# Patient Record
Sex: MALE | Race: BLACK OR AFRICAN AMERICAN | NOT HISPANIC OR LATINO | Employment: FULL TIME | URBAN - METROPOLITAN AREA
[De-identification: names, ages, dates, MRNs, and addresses within clinical notes are randomized per-mention and may not be internally consistent; named-entity substitution may affect disease eponyms.]

---

## 2017-01-03 ENCOUNTER — LAB VISIT (OUTPATIENT)
Dept: LAB | Facility: HOSPITAL | Age: 52
End: 2017-01-03
Attending: FAMILY MEDICINE
Payer: COMMERCIAL

## 2017-01-03 ENCOUNTER — OFFICE VISIT (OUTPATIENT)
Dept: FAMILY MEDICINE | Facility: CLINIC | Age: 52
End: 2017-01-03
Payer: COMMERCIAL

## 2017-01-03 VITALS
HEART RATE: 63 BPM | WEIGHT: 315 LBS | HEIGHT: 78 IN | DIASTOLIC BLOOD PRESSURE: 70 MMHG | RESPIRATION RATE: 18 BRPM | SYSTOLIC BLOOD PRESSURE: 138 MMHG | BODY MASS INDEX: 36.45 KG/M2 | OXYGEN SATURATION: 98 % | TEMPERATURE: 97 F

## 2017-01-03 DIAGNOSIS — B35.1 TOENAIL FUNGUS: ICD-10-CM

## 2017-01-03 DIAGNOSIS — I10 ESSENTIAL HYPERTENSION: ICD-10-CM

## 2017-01-03 DIAGNOSIS — J40 SINOBRONCHITIS: Primary | ICD-10-CM

## 2017-01-03 DIAGNOSIS — H65.111 ACUTE ALLERGIC OTITIS MEDIA OF RIGHT EAR, RECURRENCE NOT SPECIFIED: ICD-10-CM

## 2017-01-03 DIAGNOSIS — J32.9 SINOBRONCHITIS: Primary | ICD-10-CM

## 2017-01-03 PROCEDURE — 99999 PR PBB SHADOW E&M-EST. PATIENT-LVL III: CPT | Mod: PBBFAC,,, | Performed by: FAMILY MEDICINE

## 2017-01-03 PROCEDURE — 3075F SYST BP GE 130 - 139MM HG: CPT | Mod: S$GLB,,, | Performed by: FAMILY MEDICINE

## 2017-01-03 PROCEDURE — 96372 THER/PROPH/DIAG INJ SC/IM: CPT | Mod: S$GLB,,, | Performed by: FAMILY MEDICINE

## 2017-01-03 PROCEDURE — 36415 COLL VENOUS BLD VENIPUNCTURE: CPT | Mod: PO

## 2017-01-03 PROCEDURE — 84450 TRANSFERASE (AST) (SGOT): CPT

## 2017-01-03 PROCEDURE — 99214 OFFICE O/P EST MOD 30 MIN: CPT | Mod: 25,S$GLB,, | Performed by: FAMILY MEDICINE

## 2017-01-03 PROCEDURE — 1159F MED LIST DOCD IN RCRD: CPT | Mod: S$GLB,,, | Performed by: FAMILY MEDICINE

## 2017-01-03 PROCEDURE — 3078F DIAST BP <80 MM HG: CPT | Mod: S$GLB,,, | Performed by: FAMILY MEDICINE

## 2017-01-03 PROCEDURE — 84460 ALANINE AMINO (ALT) (SGPT): CPT

## 2017-01-03 RX ORDER — AMOXICILLIN AND CLAVULANATE POTASSIUM 875; 125 MG/1; MG/1
1 TABLET, FILM COATED ORAL 2 TIMES DAILY
Qty: 20 TABLET | Refills: 0 | Status: SHIPPED | OUTPATIENT
Start: 2017-01-03 | End: 2017-01-13

## 2017-01-03 RX ORDER — BENZONATATE 100 MG/1
100 CAPSULE ORAL 3 TIMES DAILY PRN
Qty: 30 CAPSULE | Refills: 0 | Status: SHIPPED | OUTPATIENT
Start: 2017-01-03 | End: 2017-01-13

## 2017-01-03 RX ORDER — TERBINAFINE HYDROCHLORIDE 250 MG/1
250 TABLET ORAL DAILY
Qty: 30 TABLET | Refills: 2 | Status: SHIPPED | OUTPATIENT
Start: 2017-01-03 | End: 2017-02-02

## 2017-01-03 RX ORDER — BETAMETHASONE SODIUM PHOSPHATE AND BETAMETHASONE ACETATE 3; 3 MG/ML; MG/ML
12 INJECTION, SUSPENSION INTRA-ARTICULAR; INTRALESIONAL; INTRAMUSCULAR; SOFT TISSUE
Status: COMPLETED | OUTPATIENT
Start: 2017-01-03 | End: 2017-01-03

## 2017-01-03 RX ORDER — FLUTICASONE PROPIONATE 50 MCG
2 SPRAY, SUSPENSION (ML) NASAL DAILY PRN
Qty: 16 G | Refills: 1 | Status: SHIPPED | OUTPATIENT
Start: 2017-01-03 | End: 2017-03-08 | Stop reason: SDUPTHER

## 2017-01-03 RX ADMIN — BETAMETHASONE SODIUM PHOSPHATE AND BETAMETHASONE ACETATE 12 MG: 3; 3 INJECTION, SUSPENSION INTRA-ARTICULAR; INTRALESIONAL; INTRAMUSCULAR; SOFT TISSUE at 04:01

## 2017-01-03 NOTE — MR AVS SNAPSHOT
[facility]  [address]  [place] [address]  Phone: [phone]                  Reggie Sawant   1/3/2017 4:00 PM   Office Visit    Description:  Male : 1965   Provider:  Irene Sanchez MD   Department:  [facility]           Reason for Visit     URI           Diagnoses this Visit        Comments    Sinobronchitis    -  Primary     Essential hypertension         Toenail fungus                To Do List           Future Appointments        Provider Department Dept Phone    2017 9:00 AM Elizabeth Lejeune, NP [facility] [phone]    3/14/2017 8:00 AM Irene Sanchez MD [facility] [phone]      Goals (5 Years of Data)     None       These Medications        Disp Refills Start End    amoxicillin-clavulanate 875-125mg (AUGMENTIN) 875-125 mg per tablet 20 tablet 0 1/3/2017 2017    Take 1 tablet by mouth 2 (two) times daily. - Oral    Pharmacy: [place] [address] [address] Ph #: [phone]       benzonatate (TESSALON) 100 MG capsule 30 capsule 0 1/3/2017 2017    Take 1 capsule (100 mg total) by mouth 3 (three) times daily as needed. - Oral    Pharmacy: [facility] [place] [address] [address] Ph #: [phone]       fluticasone (FLONASE) 50 mcg/actuation nasal spray 16 g 1 1/3/2017     2 sprays by Each Nare route daily as needed. - Each Nare    Pharmacy: [address] Ph #: [phone]       terbinafine HCl (LAMISIL) 250 mg tablet 30 tablet 2 1/3/2017 2017    Take 1 tablet (250 mg total) by mouth once daily. - Oral    Pharmacy: [facility] [address] Ph #: [phone]         [facility]     [facility] -   Assistance  Registered nurses in the [facility] provide clinical advisement, health education, appointment booking, and other advisory services.  Call for this free service at [phone].             Medications           Message regarding Medications     Verify the changes and/or additions to your medication regime listed below are the same as discussed with your clinician today.  If any of these changes or additions are incorrect, please notify your healthcare provider.        START taking these NEW medications        Refills    amoxicillin-clavulanate 875-125mg (AUGMENTIN) 875-125 mg per tablet 0    Sig: Take 1 tablet by mouth 2 (two) times daily.    Class: Normal    Route: Oral    benzonatate (TESSALON) 100 MG capsule 0    Sig: Take 1 capsule (100 mg total) by mouth 3 (three) times daily as needed.    Class: Normal    Route: Oral    fluticasone (FLONASE) 50 mcg/actuation nasal spray 1    Si sprays by Each Nare route daily as needed.    Class: Normal    Route: Each Nare      These medications were administered today        Dose Freq    betamethasone acetate-betamethasone sodium phosphate injection 12 mg 12 mg [facility] 1 time    Sig: Inject 2 mLs (12 mg total) into the muscle one time.    Class: Normal    Route: Intramuscular      CHANGE how you are taking these medications     Start Taking Instead of    terbinafine HCl (LAMISIL) 250 mg tablet terbinafine (LAMISIL) 250 mg tablet    Dosage:  Take 1 tablet (250 mg total) by mouth once daily. Dosage:  Take 1 tablet (250 mg total) by mouth once daily.    Reason for Change:  Reorder            Verify that the below list of medications is an accurate representation of the medications you are currently taking.  If none reported, the list may be blank. If incorrect, please contact your healthcare provider. Carry this list with you in case of emergency.           Current Medications     amlodipine-benazepril (LOTREL) 10-40 mg per capsule TAKE ONE  CAPSULE BY MOUTH ONCE DAILY    aspirin (ASPIRIN LOW DOSE) 81 MG EC tablet Take 1 tablet by mouth Daily.    chlorthalidone (HYGROTEN) 50 MG Tab Take 1 tablet (50 mg total) by mouth once daily.    meloxicam (MOBIC) 15 MG tablet TAKE 1 TABLET BY MOUTH ONCE DAILY    metoprolol succinate (TOPROL-XL) 100 MG 24 hr tablet TAKE 1 TABLET BY MOUTH ONCE DAILY FOR BLOOD PRESSURE    multivitamin (ONE DAILY MULTIVITAMIN) per tablet Take 1 tablet by mouth once daily.    naproxen (NAPROSYN) 500 MG tablet Take 1 tablet (500 mg total) by mouth 2 (two) times daily as needed.    potassium chloride SA (K-DUR,KLOR-CON) 20 MEQ tablet Take 1 tablet (20 mEq total) by mouth once daily.    pravastatin (PRAVACHOL) 20 MG tablet TAKE 1 TABLET BY MOUTH ONCE DAILY    tadalafil (CIALIS) 20 MG Tab TAKE 1 TABLET BY MOUTH AS NEEDED    testosterone cypionate (DEPOTESTOTERONE CYPIONATE) 200 mg/mL injection INJECT 1 milliliter INTRAMUSCULARLY EVERY 14 days. PATIENT NEEDS TO BE SEEN FOR FURTHER REFILLS    trazodone (DESYREL) 50 MG tablet Take 1 tablet (50 mg total) by mouth every evening.    triamterene-hydrochlorothiazide 37.5-25 mg (DYAZIDE) 37.5-25 mg per capsule Take 1 capsule by mouth daily as needed (Leg swelling).    amoxicillin-clavulanate 875-125mg (AUGMENTIN) 875-125 mg per tablet Take 1 tablet by mouth 2 (two) times daily.    benzonatate (TESSALON) 100 MG capsule Take 1 capsule (100 mg total) by mouth 3 (three) times daily as needed.    epinephrine (EPIPEN JR) 0.15 mg/0.3 mL (1:2,000) pen injection Inject 0.3 mLs (0.15 mg total) into the muscle as needed for Anaphylaxis.    fluticasone (FLONASE) 50 mcg/actuation nasal spray 2 sprays by Each Nare route daily as needed.    terbinafine HCl (LAMISIL) 250 mg tablet Take 1 tablet (250 mg total) by mouth once daily.           Clinical Reference Information           Vital Signs - Last Recorded  Most recent update: 1/3/2017  4:23 PM by Angi Nunez LPN    BP Pulse Temp Resp Ht Wt    138/70 63  "97 °F (36.1 °C) (Tympanic) 18 6' 6" (1.981 m) (!) 164.3 kg (362 lb 3.5 oz)    SpO2 BMI             98% 41.86 kg/m2         Blood Pressure          Most Recent Value    BP  138/70      Allergies as of 1/3/2017     No Known Allergies      Immunizations Administered on Date of Encounter - 1/3/2017     None      Orders Placed During Today's Visit     Future Labs/Procedures Expected by Expires    ALT (SGPT)  1/3/2017 3/4/2018    AST (SGOT)  1/3/2017 3/4/2018      Administrations This Visit     betamethasone acetate-betamethasone sodium phosphate injection 12 mg     Admin Date Action Dose Route Administered By             01/03/2017 Given 12 mg Intramuscular Angi Nunez LPN                      Instructions    Please correspond with Dr. Sanchez via My Chart for results.    Thanks.       "

## 2017-01-03 NOTE — PROGRESS NOTES
Subjective:       Patient ID: Reggie Sawant is a 51 y.o. male.    Chief Complaint: URI    HPI Comments: Mr. Sawant, a nonsmoker, comes in today with complaint of having cold symptoms for 7 days.  He reports having fatigue, chest congestion, body aches, bloody nose, postnasal drip, runny nose, sore throat, red eyes with dry cough, loose stools but denies having fever, chills, appetite change, headache, sinus pressure, chest pain, palpitations, sneezing, ocular symptoms, abdominal pain, nausea, vomiting.  He states he has been drinking hot tea with lemon and honey with help but also states he took TheraFlu at the start of his symptoms without help.    He requests Lamisil for toenail fungus.  He states he has used Lamisil in the past (2014) with help but states fungus reoccurs.  He has no history of significant liver disease.    Current Outpatient Prescriptions:  amlodipine-benazepril (LOTREL) 10-40 mg per capsule, TAKE ONE CAPSULE BY MOUTH ONCE DAILY  aspirin (ASPIRIN LOW DOSE) 81 MG EC tablet, Take 1 tablet by mouth Daily.  chlorthalidone (HYGROTEN) 50 MG Tab, Take 1 tablet (50 mg total) by mouth once daily.  meloxicam (MOBIC) 15 MG tablet, TAKE 1 TABLET BY MOUTH ONCE DAILY  metoprolol succinate (TOPROL-XL) 100 MG 24 hr tablet, TAKE 1 TABLET BY MOUTH ONCE DAILY FOR BLOOD PRESSURE  multivitamin (ONE DAILY MULTIVITAMIN) per tablet, Take 1 tablet by mouth once daily.  naproxen (NAPROSYN) 500 MG tablet, Take 1 tablet (500 mg total) by mouth 2 (two) times daily as needed.  potassium chloride SA (K-DUR,KLOR-CON) 20 MEQ tablet, Take 1 tablet (20 mEq total) by mouth once daily.  pravastatin (PRAVACHOL) 20 MG tablet, TAKE 1 TABLET BY MOUTH ONCE DAILY  tadalafil (CIALIS) 20 MG Tab, TAKE 1 TABLET BY MOUTH AS NEEDED  testosterone cypionate (DEPOTESTOTERONE CYPIONATE) 200 mg/mL injection, INJECT 1 milliliter INTRAMUSCULARLY EVERY 14 days. PATIENT NEEDS TO BE SEEN FOR FURTHER REFILLS  trazodone (DESYREL) 50 MG tablet, Take 1  tablet (50 mg total) by mouth every evening.  triamterene-hydrochlorothiazide 37.5-25 mg (DYAZIDE) 37.5-25 mg per capsule, Take 1 capsule by mouth daily as needed (Leg swelling).  epinephrine (EPIPEN JR) 0.15 mg/0.3 mL (1:2,000) pen injection, Inject 0.3 mLs (0.15 mg total) into the muscle as needed for Anaphylaxis.        Review of Systems   Constitutional: Positive for fatigue. Negative for appetite change, chills and fever.   HENT: Positive for congestion, nosebleeds, postnasal drip, rhinorrhea and sore throat. Negative for sinus pressure and sneezing.    Eyes: Positive for redness. Negative for pain, discharge and itching.   Respiratory: Positive for cough. Negative for shortness of breath and wheezing.    Cardiovascular: Negative for chest pain and palpitations.   Gastrointestinal: Positive for diarrhea. Negative for abdominal pain, nausea and vomiting.   Musculoskeletal: Positive for myalgias.   Skin:        See history of present illness.   Neurological: Negative for headaches.       Objective:      Physical Exam   Constitutional: He is oriented to person, place, and time. He appears well-developed and well-nourished. No distress.   Pleasant.   HENT:   Head: Normocephalic and atraumatic.   Right Ear: External ear normal.   Left Ear: External ear normal.   Nose: Nose normal.   Mouth/Throat: Oropharynx is clear and moist. No oropharyngeal exudate.   Non tender sinuses. Right TM red, retracted, dull without drainage noted.  Left TM shiny and clear.  Nasal mucosa congested, pink without drainage noted.   Eyes: Conjunctivae and EOM are normal. Pupils are equal, round, and reactive to light. Right eye exhibits no discharge. Left eye exhibits no discharge.   He wears glasses.   Neck: Normal range of motion. Neck supple. No thyromegaly present.   Cardiovascular: Normal rate, regular rhythm and normal heart sounds.    No murmur heard.  Pulmonary/Chest: Effort normal and breath sounds normal. No respiratory distress.  He has no wheezes.   Abdominal: Soft. Bowel sounds are normal. He exhibits no distension and no mass. There is no tenderness. There is no rebound and no guarding.   Musculoskeletal: Normal range of motion. He exhibits no edema or tenderness.   He is ambulatory without problems.   Lymphadenopathy:     He has no cervical adenopathy.   Neurological: He is alert and oriented to person, place, and time.   Skin: He is not diaphoretic.   Toes not examined today.   Psychiatric: He has a normal mood and affect. His behavior is normal. Judgment and thought content normal.   Vitals reviewed.      Assessment:       1. Sinobronchitis    2. Acute allergic otitis media of right ear, recurrence not specified    3. Essential hypertension    4. Toenail fungus        Plan:       1.  Augmentin 875 mg twice daily for 10 days.  2.  Celestone 12 mg IM x 1 today.  3.  Tessalon Perles 100 mg every 8 hours prn cough, #30, 0 refill.  4.  Flonase 2 sprays each nostril daily prn, #1, 1 refill.    5.  Continue current medications, follow low sodium, low cholesterol, low carb diet, daily walks.  6.  Labs: ALT, AST.  Patient advised to correspond via My Chart for results.  7.  Okay for Lamisil 250 mg daily for 3 months if ALT/AST okay.  8.  Keep 3/14/2017 scheduled physical with me.

## 2017-01-04 LAB
ALT SERPL W/O P-5'-P-CCNC: 39 U/L
AST SERPL-CCNC: 42 U/L

## 2017-01-06 ENCOUNTER — PATIENT MESSAGE (OUTPATIENT)
Dept: FAMILY MEDICINE | Facility: CLINIC | Age: 52
End: 2017-01-06

## 2017-01-10 DIAGNOSIS — R60.0 BILATERAL LOWER EXTREMITY EDEMA: ICD-10-CM

## 2017-01-10 RX ORDER — TRIAMTERENE AND HYDROCHLOROTHIAZIDE 37.5; 25 MG/1; MG/1
CAPSULE ORAL
Qty: 30 CAPSULE | Refills: 5 | Status: SHIPPED | OUTPATIENT
Start: 2017-01-10 | End: 2017-05-11 | Stop reason: SDUPTHER

## 2017-01-26 RX ORDER — PRAVASTATIN SODIUM 20 MG/1
TABLET ORAL
Qty: 30 TABLET | Refills: 5 | Status: SHIPPED | OUTPATIENT
Start: 2017-01-26 | End: 2017-03-27 | Stop reason: SDUPTHER

## 2017-01-26 RX ORDER — AMLODIPINE AND BENAZEPRIL HYDROCHLORIDE 10; 40 MG/1; MG/1
CAPSULE ORAL
Qty: 30 CAPSULE | Refills: 5 | Status: SHIPPED | OUTPATIENT
Start: 2017-01-26 | End: 2017-02-03 | Stop reason: SDUPTHER

## 2017-01-31 ENCOUNTER — PATIENT MESSAGE (OUTPATIENT)
Dept: FAMILY MEDICINE | Facility: CLINIC | Age: 52
End: 2017-01-31

## 2017-02-01 RX ORDER — SILDENAFIL 50 MG/1
50 TABLET, FILM COATED ORAL DAILY PRN
Qty: 30 TABLET | Refills: 0 | Status: SHIPPED | OUTPATIENT
Start: 2017-02-01 | End: 2017-09-20

## 2017-02-03 ENCOUNTER — OFFICE VISIT (OUTPATIENT)
Dept: PULMONOLOGY | Facility: CLINIC | Age: 52
End: 2017-02-03
Payer: COMMERCIAL

## 2017-02-03 VITALS
BODY MASS INDEX: 36.45 KG/M2 | OXYGEN SATURATION: 99 % | RESPIRATION RATE: 18 BRPM | HEIGHT: 78 IN | DIASTOLIC BLOOD PRESSURE: 74 MMHG | HEART RATE: 45 BPM | SYSTOLIC BLOOD PRESSURE: 112 MMHG | WEIGHT: 315 LBS

## 2017-02-03 DIAGNOSIS — G47.33 OSA (OBSTRUCTIVE SLEEP APNEA): Primary | ICD-10-CM

## 2017-02-03 PROCEDURE — 99214 OFFICE O/P EST MOD 30 MIN: CPT | Mod: S$GLB,,, | Performed by: NURSE PRACTITIONER

## 2017-02-03 PROCEDURE — 3074F SYST BP LT 130 MM HG: CPT | Mod: S$GLB,,, | Performed by: NURSE PRACTITIONER

## 2017-02-03 PROCEDURE — 99999 PR PBB SHADOW E&M-EST. PATIENT-LVL IV: CPT | Mod: PBBFAC,,, | Performed by: NURSE PRACTITIONER

## 2017-02-03 PROCEDURE — 3078F DIAST BP <80 MM HG: CPT | Mod: S$GLB,,, | Performed by: NURSE PRACTITIONER

## 2017-02-03 RX ORDER — TADALAFIL 20 MG/1
TABLET, FILM COATED ORAL
COMMUNITY
Start: 2016-12-30 | End: 2017-05-29 | Stop reason: SDUPTHER

## 2017-02-03 NOTE — PROGRESS NOTES
"Subjective:      Patient ID: Reggie Sawant is a 51 y.o. male.    Chief Complaint: Sleep Apnea    HPI Comments: Presents to office for review of AutoPAP therapy. This is replacement. Patient states improved symptoms with use of AutoPAP. Sleeping more soundly. Waking up feeling more refreshed. Improved daytime sleepiness. Patient states he is benefiting from use of the AutoPAP.         Visit Vitals    /74    Pulse (!) 45    Resp 18    Ht 6' 6" (1.981 m)    Wt (!) 163.5 kg (360 lb 7.2 oz)    SpO2 99%    BMI 41.65 kg/m2     Body mass index is 41.65 kg/(m^2).    Review of Systems   Constitutional: Negative.    HENT: Negative.    Respiratory: Negative.    Cardiovascular: Negative.    Musculoskeletal: Negative.    Gastrointestinal: Negative.    Neurological: Negative.    Psychiatric/Behavioral: Negative.      Objective:      Physical Exam   Constitutional: He is oriented to person, place, and time. He appears well-developed and well-nourished.   Obese   HENT:   Head: Normocephalic and atraumatic.   Nose: Nose normal.   Mouth/Throat: Uvula is midline and oropharynx is clear and moist.   Neck: Trachea normal and normal range of motion. Neck supple. No thyroid mass and no thyromegaly present.   Cardiovascular: Normal rate, regular rhythm and normal heart sounds.    Pulmonary/Chest: Effort normal and breath sounds normal. He has no wheezes. He has no rhonchi. He has no rales. Chest wall is not dull to percussion.   Abdominal: Soft. He exhibits no mass. There is no hepatosplenomegaly or splenomegaly. There is no tenderness.   Musculoskeletal: Normal range of motion. He exhibits no edema.   Neurological: He is alert and oriented to person, place, and time.   Skin: Skin is warm and dry.   Psychiatric: He has a normal mood and affect.     Personal Diagnostic Review  Autopap download: Patient wears on average 5 hrs and 30 minutes. Greater than 4 hrs 86.7 % of the time. 90% percentile pressure 11.8 with AHI 2.8 " events/hr      Assessment:       1. DEEPTHI (obstructive sleep apnea)        Outpatient Encounter Prescriptions as of 2/3/2017   Medication Sig Dispense Refill    amlodipine-benazepril (LOTREL) 10-40 mg per capsule TAKE ONE CAPSULE BY MOUTH ONCE DAILY 30 capsule 5    aspirin (ASPIRIN LOW DOSE) 81 MG EC tablet Take 1 tablet by mouth Daily.      chlorthalidone (HYGROTEN) 50 MG Tab Take 1 tablet (50 mg total) by mouth once daily. 30 tablet 6    CIALIS 20 mg Tab       epinephrine (EPIPEN JR) 0.15 mg/0.3 mL (1:2,000) pen injection Inject 0.3 mLs (0.15 mg total) into the muscle as needed for Anaphylaxis. 2 each 6    fluticasone (FLONASE) 50 mcg/actuation nasal spray 2 sprays by Each Nare route daily as needed. 16 g 1    meloxicam (MOBIC) 15 MG tablet TAKE 1 TABLET BY MOUTH ONCE DAILY 30 tablet 0    metoprolol succinate (TOPROL-XL) 100 MG 24 hr tablet TAKE 1 TABLET BY MOUTH ONCE DAILY FOR BLOOD PRESSURE 34 tablet 5    multivitamin (ONE DAILY MULTIVITAMIN) per tablet Take 1 tablet by mouth once daily.      naproxen (NAPROSYN) 500 MG tablet Take 1 tablet (500 mg total) by mouth 2 (two) times daily as needed. 60 tablet 2    potassium chloride SA (K-DUR,KLOR-CON) 20 MEQ tablet Take 1 tablet (20 mEq total) by mouth once daily. 30 tablet 6    pravastatin (PRAVACHOL) 20 MG tablet TAKE 1 TABLET BY MOUTH ONCE DAILY 30 tablet 5    pravastatin (PRAVACHOL) 20 MG tablet TAKE 1 TABLET BY MOUTH ONCE DAILY 30 tablet 5    sildenafil (VIAGRA) 50 MG tablet Take 1 tablet (50 mg total) by mouth daily as needed for Erectile Dysfunction. 30 tablet 0    [] terbinafine HCl (LAMISIL) 250 mg tablet Take 1 tablet (250 mg total) by mouth once daily. 30 tablet 2    testosterone cypionate (DEPOTESTOTERONE CYPIONATE) 200 mg/mL injection INJECT 1 milliliter INTRAMUSCULARLY EVERY 14 days. PATIENT NEEDS TO BE SEEN FOR FURTHER REFILLS 12 mL 0    trazodone (DESYREL) 50 MG tablet Take 1 tablet (50 mg total) by mouth every evening. 30 tablet  11    triamterene-hydrochlorothiazide 37.5-25 mg (DYAZIDE) 37.5-25 mg per capsule TAKE 1 CAPSULE BY MOUTH DAILY AS NEEDED( LEG SWELLING) 30 capsule 5    [DISCONTINUED] amlodipine-benazepril (LOTREL) 10-40 mg per capsule TAKE ONE CAPSULE BY MOUTH ONCE DAILY 30 capsule 5     No facility-administered encounter medications on file as of 2/3/2017.      Orders Placed This Encounter   Procedures    CPAP/BIPAP SUPPLIES     Order Specific Question:   Type of mask:     Answer:   Nasal     Comments:   or FFM     Order Specific Question:   Headgear?     Answer:   Yes     Order Specific Question:   Tubing?     Answer:   Yes     Order Specific Question:   Humidifier chamber?     Answer:   Yes     Order Specific Question:   Chin strap?     Answer:   Yes     Order Specific Question:   Filters?     Answer:   Yes     Order Specific Question:   Length of need (1-99 months):     Answer:   99     Plan:      Doing well on PAP settings. Patient is compliant. Follow up in 12 months with PAP data download or call earlier if any problems.

## 2017-03-09 RX ORDER — FLUTICASONE PROPIONATE 50 MCG
SPRAY, SUSPENSION (ML) NASAL
Qty: 16 G | Refills: 5 | Status: SHIPPED | OUTPATIENT
Start: 2017-03-09 | End: 2018-05-07 | Stop reason: SDUPTHER

## 2017-03-11 ENCOUNTER — PATIENT MESSAGE (OUTPATIENT)
Dept: FAMILY MEDICINE | Facility: CLINIC | Age: 52
End: 2017-03-11

## 2017-03-27 ENCOUNTER — OFFICE VISIT (OUTPATIENT)
Dept: FAMILY MEDICINE | Facility: CLINIC | Age: 52
End: 2017-03-27
Payer: COMMERCIAL

## 2017-03-27 VITALS
OXYGEN SATURATION: 98 % | SYSTOLIC BLOOD PRESSURE: 132 MMHG | RESPIRATION RATE: 18 BRPM | BODY MASS INDEX: 36.45 KG/M2 | TEMPERATURE: 97 F | DIASTOLIC BLOOD PRESSURE: 68 MMHG | WEIGHT: 315 LBS | HEIGHT: 78 IN | HEART RATE: 65 BPM

## 2017-03-27 DIAGNOSIS — E78.5 HYPERLIPIDEMIA, UNSPECIFIED HYPERLIPIDEMIA TYPE: ICD-10-CM

## 2017-03-27 DIAGNOSIS — Z00.00 ANNUAL PHYSICAL EXAM: Primary | ICD-10-CM

## 2017-03-27 DIAGNOSIS — N52.9 ERECTILE DYSFUNCTION, UNSPECIFIED ERECTILE DYSFUNCTION TYPE: ICD-10-CM

## 2017-03-27 DIAGNOSIS — E29.1 HYPOGONADISM MALE: ICD-10-CM

## 2017-03-27 DIAGNOSIS — E66.01 MORBID OBESITY WITH BMI OF 40.0-44.9, ADULT: ICD-10-CM

## 2017-03-27 DIAGNOSIS — G47.30 SLEEP APNEA, UNSPECIFIED TYPE: ICD-10-CM

## 2017-03-27 DIAGNOSIS — I10 ESSENTIAL HYPERTENSION: ICD-10-CM

## 2017-03-27 PROCEDURE — 99999 PR PBB SHADOW E&M-EST. PATIENT-LVL III: CPT | Mod: PBBFAC,,, | Performed by: FAMILY MEDICINE

## 2017-03-27 PROCEDURE — 3075F SYST BP GE 130 - 139MM HG: CPT | Mod: S$GLB,,, | Performed by: FAMILY MEDICINE

## 2017-03-27 PROCEDURE — 99396 PREV VISIT EST AGE 40-64: CPT | Mod: S$GLB,,, | Performed by: FAMILY MEDICINE

## 2017-03-27 PROCEDURE — 3078F DIAST BP <80 MM HG: CPT | Mod: S$GLB,,, | Performed by: FAMILY MEDICINE

## 2017-03-27 RX ORDER — TESTOSTERONE CYPIONATE 200 MG/ML
INJECTION, SOLUTION INTRAMUSCULAR
Qty: 2 ML | Refills: 5 | Status: SHIPPED | OUTPATIENT
Start: 2017-03-27 | End: 2017-06-01 | Stop reason: SDUPTHER

## 2017-03-27 NOTE — MR AVS SNAPSHOT
[facility]  [address] [facility] [address]  Phone: [phone]                  Reggie Sawant   3/27/2017 9:00 AM   Office Visit    Description:  Male : 1965   Provider:  Irene Sanchez MD   Department:  [facility]           Reason for Visit     Annual Exam           Diagnoses this Visit        Comments    Annual physical exam    -  Primary     Essential hypertension         Hyperlipidemia, unspecified hyperlipidemia type         Hypogonadism male         Sleep apnea, unspecified type         Erectile dysfunction, unspecified erectile dysfunction type         Morbid obesity with BMI of 40.0-44.9, adult                To Do List           Future Appointments        Provider Department Dept Phone    3/30/2017 8:10 AM SPECIMEN, [address] [facility] [phone]    3/30/2017 8:20 AM LABORATORY, [address] [facility] [phone]    2017 9:30 AM Irene Sanchez MD [facility] [phone]      Goals (5 Years of Data)     None      Follow-Up and Disposition     Return in about 6 months (around 2017) for blood pressure follow up.       These Medications        Disp Refills Start End    testosterone cypionate (DEPOTESTOTERONE CYPIONATE) 200 mg/mL injection 2 mL 5 3/27/2017     INJECT 1 milliliter INTRAMUSCULARLY EVERY 14 days.    Pharmacy: [facility] [address] [place] [address] AT [address] Ph #: [phone]         [facility]     [facility] -  Assistance  Registered nurses in the [facility] provide clinical advisement, health education, appointment booking, and other advisory services.  Call for this free service at [phone].             Medications           Message regarding Medications     Verify the changes and/or additions to your medication regime listed below are the same  as discussed with your clinician today.  If any of these changes or additions are incorrect, please notify your healthcare provider.        CHANGE how you are taking these medications     Start Taking Instead of    testosterone cypionate (DEPOTESTOTERONE CYPIONATE) 200 mg/mL injection testosterone cypionate (DEPOTESTOTERONE CYPIONATE) 200 mg/mL injection    Dosage:  INJECT 1 milliliter INTRAMUSCULARLY EVERY 14 days. Dosage:  INJECT 1 milliliter INTRAMUSCULARLY EVERY 14 days. PATIENT NEEDS TO BE SEEN FOR FURTHER REFILLS    Reason for Change:  Reorder            Verify that the below list of medications is an accurate representation of the medications you are currently taking.  If none reported, the list may be blank. If incorrect, please contact your healthcare provider. Carry this list with you in case of emergency.           Current Medications     amlodipine-benazepril (LOTREL) 10-40 mg per capsule TAKE ONE CAPSULE BY MOUTH ONCE DAILY    aspirin (ASPIRIN LOW DOSE) 81 MG EC tablet Take 1 tablet by mouth Daily.    chlorthalidone (HYGROTEN) 50 MG Tab Take 1 tablet (50 mg total) by mouth once daily.    CIALIS 20 mg Tab     fluticasone (FLONASE) 50 mcg/actuation nasal spray SHAKE LIQUID AND USE 2 SPRAYS IN EACH NOSTRIL DAILY AS NEEDED    meloxicam (MOBIC) 15 MG tablet TAKE 1 TABLET BY MOUTH ONCE DAILY    metoprolol succinate (TOPROL-XL) 100 MG 24 hr tablet TAKE 1 TABLET BY MOUTH ONCE DAILY FOR BLOOD PRESSURE    multivitamin (ONE DAILY MULTIVITAMIN) per tablet Take 1 tablet by mouth once daily.    naproxen (NAPROSYN) 500 MG tablet Take 1 tablet (500 mg total) by mouth 2 (two) times daily as needed.    potassium chloride SA (K-DUR,KLOR-CON) 20 MEQ tablet Take 1 tablet (20 mEq total) by mouth once daily.    pravastatin (PRAVACHOL) 20 MG tablet TAKE 1 TABLET BY MOUTH ONCE DAILY    sildenafil (VIAGRA) 50 MG tablet Take 1 tablet (50 mg total) by mouth daily as needed for Erectile Dysfunction.    testosterone cypionate  "(DEPOTESTOTERONE CYPIONATE) 200 mg/mL injection INJECT 1 milliliter INTRAMUSCULARLY EVERY 14 days.    trazodone (DESYREL) 50 MG tablet Take 1 tablet (50 mg total) by mouth every evening.    triamterene-hydrochlorothiazide 37.5-25 mg (DYAZIDE) 37.5-25 mg per capsule TAKE 1 CAPSULE BY MOUTH DAILY AS NEEDED( LEG SWELLING)    epinephrine (EPIPEN JR) 0.15 mg/0.3 mL (1:2,000) pen injection Inject 0.3 mLs (0.15 mg total) into the muscle as needed for Anaphylaxis.           Clinical Reference Information           Your Vitals Were     BP Pulse Temp Resp    132/68 (BP Location: Right arm, Patient Position: Sitting, BP Method: Manual) 65 96.5 °F (35.8 °C) (Tympanic) 18    Height Weight SpO2 BMI    6' 6.5" (1.994 m) 160 kg (352 lb 11.8 oz) 98% 40.25 kg/m2      Blood Pressure          Most Recent Value    BP  132/68      Allergies as of 3/27/2017     Shrimp      Immunizations Administered on Date of Encounter - 3/27/2017     None      Orders Placed During Today's Visit     Future Labs/Procedures Expected by Expires    CBC auto differential  3/27/2017 5/26/2018    Comprehensive metabolic panel  3/27/2017 5/26/2018    Lipid panel  3/27/2017 5/26/2018    T3, free  3/27/2017 5/26/2018    T4, free  3/27/2017 5/26/2018    Testosterone, free  3/27/2017 5/26/2018    Testosterone  3/27/2017 5/26/2018    TSH  3/27/2017 5/26/2018    Urinalysis  3/27/2017 5/26/2018      Instructions    Please correspond with Dr. Sanchez via My Chart for results.     Thanks       Language Assistance Services     ATTENTION: Language assistance services are available, free of charge. Please call [phone].      ATENCIÓN: Si nataliala tony, tiene a grijalva disposición servicios gratuitos de asistencia lingüística. Llame al [phone].     CHÚ Ý: N?u b?n nói Ti?ng Vi?t, có các d?ch v? h? tr? ngôn ng? mi?n phí dành cho b?n. G?i s? [phone].         [facility] complies with applicable Federal civil rights laws and does not discriminate " on the basis of race, color, national origin, age, disability, or sex.

## 2017-03-27 NOTE — PROGRESS NOTES
HISTORY OF PRESENT ILLNESS: Mr. Sawant comes in today non fasting with taking medication and without acute problems for annual wellness examination.    END OF LIFE DECISION: He has a living will but desires life-support conditionally.     Current Outpatient Prescriptions   Medication Sig    amlodipine-benazepril (LOTREL) 10-40 mg per capsule TAKE ONE CAPSULE BY MOUTH ONCE DAILY    aspirin (ASPIRIN LOW DOSE) 81 MG EC tablet Take 1 tablet by mouth Daily.    chlorthalidone (HYGROTEN) 50 MG Tab Take 1 tablet (50 mg total) by mouth once daily.    CIALIS 20 mg Tab     fluticasone (FLONASE) 50 mcg/actuation nasal spray SHAKE LIQUID AND USE 2 SPRAYS IN EACH NOSTRIL DAILY AS NEEDED    meloxicam (MOBIC) 15 MG tablet TAKE 1 TABLET BY MOUTH ONCE DAILY    metoprolol succinate (TOPROL-XL) 100 MG 24 hr tablet TAKE 1 TABLET BY MOUTH ONCE DAILY FOR BLOOD PRESSURE    multivitamin (ONE DAILY MULTIVITAMIN) per tablet Take 1 tablet by mouth once daily.    naproxen (NAPROSYN) 500 MG tablet Take 1 tablet (500 mg total) by mouth 2 (two) times daily as needed.    potassium chloride SA (K-DUR,KLOR-CON) 20 MEQ tablet Take 1 tablet (20 mEq total) by mouth once daily.    pravastatin (PRAVACHOL) 20 MG tablet TAKE 1 TABLET BY MOUTH ONCE DAILY    sildenafil (VIAGRA) 50 MG tablet Take 1 tablet (50 mg total) by mouth daily as needed for Erectile Dysfunction.    testosterone cypionate (DEPOTESTOTERONE CYPIONATE) 200 mg/mL injection INJECT 1 milliliter INTRAMUSCULARLY EVERY 14 days. PATIENT NEEDS TO BE SEEN FOR FURTHER REFILLS    trazodone (DESYREL) 50 MG tablet Take 1 tablet (50 mg total) by mouth every evening.    triamterene-hydrochlorothiazide 37.5-25 mg (DYAZIDE) 37.5-25 mg per capsule TAKE 1 CAPSULE BY MOUTH DAILY AS NEEDED( LEG SWELLING)    epinephrine (EPIPEN JR) 0.15 mg/0.3 mL (1:2,000) pen injection Inject 0.3 mLs (0.15 mg total) into the muscle as needed for Anaphylaxis.     SCREENINGS:   Cholesterol: February 17,  2016.  FFS/Colonoscopy: Never.  Prostate/PSA: March 14, 2016/February 17, 2016 - 1.0; July 1, 2016 - 1.7.  Dexa Scan: Never.  Eye Exam: 2016 per patient.  PPD: Negative in the past.  Immunizations: Tdap - January 23, 2014.  Zostavax - N./A.  Pneumovax - never.  Seasonal Flu - September 1, 2016 at work.  Hepatitis B vaccine: completed per patient.    ROS:  GENERAL: No fever, chills, fatigue or unusual weight change. Appetite normal. Weight 164.3 kg (362 lb 3.5 oz) at January 3, 2017 visit. Does not exercise. Monitors diet.  SKIN: No rashes, itching, changes in mole, color or texture of skin or easy bruising. Reports on last month of Lamisil for onychomycosis with some improvement noted since starting it on January 3, 2017.     HEAD: No headaches or recent head trauma.  EYES: No change in vision, no pain, diplopia, redness or discharge.Wears glasses.  EARS: Denies ear pain, discharge, vertigo or decreased hearing.  NOSE: No epistaxis or rhinitis. Non tender external nose.  MOUTH & THROAT: No hoarseness or change in voice. No excessive gum bleeding or mouth sores. No sore throat.  NODES: Denies swollen glands.  CHEST: Denies GOMEZ, wheezing, cough, hemoptysis or sputum production. Saw NP Lejeune with pulmonary on February 3, 2017 for DEEPTHI on CPAP surveillance with 1-year follow up advised.   CARDIOVASCULAR: Denies chest pain, No palpitations.  ABDOMEN: Denies diarrhea, constipation, nausea, vomiting, abdominal pain, or blood in stool.  GENITOURINARY: No flank pain, dysuria or hematuria.No nocturia or frequency. No lesions, pain or swelling in genital area. Performs monthly self testicular exam. Continues testosterone 200 mg IM every 2 weeks for testicular hypofunction, initially started November 2010 with frequency increased last summer; he self administers injection and followed with AUGUSTA Gutierrez for surveillance for testicular hypofunction with last visit on July 11, 2016 with recommendation to continue follow up  "with me.   ENDOCRINE: Denies diabetes, thyroid problems.  HEME/LYMPH: Denies bleeding problems.  PERIPHERAL VASCULAR: No claudication or cyanosis  MUSCULOSKELETAL: No joint stiffness, pain or swelling. No edema.  NEUROLOGIC: No history of seizures, tremors, alteration of gait or coordination.  PSYCHIATRIC: Denies mood swings, depression, anxiety, homicidal or suicidal thoughts. Denies sleep problems.    PE:   VS:  /68 (BP Location: Right arm, Patient Position: Sitting, BP Method: Manual)  Pulse 65  Temp 96.5 °F (35.8 °C) (Tympanic)   Resp 18  Ht 6' 6.5" (1.994 m)  Wt (!) 160 kg (352 lb 11.8 oz)  SpO2 98%  BMI 40.25 kg/m2  APPEARANCE: Well nourished, well developed male, obese and pleasant, alert and oriented in no acute distress.   HEAD: Non tender . Full range of motion.  EYES: PERRL, conjunctiva pink, lids no edema. He wears glasses.  EARS: External canal patent, no swelling or redness. TM's shiny and clear.  NOSE: Mucosa and turbinates pink, not swollen. No discharge  THROAT: No pharyngeal erythema or exudate. No stridor.   NECK: Supple, no mass, thyroid not enlarged. No carotid bruit.  NODES: No cervical, axillary or inguinal lymph node enlargement.  CHEST: Normal respiratory effort. Lungs clear to auscultation.  CARDIOVASCULAR: Normal S1, S2. No rubs, murmurs or gallops.No edema.Pedal pulses palpable bilaterally.  ABDOMEN: Bowel sounds present. Not distended. Soft. No tenderness, masses or organomegaly.  BREAST: Non tender, no asymmetry, nipple discharge, abnormal masses, nodules, lumps.  GENITALIA: Scrotum no lesions, masses, tenderness or swelling. No penile lesions. No hernia.  RECTAL: Sphincter tone normal, no masses palpated, prostate not enlarged, no nodules, no external hemorrhoids or anal fissures noted.   MUSCULOSKELETAL: No joint deformities or stiffness. He is ambulatory without problems.  SKIN: No rashes or suspicious lesions, normal color and turgor with improvement of discolored " toenails noted.    NEUROLOGIC: Cranial Nerves: II-XII grossly intact. DTR's: Knees, Ankles 2+ and equal bilaterally Gait & Posture: Normal gait and fine motion.  PSYCHIATRIC: Patient alert, oriented x 3. Mood/Affect normal without acute anxiety or depression noted. Judgement and insight-good as he makes appropriate decisions on today's examination.    DIAGNOSIS:    ICD-10-CM ICD-9-CM    1. Annual physical exam Z00.00 V70.0 T3, free      T4, free      Lipid panel      Comprehensive metabolic panel      Urinalysis      TSH      CBC auto differential      Testosterone, free      Testosterone   2. Essential hypertension I10 401.9    3. Hyperlipidemia, unspecified hyperlipidemia type E78.5 272.4    4. Hypogonadism male E29.1 257.2    5. Sleep apnea, unspecified type G47.30 780.57    6. Erectile dysfunction, unspecified erectile dysfunction type N52.9 607.84    7. Morbid obesity with BMI of 40.0-44.9, adult E66.01 278.01     Z68.41 V85.41        PLAN:   1. Age-appropriate counseling-appropriate low-sodium, low-cholesterol, low carbohydrate diet and exercise daily, monthly self testicular exam, weight reduction advised, annual wellness examination.  2. Continue current medications.  3. See me in 6 months for hypertension follow up.  4. Prescription refill - Testosterone cypionate 1 mL IM every 2 weeks, #2 mL, 5 refills.

## 2017-03-30 ENCOUNTER — LAB VISIT (OUTPATIENT)
Dept: LAB | Facility: HOSPITAL | Age: 52
End: 2017-03-30
Attending: FAMILY MEDICINE
Payer: COMMERCIAL

## 2017-03-30 DIAGNOSIS — Z00.00 ANNUAL PHYSICAL EXAM: ICD-10-CM

## 2017-03-30 LAB
BILIRUB UR QL STRIP: NEGATIVE
CLARITY UR REFRACT.AUTO: CLEAR
COLOR UR AUTO: YELLOW
GLUCOSE UR QL STRIP: NEGATIVE
HGB UR QL STRIP: NEGATIVE
KETONES UR QL STRIP: NEGATIVE
LEUKOCYTE ESTERASE UR QL STRIP: NEGATIVE
NITRITE UR QL STRIP: NEGATIVE
PH UR STRIP: 6 [PH] (ref 5–8)
PROT UR QL STRIP: NEGATIVE
SP GR UR STRIP: 1.02 (ref 1–1.03)
URN SPEC COLLECT METH UR: NORMAL
UROBILINOGEN UR STRIP-ACNC: NEGATIVE EU/DL

## 2017-03-30 PROCEDURE — 81003 URINALYSIS AUTO W/O SCOPE: CPT

## 2017-04-02 DIAGNOSIS — Z00.00 ANNUAL PHYSICAL EXAM: Primary | ICD-10-CM

## 2017-05-11 ENCOUNTER — PATIENT MESSAGE (OUTPATIENT)
Dept: FAMILY MEDICINE | Facility: CLINIC | Age: 52
End: 2017-05-11

## 2017-05-11 DIAGNOSIS — I10 ESSENTIAL HYPERTENSION: Chronic | ICD-10-CM

## 2017-05-11 DIAGNOSIS — R60.0 BILATERAL LOWER EXTREMITY EDEMA: ICD-10-CM

## 2017-05-11 DIAGNOSIS — M16.0 PRIMARY OSTEOARTHRITIS OF BOTH HIPS: ICD-10-CM

## 2017-05-11 RX ORDER — TRAZODONE HYDROCHLORIDE 50 MG/1
50 TABLET ORAL NIGHTLY
Qty: 90 TABLET | Refills: 1 | Status: SHIPPED | OUTPATIENT
Start: 2017-05-11 | End: 2017-05-29 | Stop reason: SDUPTHER

## 2017-05-11 RX ORDER — PRAVASTATIN SODIUM 20 MG/1
20 TABLET ORAL DAILY
Qty: 90 TABLET | Refills: 1 | Status: SHIPPED | OUTPATIENT
Start: 2017-05-11 | End: 2017-05-29 | Stop reason: SDUPTHER

## 2017-05-11 RX ORDER — TRIAMTERENE AND HYDROCHLOROTHIAZIDE 37.5; 25 MG/1; MG/1
1 CAPSULE ORAL DAILY
Qty: 90 CAPSULE | Refills: 1 | Status: SHIPPED | OUTPATIENT
Start: 2017-05-11 | End: 2017-05-19 | Stop reason: SDUPTHER

## 2017-05-11 RX ORDER — METOPROLOL SUCCINATE 100 MG/1
100 TABLET, EXTENDED RELEASE ORAL DAILY
Qty: 90 TABLET | Refills: 1 | Status: SHIPPED | OUTPATIENT
Start: 2017-05-11 | End: 2017-05-29 | Stop reason: SDUPTHER

## 2017-05-11 RX ORDER — CHLORTHALIDONE 50 MG/1
50 TABLET ORAL DAILY
Qty: 90 TABLET | Refills: 1 | Status: SHIPPED | OUTPATIENT
Start: 2017-05-11 | End: 2017-05-29 | Stop reason: SDUPTHER

## 2017-05-11 RX ORDER — MELOXICAM 15 MG/1
15 TABLET ORAL DAILY
Qty: 90 TABLET | Refills: 1 | Status: SHIPPED | OUTPATIENT
Start: 2017-05-11 | End: 2017-05-29 | Stop reason: SDUPTHER

## 2017-05-11 RX ORDER — POTASSIUM CHLORIDE 20 MEQ/1
20 TABLET, EXTENDED RELEASE ORAL DAILY
Qty: 90 TABLET | Refills: 1 | Status: SHIPPED | OUTPATIENT
Start: 2017-05-11 | End: 2017-05-29 | Stop reason: SDUPTHER

## 2017-05-11 RX ORDER — AMLODIPINE AND BENAZEPRIL HYDROCHLORIDE 10; 40 MG/1; MG/1
1 CAPSULE ORAL DAILY
Qty: 90 CAPSULE | Refills: 1 | Status: SHIPPED | OUTPATIENT
Start: 2017-05-11 | End: 2017-05-29 | Stop reason: SDUPTHER

## 2017-05-19 ENCOUNTER — PATIENT MESSAGE (OUTPATIENT)
Dept: FAMILY MEDICINE | Facility: CLINIC | Age: 52
End: 2017-05-19

## 2017-05-19 DIAGNOSIS — R60.0 BILATERAL LOWER EXTREMITY EDEMA: ICD-10-CM

## 2017-05-19 RX ORDER — TRIAMTERENE AND HYDROCHLOROTHIAZIDE 37.5; 25 MG/1; MG/1
1 CAPSULE ORAL DAILY
Qty: 90 CAPSULE | Refills: 1 | Status: SHIPPED | OUTPATIENT
Start: 2017-05-19 | End: 2017-10-04 | Stop reason: ALTCHOICE

## 2017-05-29 ENCOUNTER — PATIENT MESSAGE (OUTPATIENT)
Dept: FAMILY MEDICINE | Facility: CLINIC | Age: 52
End: 2017-05-29

## 2017-05-29 DIAGNOSIS — M16.0 PRIMARY OSTEOARTHRITIS OF BOTH HIPS: ICD-10-CM

## 2017-05-29 DIAGNOSIS — I10 ESSENTIAL HYPERTENSION: Chronic | ICD-10-CM

## 2017-05-29 RX ORDER — METOPROLOL SUCCINATE 100 MG/1
100 TABLET, EXTENDED RELEASE ORAL DAILY
Qty: 90 TABLET | Refills: 1 | Status: SHIPPED | OUTPATIENT
Start: 2017-05-29 | End: 2017-10-04 | Stop reason: SDUPTHER

## 2017-05-29 RX ORDER — TRAZODONE HYDROCHLORIDE 50 MG/1
50 TABLET ORAL NIGHTLY
Qty: 90 TABLET | Refills: 1 | Status: SHIPPED | OUTPATIENT
Start: 2017-05-29 | End: 2017-10-04 | Stop reason: SDUPTHER

## 2017-05-29 RX ORDER — POTASSIUM CHLORIDE 20 MEQ/1
20 TABLET, EXTENDED RELEASE ORAL DAILY
Qty: 90 TABLET | Refills: 1 | Status: SHIPPED | OUTPATIENT
Start: 2017-05-29 | End: 2017-12-23 | Stop reason: SDUPTHER

## 2017-05-29 RX ORDER — PRAVASTATIN SODIUM 20 MG/1
20 TABLET ORAL DAILY
Qty: 90 TABLET | Refills: 1 | Status: SHIPPED | OUTPATIENT
Start: 2017-05-29 | End: 2017-10-04 | Stop reason: SDUPTHER

## 2017-05-29 RX ORDER — CHLORTHALIDONE 50 MG/1
50 TABLET ORAL DAILY
Qty: 90 TABLET | Refills: 1 | Status: SHIPPED | OUTPATIENT
Start: 2017-05-29 | End: 2017-12-03 | Stop reason: SDUPTHER

## 2017-05-29 RX ORDER — AMLODIPINE AND BENAZEPRIL HYDROCHLORIDE 10; 40 MG/1; MG/1
1 CAPSULE ORAL DAILY
Qty: 90 CAPSULE | Refills: 1 | Status: SHIPPED | OUTPATIENT
Start: 2017-05-29 | End: 2018-02-08 | Stop reason: SDUPTHER

## 2017-05-29 RX ORDER — MELOXICAM 15 MG/1
15 TABLET ORAL DAILY
Qty: 90 TABLET | Refills: 1 | Status: SHIPPED | OUTPATIENT
Start: 2017-05-29 | End: 2021-09-17 | Stop reason: SDUPTHER

## 2017-05-29 RX ORDER — TADALAFIL 20 MG/1
20 TABLET, FILM COATED ORAL DAILY
Qty: 30 TABLET | Refills: 0 | Status: SHIPPED | OUTPATIENT
Start: 2017-05-29 | End: 2017-06-25 | Stop reason: SDUPTHER

## 2017-06-02 RX ORDER — TESTOSTERONE CYPIONATE 200 MG/ML
INJECTION, SOLUTION INTRAMUSCULAR
Qty: 2 ML | Refills: 5 | Status: SHIPPED | OUTPATIENT
Start: 2017-06-02 | End: 2018-11-23

## 2017-06-26 RX ORDER — TADALAFIL 20 MG/1
TABLET, FILM COATED ORAL
Qty: 30 TABLET | Refills: 1 | Status: SHIPPED | OUTPATIENT
Start: 2017-06-26 | End: 2017-09-06 | Stop reason: SDUPTHER

## 2017-07-24 ENCOUNTER — PATIENT MESSAGE (OUTPATIENT)
Dept: FAMILY MEDICINE | Facility: CLINIC | Age: 52
End: 2017-07-24

## 2017-08-04 ENCOUNTER — OFFICE VISIT (OUTPATIENT)
Dept: FAMILY MEDICINE | Facility: CLINIC | Age: 52
End: 2017-08-04
Payer: COMMERCIAL

## 2017-08-04 VITALS
DIASTOLIC BLOOD PRESSURE: 86 MMHG | RESPIRATION RATE: 18 BRPM | TEMPERATURE: 98 F | HEIGHT: 78 IN | SYSTOLIC BLOOD PRESSURE: 134 MMHG | OXYGEN SATURATION: 98 % | BODY MASS INDEX: 36.45 KG/M2 | HEART RATE: 66 BPM | WEIGHT: 315 LBS

## 2017-08-04 DIAGNOSIS — S16.1XXA CERVICAL STRAIN, INITIAL ENCOUNTER: Primary | ICD-10-CM

## 2017-08-04 DIAGNOSIS — S29.012A UPPER BACK STRAIN, INITIAL ENCOUNTER: ICD-10-CM

## 2017-08-04 PROCEDURE — 99213 OFFICE O/P EST LOW 20 MIN: CPT | Mod: S$GLB,,, | Performed by: FAMILY MEDICINE

## 2017-08-04 PROCEDURE — 99999 PR PBB SHADOW E&M-EST. PATIENT-LVL III: CPT | Mod: PBBFAC,,, | Performed by: FAMILY MEDICINE

## 2017-08-04 PROCEDURE — 3008F BODY MASS INDEX DOCD: CPT | Mod: S$GLB,,, | Performed by: FAMILY MEDICINE

## 2017-08-04 RX ORDER — CYCLOBENZAPRINE HCL 10 MG
10 TABLET ORAL 3 TIMES DAILY PRN
Qty: 30 TABLET | Refills: 0 | Status: SHIPPED | OUTPATIENT
Start: 2017-08-04 | End: 2017-08-14
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
## 2017-09-01 ENCOUNTER — PATIENT MESSAGE (OUTPATIENT)
Dept: FAMILY MEDICINE | Facility: CLINIC | Age: 52
End: 2017-09-01

## 2017-09-01 ENCOUNTER — TELEPHONE (OUTPATIENT)
Dept: FAMILY MEDICINE | Facility: CLINIC | Age: 52
End: 2017-09-01

## 2017-09-01 DIAGNOSIS — N52.9 ERECTILE DYSFUNCTION, UNSPECIFIED ERECTILE DYSFUNCTION TYPE: ICD-10-CM

## 2017-09-01 DIAGNOSIS — E78.5 HYPERLIPIDEMIA, UNSPECIFIED HYPERLIPIDEMIA TYPE: ICD-10-CM

## 2017-09-01 DIAGNOSIS — H60.501 ACUTE OTITIS EXTERNA OF RIGHT EAR, UNSPECIFIED TYPE: ICD-10-CM

## 2017-09-01 DIAGNOSIS — G47.30 SLEEP APNEA, UNSPECIFIED TYPE: ICD-10-CM

## 2017-09-01 DIAGNOSIS — E66.9 OBESITY, UNSPECIFIED OBESITY SEVERITY, UNSPECIFIED OBESITY TYPE: ICD-10-CM

## 2017-09-01 DIAGNOSIS — I10 ESSENTIAL HYPERTENSION: Chronic | ICD-10-CM

## 2017-09-01 DIAGNOSIS — E29.1 HYPOGONADISM MALE: Chronic | ICD-10-CM

## 2017-09-01 RX ORDER — NEOMYCIN SULFATE, POLYMYXIN B SULFATE, HYDROCORTISONE 3.5; 10000; 1 MG/ML; [USP'U]/ML; MG/ML
3 SOLUTION/ DROPS AURICULAR (OTIC) 4 TIMES DAILY
Qty: 10 ML | Refills: 0 | Status: SHIPPED | OUTPATIENT
Start: 2017-09-01 | End: 2017-09-11

## 2017-09-01 NOTE — TELEPHONE ENCOUNTER
Patient stated that his ear have been itching and having wax in it. He also stated that he have been using over the counter drops and it do work but today he have had discharge coming from ear right ear, irritated and itching. Please advise.

## 2017-09-01 NOTE — TELEPHONE ENCOUNTER
----- Message from Zoey Jarrett sent at 9/1/2017  2:43 PM CDT -----  Please call pt back [phone] concerning pt right ear/

## 2017-09-01 NOTE — TELEPHONE ENCOUNTER
Notified patient of drops called in. Patient verbally understand and stated that he will  medication. And if Dr. Sanchez have an opening before Wednesday 9/6/2017 he would like to see her.

## 2017-09-01 NOTE — TELEPHONE ENCOUNTER
Pt refused at on Tuesday because of work  Pt states he will try the gtts called in and will go to urgent care this weekend if he is no better.  Will follow up with Dr. Sanchez next week if needed

## 2017-09-01 NOTE — TELEPHONE ENCOUNTER
Advise it I see he was prescribed Cortisporin drops of right ear infection by PA in 2/2016.  If asking for that ear drop for same symptoms - external ear infection, I sent Rx to pharmacy.

## 2017-09-06 RX ORDER — TADALAFIL 20 MG/1
20 TABLET, FILM COATED ORAL DAILY
Qty: 30 TABLET | Refills: 1 | Status: SHIPPED | OUTPATIENT
Start: 2017-09-06 | End: 2018-04-13 | Stop reason: SDUPTHER

## 2017-09-20 ENCOUNTER — OFFICE VISIT (OUTPATIENT)
Dept: FAMILY MEDICINE | Facility: CLINIC | Age: 52
End: 2017-09-20
Payer: COMMERCIAL

## 2017-09-20 VITALS
RESPIRATION RATE: 17 BRPM | SYSTOLIC BLOOD PRESSURE: 136 MMHG | WEIGHT: 315 LBS | OXYGEN SATURATION: 98 % | BODY MASS INDEX: 36.45 KG/M2 | TEMPERATURE: 97 F | HEIGHT: 78 IN | HEART RATE: 67 BPM | DIASTOLIC BLOOD PRESSURE: 84 MMHG

## 2017-09-20 DIAGNOSIS — S29.012A MUSCLE STRAIN OF RIGHT UPPER BACK, INITIAL ENCOUNTER: ICD-10-CM

## 2017-09-20 DIAGNOSIS — S16.1XXA CERVICAL STRAIN, INITIAL ENCOUNTER: Primary | ICD-10-CM

## 2017-09-20 PROCEDURE — 99999 PR PBB SHADOW E&M-EST. PATIENT-LVL IV: CPT | Mod: PBBFAC,,, | Performed by: REGISTERED NURSE

## 2017-09-20 PROCEDURE — 3075F SYST BP GE 130 - 139MM HG: CPT | Mod: S$GLB,,, | Performed by: REGISTERED NURSE

## 2017-09-20 PROCEDURE — 3079F DIAST BP 80-89 MM HG: CPT | Mod: S$GLB,,, | Performed by: REGISTERED NURSE

## 2017-09-20 PROCEDURE — 96372 THER/PROPH/DIAG INJ SC/IM: CPT | Mod: S$GLB,,, | Performed by: REGISTERED NURSE

## 2017-09-20 PROCEDURE — 3008F BODY MASS INDEX DOCD: CPT | Mod: S$GLB,,, | Performed by: REGISTERED NURSE

## 2017-09-20 PROCEDURE — 99213 OFFICE O/P EST LOW 20 MIN: CPT | Mod: 25,S$GLB,, | Performed by: REGISTERED NURSE

## 2017-09-20 RX ORDER — METHOCARBAMOL 750 MG/1
750 TABLET, FILM COATED ORAL 2 TIMES DAILY PRN
Qty: 60 TABLET | Refills: 2 | Status: SHIPPED | OUTPATIENT
Start: 2017-09-20 | End: 2019-02-18

## 2017-09-20 RX ORDER — TRIAMCINOLONE ACETONIDE 40 MG/ML
40 INJECTION, SUSPENSION INTRA-ARTICULAR; INTRAMUSCULAR
Status: COMPLETED | OUTPATIENT
Start: 2017-09-20 | End: 2017-09-20

## 2017-09-20 RX ORDER — OXAPROZIN 600 MG/1
600 TABLET, FILM COATED ORAL DAILY
Qty: 30 TABLET | Refills: 2 | Status: SHIPPED | OUTPATIENT
Start: 2017-09-20 | End: 2018-10-05

## 2017-09-20 RX ADMIN — TRIAMCINOLONE ACETONIDE 40 MG: 40 INJECTION, SUSPENSION INTRA-ARTICULAR; INTRAMUSCULAR at 10:09

## 2017-10-04 ENCOUNTER — OFFICE VISIT (OUTPATIENT)
Dept: FAMILY MEDICINE | Facility: CLINIC | Age: 52
End: 2017-10-04
Payer: COMMERCIAL

## 2017-10-04 ENCOUNTER — PATIENT MESSAGE (OUTPATIENT)
Dept: FAMILY MEDICINE | Facility: CLINIC | Age: 52
End: 2017-10-04

## 2017-10-04 VITALS
OXYGEN SATURATION: 99 % | TEMPERATURE: 96 F | RESPIRATION RATE: 18 BRPM | BODY MASS INDEX: 36.45 KG/M2 | HEIGHT: 78 IN | SYSTOLIC BLOOD PRESSURE: 128 MMHG | WEIGHT: 315 LBS | DIASTOLIC BLOOD PRESSURE: 86 MMHG | HEART RATE: 60 BPM

## 2017-10-04 DIAGNOSIS — G47.30 SLEEP APNEA, UNSPECIFIED TYPE: Chronic | ICD-10-CM

## 2017-10-04 DIAGNOSIS — I10 ESSENTIAL HYPERTENSION: Primary | Chronic | ICD-10-CM

## 2017-10-04 DIAGNOSIS — E29.1 HYPOGONADISM MALE: Chronic | ICD-10-CM

## 2017-10-04 DIAGNOSIS — E78.5 HYPERLIPIDEMIA, UNSPECIFIED HYPERLIPIDEMIA TYPE: Chronic | ICD-10-CM

## 2017-10-04 DIAGNOSIS — E66.01 MORBID OBESITY WITH BMI OF 40.0-44.9, ADULT: ICD-10-CM

## 2017-10-04 PROCEDURE — 99214 OFFICE O/P EST MOD 30 MIN: CPT | Mod: 25,S$GLB,, | Performed by: FAMILY MEDICINE

## 2017-10-04 PROCEDURE — 90471 IMMUNIZATION ADMIN: CPT | Mod: S$GLB,,, | Performed by: FAMILY MEDICINE

## 2017-10-04 PROCEDURE — 90686 IIV4 VACC NO PRSV 0.5 ML IM: CPT | Mod: S$GLB,,, | Performed by: FAMILY MEDICINE

## 2017-10-04 PROCEDURE — 99999 PR PBB SHADOW E&M-EST. PATIENT-LVL V: CPT | Mod: PBBFAC,,, | Performed by: FAMILY MEDICINE

## 2017-10-04 RX ORDER — PRAVASTATIN SODIUM 20 MG/1
20 TABLET ORAL DAILY
Qty: 90 TABLET | Refills: 1 | Status: SHIPPED | OUTPATIENT
Start: 2017-10-04 | End: 2018-08-31 | Stop reason: SDUPTHER

## 2017-10-04 RX ORDER — METOPROLOL SUCCINATE 100 MG/1
100 TABLET, EXTENDED RELEASE ORAL DAILY
Qty: 90 TABLET | Refills: 1 | Status: SHIPPED | OUTPATIENT
Start: 2017-10-04 | End: 2018-04-30 | Stop reason: SDUPTHER

## 2017-10-04 RX ORDER — TRAZODONE HYDROCHLORIDE 50 MG/1
50 TABLET ORAL NIGHTLY
Qty: 90 TABLET | Refills: 1 | Status: SHIPPED | OUTPATIENT
Start: 2017-10-04 | End: 2018-11-23

## 2017-10-04 RX ORDER — POTASSIUM CHLORIDE 1500 MG/1
TABLET, EXTENDED RELEASE ORAL
COMMUNITY
Start: 2017-09-22 | End: 2018-10-05
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
## 2017-10-23 RX ORDER — TESTOSTERONE CYPIONATE 200 MG/ML
INJECTION, SOLUTION INTRAMUSCULAR
Qty: 2 ML | Refills: 0 | Status: SHIPPED | OUTPATIENT
Start: 2017-10-23 | End: 2017-10-25 | Stop reason: SDUPTHER

## 2017-10-27 ENCOUNTER — LAB VISIT (OUTPATIENT)
Dept: LAB | Facility: HOSPITAL | Age: 52
End: 2017-10-27
Attending: FAMILY MEDICINE
Payer: COMMERCIAL

## 2017-10-27 DIAGNOSIS — I10 ESSENTIAL HYPERTENSION: Chronic | ICD-10-CM

## 2017-10-27 DIAGNOSIS — E29.1 HYPOGONADISM MALE: Chronic | ICD-10-CM

## 2017-10-27 LAB
ANION GAP SERPL CALC-SCNC: 10 MMOL/L
BUN SERPL-MCNC: 15 MG/DL
CALCIUM SERPL-MCNC: 9.3 MG/DL
CHLORIDE SERPL-SCNC: 97 MMOL/L
CO2 SERPL-SCNC: 29 MMOL/L
COMPLEXED PSA SERPL-MCNC: 1.4 NG/ML
CREAT SERPL-MCNC: 1.1 MG/DL
EST. GFR  (AFRICAN AMERICAN): >60 ML/MIN/1.73 M^2
EST. GFR  (NON AFRICAN AMERICAN): >60 ML/MIN/1.73 M^2
GLUCOSE SERPL-MCNC: 76 MG/DL
POTASSIUM SERPL-SCNC: 3.4 MMOL/L
SODIUM SERPL-SCNC: 136 MMOL/L
TESTOST SERPL-MCNC: 524 NG/DL

## 2017-10-27 PROCEDURE — 84402 ASSAY OF FREE TESTOSTERONE: CPT

## 2017-10-27 PROCEDURE — 84403 ASSAY OF TOTAL TESTOSTERONE: CPT

## 2017-10-27 PROCEDURE — 80048 BASIC METABOLIC PNL TOTAL CA: CPT

## 2017-10-27 PROCEDURE — 84153 ASSAY OF PSA TOTAL: CPT

## 2017-10-27 PROCEDURE — 36415 COLL VENOUS BLD VENIPUNCTURE: CPT | Mod: PO

## 2017-10-27 RX ORDER — TESTOSTERONE CYPIONATE 200 MG/ML
INJECTION, SOLUTION INTRAMUSCULAR
Qty: 2 ML | Refills: 5 | Status: SHIPPED | OUTPATIENT
Start: 2017-10-27 | End: 2018-05-13 | Stop reason: SDUPTHER

## 2017-10-30 ENCOUNTER — PATIENT MESSAGE (OUTPATIENT)
Dept: FAMILY MEDICINE | Facility: CLINIC | Age: 52
End: 2017-10-30

## 2017-10-30 LAB — TESTOST FREE SERPL-MCNC: 16.6 PG/ML

## 2017-11-13 ENCOUNTER — TELEPHONE (OUTPATIENT)
Dept: FAMILY MEDICINE | Facility: CLINIC | Age: 52
End: 2017-11-13

## 2017-11-13 DIAGNOSIS — Z12.11 COLON CANCER SCREENING: Primary | ICD-10-CM

## 2017-11-13 NOTE — TELEPHONE ENCOUNTER
Pt called to schedule colonoscopy. Please enter case order and our office will call him to schedule

## 2017-11-16 RX ORDER — SODIUM, POTASSIUM,MAG SULFATES 17.5-3.13G
SOLUTION, RECONSTITUTED, ORAL ORAL
Qty: 354 ML | Refills: 0 | Status: ON HOLD | OUTPATIENT
Start: 2017-11-16 | End: 2017-12-06 | Stop reason: HOSPADM

## 2017-12-03 DIAGNOSIS — I10 ESSENTIAL HYPERTENSION: Chronic | ICD-10-CM

## 2017-12-03 RX ORDER — CHLORTHALIDONE 50 MG/1
TABLET ORAL
Qty: 90 TABLET | Refills: 1 | Status: SHIPPED | OUTPATIENT
Start: 2017-12-03 | End: 2018-08-31 | Stop reason: SDUPTHER

## 2017-12-06 ENCOUNTER — ANESTHESIA (OUTPATIENT)
Dept: ENDOSCOPY | Facility: HOSPITAL | Age: 52
End: 2017-12-06
Payer: COMMERCIAL

## 2017-12-06 ENCOUNTER — HOSPITAL ENCOUNTER (OUTPATIENT)
Facility: HOSPITAL | Age: 52
Discharge: HOME | End: 2017-12-06
Attending: INTERNAL MEDICINE | Admitting: INTERNAL MEDICINE
Payer: COMMERCIAL

## 2017-12-06 ENCOUNTER — SURGERY (OUTPATIENT)
Age: 52
End: 2017-12-06

## 2017-12-06 ENCOUNTER — ANESTHESIA EVENT (OUTPATIENT)
Dept: ENDOSCOPY | Facility: HOSPITAL | Age: 52
End: 2017-12-06
Payer: COMMERCIAL

## 2017-12-06 DIAGNOSIS — Z12.11 SCREEN FOR COLON CANCER: ICD-10-CM

## 2017-12-06 PROCEDURE — 25000003 PHARM REV CODE 250: Performed by: INTERNAL MEDICINE

## 2017-12-06 PROCEDURE — 88305 TISSUE EXAM BY PATHOLOGIST: CPT | Performed by: PATHOLOGY

## 2017-12-06 PROCEDURE — 63600175 PHARM REV CODE 636 W HCPCS: Performed by: NURSE ANESTHETIST, CERTIFIED REGISTERED

## 2017-12-06 PROCEDURE — 27201012 HC FORCEPS, HOT/COLD, DISP: Performed by: INTERNAL MEDICINE

## 2017-12-06 PROCEDURE — 25000003 PHARM REV CODE 250: Performed by: NURSE ANESTHETIST, CERTIFIED REGISTERED

## 2017-12-06 PROCEDURE — 37000008 HC ANESTHESIA 1ST 15 MINUTES: Performed by: INTERNAL MEDICINE

## 2017-12-06 PROCEDURE — 37000009 HC ANESTHESIA EA ADD 15 MINS: Performed by: INTERNAL MEDICINE

## 2017-12-06 PROCEDURE — 45380 COLONOSCOPY AND BIOPSY: CPT | Performed by: INTERNAL MEDICINE

## 2017-12-06 PROCEDURE — 45380 COLONOSCOPY AND BIOPSY: CPT | Mod: 33,,, | Performed by: INTERNAL MEDICINE

## 2017-12-06 PROCEDURE — 88305 TISSUE EXAM BY PATHOLOGIST: CPT | Mod: 26,,, | Performed by: PATHOLOGY

## 2017-12-06 RX ORDER — SODIUM CHLORIDE, SODIUM LACTATE, POTASSIUM CHLORIDE, CALCIUM CHLORIDE 600; 310; 30; 20 MG/100ML; MG/100ML; MG/100ML; MG/100ML
INJECTION, SOLUTION INTRAVENOUS CONTINUOUS
Status: DISCONTINUED | OUTPATIENT
Start: 2017-12-06 | End: 2017-12-06 | Stop reason: HOSPADM

## 2017-12-06 RX ORDER — LIDOCAINE HCL/PF 100 MG/5ML
SYRINGE (ML) INTRAVENOUS
Status: DISCONTINUED | OUTPATIENT
Start: 2017-12-06 | End: 2017-12-06

## 2017-12-06 RX ORDER — PROPOFOL 10 MG/ML
INJECTION, EMULSION INTRAVENOUS
Status: DISCONTINUED | OUTPATIENT
Start: 2017-12-06 | End: 2017-12-06

## 2017-12-06 RX ORDER — SODIUM CHLORIDE, SODIUM LACTATE, POTASSIUM CHLORIDE, CALCIUM CHLORIDE 600; 310; 30; 20 MG/100ML; MG/100ML; MG/100ML; MG/100ML
INJECTION, SOLUTION INTRAVENOUS CONTINUOUS PRN
Status: DISCONTINUED | OUTPATIENT
Start: 2017-12-06 | End: 2017-12-06

## 2017-12-06 RX ADMIN — PROPOFOL 140 MG: 10 INJECTION, EMULSION INTRAVENOUS at 11:12

## 2017-12-06 RX ADMIN — PROPOFOL 60 MG: 10 INJECTION, EMULSION INTRAVENOUS at 11:12

## 2017-12-06 RX ADMIN — PROPOFOL 40 MG: 10 INJECTION, EMULSION INTRAVENOUS at 11:12

## 2017-12-06 RX ADMIN — LIDOCAINE HYDROCHLORIDE 100 MG: 20 INJECTION, SOLUTION INTRAVENOUS at 11:12

## 2017-12-06 RX ADMIN — PROPOFOL 30 MG: 10 INJECTION, EMULSION INTRAVENOUS at 11:12

## 2017-12-06 RX ADMIN — SODIUM CHLORIDE, SODIUM LACTATE, POTASSIUM CHLORIDE, AND CALCIUM CHLORIDE: 600; 310; 30; 20 INJECTION, SOLUTION INTRAVENOUS at 11:12

## 2017-12-06 RX ADMIN — SODIUM CHLORIDE, SODIUM LACTATE, POTASSIUM CHLORIDE, AND CALCIUM CHLORIDE: .6; .31; .03; .02 INJECTION, SOLUTION INTRAVENOUS at 10:12

## 2017-12-06 NOTE — PLAN OF CARE
WIFE AT BEDSIDE. DR KYLE SPOKE TO PT AND SPOUSE. DISCHARGE INSTRUCTIONS GIVEN TO PT ND FAMILY. VERBALIZE UNDERSTANDING.

## 2017-12-06 NOTE — DISCHARGE INSTRUCTIONS

## 2017-12-06 NOTE — ANESTHESIA RELEASE NOTE
"Anesthesia Release from PACU Note    Patient: Reggie Sawant    Procedure(s) Performed: Procedure(s) (LRB):  COLONOSCOPY (N/A)    Anesthesia type: MAC    Post pain: Adequate analgesia    Post assessment: no apparent anesthetic complications, tolerated procedure well and no evidence of recall    Last Vitals:   Visit Vitals  /61 (BP Location: Left arm, Patient Position: Lying)   Pulse 65   Temp 36.6 °C (97.9 °F) (Oral)   Resp 16   Ht 6' 6" (1.981 m)   Wt (!) 154.7 kg (341 lb)   SpO2 (!) 94%   BMI 39.41 kg/m²       Post vital signs: stable    Level of consciousness: awake, alert  and oriented    Nausea/Vomiting: no nausea/no vomiting    Complications: none    Airway Patency: patent    Respiratory: unassisted, spontaneous ventilation, room air    Cardiovascular: stable    Hydration: euvolemic  "

## 2017-12-06 NOTE — TRANSFER OF CARE
"Anesthesia Transfer of Care Note    Patient: Reggie Sawant    Procedure(s) Performed: Procedure(s) (LRB):  COLONOSCOPY (N/A)    Patient location: PACU    Anesthesia Type: MAC    Transport from OR: Transported from OR on room air with adequate spontaneous ventilation    Post pain: adequate analgesia    Post assessment: no apparent anesthetic complications    Post vital signs: stable    Level of consciousness: awake and alert    Nausea/Vomiting: no nausea/vomiting    Complications: none    Transfer of care protocol was followed      Last vitals:   Visit Vitals  /61 (BP Location: Left arm, Patient Position: Lying)   Pulse 65   Temp 36.6 °C (97.9 °F) (Oral)   Resp 16   Ht 6' 6" (1.981 m)   Wt (!) 154.7 kg (341 lb)   SpO2 (!) 94%   BMI 39.41 kg/m²     "

## 2017-12-06 NOTE — H&P
Short Stay Endoscopy History and Physical    PCP - Irene Sanchez MD    Procedure - Colonoscopy  ASA - II  Mallampati - per anesthesia  History of Anesthesia problems - no  Family history Anesthesia problems -  no     HPI:  This is a 52 y.o. male here for evaluation of :  Screening for colon cancer    Average Risk Screening:Yes  Family history of colon cancer: No  History of polyps: No  Anemia: No  Blood in stools: No  Diarrhea: No  Abdominal Pain: No    Review of Systems:  CONSTITUTIONAL: Denies weight change,  fatigue, fevers, chills, night sweats.  CARDIOVASCULAR: Denies chest pain, shortness of breath, orthopnea and edema.  RESPIRATORY: Denies cough, hemoptysis, dyspnea, and wheezing.  GI: See HPI.    Medical History:  Past Medical History:   Diagnosis Date    Cellulitis     left leg    Hip arthritis     right    Hyperlipidemia     Hypertension     Hypogonadism male     Hypokalemia     Morbid obesity     Sleep apnea     on CPAP       Surgical History:   Past Surgical History:   Procedure Laterality Date    right bunionectomy         Family History:   Family History   Problem Relation Age of Onset    Hypertension Brother     Diabetes Maternal Grandmother     Hypertension Father     Heart disease Father      CAD    Diabetes Sister     No Known Problems Daughter     No Known Problems Daughter     No Known Problems Daughter     No Known Problems Daughter     No Known Problems Mother     No Known Problems Sister     Cancer Neg Hx     Stroke Neg Hx        Social History:   Social History   Substance Use Topics    Smoking status: Never Smoker    Smokeless tobacco: Never Used    Alcohol use 0.0 oz/week      Comment: Occasionally       Allergies: Reviewed.    Medications:  No current facility-administered medications on file prior to encounter.      Current Outpatient Prescriptions on File Prior to Encounter   Medication Sig Dispense Refill    amlodipine-benazepril (LOTREL) 10-40 mg per  capsule Take 1 capsule by mouth once daily. 90 capsule 1    aspirin (ASPIRIN LOW DOSE) 81 MG EC tablet Take 1 tablet by mouth Daily.      CIALIS 20 mg Tab Take 1 tablet (20 mg total) by mouth once daily. 30 tablet 1    epinephrine (EPIPEN JR) 0.15 mg/0.3 mL (1:2,000) pen injection Inject 0.3 mLs (0.15 mg total) into the muscle as needed for Anaphylaxis. 2 each 6    fluticasone (FLONASE) 50 mcg/actuation nasal spray SHAKE LIQUID AND USE 2 SPRAYS IN EACH NOSTRIL DAILY AS NEEDED 16 g 5    KLOR-CON M20 20 mEq tablet       methocarbamol (ROBAXIN) 750 MG Tab Take 1 tablet (750 mg total) by mouth 2 (two) times daily as needed. 60 tablet 2    metoprolol succinate (TOPROL-XL) 100 MG 24 hr tablet Take 1 tablet (100 mg total) by mouth once daily. 90 tablet 1    multivitamin (ONE DAILY MULTIVITAMIN) per tablet Take 1 tablet by mouth once daily.      naproxen (NAPROSYN) 500 MG tablet Take 1 tablet (500 mg total) by mouth 2 (two) times daily as needed. 60 tablet 2    oxaprozin (DAYPRO) 600 mg tablet Take 1 tablet (600 mg total) by mouth once daily. (Patient taking differently: Take 600 mg by mouth daily as needed. ) 30 tablet 2    pravastatin (PRAVACHOL) 20 MG tablet Take 1 tablet (20 mg total) by mouth once daily. 90 tablet 1    testosterone cypionate (DEPOTESTOTERONE CYPIONATE) 200 mg/mL injection INJECT 1 milliliter INTRAMUSCULARLY EVERY 14 days. 2 mL 5    testosterone cypionate (DEPOTESTOTERONE CYPIONATE) 200 mg/mL injection INJECT 1 ML IN THE MUSCLE ONCE EVERY 14 DAYS 2 mL 5    trazodone (DESYREL) 50 MG tablet Take 1 tablet (50 mg total) by mouth every evening. 90 tablet 1       Physical Exam:  Vital Signs:   Vitals:    12/06/17 0950   BP: (!) 151/89   Pulse: 60   Resp: 20   Temp: 97.8 °F (36.6 °C)     General Appearance: Well appearing in no acute distress  ENT: OP clear  Chest: CTA B  CV: RRR, no m/r/g  Abd: s/nt/nd/nabs  Ext: no edema    Labs:  Lab Results   Component Value Date    WBC 5.23 03/30/2017    HGB  16.0 03/30/2017    HCT 48.7 03/30/2017    MCV 94 03/30/2017     03/30/2017     No results found for: INR, PROTIME  No results found for: IRON, TIBC, FERRITIN, SATURATEDIRO      IMPRESSION:  Patient Active Problem List   Diagnosis    HTN (hypertension)    Hyperlipidemia    Hypogonadism male    Sleep apnea    ED (erectile dysfunction)    Obesity    DJD (degenerative joint disease) of hip    Morbid obesity with BMI of 40.0-44.9, adult    Screen for colon cancer       Colon cancer screening    Plan:  I have explained the risks and benefits of colonoscopy to the patient including but not limited to bleeding, perforation, infection, and death. The patient wishes to proceed with colonoscopy.

## 2017-12-06 NOTE — ANESTHESIA POSTPROCEDURE EVALUATION
"Anesthesia Post Evaluation    Patient: Reggie Sawant    Procedure(s) Performed: Procedure(s) (LRB):  COLONOSCOPY (N/A)    Final Anesthesia Type: MAC  Patient location during evaluation: PACU  Patient participation: Yes- Able to Participate  Level of consciousness: awake and alert and oriented  Post-procedure vital signs: reviewed and stable  Pain management: adequate  Airway patency: patent  PONV status at discharge: No PONV  Anesthetic complications: no      Cardiovascular status: blood pressure returned to baseline  Respiratory status: unassisted, spontaneous ventilation and room air  Hydration status: euvolemic  Follow-up not needed.        Visit Vitals  /61 (BP Location: Left arm, Patient Position: Lying)   Pulse 65   Temp 36.6 °C (97.9 °F) (Oral)   Resp 16   Ht 6' 6" (1.981 m)   Wt (!) 154.7 kg (341 lb)   SpO2 (!) 94%   BMI 39.41 kg/m²       Pain/Des Score: Pain Assessment Performed: Yes (12/6/2017  9:49 AM)  Presence of Pain: denies (12/6/2017  9:49 AM)  Des Score: 9 (12/6/2017 11:47 AM)      "

## 2017-12-06 NOTE — ANESTHESIA PREPROCEDURE EVALUATION
12/06/2017  Reggie Sawant is a 52 y.o., male.    Anesthesia Evaluation    I have reviewed the Patient Summary Reports.    I have reviewed the Nursing Notes.   I have reviewed the Medications.     Review of Systems  Anesthesia Hx:  No problems with previous Anesthesia    Social:  Non-Smoker, Alcohol Use    Hematology/Oncology:  Hematology Normal   Oncology Normal     EENT/Dental:   chronic allergic rhinitis   Cardiovascular:   Hypertension, well controlled hyperlipidemia    Pulmonary:   Sleep Apnea, CPAP    Hepatic/GI:   Bowel Prep. 0645 last drink of fluid.   Musculoskeletal:   Arthritis     Neurological:  Neurology Normal    Endocrine:  Endocrine Normal    Dermatological:  Skin Normal    Psych:  Psychiatric Normal           Physical Exam  General:  Well nourished, Morbid Obesity    Airway/Jaw/Neck:  Airway Findings: Mallampati: IV                Anesthesia Plan  Type of Anesthesia, risks & benefits discussed:  Anesthesia Type:  MAC  Patient's Preference:   Intra-op Monitoring Plan:   Intra-op Monitoring Plan Comments:   Post Op Pain Control Plan:   Post Op Pain Control Plan Comments:   Induction:   IV  Beta Blocker:  Patient is on a Beta-Blocker and has received one dose within the past 24 hours (No further documentation required).       Informed Consent: Patient understands risks and agrees with Anesthesia plan.  Questions answered.   ASA Score: 2     Day of Surgery Review of History & Physical: I have interviewed and examined the patient. I have reviewed the patient's H&P dated: 12/06/17. There are no significant changes.  H&P update referred to the provider.         Ready For Surgery From Anesthesia Perspective.

## 2017-12-07 VITALS
BODY MASS INDEX: 36.45 KG/M2 | RESPIRATION RATE: 18 BRPM | HEIGHT: 78 IN | TEMPERATURE: 98 F | HEART RATE: 53 BPM | OXYGEN SATURATION: 99 % | WEIGHT: 315 LBS | SYSTOLIC BLOOD PRESSURE: 131 MMHG | DIASTOLIC BLOOD PRESSURE: 56 MMHG

## 2017-12-23 DIAGNOSIS — I10 ESSENTIAL HYPERTENSION: Chronic | ICD-10-CM

## 2017-12-23 RX ORDER — POTASSIUM CHLORIDE 1500 MG/1
TABLET, EXTENDED RELEASE ORAL
Qty: 90 TABLET | Refills: 1 | Status: SHIPPED | OUTPATIENT
Start: 2017-12-23 | End: 2018-08-31 | Stop reason: SDUPTHER

## 2018-02-08 RX ORDER — AMLODIPINE AND BENAZEPRIL HYDROCHLORIDE 10; 40 MG/1; MG/1
CAPSULE ORAL
Qty: 90 CAPSULE | Refills: 1 | Status: SHIPPED | OUTPATIENT
Start: 2018-02-08 | End: 2018-08-31 | Stop reason: SDUPTHER

## 2018-04-13 RX ORDER — TADALAFIL 20 MG/1
TABLET, FILM COATED ORAL
Qty: 30 TABLET | Refills: 1 | Status: SHIPPED | OUTPATIENT
Start: 2018-04-13 | End: 2018-09-27 | Stop reason: SDUPTHER

## 2018-04-30 RX ORDER — METOPROLOL SUCCINATE 100 MG/1
TABLET, EXTENDED RELEASE ORAL
Qty: 90 TABLET | Refills: 1 | Status: SHIPPED | OUTPATIENT
Start: 2018-04-30 | End: 2019-02-18 | Stop reason: SDUPTHER

## 2018-05-07 RX ORDER — FLUTICASONE PROPIONATE 50 MCG
SPRAY, SUSPENSION (ML) NASAL
Qty: 16 G | Refills: 5 | Status: SHIPPED | OUTPATIENT
Start: 2018-05-07 | End: 2019-05-17 | Stop reason: SDUPTHER

## 2018-05-13 RX ORDER — TESTOSTERONE CYPIONATE 200 MG/ML
INJECTION, SOLUTION INTRAMUSCULAR
Qty: 2 ML | Refills: 0 | Status: SHIPPED | OUTPATIENT
Start: 2018-05-13 | End: 2018-06-11 | Stop reason: SDUPTHER

## 2018-06-11 ENCOUNTER — PATIENT MESSAGE (OUTPATIENT)
Dept: FAMILY MEDICINE | Facility: CLINIC | Age: 53
End: 2018-06-11

## 2018-06-11 RX ORDER — TESTOSTERONE CYPIONATE 200 MG/ML
INJECTION, SOLUTION INTRAMUSCULAR
Qty: 2 ML | Refills: 0 | Status: SHIPPED | OUTPATIENT
Start: 2018-06-11 | End: 2018-07-02 | Stop reason: SDUPTHER

## 2018-06-15 ENCOUNTER — PATIENT OUTREACH (OUTPATIENT)
Dept: ADMINISTRATIVE | Facility: HOSPITAL | Age: 53
End: 2018-06-15

## 2018-07-02 RX ORDER — TESTOSTERONE CYPIONATE 200 MG/ML
INJECTION, SOLUTION INTRAMUSCULAR
Qty: 2 ML | Refills: 0 | Status: SHIPPED | OUTPATIENT
Start: 2018-07-02 | End: 2018-07-31 | Stop reason: SDUPTHER

## 2018-07-31 RX ORDER — TESTOSTERONE CYPIONATE 200 MG/ML
INJECTION, SOLUTION INTRAMUSCULAR
Qty: 2 ML | Refills: 0 | Status: SHIPPED | OUTPATIENT
Start: 2018-07-31 | End: 2018-08-25 | Stop reason: SDUPTHER

## 2018-08-26 RX ORDER — TESTOSTERONE CYPIONATE 200 MG/ML
INJECTION, SOLUTION INTRAMUSCULAR
Qty: 2 ML | Refills: 0 | Status: SHIPPED | OUTPATIENT
Start: 2018-08-26 | End: 2018-09-27 | Stop reason: SDUPTHER

## 2018-08-31 DIAGNOSIS — I10 ESSENTIAL HYPERTENSION: Chronic | ICD-10-CM

## 2018-09-01 RX ORDER — PRAVASTATIN SODIUM 20 MG/1
TABLET ORAL
Qty: 90 TABLET | Refills: 0 | Status: SHIPPED | OUTPATIENT
Start: 2018-09-01 | End: 2019-01-16 | Stop reason: SDUPTHER

## 2018-09-01 RX ORDER — AMLODIPINE AND BENAZEPRIL HYDROCHLORIDE 10; 40 MG/1; MG/1
CAPSULE ORAL
Qty: 90 CAPSULE | Refills: 0 | Status: SHIPPED | OUTPATIENT
Start: 2018-09-01 | End: 2019-02-25 | Stop reason: SDUPTHER

## 2018-09-01 RX ORDER — POTASSIUM CHLORIDE 1500 MG/1
TABLET, EXTENDED RELEASE ORAL
Qty: 90 TABLET | Refills: 0 | Status: SHIPPED | OUTPATIENT
Start: 2018-09-01 | End: 2019-01-16 | Stop reason: SDUPTHER

## 2018-09-01 RX ORDER — CHLORTHALIDONE 50 MG/1
TABLET ORAL
Qty: 90 TABLET | Refills: 0 | Status: SHIPPED | OUTPATIENT
Start: 2018-09-01 | End: 2019-01-16 | Stop reason: SDUPTHER

## 2018-09-27 RX ORDER — TADALAFIL 20 MG/1
TABLET, FILM COATED ORAL
Qty: 30 TABLET | Refills: 1 | Status: SHIPPED | OUTPATIENT
Start: 2018-09-27 | End: 2019-07-15 | Stop reason: SDUPTHER

## 2018-09-27 RX ORDER — TESTOSTERONE CYPIONATE 200 MG/ML
INJECTION, SOLUTION INTRAMUSCULAR
Qty: 2 ML | Refills: 0 | Status: SHIPPED | OUTPATIENT
Start: 2018-09-27 | End: 2018-10-05

## 2018-10-01 ENCOUNTER — TELEPHONE (OUTPATIENT)
Dept: FAMILY MEDICINE | Facility: CLINIC | Age: 53
End: 2018-10-01

## 2018-10-01 NOTE — TELEPHONE ENCOUNTER
----- Message from Mary Kc sent at 10/1/2018 11:38 AM CDT -----  Contact: pt   States his daughter is having a baby (2 months early), he is on his way to the hospital now. States he needs to reschedule his appt, nothing available until November and he would like a sooner appt. Please call pt at [phone]. Thank you

## 2018-10-05 ENCOUNTER — OFFICE VISIT (OUTPATIENT)
Dept: FAMILY MEDICINE | Facility: CLINIC | Age: 53
End: 2018-10-05
Payer: COMMERCIAL

## 2018-10-05 ENCOUNTER — LAB VISIT (OUTPATIENT)
Dept: LAB | Facility: HOSPITAL | Age: 53
End: 2018-10-05
Attending: FAMILY MEDICINE
Payer: COMMERCIAL

## 2018-10-05 VITALS
SYSTOLIC BLOOD PRESSURE: 122 MMHG | BODY MASS INDEX: 36.45 KG/M2 | HEIGHT: 78 IN | DIASTOLIC BLOOD PRESSURE: 80 MMHG | TEMPERATURE: 99 F | OXYGEN SATURATION: 96 % | HEART RATE: 66 BPM | RESPIRATION RATE: 17 BRPM | WEIGHT: 315 LBS

## 2018-10-05 DIAGNOSIS — Z00.00 ANNUAL PHYSICAL EXAM: ICD-10-CM

## 2018-10-05 DIAGNOSIS — G47.30 SLEEP APNEA, UNSPECIFIED TYPE: Chronic | ICD-10-CM

## 2018-10-05 DIAGNOSIS — E78.5 HYPERLIPIDEMIA, UNSPECIFIED HYPERLIPIDEMIA TYPE: Chronic | ICD-10-CM

## 2018-10-05 DIAGNOSIS — E66.01 MORBID OBESITY WITH BMI OF 40.0-44.9, ADULT: ICD-10-CM

## 2018-10-05 DIAGNOSIS — Z23 NEED FOR INFLUENZA VACCINATION: ICD-10-CM

## 2018-10-05 DIAGNOSIS — I10 ESSENTIAL HYPERTENSION: Chronic | ICD-10-CM

## 2018-10-05 DIAGNOSIS — E29.1 HYPOGONADISM MALE: Chronic | ICD-10-CM

## 2018-10-05 LAB
ALBUMIN SERPL BCP-MCNC: 4.1 G/DL
ALP SERPL-CCNC: 48 U/L
ALT SERPL W/O P-5'-P-CCNC: 42 U/L
ANION GAP SERPL CALC-SCNC: 10 MMOL/L
AST SERPL-CCNC: 38 U/L
BASOPHILS # BLD AUTO: 0.03 K/UL
BASOPHILS NFR BLD: 0.7 %
BILIRUB SERPL-MCNC: 1.3 MG/DL
BILIRUB UR QL STRIP: NEGATIVE
BUN SERPL-MCNC: 21 MG/DL
CALCIUM SERPL-MCNC: 10.1 MG/DL
CHLORIDE SERPL-SCNC: 100 MMOL/L
CHOLEST SERPL-MCNC: 202 MG/DL
CHOLEST/HDLC SERPL: 4.2 {RATIO}
CLARITY UR REFRACT.AUTO: CLEAR
CO2 SERPL-SCNC: 30 MMOL/L
COLOR UR AUTO: YELLOW
COMPLEXED PSA SERPL-MCNC: 1.3 NG/ML
CREAT SERPL-MCNC: 1.1 MG/DL
DIFFERENTIAL METHOD: ABNORMAL
EOSINOPHIL # BLD AUTO: 0.1 K/UL
EOSINOPHIL NFR BLD: 3.1 %
ERYTHROCYTE [DISTWIDTH] IN BLOOD BY AUTOMATED COUNT: 13 %
EST. GFR  (AFRICAN AMERICAN): >60 ML/MIN/1.73 M^2
EST. GFR  (NON AFRICAN AMERICAN): >60 ML/MIN/1.73 M^2
GLUCOSE SERPL-MCNC: 89 MG/DL
GLUCOSE UR QL STRIP: NEGATIVE
HCT VFR BLD AUTO: 51.9 %
HDLC SERPL-MCNC: 48 MG/DL
HDLC SERPL: 23.8 %
HGB BLD-MCNC: 17.3 G/DL
HGB UR QL STRIP: NEGATIVE
IMM GRANULOCYTES # BLD AUTO: 0.02 K/UL
IMM GRANULOCYTES NFR BLD AUTO: 0.4 %
KETONES UR QL STRIP: NEGATIVE
LDLC SERPL CALC-MCNC: 136.8 MG/DL
LEUKOCYTE ESTERASE UR QL STRIP: NEGATIVE
LYMPHOCYTES # BLD AUTO: 1.5 K/UL
LYMPHOCYTES NFR BLD: 33.3 %
MCH RBC QN AUTO: 31.3 PG
MCHC RBC AUTO-ENTMCNC: 33.3 G/DL
MCV RBC AUTO: 94 FL
MONOCYTES # BLD AUTO: 0.5 K/UL
MONOCYTES NFR BLD: 11.9 %
NEUTROPHILS # BLD AUTO: 2.3 K/UL
NEUTROPHILS NFR BLD: 50.6 %
NITRITE UR QL STRIP: NEGATIVE
NONHDLC SERPL-MCNC: 154 MG/DL
NRBC BLD-RTO: 0 /100 WBC
PH UR STRIP: 6 [PH] (ref 5–8)
PLATELET # BLD AUTO: 190 K/UL
PMV BLD AUTO: 11.2 FL
POTASSIUM SERPL-SCNC: 3.2 MMOL/L
PROT SERPL-MCNC: 7.7 G/DL
PROT UR QL STRIP: NEGATIVE
RBC # BLD AUTO: 5.53 M/UL
SODIUM SERPL-SCNC: 140 MMOL/L
SP GR UR STRIP: 1.02 (ref 1–1.03)
T3FREE SERPL-MCNC: 2.6 PG/ML
T4 FREE SERPL-MCNC: 0.76 NG/DL
TESTOST SERPL-MCNC: 326 NG/DL
TRIGL SERPL-MCNC: 86 MG/DL
TSH SERPL DL<=0.005 MIU/L-ACNC: 1.11 UIU/ML
URN SPEC COLLECT METH UR: NORMAL
UROBILINOGEN UR STRIP-ACNC: 2 EU/DL
WBC # BLD AUTO: 4.45 K/UL

## 2018-10-05 PROCEDURE — 84443 ASSAY THYROID STIM HORMONE: CPT

## 2018-10-05 PROCEDURE — 80061 LIPID PANEL: CPT

## 2018-10-05 PROCEDURE — 3074F SYST BP LT 130 MM HG: CPT | Mod: CPTII,S$GLB,, | Performed by: FAMILY MEDICINE

## 2018-10-05 PROCEDURE — 85025 COMPLETE CBC W/AUTO DIFF WBC: CPT

## 2018-10-05 PROCEDURE — 80053 COMPREHEN METABOLIC PANEL: CPT

## 2018-10-05 PROCEDURE — 84481 FREE ASSAY (FT-3): CPT

## 2018-10-05 PROCEDURE — 3079F DIAST BP 80-89 MM HG: CPT | Mod: CPTII,S$GLB,, | Performed by: FAMILY MEDICINE

## 2018-10-05 PROCEDURE — 90471 IMMUNIZATION ADMIN: CPT | Mod: S$GLB,,, | Performed by: FAMILY MEDICINE

## 2018-10-05 PROCEDURE — 99999 PR PBB SHADOW E&M-EST. PATIENT-LVL IV: CPT | Mod: PBBFAC,,, | Performed by: FAMILY MEDICINE

## 2018-10-05 PROCEDURE — 84403 ASSAY OF TOTAL TESTOSTERONE: CPT

## 2018-10-05 PROCEDURE — 84402 ASSAY OF FREE TESTOSTERONE: CPT

## 2018-10-05 PROCEDURE — 81003 URINALYSIS AUTO W/O SCOPE: CPT

## 2018-10-05 PROCEDURE — 90686 IIV4 VACC NO PRSV 0.5 ML IM: CPT | Mod: S$GLB,,, | Performed by: FAMILY MEDICINE

## 2018-10-05 PROCEDURE — 84153 ASSAY OF PSA TOTAL: CPT

## 2018-10-05 PROCEDURE — 84439 ASSAY OF FREE THYROXINE: CPT

## 2018-10-05 PROCEDURE — 36415 COLL VENOUS BLD VENIPUNCTURE: CPT | Mod: PO

## 2018-10-05 PROCEDURE — 99396 PREV VISIT EST AGE 40-64: CPT | Mod: 25,S$GLB,, | Performed by: FAMILY MEDICINE

## 2018-10-05 NOTE — PROGRESS NOTES
HISTORY OF PRESENT ILLNESS: Mr. Sawant comes in today non fasting with taking medication (with OJ) and without acute problems for annual wellness examination.    END OF LIFE DECISION: He has a living will but desires life-support conditionally.    Current Outpatient Medications   Medication Sig    amlodipine-benazepril (LOTREL) 10-40 mg per capsule TAKE 1 CAPSULE ONCE DAILY    aspirin (ASPIRIN LOW DOSE) 81 MG EC tablet Take 1 tablet by mouth Daily.    chlorthalidone (HYGROTEN) 50 MG Tab TAKE 1 TABLET ONCE DAILY    CIALIS 20 mg Tab TAKE 1 TABLET DAILY    fluticasone (FLONASE) 50 mcg/actuation nasal spray SHAKE LIQUID AND USE 2 SPRAYS IN EACH NOSTRIL DAILY AS NEEDED    KLOR-CON M20 20 mEq tablet TAKE 1 TABLET ONCE DAILY    methocarbamol (ROBAXIN) 750 MG Tab Take 1 tablet (750 mg total) by mouth 2 (two) times daily as needed.    metoprolol succinate (TOPROL-XL) 100 MG 24 hr tablet TAKE 1 TABLET ONCE DAILY    multivitamin (ONE DAILY MULTIVITAMIN) per tablet Take 1 tablet by mouth once daily.    naproxen (NAPROSYN) 500 MG tablet Take 1 tablet (500 mg total) by mouth 2 (two) times daily as needed.    pravastatin (PRAVACHOL) 20 MG tablet TAKE 1 TABLET ONCE DAILY    testosterone cypionate (DEPOTESTOTERONE CYPIONATE) 200 mg/mL injection INJECT 1 milliliter INTRAMUSCULARLY EVERY 14 days.    trazodone (DESYREL) 50 MG tablet Take 1 tablet (50 mg total) by mouth every evening. (Patient taking differently: Take 50 mg by mouth nightly as needed. )    epinephrine (EPIPEN JR) 0.15 mg/0.3 mL (1:2,000) pen injection Inject 0.3 mLs (0.15 mg total) into the muscle as needed for Anaphylaxis.      SCREENINGS:   Cholesterol: March 30, 2017.  FFS/Colonoscopy: December 6, 2017 - benign colon polyp, internal hemorrhoid; repeat in 5 years.  Prostate/PSA: October 27, 2017 - 1.4.  Dexa Scan: Never.  Eye Exam: June 2018 with [facility] per patient. He wears glasses.  PPD: Negative in the past.  Immunizations: Tdap - January 23,  2014.  Zostavax - N./A.  Pneumovax - never.  Seasonal Flu - October 4, 2017. He desires.  Hepatitis B vaccine: completed per patient.    ROS:  GENERAL: No fever, chills, fatigue or unusual weight change. Appetite normal. Weight 160.8 kg (354 lb 8 oz) at October 4, 2017 visit. Does not exercise. Monitors diet very little.  SKIN: No rashes, itching, changes in mole, color or texture of skin or easy bruising.      HEAD: No headaches or recent head trauma.  EYES: No change in vision, no pain, diplopia, redness or discharge.Wears glasses.  EARS: Denies ear pain, discharge, vertigo or decreased hearing.  NOSE: No epistaxis or rhinitis. Non tender external nose.  MOUTH & THROAT: No hoarseness or change in voice. No excessive gum bleeding or mouth sores. No sore throat.  NODES: Denies swollen glands.  CHEST: Denies GOMEZ, wheezing, cough, hemoptysis or sputum production. Saw NP Lejeune with pulmonary on February 3, 2017 for DEEPTHI on CPAP surveillance with 1-year follow up advised.   CARDIOVASCULAR: Denies chest pain, No palpitations.  ABDOMEN: Denies diarrhea, constipation, nausea, vomiting, abdominal pain, or blood in stool.  GENITOURINARY: No flank pain, dysuria or hematuria.No nocturia or frequency. No lesions, pain or swelling in genital area. Performs monthly self testicular exam. Continues testosterone 200 mg IM every 2 weeks for testicular hypofunction, initially started November 2010 with frequency increased summer 2016; he self administers injection and followed with AUGUSTA Gutierrez for surveillance for testicular hypofunction with last visit on July 11, 2016 with recommendation to continue follow up with me. Reports last injection was given 2 weeks ago.  ENDOCRINE: Denies diabetes, thyroid problems.  HEME/LYMPH: Denies bleeding problems.  PERIPHERAL VASCULAR: No claudication or cyanosis  MUSCULOSKELETAL: No joint stiffness, pain or swelling. No edema.  NEUROLOGIC: No history of seizures, tremors, alteration of gait  "or coordination.  PSYCHIATRIC: Denies mood swings, depression, anxiety, homicidal or suicidal thoughts. Denies sleep problems.    PE:   VS:  /80 Comment: Rechecked by Dr. Sanchez.  Pulse 66   Temp 98.9 °F (37.2 °C) (Tympanic)   Resp 17   Ht 6' 6" (1.981 m)   Wt (!) 162.9 kg (359 lb 3.8 oz)   SpO2 96%   BMI 41.51 kg/m²   APPEARANCE: Well nourished, well developed male, obese and pleasant, alert and oriented in no acute distress.   HEAD: Non tender . Full range of motion.  EYES: PERRL, conjunctiva pink, lids no edema. He wears glasses.  EARS: External canal patent, no swelling or redness. TM's shiny and clear.  NOSE: Mucosa and turbinates pink, not swollen. No discharge  THROAT: No pharyngeal erythema or exudate. No stridor.   NECK: Supple, no mass, thyroid not enlarged. No carotid bruit.  NODES: No cervical, axillary or inguinal lymph node enlargement.  CHEST: Normal respiratory effort. Lungs clear to auscultation.  CARDIOVASCULAR: Normal S1, S2. No rubs, murmurs or gallops.No edema.Pedal pulses palpable bilaterally.  ABDOMEN: Bowel sounds present. Not distended. Soft. No tenderness, masses or organomegaly.  BREAST: Non tender, no asymmetry, nipple discharge, abnormal masses, nodules, lumps.  GENITALIA: Scrotum no lesions, masses, tenderness or swelling. No penile lesions. No hernia.  RECTAL: Sphincter tone normal, no masses palpated, prostate not enlarged, no nodules, no external hemorrhoids or anal fissures noted.   MUSCULOSKELETAL: No joint deformities or stiffness. He is ambulatory without problems.  SKIN: No rashes or suspicious lesions, normal color and turgor.    NEUROLOGIC: Cranial Nerves: II-XII grossly intact. DTR's: Knees, Ankles 2+ and equal bilaterally Gait & Posture: Normal gait and fine motion.  PSYCHIATRIC: Patient alert, oriented x 3. Mood/Affect normal without acute anxiety or depression noted. Judgement and insight-good as he makes appropriate decisions on today's " examination.    DIAGNOSIS:    ICD-10-CM ICD-9-CM    1. Annual physical exam Z00.00 V70.0 CBC auto differential      TSH      Urinalysis      Comprehensive metabolic panel      Lipid panel      T3, free      T4, free      Testosterone, free      Testosterone      PSA, Screening   2. Essential hypertension I10 401.9    3. Hyperlipidemia, unspecified hyperlipidemia type E78.5 272.4    4. Hypogonadism male E29.1 257.2    5. Sleep apnea, unspecified type G47.30 780.57    6. Morbid obesity with BMI of 40.0-44.9, adult E66.01 278.01     Z68.41 V85.41    7. Need for influenza vaccination Z23 V04.81 Influenza - Quadrivalent (3 years & older) (PF)     PLAN:   1.Age-appropriate counseling-appropriate low-sodium, low-cholesterol, low carbohydrate diet and exercise daily, monthly self testicular examination, annual wellness examination.  2. Patient advised to call for results.  3. Continue current medications.  4. Keep follow up with specialists.  5. Follow-up in about 6 months (around 4/5/2019) for hypertension follow up.     Answers for HPI/ROS submitted by the patient on 10/3/2018   activity change: No  unexpected weight change: No  neck pain: No  hearing loss: No  rhinorrhea: No  trouble swallowing: No  eye discharge: No  visual disturbance: No  chest tightness: No  wheezing: No  chest pain: No  palpitations: No  blood in stool: No  constipation: No  vomiting: No  diarrhea: No  polydipsia: No  polyuria: No  difficulty urinating: No  urgency: No  hematuria: No  joint swelling: No  arthralgias: No  headaches: No  weakness: No  confusion: No  dysphoric mood: No

## 2018-10-09 LAB — TESTOST FREE SERPL-MCNC: 10.3 PG/ML

## 2018-10-11 ENCOUNTER — PATIENT MESSAGE (OUTPATIENT)
Dept: FAMILY MEDICINE | Facility: CLINIC | Age: 53
End: 2018-10-11

## 2018-10-15 ENCOUNTER — TELEPHONE (OUTPATIENT)
Dept: FAMILY MEDICINE | Facility: CLINIC | Age: 53
End: 2018-10-15

## 2018-10-15 NOTE — TELEPHONE ENCOUNTER
----- Message from Lola Crawley sent at 10/15/2018  9:52 AM CDT -----  [phone] ref number([id number])  Caller wants nurse to see if its ok to prescribe a generic prescription of CIALIS 20 mg Tab to pharmacy listed below.

## 2018-10-29 RX ORDER — TESTOSTERONE CYPIONATE 200 MG/ML
INJECTION, SOLUTION INTRAMUSCULAR
Qty: 2 ML | Refills: 5 | Status: SHIPPED | OUTPATIENT
Start: 2018-10-29 | End: 2019-04-13 | Stop reason: SDUPTHER

## 2018-11-05 ENCOUNTER — PATIENT MESSAGE (OUTPATIENT)
Dept: FAMILY MEDICINE | Facility: CLINIC | Age: 53
End: 2018-11-05

## 2018-11-23 ENCOUNTER — OFFICE VISIT (OUTPATIENT)
Dept: FAMILY MEDICINE | Facility: CLINIC | Age: 53
End: 2018-11-23
Payer: COMMERCIAL

## 2018-11-23 VITALS
SYSTOLIC BLOOD PRESSURE: 138 MMHG | HEART RATE: 66 BPM | TEMPERATURE: 98 F | HEIGHT: 78 IN | OXYGEN SATURATION: 98 % | WEIGHT: 315 LBS | DIASTOLIC BLOOD PRESSURE: 80 MMHG | RESPIRATION RATE: 17 BRPM | BODY MASS INDEX: 36.45 KG/M2

## 2018-11-23 DIAGNOSIS — B35.1 ONYCHOMYCOSIS OF TOENAIL: ICD-10-CM

## 2018-11-23 PROCEDURE — 99999 PR PBB SHADOW E&M-EST. PATIENT-LVL IV: CPT | Mod: PBBFAC,,, | Performed by: FAMILY MEDICINE

## 2018-11-23 PROCEDURE — 99213 OFFICE O/P EST LOW 20 MIN: CPT | Mod: S$GLB,,, | Performed by: FAMILY MEDICINE

## 2018-11-23 PROCEDURE — 3008F BODY MASS INDEX DOCD: CPT | Mod: CPTII,S$GLB,, | Performed by: FAMILY MEDICINE

## 2018-11-23 PROCEDURE — 3075F SYST BP GE 130 - 139MM HG: CPT | Mod: CPTII,S$GLB,, | Performed by: FAMILY MEDICINE

## 2018-11-23 PROCEDURE — 3079F DIAST BP 80-89 MM HG: CPT | Mod: CPTII,S$GLB,, | Performed by: FAMILY MEDICINE

## 2018-11-23 RX ORDER — TERBINAFINE HYDROCHLORIDE 250 MG/1
250 TABLET ORAL DAILY
Qty: 90 TABLET | Refills: 0 | Status: SHIPPED | OUTPATIENT
Start: 2018-11-23 | End: 2018-12-23

## 2018-11-23 NOTE — PROGRESS NOTES
Reggie Sawant    Chief Complaint   Patient presents with    Nail Problem       History of Present Illness:   Mr. Kelly comes in today requesting Lamisil again pain for toenail onychomycosis.  He states since this summer he has noticed nail discoloration.  Used Lamisil in February 2017 for treatment of toenail onychomycosis without problems and with help.    He denies having fever, chills, fatigue, appetite changes; shortness of breath, cough, wheezing; chest pain, palpitations, leg swelling; abdominal pain, nausea, vomiting, diarrhea, constipation; unusual urinary symptoms; back pain; headache; anxiety, depression, homicidal or suicidal thoughts.    He occasional drinks alcohol. He denies history of liver disease.    Labs:                WBC                      4.45                10/05/2018                 HGB                      17.3                10/05/2018                 HCT                      51.9                10/05/2018                 PLT                      190                 10/05/2018                 CHOL                     202 (H)             10/05/2018                 TRIG                     86                  10/05/2018                 HDL                      48                  10/05/2018                 ALT                      42                  10/05/2018                 AST                      38                  10/05/2018                 NA                       140                 10/05/2018                 K                        3.2 (L)             10/05/2018                 CL                       100                 10/05/2018                 CREATININE               1.1                 10/05/2018                 BUN                      21 (H)              10/05/2018                 CO2                      30 (H)              10/05/2018                 TSH                      1.110               10/05/2018                 PSA                      1.3                  10/05/2018                 HGBA1C                   5.8                 02/17/2016              LDLCALC                  136.8               10/05/2018                  Current Outpatient Medications   Medication Sig    amlodipine-benazepril (LOTREL) 10-40 mg per capsule TAKE 1 CAPSULE ONCE DAILY    aspirin (ASPIRIN LOW DOSE) 81 MG EC tablet Take 1 tablet by mouth Daily.    chlorthalidone (HYGROTEN) 50 MG Tab TAKE 1 TABLET ONCE DAILY    CIALIS 20 mg Tab TAKE 1 TABLET DAILY    fluticasone (FLONASE) 50 mcg/actuation nasal spray SHAKE LIQUID AND USE 2 SPRAYS IN EACH NOSTRIL DAILY AS NEEDED    KLOR-CON M20 20 mEq tablet TAKE 1 TABLET ONCE DAILY    methocarbamol (ROBAXIN) 750 MG Tab Take 1 tablet (750 mg total) by mouth 2 (two) times daily as needed.    metoprolol succinate (TOPROL-XL) 100 MG 24 hr tablet TAKE 1 TABLET ONCE DAILY    multivitamin (ONE DAILY MULTIVITAMIN) per tablet Take 1 tablet by mouth once daily.    naproxen (NAPROSYN) 500 MG tablet Take 1 tablet (500 mg total) by mouth 2 (two) times daily as needed.    pravastatin (PRAVACHOL) 20 MG tablet TAKE 1 TABLET ONCE DAILY    testosterone cypionate (DEPOTESTOTERONE CYPIONATE) 200 mg/mL injection ADMINISTER 1 ML IN THE MUSCLE 1 TIME EVERY 14 DAYS    epinephrine (EPIPEN JR) 0.15 mg/0.3 mL (1:2,000) pen injection Inject 0.3 mLs (0.15 mg total) into the muscle as needed for Anaphylaxis.       Review of Systems   Constitutional: Negative for appetite change, chills, fatigue and fever.   Respiratory: Negative for cough, shortness of breath and wheezing.    Cardiovascular: Negative for chest pain, palpitations and leg swelling.   Gastrointestinal: Negative for abdominal pain, blood in stool, constipation, diarrhea and vomiting.   Genitourinary: Negative for difficulty urinating.   Musculoskeletal: Negative for back pain.   Skin:        See history of present illness.   Neurological: Negative for headaches.   Psychiatric/Behavioral: Negative for dysphoric  mood and suicidal ideas. The patient is not nervous/anxious.         Negative for homicidal ideas.       Objective:  Physical Exam   Constitutional: He is oriented to person, place, and time. He appears well-developed and well-nourished. No distress.   Pleasant.   Eyes:   He wears glasses.   Cardiovascular: Normal rate, regular rhythm and normal heart sounds.   No murmur heard.  Pulmonary/Chest: Effort normal and breath sounds normal. No respiratory distress. He has no wheezes.   Abdominal: Soft. Bowel sounds are normal. He exhibits no distension and no mass. There is no tenderness. There is no rebound and no guarding.   Musculoskeletal: Normal range of motion. He exhibits no edema or tenderness.   He is ambulatory without problems.   Neurological: He is alert and oriented to person, place, and time.   Skin: He is not diaphoretic.   Darkening nails at right > left foot (1st - 3rd toes) - consistent with onychomycosis.   Psychiatric: He has a normal mood and affect. His behavior is normal. Judgment and thought content normal.   Vitals reviewed.      ASSESSMENT:  1. Onychomycosis of toenail        PLAN:  Reggie was seen today for nail problem.    Diagnoses and all orders for this visit:    Onychomycosis of toenail  -     terbinafine HCl (LAMISIL) 250 mg tablet; Take 1 tablet (250 mg total) by mouth once daily.    Continue current medications, follow low sodium, low cholesterol, low carb diet, daily walks.  Medication precautions discussed with patient.  Keep 4/5/2019 scheduled appointment with me for hypertension follow up with repeat AST, ALT at that time.

## 2018-12-06 ENCOUNTER — TELEPHONE (OUTPATIENT)
Dept: FAMILY MEDICINE | Facility: CLINIC | Age: 53
End: 2018-12-06

## 2018-12-06 NOTE — TELEPHONE ENCOUNTER
----- Message from Lin Scott sent at 12/6/2018 10:59 AM CST -----  Contact: Adilene from [facility]   States she's calling having questions about Cialis 20mg and wants to know if generic is ok to dispense and can be reached at [phone] Ref # [id number]//thanks/dbw

## 2018-12-06 NOTE — TELEPHONE ENCOUNTER
Pharmacy notified of ok to dispense generic if patient is ok with generic. Pharmacy voiced understanding

## 2019-01-16 DIAGNOSIS — I10 ESSENTIAL HYPERTENSION: Chronic | ICD-10-CM

## 2019-01-17 RX ORDER — CHLORTHALIDONE 50 MG/1
TABLET ORAL
Qty: 90 TABLET | Refills: 0 | Status: SHIPPED | OUTPATIENT
Start: 2019-01-17 | End: 2019-05-04 | Stop reason: SDUPTHER

## 2019-01-17 RX ORDER — PRAVASTATIN SODIUM 20 MG/1
TABLET ORAL
Qty: 90 TABLET | Refills: 0 | Status: SHIPPED | OUTPATIENT
Start: 2019-01-17 | End: 2019-05-20 | Stop reason: SDUPTHER

## 2019-01-17 RX ORDER — POTASSIUM CHLORIDE 1500 MG/1
TABLET, EXTENDED RELEASE ORAL
Qty: 90 TABLET | Refills: 0 | Status: SHIPPED | OUTPATIENT
Start: 2019-01-17 | End: 2019-05-04 | Stop reason: SDUPTHER

## 2019-02-03 ENCOUNTER — HOSPITAL ENCOUNTER (OUTPATIENT)
Dept: RADIOLOGY | Facility: HOSPITAL | Age: 54
Discharge: HOME | End: 2019-02-03
Attending: NURSE PRACTITIONER
Payer: COMMERCIAL

## 2019-02-03 ENCOUNTER — OFFICE VISIT (OUTPATIENT)
Dept: URGENT CARE | Facility: CLINIC | Age: 54
End: 2019-02-03
Payer: COMMERCIAL

## 2019-02-03 VITALS
SYSTOLIC BLOOD PRESSURE: 160 MMHG | WEIGHT: 315 LBS | HEART RATE: 75 BPM | TEMPERATURE: 100 F | HEIGHT: 78 IN | BODY MASS INDEX: 36.45 KG/M2 | DIASTOLIC BLOOD PRESSURE: 80 MMHG

## 2019-02-03 DIAGNOSIS — R50.9 LOW GRADE FEVER: ICD-10-CM

## 2019-02-03 DIAGNOSIS — J22 BACTERIAL LOWER RESPIRATORY INFECTION: Primary | ICD-10-CM

## 2019-02-03 DIAGNOSIS — R05.9 COUGH: ICD-10-CM

## 2019-02-03 DIAGNOSIS — B96.89 BACTERIAL LOWER RESPIRATORY INFECTION: Primary | ICD-10-CM

## 2019-02-03 PROCEDURE — 71046 X-RAY EXAM CHEST 2 VIEWS: CPT | Mod: TC,FY,PO

## 2019-02-03 PROCEDURE — 99214 OFFICE O/P EST MOD 30 MIN: CPT | Mod: S$GLB,,, | Performed by: NURSE PRACTITIONER

## 2019-02-03 PROCEDURE — 3079F PR MOST RECENT DIASTOLIC BLOOD PRESSURE 80-89 MM HG: ICD-10-PCS | Mod: CPTII,S$GLB,, | Performed by: NURSE PRACTITIONER

## 2019-02-03 PROCEDURE — 3077F PR MOST RECENT SYSTOLIC BLOOD PRESSURE >= 140 MM HG: ICD-10-PCS | Mod: CPTII,S$GLB,, | Performed by: NURSE PRACTITIONER

## 2019-02-03 PROCEDURE — 3008F PR BODY MASS INDEX (BMI) DOCUMENTED: ICD-10-PCS | Mod: CPTII,S$GLB,, | Performed by: NURSE PRACTITIONER

## 2019-02-03 PROCEDURE — 71046 X-RAY EXAM CHEST 2 VIEWS: CPT | Mod: 26,,, | Performed by: RADIOLOGY

## 2019-02-03 PROCEDURE — 3077F SYST BP >= 140 MM HG: CPT | Mod: CPTII,S$GLB,, | Performed by: NURSE PRACTITIONER

## 2019-02-03 PROCEDURE — 3008F BODY MASS INDEX DOCD: CPT | Mod: CPTII,S$GLB,, | Performed by: NURSE PRACTITIONER

## 2019-02-03 PROCEDURE — 99999 PR PBB SHADOW E&M-EST. PATIENT-LVL III: CPT | Mod: PBBFAC,,, | Performed by: NURSE PRACTITIONER

## 2019-02-03 PROCEDURE — 99214 PR OFFICE/OUTPT VISIT, EST, LEVL IV, 30-39 MIN: ICD-10-PCS | Mod: S$GLB,,, | Performed by: NURSE PRACTITIONER

## 2019-02-03 PROCEDURE — 71046 XR CHEST PA AND LATERAL: ICD-10-PCS | Mod: 26,,, | Performed by: RADIOLOGY

## 2019-02-03 PROCEDURE — 99999 PR PBB SHADOW E&M-EST. PATIENT-LVL III: ICD-10-PCS | Mod: PBBFAC,,, | Performed by: NURSE PRACTITIONER

## 2019-02-03 PROCEDURE — 3079F DIAST BP 80-89 MM HG: CPT | Mod: CPTII,S$GLB,, | Performed by: NURSE PRACTITIONER

## 2019-02-03 RX ORDER — AMOXICILLIN AND CLAVULANATE POTASSIUM 875; 125 MG/1; MG/1
1 TABLET, FILM COATED ORAL EVERY 12 HOURS
Qty: 20 TABLET | Refills: 0 | Status: SHIPPED | OUTPATIENT
Start: 2019-02-03 | End: 2019-02-13

## 2019-02-03 RX ORDER — METHYLPREDNISOLONE 4 MG/1
TABLET ORAL
Qty: 21 TABLET | Refills: 0 | Status: SHIPPED | OUTPATIENT
Start: 2019-02-03 | End: 2019-02-18

## 2019-02-03 RX ORDER — GUAIFENESIN 600 MG/1
1200 TABLET, EXTENDED RELEASE ORAL 2 TIMES DAILY PRN
Qty: 40 TABLET | Refills: 0 | Status: SHIPPED | OUTPATIENT
Start: 2019-02-03 | End: 2019-02-10

## 2019-02-03 NOTE — LETTER
February 3, 2019      [facility]  [address] [place] [address]  Phone: [phone]  Fax: [phone]       Patient: Reggie Sawant   YOB: 1965  Date of Visit: 02/03/2019    To Whom It May Concern:    Ben Sawant  was at [facility] on 02/03/2019. He may return to work/school on 02/05/2019 with no restrictions. If you have any questions or concerns, or if I can be of further assistance, please do not hesitate to contact me.    Sincerely,    Alka Boss, NP

## 2019-02-03 NOTE — PROGRESS NOTES
Subjective:       Patient ID: Reggie Sawant is a 53 y.o. male.    Chief Complaint: URI    URI    This is a new problem. Episode onset: 3 days. The problem has been unchanged. There has been no fever. Associated symptoms include congestion, coughing, rhinorrhea and a sore throat. Pertinent negatives include no dysuria, ear pain, headaches, neck pain, plugged ear sensation, sinus pain, sneezing, swollen glands, vomiting or wheezing. He has tried nothing for the symptoms.     Review of Systems   Constitutional: Negative for fatigue and fever.   HENT: Positive for congestion, postnasal drip, rhinorrhea and sore throat. Negative for ear pain, sinus pain and sneezing.    Respiratory: Positive for cough. Negative for shortness of breath, wheezing and stridor.    Gastrointestinal: Negative for vomiting.   Genitourinary: Negative for dysuria.   Musculoskeletal: Negative for neck pain.   Skin: Negative for color change.   Allergic/Immunologic: Negative for environmental allergies.   Neurological: Negative for dizziness and headaches.   Psychiatric/Behavioral: Negative for agitation.       Objective:      Physical Exam   Constitutional: He appears well-developed and well-nourished.   HENT:   Head: Normocephalic.   Right Ear: Tympanic membrane normal.   Left Ear: Tympanic membrane normal.   Nose: Mucosal edema and rhinorrhea present.   Mouth/Throat: Uvula is midline, oropharynx is clear and moist and mucous membranes are normal.   Cardiovascular: Normal rate and normal heart sounds.   Pulmonary/Chest: Effort normal. He has decreased breath sounds in the right lower field and the left lower field.   productive cough   Nursing note and vitals reviewed.      Assessment:       1. Bacterial lower respiratory infection    2. Low grade fever    3. Cough        Plan:         Reggie was seen today for uri.    Diagnoses and all orders for this visit:    Bacterial lower respiratory infection  -     amoxicillin-clavulanate 875-125mg  (AUGMENTIN) 875-125 mg per tablet; Take 1 tablet by mouth every 12 (twelve) hours. for 10 days  -     guaiFENesin (MUCINEX) 600 mg 12 hr tablet; Take 2 tablets (1,200 mg total) by mouth 2 (two) times daily as needed for Congestion.  -     methylPREDNISolone (MEDROL DOSEPACK) 4 mg tablet; use as directed    Low grade fever  -     POCT Influenza A/B Molecular    Cough  -     X-Ray Chest PA And Lateral; Future    Follow prescribed treatment plan as directed.  Stay hydrated and rest.  Report to ER if symptoms worsen.  Follow up with PCP in 2-3 days or sooner if symptoms do not improve.

## 2019-02-12 ENCOUNTER — PATIENT MESSAGE (OUTPATIENT)
Dept: FAMILY MEDICINE | Facility: CLINIC | Age: 54
End: 2019-02-12

## 2019-02-18 ENCOUNTER — PATIENT MESSAGE (OUTPATIENT)
Dept: ADMINISTRATIVE | Facility: OTHER | Age: 54
End: 2019-02-18

## 2019-02-18 ENCOUNTER — OFFICE VISIT (OUTPATIENT)
Dept: FAMILY MEDICINE | Facility: CLINIC | Age: 54
End: 2019-02-18
Payer: COMMERCIAL

## 2019-02-18 VITALS
SYSTOLIC BLOOD PRESSURE: 142 MMHG | OXYGEN SATURATION: 97 % | RESPIRATION RATE: 18 BRPM | HEIGHT: 78 IN | TEMPERATURE: 99 F | HEART RATE: 64 BPM | WEIGHT: 315 LBS | BODY MASS INDEX: 36.45 KG/M2 | DIASTOLIC BLOOD PRESSURE: 84 MMHG

## 2019-02-18 DIAGNOSIS — E66.9 OBESITY (BMI 30-39.9): ICD-10-CM

## 2019-02-18 DIAGNOSIS — I10 ESSENTIAL HYPERTENSION: Primary | Chronic | ICD-10-CM

## 2019-02-18 PROCEDURE — 3077F PR MOST RECENT SYSTOLIC BLOOD PRESSURE >= 140 MM HG: ICD-10-PCS | Mod: CPTII,S$GLB,, | Performed by: FAMILY MEDICINE

## 2019-02-18 PROCEDURE — 3008F BODY MASS INDEX DOCD: CPT | Mod: CPTII,S$GLB,, | Performed by: FAMILY MEDICINE

## 2019-02-18 PROCEDURE — 99999 PR PBB SHADOW E&M-EST. PATIENT-LVL III: ICD-10-PCS | Mod: PBBFAC,,, | Performed by: FAMILY MEDICINE

## 2019-02-18 PROCEDURE — 99214 OFFICE O/P EST MOD 30 MIN: CPT | Mod: S$GLB,,, | Performed by: FAMILY MEDICINE

## 2019-02-18 PROCEDURE — 3079F PR MOST RECENT DIASTOLIC BLOOD PRESSURE 80-89 MM HG: ICD-10-PCS | Mod: CPTII,S$GLB,, | Performed by: FAMILY MEDICINE

## 2019-02-18 PROCEDURE — 3079F DIAST BP 80-89 MM HG: CPT | Mod: CPTII,S$GLB,, | Performed by: FAMILY MEDICINE

## 2019-02-18 PROCEDURE — 3008F PR BODY MASS INDEX (BMI) DOCUMENTED: ICD-10-PCS | Mod: CPTII,S$GLB,, | Performed by: FAMILY MEDICINE

## 2019-02-18 PROCEDURE — 3077F SYST BP >= 140 MM HG: CPT | Mod: CPTII,S$GLB,, | Performed by: FAMILY MEDICINE

## 2019-02-18 PROCEDURE — 99214 PR OFFICE/OUTPT VISIT, EST, LEVL IV, 30-39 MIN: ICD-10-PCS | Mod: S$GLB,,, | Performed by: FAMILY MEDICINE

## 2019-02-18 PROCEDURE — 99999 PR PBB SHADOW E&M-EST. PATIENT-LVL III: CPT | Mod: PBBFAC,,, | Performed by: FAMILY MEDICINE

## 2019-02-18 RX ORDER — MELOXICAM 15 MG/1
15 TABLET ORAL DAILY PRN
COMMUNITY
End: 2019-09-04 | Stop reason: SDUPTHER

## 2019-02-18 RX ORDER — METOPROLOL SUCCINATE 200 MG/1
200 TABLET, EXTENDED RELEASE ORAL DAILY
Qty: 90 TABLET | Refills: 1 | Status: SHIPPED | OUTPATIENT
Start: 2019-02-18 | End: 2019-04-08 | Stop reason: DRUGHIGH

## 2019-02-18 NOTE — PROGRESS NOTES
Reggie Sawant    Chief Complaint   Patient presents with    Hypertension       History of Present Illness:   Mr. Sawant the comes in today for hypertension follow-up.  He has taken medication today.  He states he walks at work and does no other leisure exercise.  He states he monitors his diet.  He states his blood pressure levels have been elevated at work (160/90).  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    He states he feels okay today.  He denies having fever, chills, fatigue, appetite changes; shortness of breath, cough, wheezing; chest pain, palpitations, leg swelling; abdominal pain, nausea, vomiting, diarrhea, constipation; unusual urinary symptoms; back pain; headaches, numbness, visual disturbance; anxiety, depression, homicidal or suicidal thoughts.        Labs:                   WBC                      4.45                10/05/2018                 HGB                      17.3                10/05/2018                 HCT                      51.9                10/05/2018                 PLT                      190                 10/05/2018                 CHOL                     202 (H)             10/05/2018                 TRIG                     86                  10/05/2018                 HDL                      48                  10/05/2018                 ALT                      42                  10/05/2018                 AST                      38                  10/05/2018                 NA                       140                 10/05/2018                 K                        3.2 (L)             10/05/2018                 CL                       100                 10/05/2018                 CREATININE               1.1                 10/05/2018                 BUN                      21 (H)              10/05/2018                 CO2                       30 (H)              10/05/2018                 TSH                      1.110               10/05/2018                 PSA                      1.3                 10/05/2018                 HGBA1C                   5.8                 02/17/2016               LDLCALC                  136.8               10/05/2018                Hypertension   This is a chronic problem. The current episode started more than 1 year ago. The problem has been gradually worsening since onset. The problem is resistant. Pertinent negatives include no anxiety, blurred vision, chest pain, headaches, malaise/fatigue, orthopnea, palpitations, peripheral edema, PND, shortness of breath or sweats. There are no associated agents to hypertension. Risk factors for coronary artery disease include dyslipidemia, family history, obesity, sedentary lifestyle and stress. Past treatments include calcium channel blockers and diuretics. The current treatment provides mild improvement. Compliance problems include diet and exercise.          Current Outpatient Medications   Medication Sig    amlodipine-benazepril (LOTREL) 10-40 mg per capsule TAKE 1 CAPSULE ONCE DAILY    aspirin (ASPIRIN LOW DOSE) 81 MG EC tablet Take 1 tablet by mouth Daily.    chlorthalidone (HYGROTEN) 50 MG Tab TAKE 1 TABLET ONCE DAILY    CIALIS 20 mg Tab TAKE 1 TABLET DAILY    fluticasone (FLONASE) 50 mcg/actuation nasal spray SHAKE LIQUID AND USE 2 SPRAYS IN EACH NOSTRIL DAILY AS NEEDED    KLOR-CON M20 20 mEq tablet TAKE 1 TABLET ONCE DAILY    meloxicam (MOBIC) 15 MG tablet Take 15 mg by mouth daily as needed for Pain.    metoprolol succinate (TOPROL-XL) 100 MG 24 hr tablet TAKE 1 TABLET ONCE DAILY    multivitamin (ONE DAILY MULTIVITAMIN) per tablet Take 1 tablet by mouth once daily.    pravastatin (PRAVACHOL) 20 MG tablet TAKE 1 TABLET ONCE DAILY    testosterone cypionate (DEPOTESTOTERONE CYPIONATE) 200 mg/mL injection ADMINISTER 1 ML IN THE MUSCLE 1 TIME  EVERY 14 DAYS    epinephrine (EPIPEN JR) 0.15 mg/0.3 mL (1:2,000) pen injection Inject 0.3 mLs (0.15 mg total) into the muscle as needed for Anaphylaxis.     Review of Systems   Constitutional: Negative for activity change, appetite change, chills, fatigue, fever and malaise/fatigue.   Eyes: Negative for blurred vision and visual disturbance.   Respiratory: Negative for cough, shortness of breath and wheezing.    Cardiovascular: Negative for chest pain, palpitations, orthopnea, leg swelling and PND.   Gastrointestinal: Negative for abdominal pain, constipation, diarrhea and vomiting.   Genitourinary: Negative for difficulty urinating.   Musculoskeletal: Negative for back pain.   Neurological: Negative for headaches.   Psychiatric/Behavioral: Negative for dysphoric mood and suicidal ideas. The patient is not nervous/anxious.         Negative for homicidal ideas.  See history of present illness.       Objective:  Physical Exam   Constitutional: He is oriented to person, place, and time. He appears well-developed and well-nourished. No distress.   Pleasant.   Eyes:   He wears glasses.   Neck: Normal range of motion. Neck supple. No thyromegaly present.   Cardiovascular: Normal rate, regular rhythm and normal heart sounds.   No murmur heard.  Pulmonary/Chest: Effort normal and breath sounds normal. No respiratory distress. He has no wheezes.   Abdominal: Soft. Bowel sounds are normal. He exhibits no distension and no mass. There is no tenderness. There is no rebound and no guarding.   Musculoskeletal: Normal range of motion. He exhibits no edema or tenderness.   He is ambulatory without problems.   Lymphadenopathy:     He has no cervical adenopathy.   Neurological: He is alert and oriented to person, place, and time.   Skin: He is not diaphoretic.   Psychiatric: He has a normal mood and affect. His behavior is normal. Judgment and thought content normal.   Vitals reviewed.      ASSESSMENT:  1. Essential hypertension     2. Obesity (BMI 30-39.9)        PLAN:  Reggie was seen today for hypertension.    Diagnoses and all orders for this visit:    Essential hypertension  -     metoprolol succinate (TOPROL-XL) 200 MG 24 hr tablet; Take 1 tablet (200 mg total) by mouth once daily.  -     Hypertension Digital Medicine (HDMP) Enrollment Order  -     Hypertension Digital Medicine (HDMP): Assign Onboarding Questionnaires    Obesity (BMI 30-39.9)       Increase Toprol- mg to 200 mg daily as directed, #90, 1 refill.  Continue current medications, follow low sodium, low cholesterol, low carb diet, daily walks.  Keep scheduled 4/5/2019 appointment with me for hypertension follow up.

## 2019-02-20 ENCOUNTER — PATIENT OUTREACH (OUTPATIENT)
Dept: OTHER | Facility: OTHER | Age: 54
End: 2019-02-20

## 2019-02-20 NOTE — PROGRESS NOTES
1st attempt for enrollment call. Left voicemail.         Last 5 Patient Entered Readings                                      Current 30 Day Average: 168/104     Recent Readings 2/19/2019    SBP (mmHg) 168    DBP (mmHg) 104    Pulse 57

## 2019-02-26 RX ORDER — AMLODIPINE AND BENAZEPRIL HYDROCHLORIDE 10; 40 MG/1; MG/1
CAPSULE ORAL
Qty: 90 CAPSULE | Refills: 1 | Status: SHIPPED | OUTPATIENT
Start: 2019-02-26 | End: 2019-04-22 | Stop reason: DRUGHIGH

## 2019-03-27 ENCOUNTER — PATIENT OUTREACH (OUTPATIENT)
Dept: OTHER | Facility: OTHER | Age: 54
End: 2019-03-27

## 2019-03-27 PROCEDURE — 99091 PR DIGITAL MEDICINE SERVICES, HYPERTENSION, ESTABLISHED: ICD-10-PCS | Mod: ,,, | Performed by: PHARMACIST

## 2019-03-27 PROCEDURE — 99091 COLLJ & INTERPJ DATA EA 30 D: CPT | Mod: ,,, | Performed by: PHARMACIST

## 2019-03-27 NOTE — LETTER
March 27, 2019     Reggie Sawant  [address]  [place] [address]       Dear Mr. Paredes,    Welcome to [facility]! Our goal is to make care effective, proactive and convenient by using data you send us from home to better treat your chronic conditions.        My name is Cinda Gill, and I am your dedicated Digital Medicine clinician. As an expert in medication management, I will help ensure that the medications you are taking continue to provide the intended benefits and help you reach your goals. You can reach me directly at [phone] or by sending me a message directly through your [facility] account.      I am Cesilia Carbone and I will be your health . My job is to help you identify lifestyle changes to improve your disease control. We will talk about nutrition, exercise, and other ways you may be able to adjust your current habits to better your health. Additionally, we will help ensure you are completing the tests and screenings that are necessary to help manage your conditions. You can reach me directly at [phone] or by sending me a message directly through your [facility] account.    Most importantly, YOU are at the center of this team. Together, we will work to improve your overall health and encourage you to meet your goals for a healthier lifestyle.     What we expect from YOU:  · Please take frequent home blood pressure measurements. We ask that you take at least 1 blood pressure reading per week, but more information will better help us get you know you. Be sure you rest for a few minutes before taking the reading in a quiet, comfortable place.     Be available to receive phone calls or [facility] messages, when appropriate, from your care team. Please let us know if there are any specific days or times that work best for us to reach you via phone.     Complete routine tests and screenings. Dont worry, we will help keep you on track!           What you  should expect from your Digital Medicine Care Team:   We will work with you to create a personalized plan of care and provide you with encouragement and education, including regarding lifestyle changes, that could help you manage your disease states.     We will adjust your current medications, if needed, and continue to monitor your long-term progress.     We will provide you and your physician with monthly progress reports after you have been in the program for more than 30 days.     We will send you reminders through [facility] and text messages to help ensure you do not miss any testing deadlines to help manage your disease states.    You will be able to reach us by phone or through your [facility] account by clicking our names under Care Team on the right side of the home screen. As a reminder, we cannot receive text messages.    We look forward to working with you to achieve your blood pressure goals!    Sincerely,    Your Digital Medicine Team    Please visit our websites to learn more:   · Hypertension: [email or web address]      Remember, we are not available for emergencies. If you have an emergency, please contact your doctors office directly or call [facility] ([phone] or [phone]) or 911.

## 2019-03-27 NOTE — PROGRESS NOTES
Last 5 Patient Entered Readings                                      Current 30 Day Average: 128/73     Recent Readings 3/6/2019 3/6/2019 3/6/2019 3/4/2019 3/4/2019    SBP (mmHg) 140 139 143 126 136    DBP (mmHg) 81 83 88 73 76    Pulse 57 58 58 59 60        Digital Medicine Enrollment Call    Introduced Mr. Reggie Sawnat to Digital Medicine.     Discussed program expectations and requirements.    Introduced digital medicine care team.     Reviewed the importance of self-monitoring for digital medicine participation.     Reviewed that the Digital Medicine team is not available for emergencies and instructed the patient to call 911 or [facility] ([phone] or [phone]) if one arises.

## 2019-03-27 NOTE — PROGRESS NOTES
Last 5 Patient Entered Readings                                      Current 30 Day Average: 128/73     Recent Readings 3/26/2019 3/26/2019 3/26/2019 3/17/2019 3/16/2019    SBP (mmHg) 146 151 160 129 131    DBP (mmHg) 86 90 98 79 76    Pulse 57 58 60 51 57        Digital Medicine: Health  Introduction    Introduced Mr. Reggie Sawant to Digital Medicine. Discussed health  role and recommended lifestyle modifications.    Completed enrollment call.    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  Afternoon calls between 2-4pm are best.    Lifestyle Assessment:  Current Dietary Habits(i.e. low sodium, food labels, dining out): will discuss at next encounter.  Exercise:  Alcohol/Tobacco:  Medication Adherence: has been compliant with the medicaiton regimen  Other goals:    Reviewed AHA/AACE recommendations:  Limit sodium intake to <2000mg/day  Recommended CHO intake, 45-65% of daily caloric intake  Perform 150 minutes of physical activity per week    Reviewed the importance of self-monitoring, medication adherence, and that the health  can be used as a resource for lifestyle modifications to help reduce or maintain a healthy lifestyle.  Reviewed that the Digital Medicine team is not available for emergencies and instructed the patient to call 911 or [facility] ([phone] or [phone]) if one arises.

## 2019-03-29 ENCOUNTER — PATIENT OUTREACH (OUTPATIENT)
Dept: OTHER | Facility: OTHER | Age: 54
End: 2019-03-29

## 2019-03-29 DIAGNOSIS — I10 ESSENTIAL HYPERTENSION: Primary | Chronic | ICD-10-CM

## 2019-03-29 NOTE — PROGRESS NOTES
Last 5 Patient Entered Readings                                      Current 30 Day Average: 129/75     Recent Readings 3/26/2019 3/26/2019 3/26/2019 3/17/2019 3/16/2019    SBP (mmHg) 146 151 160 129 131    DBP (mmHg) 86 90 98 79 76    Pulse 57 58 60 51 57        [facility] to welcome him to the program.

## 2019-04-05 ENCOUNTER — OFFICE VISIT (OUTPATIENT)
Dept: FAMILY MEDICINE | Facility: CLINIC | Age: 54
End: 2019-04-05
Payer: COMMERCIAL

## 2019-04-05 ENCOUNTER — LAB VISIT (OUTPATIENT)
Dept: LAB | Facility: HOSPITAL | Age: 54
End: 2019-04-05
Attending: FAMILY MEDICINE
Payer: COMMERCIAL

## 2019-04-05 ENCOUNTER — PATIENT MESSAGE (OUTPATIENT)
Dept: OTHER | Facility: OTHER | Age: 54
End: 2019-04-05

## 2019-04-05 VITALS
DIASTOLIC BLOOD PRESSURE: 88 MMHG | HEIGHT: 78 IN | TEMPERATURE: 98 F | OXYGEN SATURATION: 96 % | RESPIRATION RATE: 17 BRPM | SYSTOLIC BLOOD PRESSURE: 162 MMHG | BODY MASS INDEX: 36.45 KG/M2 | HEART RATE: 67 BPM | WEIGHT: 315 LBS

## 2019-04-05 DIAGNOSIS — I10 ESSENTIAL HYPERTENSION: Chronic | ICD-10-CM

## 2019-04-05 DIAGNOSIS — G47.00 INSOMNIA, UNSPECIFIED TYPE: ICD-10-CM

## 2019-04-05 DIAGNOSIS — E66.01 MORBID OBESITY WITH BMI OF 40.0-44.9, ADULT: ICD-10-CM

## 2019-04-05 DIAGNOSIS — G47.30 SLEEP APNEA, UNSPECIFIED TYPE: Chronic | ICD-10-CM

## 2019-04-05 DIAGNOSIS — I10 ESSENTIAL HYPERTENSION: Primary | Chronic | ICD-10-CM

## 2019-04-05 DIAGNOSIS — M54.9 MUSCULOSKELETAL BACK PAIN: ICD-10-CM

## 2019-04-05 DIAGNOSIS — E78.5 HYPERLIPIDEMIA, UNSPECIFIED HYPERLIPIDEMIA TYPE: Chronic | ICD-10-CM

## 2019-04-05 LAB
ALBUMIN SERPL BCP-MCNC: 3.9 G/DL (ref 3.5–5.2)
ALP SERPL-CCNC: 38 U/L (ref 55–135)
ALT SERPL W/O P-5'-P-CCNC: 38 U/L (ref 10–44)
ANION GAP SERPL CALC-SCNC: 7 MMOL/L (ref 8–16)
AST SERPL-CCNC: 42 U/L (ref 10–40)
BILIRUB SERPL-MCNC: 1.1 MG/DL (ref 0.1–1)
BUN SERPL-MCNC: 17 MG/DL (ref 6–20)
CALCIUM SERPL-MCNC: 9.8 MG/DL (ref 8.7–10.5)
CHLORIDE SERPL-SCNC: 98 MMOL/L (ref 95–110)
CO2 SERPL-SCNC: 33 MMOL/L (ref 23–29)
CREAT SERPL-MCNC: 1.1 MG/DL (ref 0.5–1.4)
EST. GFR  (AFRICAN AMERICAN): >60 ML/MIN/1.73 M^2
EST. GFR  (NON AFRICAN AMERICAN): >60 ML/MIN/1.73 M^2
GLUCOSE SERPL-MCNC: 88 MG/DL (ref 70–110)
POTASSIUM SERPL-SCNC: 3.6 MMOL/L (ref 3.5–5.1)
PROT SERPL-MCNC: 7.5 G/DL (ref 6–8.4)
SODIUM SERPL-SCNC: 138 MMOL/L (ref 136–145)

## 2019-04-05 PROCEDURE — 3079F DIAST BP 80-89 MM HG: CPT | Mod: CPTII,S$GLB,, | Performed by: FAMILY MEDICINE

## 2019-04-05 PROCEDURE — 36415 COLL VENOUS BLD VENIPUNCTURE: CPT | Mod: PO

## 2019-04-05 PROCEDURE — 3008F PR BODY MASS INDEX (BMI) DOCUMENTED: ICD-10-PCS | Mod: CPTII,S$GLB,, | Performed by: FAMILY MEDICINE

## 2019-04-05 PROCEDURE — 3008F BODY MASS INDEX DOCD: CPT | Mod: CPTII,S$GLB,, | Performed by: FAMILY MEDICINE

## 2019-04-05 PROCEDURE — 3077F PR MOST RECENT SYSTOLIC BLOOD PRESSURE >= 140 MM HG: ICD-10-PCS | Mod: CPTII,S$GLB,, | Performed by: FAMILY MEDICINE

## 2019-04-05 PROCEDURE — 3077F SYST BP >= 140 MM HG: CPT | Mod: CPTII,S$GLB,, | Performed by: FAMILY MEDICINE

## 2019-04-05 PROCEDURE — 99214 PR OFFICE/OUTPT VISIT, EST, LEVL IV, 30-39 MIN: ICD-10-PCS | Mod: S$GLB,,, | Performed by: FAMILY MEDICINE

## 2019-04-05 PROCEDURE — 99999 PR PBB SHADOW E&M-EST. PATIENT-LVL IV: CPT | Mod: PBBFAC,,, | Performed by: FAMILY MEDICINE

## 2019-04-05 PROCEDURE — 99999 PR PBB SHADOW E&M-EST. PATIENT-LVL IV: ICD-10-PCS | Mod: PBBFAC,,, | Performed by: FAMILY MEDICINE

## 2019-04-05 PROCEDURE — 80053 COMPREHEN METABOLIC PANEL: CPT

## 2019-04-05 PROCEDURE — 99214 OFFICE O/P EST MOD 30 MIN: CPT | Mod: S$GLB,,, | Performed by: FAMILY MEDICINE

## 2019-04-05 PROCEDURE — 3079F PR MOST RECENT DIASTOLIC BLOOD PRESSURE 80-89 MM HG: ICD-10-PCS | Mod: CPTII,S$GLB,, | Performed by: FAMILY MEDICINE

## 2019-04-05 RX ORDER — TERBINAFINE HYDROCHLORIDE 250 MG/1
TABLET ORAL
Refills: 0 | COMMUNITY
Start: 2019-01-22 | End: 2019-04-15

## 2019-04-05 RX ORDER — TRAZODONE HYDROCHLORIDE 50 MG/1
50 TABLET ORAL NIGHTLY PRN
Qty: 30 TABLET | Refills: 5 | Status: SHIPPED | OUTPATIENT
Start: 2019-04-05 | End: 2020-12-30 | Stop reason: SDUPTHER

## 2019-04-05 NOTE — PATIENT INSTRUCTIONS
Okay to take Meloxicam 15 mg daily along with Methocarbamol 750 mg twice daily if needed for 2 weeks.    Please call Dr. Sanchez for your results.    Please reach out to Hypertension Digital Medicine Program representative to see if he/she can help you with your blood pressure management.    Thanks.

## 2019-04-05 NOTE — PROGRESS NOTES
Reggie Sawant    Chief Complaint   Patient presents with    Hypertension    Follow-up       History of Present Illness:   Mr. Sawant comes in today for 6-month hypertension follow-up.  He has taken medication today but states he just got off of work.  He states he monitors his diet and walks at work but does not perform other exercise.    He follows with [facility] but states he has not received the back on his blood pressure levels yet.  He reports home BP of 160/83 this morning following taken medication.    He states he has abnormal sleep pattern as his job schedule is inconsistent; he states the abnormal sleep pattern causes him to be tired.  He states he has not been taking trazodone 50 mg nightly prn for insomnia in some time.  He states he continues to use CPAP for treatment of DEEPTHI.  He denies having anxiety, depression, homicidal or suicidal thoughts.    He complains of having upper back pain over his left shoulder blade for 1 week.  He denies having recent trauma or associated shortness of breath, cough, wheezing, chest pain, palpitations, leg swelling, abdominal pain, nausea, vomiting, diarrhea, constipation, fever, chills, appetite change, fatigue, unusual urinary symptoms, headaches, anxiety, depression, homicidal suicidal thoughts, rashes.  He states he has not taken medication specifically for upper back pain but states he has taken Robaxin 750 mg twice daily prn and oxaprozin 600 mg daily in the past with help for back pain.    He states he will end the Lamisil therapy for treatment of onychomycosis soon; he has been taken Lamisil since November 2018.      Labs:                     WBC                      4.45                10/05/2018                 HGB                      17.3                10/05/2018                 HCT                      51.9                10/05/2018                 PLT                      190                 10/05/2018                 CHOL                     202  (H)             10/05/2018                 TRIG                     86                  10/05/2018                 HDL                      48                  10/05/2018                 ALT                      42                  10/05/2018                 AST                      38                  10/05/2018                 NA                       140                 10/05/2018                 K                        3.2 (L)             10/05/2018                 CL                       100                 10/05/2018                 CREATININE               1.1                 10/05/2018                 BUN                      21 (H)              10/05/2018                 CO2                      30 (H)              10/05/2018                 TSH                      1.110               10/05/2018                 PSA                      1.3                 10/05/2018                 HGBA1C                   5.8                 02/17/2016                LDLCALC                  136.8               10/05/2018                Current Outpatient Medications   Medication Sig    amlodipine-benazepril (LOTREL) 10-40 mg per capsule TAKE 1 CAPSULE ONCE DAILY    aspirin (ASPIRIN LOW DOSE) 81 MG EC tablet Take 1 tablet by mouth Daily.    chlorthalidone (HYGROTEN) 50 MG Tab TAKE 1 TABLET ONCE DAILY    CIALIS 20 mg Tab TAKE 1 TABLET DAILY    fluticasone (FLONASE) 50 mcg/actuation nasal spray SHAKE LIQUID AND USE 2 SPRAYS IN EACH NOSTRIL DAILY AS NEEDED    KLOR-CON M20 20 mEq tablet TAKE 1 TABLET ONCE DAILY    metoprolol succinate (TOPROL-XL) 200 MG 24 hr tablet Take 1 tablet (200 mg total) by mouth once daily.    multivitamin (ONE DAILY MULTIVITAMIN) per tablet Take 1 tablet by mouth once daily.    pravastatin (PRAVACHOL) 20 MG tablet TAKE 1 TABLET ONCE DAILY    terbinafine HCl (LAMISIL) 250 mg tablet     testosterone cypionate (DEPOTESTOTERONE CYPIONATE) 200 mg/mL injection ADMINISTER 1 ML IN THE MUSCLE 1  TIME EVERY 14 DAYS    epinephrine (EPIPEN JR) 0.15 mg/0.3 mL (1:2,000) pen injection Inject 0.3 mLs (0.15 mg total) into the muscle as needed for Anaphylaxis.    meloxicam (MOBIC) 15 MG tablet Take 15 mg by mouth daily as needed for Pain.       Review of Systems   Constitutional: Positive for fatigue. Negative for activity change, appetite change, chills and fever.        Weight 160.4 kg (353 lb 9.9 oz) at February 18, 2019 visit.   Respiratory: Negative for cough, shortness of breath and wheezing.    Cardiovascular: Negative for chest pain and palpitations.   Gastrointestinal: Negative for abdominal pain, blood in stool, constipation, diarrhea and vomiting.   Endocrine: Negative for polydipsia, polyphagia and polyuria.   Genitourinary: Negative for difficulty urinating and hematuria.   Musculoskeletal: Positive for back pain.   Neurological: Negative for headaches.   Psychiatric/Behavioral: Positive for sleep disturbance. Negative for dysphoric mood and suicidal ideas. The patient is not nervous/anxious.         Negative for homicidal ideas.       Objective:  Physical Exam   Constitutional: He is oriented to person, place, and time. He appears well-developed and well-nourished. No distress.   Pleasant.   Eyes:   He wears glasses.   Neck: Normal range of motion. Neck supple. No thyromegaly present.   Cardiovascular: Normal rate, regular rhythm and normal heart sounds.   No murmur heard.  Pulmonary/Chest: Effort normal and breath sounds normal. No respiratory distress. He has no wheezes.   Abdominal: Soft. Bowel sounds are normal. He exhibits no distension and no mass. There is no tenderness. There is no rebound and no guarding.   Musculoskeletal: Normal range of motion. He exhibits tenderness. He exhibits no edema.   He is ambulatory without problems. Slightly tender at left shoulder blade with full range of motion noted.   Lymphadenopathy:     He has no cervical adenopathy.   Neurological: He is alert and  oriented to person, place, and time.   Skin: He is not diaphoretic.   Improvement of darkening nails at right > left foot (1st - 3rd toes).    Psychiatric: He has a normal mood and affect. His behavior is normal. Judgment and thought content normal.   Vitals reviewed.      ASSESSMENT:  1. Essential hypertension    2. Hyperlipidemia, unspecified hyperlipidemia type    3. Sleep apnea, unspecified type    4. Insomnia, unspecified type    5. Musculoskeletal back pain    6. Morbid obesity with BMI of 40.0-44.9, adult        PLAN:  Reggie was seen today for hypertension and follow-up.    Diagnoses and all orders for this visit:    Essential hypertension  -     Comprehensive metabolic panel; Future    Hyperlipidemia, unspecified hyperlipidemia type    Sleep apnea, unspecified type    Insomnia, unspecified type  -     traZODone (DESYREL) 50 MG tablet; Take 1 tablet (50 mg total) by mouth nightly as needed for Insomnia.    Musculoskeletal back pain    Morbid obesity with BMI of 40.0-44.9, adult    Patient advised to call for results.  Okay to take Meloxicam 15 mg daily (not oxaprozin) along with Methocarbamol 750 mg twice daily if needed for 2 weeks.  Please reach out to Hypertension Digital Medicine Program representative to see if he/she can help you with your blood pressure management.  Continue current medications, follow low sodium, low cholesterol, low carb diet, daily walks.  Get back on Trazodone nightly to see if sleep improves and helps blood pressure control.  Keep follow up with specialists.  Follow up in about 6 months (around 10/7/2019) for physical.

## 2019-04-08 RX ORDER — CARVEDILOL 25 MG/1
25 TABLET ORAL 2 TIMES DAILY WITH MEALS
Qty: 60 TABLET | Refills: 3 | Status: SHIPPED | OUTPATIENT
Start: 2019-04-08 | End: 2019-08-31 | Stop reason: SDUPTHER

## 2019-04-08 RX ORDER — OXAPROZIN 600 MG/1
600 TABLET, FILM COATED ORAL DAILY
COMMUNITY
End: 2020-01-29

## 2019-04-08 NOTE — PROGRESS NOTES
Last 5 Patient Entered Readings                                      Current 30 Day Average: 146/87     Recent Readings 4/5/2019 4/5/2019 4/3/2019 4/3/2019 3/26/2019    SBP (mmHg) 164 158 161 160 146    DBP (mmHg) 97 93 93 90 86    Pulse 50 51 60 62 57        HPI:  54 yo male with a PMH listed below.  Past Medical History:   Diagnosis Date    Cellulitis     left leg    Hip arthritis     right    Hyperlipidemia     Hypertension     Hypogonadism male     Hypokalemia     Morbid obesity     Sleep apnea     on CPAP     Patient endorses adherence to medication regimen.     Patient started utilizing Mobic daily for the past two weeks most likely attributing to his upward trend in BP. He used it twice a month before this started two weeks ago.    Patient denies hypotensive s/sx (lightheadedness, dizziness, nausea, fatigue); patient denies hypertensive s/sx (SOB, CP, severe headaches, changes in vision). Instructed patient to seek medical care if BP > 180/110 and is accompanied by hypertensive s/sx associated, patient confirms understanding.     IF DEPRESSED    Responses to the depression screening suggest patient is having depressive symptoms. Patient is not followed for this and is not interested in referral.     Irene Sanchez indicated she would like to have patient  referred to a specialist for further evaluation.     IF DEEPTHI screen positive    DEEPTHI screening results for this patient suggest a high likelihood of sleep apnea, which can contribute to hypertension. Patient has been previously diagnosed with sleep apnea and is not interested in referral at this time. Patient reports consistent CPAP use at least 8 hours per night. DEEPTHI is managed effectively with CPAP use.     Irene Sanchez indicated she would like to have patient  referred to a specialist for further evaluation.     Assessment:  Reviewed recent readings. Per 2017 ACC/ AHA HTN guidelines (goal of BP < 130/80), current 30-day average needs to be addressed  more thoroughly today.     Plan:  Change metoprolol 200mg daily to carvedilol 25mg twice daily for better BP and HR control.   I will continue to monitor regularly and will follow-up in 2 to 3 weeks, sooner if blood pressure begins to trend upward or downward.     Current medication regimen:  Hypertension Medications             amlodipine-benazepril (LOTREL) 10-40 mg per capsule TAKE 1 CAPSULE ONCE DAILY    chlorthalidone (HYGROTEN) 50 MG Tab TAKE 1 TABLET ONCE DAILY    metoprolol succinate (TOPROL-XL) 200 MG 24 hr tablet Take 1 tablet (200 mg total) by mouth once daily.        Patient denies having questions or concerns. Patient has my contact information and knows to call with any concerns or clinical changes.

## 2019-04-09 ENCOUNTER — PATIENT MESSAGE (OUTPATIENT)
Dept: OTHER | Facility: OTHER | Age: 54
End: 2019-04-09

## 2019-04-10 PROCEDURE — 99091 COLLJ & INTERPJ DATA EA 30 D: CPT | Mod: ,,,

## 2019-04-10 PROCEDURE — 99091 PR DIGITAL MEDICINE SERVICES, HYPERTENSION, ESTABLISHED: ICD-10-PCS | Mod: ,,,

## 2019-04-15 ENCOUNTER — OFFICE VISIT (OUTPATIENT)
Dept: FAMILY MEDICINE | Facility: CLINIC | Age: 54
End: 2019-04-15
Payer: COMMERCIAL

## 2019-04-15 ENCOUNTER — PATIENT MESSAGE (OUTPATIENT)
Dept: FAMILY MEDICINE | Facility: CLINIC | Age: 54
End: 2019-04-15

## 2019-04-15 VITALS
RESPIRATION RATE: 18 BRPM | HEART RATE: 60 BPM | TEMPERATURE: 98 F | OXYGEN SATURATION: 97 % | WEIGHT: 315 LBS | BODY MASS INDEX: 36.45 KG/M2 | DIASTOLIC BLOOD PRESSURE: 82 MMHG | HEIGHT: 78 IN | SYSTOLIC BLOOD PRESSURE: 136 MMHG

## 2019-04-15 DIAGNOSIS — M62.830 SPASM OF THORACIC BACK MUSCLE: Primary | ICD-10-CM

## 2019-04-15 PROCEDURE — 99213 OFFICE O/P EST LOW 20 MIN: CPT | Mod: 25,S$GLB,, | Performed by: FAMILY MEDICINE

## 2019-04-15 PROCEDURE — 3008F BODY MASS INDEX DOCD: CPT | Mod: CPTII,S$GLB,, | Performed by: FAMILY MEDICINE

## 2019-04-15 PROCEDURE — 3075F PR MOST RECENT SYSTOLIC BLOOD PRESS GE 130-139MM HG: ICD-10-PCS | Mod: CPTII,S$GLB,, | Performed by: FAMILY MEDICINE

## 2019-04-15 PROCEDURE — 96372 PR INJECTION,THERAP/PROPH/DIAG2ST, IM OR SUBCUT: ICD-10-PCS | Mod: S$GLB,,, | Performed by: FAMILY MEDICINE

## 2019-04-15 PROCEDURE — 99999 PR PBB SHADOW E&M-EST. PATIENT-LVL IV: ICD-10-PCS | Mod: PBBFAC,,, | Performed by: FAMILY MEDICINE

## 2019-04-15 PROCEDURE — 99999 PR PBB SHADOW E&M-EST. PATIENT-LVL IV: CPT | Mod: PBBFAC,,, | Performed by: FAMILY MEDICINE

## 2019-04-15 PROCEDURE — 96372 THER/PROPH/DIAG INJ SC/IM: CPT | Mod: S$GLB,,, | Performed by: FAMILY MEDICINE

## 2019-04-15 PROCEDURE — 3075F SYST BP GE 130 - 139MM HG: CPT | Mod: CPTII,S$GLB,, | Performed by: FAMILY MEDICINE

## 2019-04-15 PROCEDURE — 3079F PR MOST RECENT DIASTOLIC BLOOD PRESSURE 80-89 MM HG: ICD-10-PCS | Mod: CPTII,S$GLB,, | Performed by: FAMILY MEDICINE

## 2019-04-15 PROCEDURE — 3079F DIAST BP 80-89 MM HG: CPT | Mod: CPTII,S$GLB,, | Performed by: FAMILY MEDICINE

## 2019-04-15 PROCEDURE — 3008F PR BODY MASS INDEX (BMI) DOCUMENTED: ICD-10-PCS | Mod: CPTII,S$GLB,, | Performed by: FAMILY MEDICINE

## 2019-04-15 PROCEDURE — 99213 PR OFFICE/OUTPT VISIT, EST, LEVL III, 20-29 MIN: ICD-10-PCS | Mod: 25,S$GLB,, | Performed by: FAMILY MEDICINE

## 2019-04-15 RX ORDER — TESTOSTERONE CYPIONATE 200 MG/ML
INJECTION, SOLUTION INTRAMUSCULAR
Qty: 2 ML | Refills: 5 | Status: SHIPPED | OUTPATIENT
Start: 2019-04-15 | End: 2019-09-30 | Stop reason: SDUPTHER

## 2019-04-15 RX ORDER — METHYLPREDNISOLONE ACETATE 80 MG/ML
80 INJECTION, SUSPENSION INTRA-ARTICULAR; INTRALESIONAL; INTRAMUSCULAR; SOFT TISSUE ONCE
Status: COMPLETED | OUTPATIENT
Start: 2019-04-15 | End: 2019-04-15

## 2019-04-15 RX ADMIN — METHYLPREDNISOLONE ACETATE 80 MG: 80 INJECTION, SUSPENSION INTRA-ARTICULAR; INTRALESIONAL; INTRAMUSCULAR; SOFT TISSUE at 03:04

## 2019-04-15 NOTE — PROGRESS NOTES
Reggie Sawant    Chief Complaint   Patient presents with    Back Pain       History of Present Illness:   Mr. Sawant comes in today with complaint of having several weeks pain at his left scapular area.  He states he has had similar symptoms in the past.  He denies having neck pain; numbness, headaches; fever, chills, fatigue, appetite changes; shortness of breath, cough, wheezing; chest pain, palpitations, leg swelling; abdominal pain, nausea, vomiting, diarrhea, constipation; unusual urinary symptoms; anxiety, depression, homicidal or suicidal.  He states he has been applying heat, capsaicin and taking Robaxin along with Mobic or Oxaprozin without help.        Current Outpatient Medications   Medication Sig    amlodipine-benazepril (LOTREL) 10-40 mg per capsule TAKE 1 CAPSULE ONCE DAILY    aspirin (ASPIRIN LOW DOSE) 81 MG EC tablet Take 1 tablet by mouth Daily.    carvedilol (COREG) 25 MG tablet Take 1 tablet (25 mg total) by mouth 2 (two) times daily with meals.    chlorthalidone (HYGROTEN) 50 MG Tab TAKE 1 TABLET ONCE DAILY    CIALIS 20 mg Tab TAKE 1 TABLET DAILY    fluticasone (FLONASE) 50 mcg/actuation nasal spray SHAKE LIQUID AND USE 2 SPRAYS IN EACH NOSTRIL DAILY AS NEEDED    KLOR-CON M20 20 mEq tablet TAKE 1 TABLET ONCE DAILY    meloxicam (MOBIC) 15 MG tablet Take 15 mg by mouth daily as needed for Pain.    multivitamin (ONE DAILY MULTIVITAMIN) per tablet Take 1 tablet by mouth once daily.    oxaprozin (DAYPRO) 600 mg tablet Take 600 mg by mouth once daily.    pravastatin (PRAVACHOL) 20 MG tablet TAKE 1 TABLET ONCE DAILY    testosterone cypionate (DEPOTESTOTERONE CYPIONATE) 200 mg/mL injection ADMINISTER 1 ML IN THE MUSCLE 1 TIME EVERY 14 DAYS    traZODone (DESYREL) 50 MG tablet Take 1 tablet (50 mg total) by mouth nightly as needed for Insomnia.    epinephrine (EPIPEN JR) 0.15 mg/0.3 mL (1:2,000) pen injection Inject 0.3 mLs (0.15 mg total) into the muscle as needed for Anaphylaxis.        Review of Systems   Constitutional: Negative for appetite change, chills, fatigue and fever.   Respiratory: Negative for cough, shortness of breath and wheezing.    Cardiovascular: Negative for chest pain, palpitations and leg swelling.   Gastrointestinal: Negative for abdominal pain, constipation, diarrhea, nausea and vomiting.   Genitourinary: Negative for difficulty urinating.   Musculoskeletal: Positive for back pain and myalgias.   Neurological: Negative for headaches.   Psychiatric/Behavioral: Negative for dysphoric mood and suicidal ideas. The patient is not nervous/anxious.         Negative for homicidal ideas.       Objective:  Physical Exam   Constitutional: He is oriented to person, place, and time. He appears well-developed and well-nourished. No distress.   Pleasant.   Eyes:   He wears glasses.   Neck: Normal range of motion. Neck supple. No thyromegaly present.   Cardiovascular: Normal rate, regular rhythm and normal heart sounds.   No murmur heard.  Pulmonary/Chest: Effort normal and breath sounds normal. No respiratory distress. He has no wheezes.   Abdominal: Soft. Bowel sounds are normal. He exhibits no distension and no mass. There is no tenderness. There is no rebound and no guarding.   Musculoskeletal: Normal range of motion. He exhibits tenderness. He exhibits no edema.   He is ambulatory without problems Tender over left shoulder blade muscle with spasm with full range of motion noted.   Lymphadenopathy:     He has no cervical adenopathy.   Neurological: He is alert and oriented to person, place, and time.   Skin: No rash noted. He is not diaphoretic.   Improvement of darkening nails at right > left foot (1st - 3rd toes).    Psychiatric: He has a normal mood and affect. His behavior is normal. Judgment and thought content normal.   Vitals reviewed.      ASSESSMENT:  1. Spasm of thoracic back muscle        PLAN:  Reggie was seen today for back pain.    Diagnoses and all orders for this  visit:    Spasm of thoracic back muscle  -     Ambulatory Referral to Physical/Occupational Therapy  -     methylPREDNISolone acetate injection 80 mg    Continue conservative therapy.  Continue current medications, follow low sodium, low cholesterol, low carb diet, daily walks.  Follow up if symptoms worsen or fail to improve.

## 2019-04-22 ENCOUNTER — PATIENT OUTREACH (OUTPATIENT)
Dept: OTHER | Facility: OTHER | Age: 54
End: 2019-04-22

## 2019-04-22 DIAGNOSIS — I10 ESSENTIAL HYPERTENSION: Primary | Chronic | ICD-10-CM

## 2019-04-22 RX ORDER — VALSARTAN 320 MG/1
320 TABLET ORAL DAILY
Qty: 30 TABLET | Refills: 3 | Status: SHIPPED | OUTPATIENT
Start: 2019-04-22 | End: 2019-08-31 | Stop reason: SDUPTHER

## 2019-04-22 RX ORDER — AMLODIPINE BESYLATE 10 MG/1
10 TABLET ORAL DAILY
Qty: 30 TABLET | Refills: 3 | Status: SHIPPED | OUTPATIENT
Start: 2019-04-22 | End: 2019-08-31 | Stop reason: SDUPTHER

## 2019-04-22 NOTE — PROGRESS NOTES
Last 5 Patient Entered Readings                                      Current 30 Day Average: 151/90     Recent Readings 4/15/2019 4/13/2019 4/13/2019 4/13/2019 4/5/2019    SBP (mmHg) 132 151 150 159 164    DBP (mmHg) 86 83 85 88 97    Pulse 59 59 58 59 50        1:50PM: LMFCB to see how he is tolerating carvedilol 25mg BID.    3:24PM:   HPI:  Called patient to follow up since changing metoprolol to carvedilol. Patient denies any changes since starting.   Patient endorses adherence to medication regimen.   Patient took readings today however those readings aren't coming over.  Patient denies hypotensive s/sx (lightheadedness, dizziness, nausea, fatigue); patient denies hypertensive s/sx (SOB, CP, severe headaches, changes in vision). Instructed patient to seek medical care if BP > 180/110 and is accompanied by hypertensive s/sx associated, patient confirms understanding.     Assessment:  Reviewed recent readings. Per 2017 ACC/ AHA HTN guidelines (goal of BP < 130/80), current 30-day average needs to be addressed more thoroughly today.     Plan:  Stop Lotrel 10-40 and change to amlodipine 10mg daily and valsartan 320mg daily.  Encouraged patient to log into his [employer].  I will continue to monitor regularly and will follow-up in 2 to 3 weeks, sooner if blood pressure begins to trend upward or downward.     Current medication regimen:  Hypertension Medications             amlodipine-benazepril (LOTREL) 10-40 mg per capsule TAKE 1 CAPSULE ONCE DAILY    carvedilol (COREG) 25 MG tablet Take 1 tablet (25 mg total) by mouth 2 (two) times daily with meals.    chlorthalidone (HYGROTEN) 50 MG Tab TAKE 1 TABLET ONCE DAILY          Patient denies having questions or concerns. Patient has my contact information and knows to call with any concerns or clinical changes.

## 2019-04-24 ENCOUNTER — PATIENT OUTREACH (OUTPATIENT)
Dept: OTHER | Facility: OTHER | Age: 54
End: 2019-04-24

## 2019-04-24 NOTE — PROGRESS NOTES
Last 5 Patient Entered Readings                                      Current 30 Day Average: 150/89     Recent Readings 4/22/2019 4/22/2019 4/15/2019 4/15/2019 4/13/2019    SBP (mmHg) 148 146 132 132 151    DBP (mmHg) 85 82 86 86 83    Pulse 63 65 60 59 59        Digital Medicine: Health  Follow Up    Feeling well.    Wasn't able to  one of his medications because it wasn't in stock at the pharmacy. Will start new med regimen ASAP.     at a [employer].    Lifestyle Modifications:    1.Dietary Modifications: Meals don't have any kind of consistency. Breakfast can be just coffee or leftover from lunch/dinner. Had chinese food for lunch but that's not always typical. Eats out on average 2x/wk. We reviewed sodium intake guidelines. He requested more information be sent via email to improve dietary habits.    2.Physical Activity: Gets in a lot of steps at work. / Plans to start seeing a PT for a shoulder injury.    3.Medication Therapy: Patient has been compliant with the medication regimen.    4.Patient has the following medication side effects/concerns:   (Frequency/Alleviating factors/Precipitating factors, etc.)     Follow up with Mr. Reggie Sawant completed. No further questions or concerns. Will continue to follow up to achieve health goals.

## 2019-05-04 DIAGNOSIS — I10 ESSENTIAL HYPERTENSION: Chronic | ICD-10-CM

## 2019-05-04 RX ORDER — POTASSIUM CHLORIDE 1500 MG/1
TABLET, EXTENDED RELEASE ORAL
Qty: 90 TABLET | Refills: 1 | Status: SHIPPED | OUTPATIENT
Start: 2019-05-04 | End: 2019-12-16 | Stop reason: SDUPTHER

## 2019-05-04 RX ORDER — CHLORTHALIDONE 50 MG/1
TABLET ORAL
Qty: 90 TABLET | Refills: 1 | Status: SHIPPED | OUTPATIENT
Start: 2019-05-04 | End: 2019-12-16 | Stop reason: SDUPTHER

## 2019-05-05 PROCEDURE — 99091 COLLJ & INTERPJ DATA EA 30 D: CPT | Mod: ,,,

## 2019-05-05 PROCEDURE — 99091 PR DIGITAL MEDICINE SERVICES, HYPERTENSION, ESTABLISHED: ICD-10-PCS | Mod: ,,,

## 2019-05-08 ENCOUNTER — PATIENT OUTREACH (OUTPATIENT)
Dept: OTHER | Facility: OTHER | Age: 54
End: 2019-05-08

## 2019-05-08 DIAGNOSIS — I10 ESSENTIAL HYPERTENSION: Primary | Chronic | ICD-10-CM

## 2019-05-08 NOTE — PROGRESS NOTES
Last 5 Patient Entered Readings                                      Current 30 Day Average: 141/84     Recent Readings 5/3/2019 5/3/2019 5/2/2019 4/22/2019 4/22/2019    SBP (mmHg) 138 138 138 148 146    DBP (mmHg) 78 83 79 85 82    Pulse 56 56 59 63 65      LMFCB to assess how he is tolerating the change from Lotrel to valsartan and amlodipine.

## 2019-05-17 RX ORDER — FLUTICASONE PROPIONATE 50 MCG
SPRAY, SUSPENSION (ML) NASAL
Qty: 16 ML | Refills: 5 | Status: SHIPPED | OUTPATIENT
Start: 2019-05-17 | End: 2020-04-07

## 2019-05-20 RX ORDER — PRAVASTATIN SODIUM 20 MG/1
TABLET ORAL
Qty: 90 TABLET | Refills: 1 | Status: SHIPPED | OUTPATIENT
Start: 2019-05-20 | End: 2019-12-16 | Stop reason: SDUPTHER

## 2019-05-21 RX ORDER — SPIRONOLACTONE 25 MG/1
25 TABLET ORAL DAILY
Qty: 30 TABLET | Refills: 3 | Status: SHIPPED | OUTPATIENT
Start: 2019-05-21 | End: 2019-10-10 | Stop reason: SDUPTHER

## 2019-05-21 NOTE — PROGRESS NOTES
Last 5 Patient Entered Readings                                      Current 30 Day Average: 141/80     Recent Readings 5/21/2019 5/21/2019 5/10/2019 5/3/2019 5/3/2019    SBP (mmHg) 139 142 140 138 138    DBP (mmHg) 72 81 79 78 83    Pulse 54 56 56 56 56        HPI:  Called patient to follow up since changing Lotrel to valsartan and amlodipine.   Patient endorses adherence to medication regimen.   Patient denies hypotensive s/sx (lightheadedness, dizziness, nausea, fatigue); patient denies hypertensive s/sx (SOB, CP, severe headaches, changes in vision). Instructed patient to seek medical care if BP > 180/110 and is accompanied by hypertensive s/sx associated, patient confirms understanding.     Assessment:  Reviewed recent readings. Per 2017 ACC/ AHA HTN guidelines (goal of BP < 130/80), current 30-day average needs to be addressed more thoroughly today. His BP dropped ten points in both the systolic and diastolic readings since making the change to valsartan.     Plan:  Add spironolactone 25mg daily with a BMP in 2-3 weeks before our next contact 6/7.   I will continue to monitor regularly and will follow-up in 2 to 3 weeks, sooner if blood pressure begins to trend upward or downward.     Current medication regimen:  Hypertension Medications             amLODIPine (NORVASC) 10 MG tablet Take 1 tablet (10 mg total) by mouth once daily.    carvedilol (COREG) 25 MG tablet Take 1 tablet (25 mg total) by mouth 2 (two) times daily with meals.    chlorthalidone (HYGROTEN) 50 MG Tab TAKE 1 TABLET ONCE DAILY    valsartan (DIOVAN) 320 MG tablet Take 1 tablet (320 mg total) by mouth once daily.          Patient denies having questions or concerns. Patient has my contact information and knows to call with any concerns or clinical changes.

## 2019-05-22 ENCOUNTER — PATIENT OUTREACH (OUTPATIENT)
Dept: OTHER | Facility: OTHER | Age: 54
End: 2019-05-22

## 2019-05-22 NOTE — PROGRESS NOTES
Last 5 Patient Entered Readings                                      Current 30 Day Average: 141/80     Recent Readings 5/21/2019 5/21/2019 5/10/2019 5/3/2019 5/3/2019    SBP (mmHg) 139 142 140 138 138    DBP (mmHg) 72 81 79 78 83    Pulse 54 56 56 56 56          Digital Medicine: Health  Follow Up    Left voicemail to follow up with Mr. Reggie Sawant.  Current BP average 141/80 mmHg is not at goal.

## 2019-06-02 PROCEDURE — 99091 PR DIGITAL MEDICINE SERVICES, HYPERTENSION, ESTABLISHED: ICD-10-PCS | Mod: ,,,

## 2019-06-02 PROCEDURE — 99091 COLLJ & INTERPJ DATA EA 30 D: CPT | Mod: ,,,

## 2019-06-06 NOTE — PROGRESS NOTES
Last 5 Patient Entered Readings                                      Current 30 Day Average: 134/75     Recent Readings 5/31/2019 5/30/2019 5/30/2019 5/21/2019 5/21/2019    SBP (mmHg) 142 115 124 139 142    DBP (mmHg) 76 73 70 72 81    Pulse 66 54 55 54 56        Digital Medicine: Health  Follow Up    Left voicemail to follow up with Mr. Reggie Sawant.  Current BP average 134/75 mmHg is not at goal, but improving.  Sending [employer] message.

## 2019-06-07 ENCOUNTER — PATIENT OUTREACH (OUTPATIENT)
Dept: OTHER | Facility: OTHER | Age: 54
End: 2019-06-07

## 2019-06-07 NOTE — PROGRESS NOTES
Last 5 Patient Entered Readings                                      Current 30 Day Average: 134/75     Recent Readings 5/31/2019 5/30/2019 5/30/2019 5/21/2019 5/21/2019    SBP (mmHg) 142 115 124 139 142    DBP (mmHg) 76 73 70 72 81    Pulse 66 54 55 54 56          06/21/2019 LVM to f/u with patient:  - Needs to get BMP  - added Spironolactone on 5/21

## 2019-07-03 NOTE — PROGRESS NOTES
"Last 5 Patient Entered Readings                                      Current 30 Day Average: 131/73     Recent Readings 7/3/2019 7/1/2019 6/15/2019 6/15/2019 6/15/2019    SBP (mmHg) 124 134 137 138 125    DBP (mmHg) 71 80 70 73 66    Pulse 51 56 68 66 62        Digital Medicine: Health  Follow Up    Left voicemail to follow up with Mr. Reggie Sawant.  Current BP average 131/73 mmHg is nearly at goal.    "All is well. I'm doing fine with the meds. Just need to get lab work done that the pharmacist requested."          "

## 2019-07-05 ENCOUNTER — PATIENT MESSAGE (OUTPATIENT)
Dept: OTHER | Facility: OTHER | Age: 54
End: 2019-07-05

## 2019-07-14 PROCEDURE — 99091 COLLJ & INTERPJ DATA EA 30 D: CPT | Mod: ,,,

## 2019-07-14 PROCEDURE — 99091 PR DIGITAL MEDICINE SERVICES, HYPERTENSION, ESTABLISHED: ICD-10-PCS | Mod: ,,,

## 2019-07-15 RX ORDER — TADALAFIL 20 MG/1
TABLET ORAL
Qty: 30 TABLET | Refills: 1 | Status: SHIPPED | OUTPATIENT
Start: 2019-07-15 | End: 2019-12-16 | Stop reason: SDUPTHER

## 2019-07-29 NOTE — PROGRESS NOTES
Last 5 Patient Entered Readings                                      Current 30 Day Average: 126/71     Recent Readings 7/20/2019 7/3/2019 7/1/2019 6/15/2019 6/15/2019    SBP (mmHg) 121 124 134 137 138    DBP (mmHg) 61 71 80 70 73    Pulse 63 51 56 68 66        Digital Medicine: Health  Follow Up    Left voicemail to follow up with Mr. Reggie Sawant.  Current BP average 126/71 mmHg is at goal.    Check mailing address.

## 2019-08-04 PROCEDURE — 99091 PR DIGITAL MEDICINE SERVICES, HYPERTENSION, ESTABLISHED: ICD-10-PCS | Mod: ,,,

## 2019-08-04 PROCEDURE — 99091 COLLJ & INTERPJ DATA EA 30 D: CPT | Mod: ,,,

## 2019-08-19 ENCOUNTER — PATIENT MESSAGE (OUTPATIENT)
Dept: ADMINISTRATIVE | Facility: OTHER | Age: 54
End: 2019-08-19

## 2019-08-31 DIAGNOSIS — I10 ESSENTIAL HYPERTENSION: Chronic | ICD-10-CM

## 2019-09-01 PROCEDURE — 99091 PR DIGITAL MEDICINE SERVICES, HYPERTENSION, ESTABLISHED: ICD-10-PCS | Mod: ,,,

## 2019-09-01 PROCEDURE — 99091 COLLJ & INTERPJ DATA EA 30 D: CPT | Mod: ,,,

## 2019-09-01 RX ORDER — VALSARTAN 320 MG/1
TABLET ORAL
Qty: 30 TABLET | Refills: 1 | Status: SHIPPED | OUTPATIENT
Start: 2019-09-01 | End: 2019-11-17 | Stop reason: SDUPTHER

## 2019-09-01 RX ORDER — CARVEDILOL 25 MG/1
TABLET ORAL
Qty: 60 TABLET | Refills: 1 | Status: SHIPPED | OUTPATIENT
Start: 2019-09-01 | End: 2019-11-17 | Stop reason: SDUPTHER

## 2019-09-01 RX ORDER — AMLODIPINE BESYLATE 10 MG/1
TABLET ORAL
Qty: 30 TABLET | Refills: 1 | Status: SHIPPED | OUTPATIENT
Start: 2019-09-01 | End: 2019-11-17 | Stop reason: SDUPTHER

## 2019-09-04 ENCOUNTER — PATIENT MESSAGE (OUTPATIENT)
Dept: FAMILY MEDICINE | Facility: CLINIC | Age: 54
End: 2019-09-04

## 2019-09-04 RX ORDER — MELOXICAM 15 MG/1
15 TABLET ORAL DAILY PRN
Qty: 90 TABLET | Refills: 1 | Status: SHIPPED | OUTPATIENT
Start: 2019-09-04 | End: 2021-01-01 | Stop reason: SDUPTHER

## 2019-09-12 ENCOUNTER — PATIENT OUTREACH (OUTPATIENT)
Dept: OTHER | Facility: OTHER | Age: 54
End: 2019-09-12

## 2019-09-13 ENCOUNTER — PATIENT MESSAGE (OUTPATIENT)
Dept: FAMILY MEDICINE | Facility: CLINIC | Age: 54
End: 2019-09-13

## 2019-09-13 DIAGNOSIS — I10 ESSENTIAL HYPERTENSION: Chronic | ICD-10-CM

## 2019-09-13 RX ORDER — METOPROLOL SUCCINATE 200 MG/1
TABLET, EXTENDED RELEASE ORAL
Qty: 90 TABLET | Refills: 1 | OUTPATIENT
Start: 2019-09-13

## 2019-09-18 ENCOUNTER — PATIENT MESSAGE (OUTPATIENT)
Dept: OTHER | Facility: OTHER | Age: 54
End: 2019-09-18

## 2019-09-19 ENCOUNTER — PATIENT OUTREACH (OUTPATIENT)
Dept: OTHER | Facility: OTHER | Age: 54
End: 2019-09-19

## 2019-09-19 NOTE — PROGRESS NOTES
Digital Medicine: Clinician Follow-Up    I received a message from Dr. Sanchez regarding which beta blocker the patient should be taking.    The history is provided by the patient.     Follow Up  Follow-up reason(s): reading review    Patient and I reviewed his current HTN medications and he confirmed taking carvedilol.     His BP is well controlled at this time.                  Medication Adherence:       Patient confirms his medications and he states compliance is met.      INTERVENTION(S)  reviewed appropriate dose schedule    PLAN  patient verbalizes understanding    Patient will continue his current medications.      There are no preventive care reminders to display for this patient.    Last 5 Patient Entered Readings                                      Current 30 Day Average: 133/74     Recent Readings 9/15/2019 9/15/2019 9/7/2019 9/7/2019 8/22/2019    SBP (mmHg) 129 145 134 135 137    DBP (mmHg) 74 74 71 71 71    Pulse 57 59 55 55 57             Hypertension Medications             amLODIPine (NORVASC) 10 MG tablet TAKE 1 TABLET(10 MG) BY MOUTH EVERY DAY    carvedilol (COREG) 25 MG tablet TAKE 1 TABLET(25 MG) BY MOUTH TWICE DAILY WITH MEALS    chlorthalidone (HYGROTEN) 50 MG Tab TAKE 1 TABLET ONCE DAILY    spironolactone (ALDACTONE) 25 MG tablet Take 1 tablet (25 mg total) by mouth once daily.    valsartan (DIOVAN) 320 MG tablet TAKE 1 TABLET(320 MG) BY MOUTH EVERY DAY

## 2019-09-30 RX ORDER — TESTOSTERONE CYPIONATE 200 MG/ML
INJECTION, SOLUTION INTRAMUSCULAR
Qty: 2 ML | Refills: 0 | Status: SHIPPED | OUTPATIENT
Start: 2019-09-30 | End: 2019-10-28 | Stop reason: SDUPTHER

## 2019-10-06 ENCOUNTER — PATIENT MESSAGE (OUTPATIENT)
Dept: OTHER | Facility: OTHER | Age: 54
End: 2019-10-06

## 2019-10-10 DIAGNOSIS — I10 ESSENTIAL HYPERTENSION: Chronic | ICD-10-CM

## 2019-10-10 RX ORDER — SPIRONOLACTONE 25 MG/1
TABLET ORAL
Qty: 30 TABLET | Refills: 1 | Status: SHIPPED | OUTPATIENT
Start: 2019-10-10 | End: 2019-12-16 | Stop reason: SDUPTHER

## 2019-10-16 NOTE — PROGRESS NOTES
"Digital Medicine: Health  Follow-Up    Plans to go to the [facility] to get a smaller cuff for BP device.    The history is provided by the patient.     Follow Up  Follow-up reason(s): goal follow-up            Diet:   He has the following dietary restrictions: low sodium diet    Continuing to watch sodium intake. Has lost "some" weight.    Intervention(s): portion control, calorie tracking, carb reduction and reducing sodium intake    Assigning the following patient goals: meal plan and maintain low sodium diet    Physical Activity:   When asked if exercising, patient responded: no    He identified the following barriers to physical activity: motivation      Intervention/Plan        Topic    Lipid (Cholesterol) Test        Last 5 Patient Entered Readings                                      Current 30 Day Average: 133/83     Recent Readings 10/6/2019 10/6/2019 9/15/2019 9/15/2019 9/7/2019    SBP (mmHg) 133 153 129 145 134    DBP (mmHg) 74 91 74 74 71    Pulse 55 56 57 59 55                "

## 2019-10-28 RX ORDER — TESTOSTERONE CYPIONATE 200 MG/ML
INJECTION, SOLUTION INTRAMUSCULAR
Qty: 2 ML | Refills: 0 | Status: SHIPPED | OUTPATIENT
Start: 2019-10-28 | End: 2019-12-16 | Stop reason: SDUPTHER

## 2019-11-04 ENCOUNTER — PATIENT MESSAGE (OUTPATIENT)
Dept: ADMINISTRATIVE | Facility: OTHER | Age: 54
End: 2019-11-04

## 2019-11-14 NOTE — TELEPHONE ENCOUNTER
Please advise pt I will not be able to continue to honor refills for requested medication as labs are required for refill.  Can he come in for appt at this time for testosterone issue only?

## 2019-11-17 DIAGNOSIS — I10 ESSENTIAL HYPERTENSION: Chronic | ICD-10-CM

## 2019-11-17 RX ORDER — CARVEDILOL 25 MG/1
TABLET ORAL
Qty: 60 TABLET | Refills: 2 | Status: SHIPPED | OUTPATIENT
Start: 2019-11-17 | End: 2020-02-03 | Stop reason: SDUPTHER

## 2019-11-17 RX ORDER — VALSARTAN 320 MG/1
TABLET ORAL
Qty: 30 TABLET | Refills: 2 | Status: SHIPPED | OUTPATIENT
Start: 2019-11-17 | End: 2020-02-03 | Stop reason: SDUPTHER

## 2019-11-17 RX ORDER — AMLODIPINE BESYLATE 10 MG/1
TABLET ORAL
Qty: 30 TABLET | Refills: 2 | Status: SHIPPED | OUTPATIENT
Start: 2019-11-17 | End: 2020-02-03 | Stop reason: SDUPTHER

## 2019-11-19 RX ORDER — TESTOSTERONE CYPIONATE 200 MG/ML
INJECTION, SOLUTION INTRAMUSCULAR
Qty: 2 ML | Refills: 0 | OUTPATIENT
Start: 2019-11-19

## 2019-12-08 ENCOUNTER — PATIENT MESSAGE (OUTPATIENT)
Dept: ADMINISTRATIVE | Facility: OTHER | Age: 54
End: 2019-12-08

## 2019-12-09 ENCOUNTER — PATIENT OUTREACH (OUTPATIENT)
Dept: OTHER | Facility: OTHER | Age: 54
End: 2019-12-09

## 2019-12-09 ENCOUNTER — PATIENT MESSAGE (OUTPATIENT)
Dept: FAMILY MEDICINE | Facility: CLINIC | Age: 54
End: 2019-12-09

## 2019-12-16 ENCOUNTER — PATIENT MESSAGE (OUTPATIENT)
Dept: ADMINISTRATIVE | Facility: OTHER | Age: 54
End: 2019-12-16

## 2019-12-16 DIAGNOSIS — I10 ESSENTIAL HYPERTENSION: Chronic | ICD-10-CM

## 2019-12-16 RX ORDER — SPIRONOLACTONE 25 MG/1
TABLET ORAL
Qty: 30 TABLET | Refills: 0 | Status: SHIPPED | OUTPATIENT
Start: 2019-12-16 | End: 2020-02-03 | Stop reason: SDUPTHER

## 2019-12-16 RX ORDER — POTASSIUM CHLORIDE 1500 MG/1
TABLET, EXTENDED RELEASE ORAL
Qty: 90 TABLET | Refills: 0 | Status: SHIPPED | OUTPATIENT
Start: 2019-12-16 | End: 2020-02-03 | Stop reason: SDUPTHER

## 2019-12-16 RX ORDER — PRAVASTATIN SODIUM 20 MG/1
TABLET ORAL
Qty: 90 TABLET | Refills: 0 | Status: SHIPPED | OUTPATIENT
Start: 2019-12-16 | End: 2020-02-03 | Stop reason: SDUPTHER

## 2019-12-16 RX ORDER — TADALAFIL 20 MG/1
TABLET ORAL
Qty: 30 TABLET | Refills: 0 | Status: SHIPPED | OUTPATIENT
Start: 2019-12-16 | End: 2020-02-03 | Stop reason: SDUPTHER

## 2019-12-16 RX ORDER — TESTOSTERONE CYPIONATE 200 MG/ML
INJECTION, SOLUTION INTRAMUSCULAR
Qty: 2 ML | Refills: 0 | Status: SHIPPED | OUTPATIENT
Start: 2019-12-16 | End: 2020-02-03 | Stop reason: SDUPTHER

## 2019-12-16 RX ORDER — CHLORTHALIDONE 50 MG/1
TABLET ORAL
Qty: 90 TABLET | Refills: 0 | Status: SHIPPED | OUTPATIENT
Start: 2019-12-16 | End: 2020-02-03 | Stop reason: SDUPTHER

## 2020-01-06 NOTE — PROGRESS NOTES
1/6/2020: Attempted outreach in regards to completed HC task ( confirm address), and introduce myself as new health , filling in for previous health  Cesilia Carbone.     Attempts made:    12/09/2019~ LVM  12/23/2019 ~LVM  1/06/2020 ~ LVM     Will follow-up in 1-2 weeks.

## 2020-01-27 NOTE — PROGRESS NOTES
Incoming call 1/27/2020:     Called to introduce myself as new health  on previous outreach.  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    Will follow-up with patient in 1-2 weeks.

## 2020-01-29 ENCOUNTER — PATIENT MESSAGE (OUTPATIENT)
Dept: FAMILY MEDICINE | Facility: CLINIC | Age: 55
End: 2020-01-29

## 2020-01-29 ENCOUNTER — OFFICE VISIT (OUTPATIENT)
Dept: FAMILY MEDICINE | Facility: CLINIC | Age: 55
End: 2020-01-29
Payer: COMMERCIAL

## 2020-01-29 ENCOUNTER — LAB VISIT (OUTPATIENT)
Dept: LAB | Facility: HOSPITAL | Age: 55
End: 2020-01-29
Attending: REGISTERED NURSE
Payer: COMMERCIAL

## 2020-01-29 VITALS
SYSTOLIC BLOOD PRESSURE: 125 MMHG | HEIGHT: 78 IN | RESPIRATION RATE: 17 BRPM | BODY MASS INDEX: 36.45 KG/M2 | OXYGEN SATURATION: 98 % | TEMPERATURE: 99 F | HEART RATE: 62 BPM | WEIGHT: 315 LBS | DIASTOLIC BLOOD PRESSURE: 70 MMHG

## 2020-01-29 DIAGNOSIS — I10 ESSENTIAL HYPERTENSION: Chronic | ICD-10-CM

## 2020-01-29 DIAGNOSIS — G47.30 SLEEP APNEA, UNSPECIFIED TYPE: Chronic | ICD-10-CM

## 2020-01-29 DIAGNOSIS — E29.1 HYPOGONADISM MALE: Chronic | ICD-10-CM

## 2020-01-29 DIAGNOSIS — E66.01 MORBID OBESITY WITH BMI OF 40.0-44.9, ADULT: ICD-10-CM

## 2020-01-29 DIAGNOSIS — E78.5 HYPERLIPIDEMIA, UNSPECIFIED HYPERLIPIDEMIA TYPE: Chronic | ICD-10-CM

## 2020-01-29 DIAGNOSIS — Z00.00 ANNUAL PHYSICAL EXAM: ICD-10-CM

## 2020-01-29 DIAGNOSIS — M16.9 OSTEOARTHRITIS OF HIP, UNSPECIFIED LATERALITY, UNSPECIFIED OSTEOARTHRITIS TYPE: ICD-10-CM

## 2020-01-29 DIAGNOSIS — Z00.00 ANNUAL PHYSICAL EXAM: Primary | ICD-10-CM

## 2020-01-29 DIAGNOSIS — Z23 NEED FOR SHINGLES VACCINE: ICD-10-CM

## 2020-01-29 DIAGNOSIS — Z23 NEED FOR INFLUENZA VACCINATION: ICD-10-CM

## 2020-01-29 PROBLEM — Z12.11 SCREEN FOR COLON CANCER: Status: RESOLVED | Noted: 2017-12-06 | Resolved: 2020-01-29

## 2020-01-29 PROBLEM — B35.1 ONYCHOMYCOSIS OF TOENAIL: Status: RESOLVED | Noted: 2018-11-23 | Resolved: 2020-01-29

## 2020-01-29 PROBLEM — E66.9 OBESITY (BMI 30-39.9): Status: RESOLVED | Noted: 2019-02-18 | Resolved: 2020-01-29

## 2020-01-29 LAB
ALBUMIN SERPL BCP-MCNC: 4.4 G/DL (ref 3.5–5.2)
ALP SERPL-CCNC: 46 U/L (ref 55–135)
ALT SERPL W/O P-5'-P-CCNC: 43 U/L (ref 10–44)
ANION GAP SERPL CALC-SCNC: 9 MMOL/L (ref 8–16)
AST SERPL-CCNC: 43 U/L (ref 10–40)
BASOPHILS # BLD AUTO: 0.04 K/UL (ref 0–0.2)
BASOPHILS NFR BLD: 1 % (ref 0–1.9)
BILIRUB SERPL-MCNC: 1.3 MG/DL (ref 0.1–1)
BUN SERPL-MCNC: 18 MG/DL (ref 6–20)
CALCIUM SERPL-MCNC: 10.2 MG/DL (ref 8.7–10.5)
CHLORIDE SERPL-SCNC: 98 MMOL/L (ref 95–110)
CHOLEST SERPL-MCNC: 222 MG/DL (ref 120–199)
CHOLEST/HDLC SERPL: 4.6 {RATIO} (ref 2–5)
CO2 SERPL-SCNC: 32 MMOL/L (ref 23–29)
CREAT SERPL-MCNC: 1.1 MG/DL (ref 0.5–1.4)
DIFFERENTIAL METHOD: ABNORMAL
EOSINOPHIL # BLD AUTO: 0.2 K/UL (ref 0–0.5)
EOSINOPHIL NFR BLD: 4.3 % (ref 0–8)
ERYTHROCYTE [DISTWIDTH] IN BLOOD BY AUTOMATED COUNT: 12.6 % (ref 11.5–14.5)
EST. GFR  (AFRICAN AMERICAN): >60 ML/MIN/1.73 M^2
EST. GFR  (NON AFRICAN AMERICAN): >60 ML/MIN/1.73 M^2
GLUCOSE SERPL-MCNC: 86 MG/DL (ref 70–110)
HCT VFR BLD AUTO: 52.8 % (ref 40–54)
HDLC SERPL-MCNC: 48 MG/DL (ref 40–75)
HDLC SERPL: 21.6 % (ref 20–50)
HGB BLD-MCNC: 16.8 G/DL (ref 14–18)
IMM GRANULOCYTES # BLD AUTO: 0 K/UL (ref 0–0.04)
IMM GRANULOCYTES NFR BLD AUTO: 0 % (ref 0–0.5)
LDLC SERPL CALC-MCNC: 158.4 MG/DL (ref 63–159)
LYMPHOCYTES # BLD AUTO: 1.7 K/UL (ref 1–4.8)
LYMPHOCYTES NFR BLD: 39.2 % (ref 18–48)
MCH RBC QN AUTO: 30.8 PG (ref 27–31)
MCHC RBC AUTO-ENTMCNC: 31.8 G/DL (ref 32–36)
MCV RBC AUTO: 97 FL (ref 82–98)
MONOCYTES # BLD AUTO: 0.5 K/UL (ref 0.3–1)
MONOCYTES NFR BLD: 12.4 % (ref 4–15)
NEUTROPHILS # BLD AUTO: 1.8 K/UL (ref 1.8–7.7)
NEUTROPHILS NFR BLD: 43.1 % (ref 38–73)
NONHDLC SERPL-MCNC: 174 MG/DL
NRBC BLD-RTO: 0 /100 WBC
PLATELET # BLD AUTO: 186 K/UL (ref 150–350)
PMV BLD AUTO: 11 FL (ref 9.2–12.9)
POTASSIUM SERPL-SCNC: 3.5 MMOL/L (ref 3.5–5.1)
PROT SERPL-MCNC: 8.5 G/DL (ref 6–8.4)
RBC # BLD AUTO: 5.45 M/UL (ref 4.6–6.2)
SODIUM SERPL-SCNC: 139 MMOL/L (ref 136–145)
TRIGL SERPL-MCNC: 78 MG/DL (ref 30–150)
TSH SERPL DL<=0.005 MIU/L-ACNC: 1.9 UIU/ML (ref 0.4–4)
WBC # BLD AUTO: 4.21 K/UL (ref 3.9–12.7)

## 2020-01-29 PROCEDURE — 90471 FLU VACCINE (QUAD) GREATER THAN OR EQUAL TO 3YO PRESERVATIVE FREE IM: ICD-10-PCS | Mod: S$GLB,,, | Performed by: REGISTERED NURSE

## 2020-01-29 PROCEDURE — 36415 COLL VENOUS BLD VENIPUNCTURE: CPT | Mod: PO

## 2020-01-29 PROCEDURE — 3078F PR MOST RECENT DIASTOLIC BLOOD PRESSURE < 80 MM HG: ICD-10-PCS | Mod: CPTII,S$GLB,, | Performed by: REGISTERED NURSE

## 2020-01-29 PROCEDURE — 90686 FLU VACCINE (QUAD) GREATER THAN OR EQUAL TO 3YO PRESERVATIVE FREE IM: ICD-10-PCS | Mod: S$GLB,,, | Performed by: REGISTERED NURSE

## 2020-01-29 PROCEDURE — 85025 COMPLETE CBC W/AUTO DIFF WBC: CPT

## 2020-01-29 PROCEDURE — 90750 HZV VACC RECOMBINANT IM: CPT | Mod: S$GLB,,, | Performed by: REGISTERED NURSE

## 2020-01-29 PROCEDURE — 84403 ASSAY OF TOTAL TESTOSTERONE: CPT

## 2020-01-29 PROCEDURE — 80053 COMPREHEN METABOLIC PANEL: CPT

## 2020-01-29 PROCEDURE — 82043 UR ALBUMIN QUANTITATIVE: CPT

## 2020-01-29 PROCEDURE — 83036 HEMOGLOBIN GLYCOSYLATED A1C: CPT

## 2020-01-29 PROCEDURE — 90750 ZOSTER RECOMBINANT VACCINE: ICD-10-PCS | Mod: S$GLB,,, | Performed by: REGISTERED NURSE

## 2020-01-29 PROCEDURE — 90471 IMMUNIZATION ADMIN: CPT | Mod: S$GLB,,, | Performed by: REGISTERED NURSE

## 2020-01-29 PROCEDURE — 3074F PR MOST RECENT SYSTOLIC BLOOD PRESSURE < 130 MM HG: ICD-10-PCS | Mod: CPTII,S$GLB,, | Performed by: REGISTERED NURSE

## 2020-01-29 PROCEDURE — 3074F SYST BP LT 130 MM HG: CPT | Mod: CPTII,S$GLB,, | Performed by: REGISTERED NURSE

## 2020-01-29 PROCEDURE — 99999 PR PBB SHADOW E&M-EST. PATIENT-LVL IV: CPT | Mod: PBBFAC,,, | Performed by: REGISTERED NURSE

## 2020-01-29 PROCEDURE — 99396 PR PREVENTIVE VISIT,EST,40-64: ICD-10-PCS | Mod: 25,S$GLB,, | Performed by: REGISTERED NURSE

## 2020-01-29 PROCEDURE — 80061 LIPID PANEL: CPT

## 2020-01-29 PROCEDURE — 99999 PR PBB SHADOW E&M-EST. PATIENT-LVL IV: ICD-10-PCS | Mod: PBBFAC,,, | Performed by: REGISTERED NURSE

## 2020-01-29 PROCEDURE — 90686 IIV4 VACC NO PRSV 0.5 ML IM: CPT | Mod: S$GLB,,, | Performed by: REGISTERED NURSE

## 2020-01-29 PROCEDURE — 84153 ASSAY OF PSA TOTAL: CPT

## 2020-01-29 PROCEDURE — 90472 ZOSTER RECOMBINANT VACCINE: ICD-10-PCS | Mod: S$GLB,,, | Performed by: REGISTERED NURSE

## 2020-01-29 PROCEDURE — 99396 PREV VISIT EST AGE 40-64: CPT | Mod: 25,S$GLB,, | Performed by: REGISTERED NURSE

## 2020-01-29 PROCEDURE — 3078F DIAST BP <80 MM HG: CPT | Mod: CPTII,S$GLB,, | Performed by: REGISTERED NURSE

## 2020-01-29 PROCEDURE — 84443 ASSAY THYROID STIM HORMONE: CPT

## 2020-01-29 PROCEDURE — 86703 HIV-1/HIV-2 1 RESULT ANTBDY: CPT

## 2020-01-29 PROCEDURE — 90472 IMMUNIZATION ADMIN EACH ADD: CPT | Mod: S$GLB,,, | Performed by: REGISTERED NURSE

## 2020-01-29 NOTE — PROGRESS NOTES
Subjective:       Patient ID: Reggie Sawant is a 54 y.o. male.    Chief Complaint   Patient presents with    Annual Exam         HPI    Reggie Sawant is here today for an annual wellness exam.  I have reviewed the patient's medical history in detail and updated the computerized patient record.        Review of Systems   Constitutional: Negative for activity change, appetite change, chills, diaphoresis, fatigue, fever and unexpected weight change.   HENT: Negative for congestion, facial swelling, hearing loss, mouth sores, nosebleeds, postnasal drip, rhinorrhea, sinus pressure, sore throat, trouble swallowing and voice change.    Eyes: Negative for photophobia, pain, discharge and visual disturbance.   Respiratory: Negative for cough, chest tightness, shortness of breath and wheezing.    Cardiovascular: Negative for chest pain, palpitations and leg swelling.        Reports home blood pressure is running 130/70   Gastrointestinal: Negative for abdominal pain, blood in stool, constipation, diarrhea, nausea, rectal pain and vomiting.   Endocrine: Negative for polydipsia and polyuria.   Genitourinary: Negative for decreased urine volume, difficulty urinating, dysuria, flank pain, frequency, hematuria and urgency.        Currently giving self testosterone injections every 2 weeks, improvement reported in signs and symptoms.  Last injection 3 weeks ago.   Musculoskeletal: Positive for arthralgias (  Reports hip arthritis, on Mobic which does help). Negative for back pain, gait problem, joint swelling, myalgias, neck pain and neck stiffness.   Skin: Negative for color change, pallor, rash and wound.   Neurological: Negative for dizziness, syncope, facial asymmetry, speech difficulty, weakness, light-headedness, numbness and headaches.   Hematological: Negative for adenopathy. Does not bruise/bleed easily.   Psychiatric/Behavioral: Negative.  Negative for confusion and dysphoric mood.         Review of patient's allergies  indicates:   Allergen Reactions    Shrimp Anaphylaxis         Patient Active Problem List   Diagnosis    HTN (hypertension)    Hyperlipidemia    Hypogonadism male    Sleep apnea    ED (erectile dysfunction)    DJD (degenerative joint disease) of hip    Morbid obesity with BMI of 40.0-44.9, adult    Onychomycosis of toenail       Medication List with Changes/Refills   Current Medications    AMLODIPINE (NORVASC) 10 MG TABLET    TAKE 1 TABLET(10 MG) BY MOUTH EVERY DAY    ASPIRIN (ASPIRIN LOW DOSE) 81 MG EC TABLET    Take 1 tablet by mouth Daily.    CARVEDILOL (COREG) 25 MG TABLET    TAKE 1 TABLET(25 MG) BY MOUTH TWICE DAILY WITH MEALS    CHLORTHALIDONE (HYGROTEN) 50 MG TAB    TAKE 1 TABLET ONCE DAILY    EPINEPHRINE (EPIPEN JR) 0.15 MG/0.3 ML (1:2,000) PEN INJECTION    Inject 0.3 mLs (0.15 mg total) into the muscle as needed for Anaphylaxis.    FLUTICASONE PROPIONATE (FLONASE) 50 MCG/ACTUATION NASAL SPRAY    SHAKE LIQUID AND USE 2 SPRAYS IN EACH NOSTRIL DAILY AS NEEDED    KLOR-CON M20 20 MEQ TABLET    TAKE 1 TABLET ONCE DAILY    MELOXICAM (MOBIC) 15 MG TABLET    Take 1 tablet (15 mg total) by mouth daily as needed for Pain.    MULTIVITAMIN (ONE DAILY MULTIVITAMIN) PER TABLET    Take 1 tablet by mouth once daily.    OXAPROZIN (DAYPRO) 600 MG TABLET    Take 600 mg by mouth once daily.    PRAVASTATIN (PRAVACHOL) 20 MG TABLET    TAKE 1 TABLET ONCE DAILY    SPIRONOLACTONE (ALDACTONE) 25 MG TABLET    TAKE 1 TABLET(25 MG) BY MOUTH EVERY DAY    TADALAFIL (CIALIS) 20 MG TAB    TAKE 1 TABLET DAILY    TESTOSTERONE CYPIONATE (DEPOTESTOTERONE CYPIONATE) 200 MG/ML INJECTION    INJECT 1ML INTO THE MUSCLE EVERY 14 DAYS    TRAZODONE (DESYREL) 50 MG TABLET    Take 1 tablet (50 mg total) by mouth nightly as needed for Insomnia.    VALSARTAN (DIOVAN) 320 MG TABLET    TAKE 1 TABLET(320 MG) BY MOUTH EVERY DAY         Past Medical History:   Diagnosis Date    Cellulitis     left leg    Hip arthritis     right    Hyperlipidemia   "   Hypertension     Hypogonadism male     Hypokalemia     Morbid obesity     Sleep apnea     on CPAP       Past Surgical History:   Procedure Laterality Date    COLONOSCOPY N/A 12/6/2017    Procedure: COLONOSCOPY;  Surgeon: Rory Barone MD;  Location: [facility];  Service: Endoscopy;  Laterality: N/A;    right bunionectomy         Family History   Problem Relation Age of Onset    Hypertension Brother     Diabetes Maternal Grandmother     Hypertension Father     Heart disease Father         CAD    Diabetes Sister     No Known Problems Daughter     No Known Problems Daughter     No Known Problems Daughter     No Known Problems Daughter     No Known Problems Mother     No Known Problems Sister     No Known Problems Other     Cancer Neg Hx     Stroke Neg Hx        Social History     Tobacco Use    Smoking status: Never Smoker    Smokeless tobacco: Never Used   Substance Use Topics    Alcohol use: Yes     Alcohol/week: 0.0 standard drinks     Frequency: 4 or more times a week     Drinks per session: 1 or 2     Binge frequency: Monthly     Comment: Occasionally    Drug use: No       Objective:     Vitals:    01/29/20 0834   BP: 125/70   Pulse: 62   Resp: 17   Temp: 98.6 °F (37 °C)   TempSrc: Temporal   SpO2: 98%   Weight: (!) 164 kg (361 lb 8.9 oz)   Height: 6' 7" (2.007 m)   PainSc: 0-No pain       Estimated body mass index is 40.73 kg/m² as calculated from the following:    Height as of this encounter: 6' 7" (2.007 m).    Weight as of this encounter: 164 kg (361 lb 8.9 oz).      Physical Exam   Constitutional: He is oriented to person, place, and time. He appears well-developed and well-nourished. No distress.   HENT:   Head: Normocephalic and atraumatic.   Right Ear: External ear normal.   Left Ear: External ear normal.   Nose: Nose normal.   Mouth/Throat: Oropharynx is clear and moist. No oropharyngeal exudate.   Eyes: Pupils are equal, round, and reactive to light. Conjunctivae and EOM " are normal. Right eye exhibits no discharge. Left eye exhibits no discharge. No scleral icterus.   Neck: Normal range of motion. Neck supple. No JVD present. No tracheal deviation present. No thyromegaly present.   Cardiovascular: Normal rate, regular rhythm, normal heart sounds and intact distal pulses. Exam reveals no gallop and no friction rub.   No murmur heard.  Pulmonary/Chest: Effort normal and breath sounds normal. No respiratory distress. He has no wheezes. He exhibits no tenderness.   Abdominal: Soft. Bowel sounds are normal. He exhibits no distension and no mass. There is no tenderness.   Musculoskeletal: Normal range of motion. He exhibits no edema, tenderness or deformity.   Lymphadenopathy:     He has no cervical adenopathy.   Neurological: He is alert and oriented to person, place, and time. He displays normal reflexes. No cranial nerve deficit or sensory deficit. He exhibits normal muscle tone. Coordination normal.   Skin: Skin is warm and dry. Capillary refill takes less than 2 seconds. No rash noted. He is not diaphoretic. No erythema. No pallor.   Psychiatric: He has a normal mood and affect. His behavior is normal. Judgment and thought content normal.         Diagnosis       1. Annual physical exam    2. Essential hypertension    3. Hyperlipidemia, unspecified hyperlipidemia type    4. Hypogonadism male    5. Osteoarthritis of hip, unspecified laterality, unspecified osteoarthritis type    6. Sleep apnea, unspecified type    7. Morbid obesity with BMI of 40.0-44.9, adult    8. Need for shingles vaccine    9. Need for influenza vaccination          Assessment/ Plan     Annual physical exam  -     CBC auto differential; Future; Expected date: 01/29/2020  -     Comprehensive metabolic panel; Future; Expected date: 01/29/2020  -     TSH; Future; Expected date: 01/29/2020  -     PSA, Screening; Future; Expected date: 01/29/2020  -     Lipid panel; Future; Expected date: 01/29/2020  -     Hemoglobin  A1c; Future; Expected date: 01/29/2020  -     Microalbumin/creatinine urine ratio  -     HIV 1/2 Ag/Ab (4th Gen); Future; Expected date: 01/29/2020  -     (In Office Administered) Zoster Recombinant Vaccine  -     Testosterone; Future; Expected date: 01/29/2020    Essential hypertension  -     CBC auto differential; Future; Expected date: 01/29/2020  -     Comprehensive metabolic panel; Future; Expected date: 01/29/2020  -     TSH; Future; Expected date: 01/29/2020  -     Lipid panel; Future; Expected date: 01/29/2020  -     Hemoglobin A1c; Future; Expected date: 01/29/2020  -     Microalbumin/creatinine urine ratio    Hyperlipidemia, unspecified hyperlipidemia type  -     CBC auto differential; Future; Expected date: 01/29/2020  -     Comprehensive metabolic panel; Future; Expected date: 01/29/2020  -     TSH; Future; Expected date: 01/29/2020  -     Lipid panel; Future; Expected date: 01/29/2020  -     Hemoglobin A1c; Future; Expected date: 01/29/2020    Hypogonadism male  -     PSA, Screening; Future; Expected date: 01/29/2020  -     Testosterone; Future; Expected date: 01/29/2020    Osteoarthritis of hip, unspecified laterality, unspecified osteoarthritis type  Arthritis of hip, on Mobic as ordered.    Sleep apnea, unspecified type  Stable on CPAP.    Morbid obesity with BMI of 40.0-44.9, adult  -     CBC auto differential; Future; Expected date: 01/29/2020  -     Comprehensive metabolic panel; Future; Expected date: 01/29/2020  -     TSH; Future; Expected date: 01/29/2020  -     Lipid panel; Future; Expected date: 01/29/2020  -     Hemoglobin A1c; Future; Expected date: 01/29/2020    Need for shingles vaccine  -     (In Office Administered) Zoster Recombinant Vaccine    Need for influenza vaccination  -     Influenza - Quadrivalent (PF)      Flu shot today.  Shingrix # 1 today, then return to clinic in 2 to 6 mo for Shingrix # 2.  Healthy diet, regular exercise with weight reduction.  Lab pending.  Follow-up in  clinic as needed.      Patient Care Team:  Irene Sanchez MD as PCP - General (Family Medicine)  Chuy Rausch LPN as Care Coordinator (Internal Medicine)  Cinda Gill, PharmD as Hypertension Digital Medicine Clinician (Pharmacist)  Irene Sanchez MD as Hypertension Digital Medicine Responsible Provider (Family Medicine)  [employer] as Hypertension Digital Medicine Contract  Vi Carter as Digital Medicine Health       SURY Lee  [facility]

## 2020-01-30 ENCOUNTER — PATIENT MESSAGE (OUTPATIENT)
Dept: FAMILY MEDICINE | Facility: CLINIC | Age: 55
End: 2020-01-30

## 2020-01-30 LAB
ALBUMIN/CREAT UR: 14.3 UG/MG (ref 0–30)
COMPLEXED PSA SERPL-MCNC: 1.4 NG/ML (ref 0–4)
CREAT UR-MCNC: 182 MG/DL (ref 23–375)
ESTIMATED AVG GLUCOSE: 117 MG/DL (ref 68–131)
HBA1C MFR BLD HPLC: 5.7 % (ref 4–5.6)
HIV 1+2 AB+HIV1 P24 AG SERPL QL IA: NEGATIVE
MICROALBUMIN UR DL<=1MG/L-MCNC: 26 UG/ML
TESTOST SERPL-MCNC: 180 NG/DL (ref 304–1227)

## 2020-01-31 ENCOUNTER — PATIENT MESSAGE (OUTPATIENT)
Dept: FAMILY MEDICINE | Facility: CLINIC | Age: 55
End: 2020-01-31

## 2020-01-31 RX ORDER — TESTOSTERONE CYPIONATE 200 MG/ML
INJECTION, SOLUTION INTRAMUSCULAR
Qty: 2 ML | Refills: 0 | Status: CANCELLED | OUTPATIENT
Start: 2020-01-31

## 2020-01-31 NOTE — TELEPHONE ENCOUNTER
----- Message from Bienvenido Barclay sent at 1/31/2020 12:17 PM CST -----  ..Type:  Patient Returning Call    Who Called:pt   Who Left Message for Patient:  Does the patient know what this is regarding?:medications   Would the patient rather a call back or a response via [facility]? Call back   Best Call Back Number [phone]  Additional Information: Pt is requesting a call from nurse to f/u on his medication that wasn't called in from his visit

## 2020-02-03 ENCOUNTER — PATIENT OUTREACH (OUTPATIENT)
Dept: OTHER | Facility: OTHER | Age: 55
End: 2020-02-03

## 2020-02-03 DIAGNOSIS — I10 ESSENTIAL HYPERTENSION: Chronic | ICD-10-CM

## 2020-02-03 RX ORDER — CARVEDILOL 25 MG/1
TABLET ORAL
Qty: 60 TABLET | Refills: 2 | Status: CANCELLED | OUTPATIENT
Start: 2020-02-03

## 2020-02-03 RX ORDER — SPIRONOLACTONE 25 MG/1
25 TABLET ORAL DAILY
Qty: 90 TABLET | Refills: 1 | Status: SHIPPED | OUTPATIENT
Start: 2020-02-03 | End: 2020-07-27 | Stop reason: SDUPTHER

## 2020-02-03 RX ORDER — TESTOSTERONE CYPIONATE 200 MG/ML
INJECTION, SOLUTION INTRAMUSCULAR
Qty: 2 ML | Refills: 0 | Status: CANCELLED | OUTPATIENT
Start: 2020-02-03

## 2020-02-03 RX ORDER — PRAVASTATIN SODIUM 20 MG/1
20 TABLET ORAL DAILY
Qty: 90 TABLET | Refills: 0 | Status: SHIPPED | OUTPATIENT
Start: 2020-02-03 | End: 2020-06-15 | Stop reason: SDUPTHER

## 2020-02-03 RX ORDER — VALSARTAN 320 MG/1
320 TABLET ORAL DAILY
Qty: 90 TABLET | Refills: 1 | Status: SHIPPED | OUTPATIENT
Start: 2020-02-03 | End: 2020-05-01 | Stop reason: RX

## 2020-02-03 RX ORDER — TADALAFIL 20 MG/1
20 TABLET ORAL DAILY
Qty: 30 TABLET | Refills: 0 | Status: SHIPPED | OUTPATIENT
Start: 2020-02-03 | End: 2020-04-01 | Stop reason: SDUPTHER

## 2020-02-03 RX ORDER — SPIRONOLACTONE 25 MG/1
TABLET ORAL
Qty: 30 TABLET | Refills: 0 | Status: CANCELLED | OUTPATIENT
Start: 2020-02-03

## 2020-02-03 RX ORDER — CARVEDILOL 25 MG/1
25 TABLET ORAL 2 TIMES DAILY WITH MEALS
Qty: 180 TABLET | Refills: 1 | Status: SHIPPED | OUTPATIENT
Start: 2020-02-03 | End: 2020-09-29 | Stop reason: SDUPTHER

## 2020-02-03 RX ORDER — TESTOSTERONE CYPIONATE 200 MG/ML
INJECTION, SOLUTION INTRAMUSCULAR
Qty: 2 ML | Refills: 1 | Status: SHIPPED | OUTPATIENT
Start: 2020-02-03 | End: 2020-03-18

## 2020-02-03 RX ORDER — CHLORTHALIDONE 50 MG/1
50 TABLET ORAL DAILY
Qty: 90 TABLET | Refills: 1 | Status: SHIPPED | OUTPATIENT
Start: 2020-02-03 | End: 2020-05-20 | Stop reason: SDUPTHER

## 2020-02-03 RX ORDER — POTASSIUM CHLORIDE 20 MEQ/1
20 TABLET, EXTENDED RELEASE ORAL DAILY
Qty: 90 TABLET | Refills: 0 | Status: SHIPPED | OUTPATIENT
Start: 2020-02-03 | End: 2020-05-20 | Stop reason: SDUPTHER

## 2020-02-03 RX ORDER — AMLODIPINE BESYLATE 10 MG/1
10 TABLET ORAL DAILY
Qty: 90 TABLET | Refills: 1 | Status: SHIPPED | OUTPATIENT
Start: 2020-02-03 | End: 2020-12-17 | Stop reason: SDUPTHER

## 2020-02-03 RX ORDER — CHLORTHALIDONE 50 MG/1
50 TABLET ORAL DAILY
Qty: 90 TABLET | Refills: 0 | Status: CANCELLED | OUTPATIENT
Start: 2020-02-03

## 2020-02-03 NOTE — PROGRESS NOTES
Digital Medicine: Clinician Follow-Up    Patient returned my call to discuss his at goal readings.    The history is provided by the patient.     Follow Up  Follow-up reason(s): reading review    Patient's average blood pressure is controlled.    He is feeling well and denies hypotension symptoms.      INTERVENTION(S)  reviewed appropriate dose schedule and encouragement/support    PLAN  patient verbalizes understanding and continue monitoring    Patient will maintain his current hypertension medication regimen.     Patient's hypertension medications were refilled for six months.    Could not transfer his call to his health  since she was on the phone however the patient is aware she has been trying to reach him.    Patient aware to contact us with any hypotension symptoms prior to my next outreach.      There are no preventive care reminders to display for this patient.    Last 5 Patient Entered Readings                                      Current 30 Day Average: 130/71     Recent Readings 1/26/2020 1/26/2020 1/17/2020 1/17/2020 1/17/2020    SBP (mmHg) 103 103 144 135 131    DBP (mmHg) 54 57 78 69 65    Pulse 64 64 66 69 67             Hypertension Medications             amLODIPine (NORVASC) 10 MG tablet TAKE 1 TABLET(10 MG) BY MOUTH EVERY DAY    carvedilol (COREG) 25 MG tablet TAKE 1 TABLET(25 MG) BY MOUTH TWICE DAILY WITH MEALS    chlorthalidone (HYGROTEN) 50 MG Tab TAKE 1 TABLET ONCE DAILY    spironolactone (ALDACTONE) 25 MG tablet TAKE 1 TABLET(25 MG) BY MOUTH EVERY DAY    valsartan (DIOVAN) 320 MG tablet TAKE 1 TABLET(320 MG) BY MOUTH EVERY DAY                 Screenings

## 2020-02-03 NOTE — PROGRESS NOTES
Digital Medicine: Clinician Follow-Up    Called patient to discuss his recent at goal readings.    The history is provided by the patient.     Follow Up  Follow-up reason(s): reading review      Readings are trending down Patient is doing well.    He denies hypotension symptoms.       INTERVENTION(S)  reviewed appropriate dose schedule and encouragement/support    PLAN  patient verbalizes understanding, patient amenable to changes and continue monitoring    Patient aware to contact me with any hypotension symptoms prior to my next outreach.     Patient aware that his health  is trying to reach the patient.     Patient will maintain his current hypertension medication regimen.       There are no preventive care reminders to display for this patient.    Last 5 Patient Entered Readings                                      Current 30 Day Average: 130/71     Recent Readings 1/26/2020 1/26/2020 1/17/2020 1/17/2020 1/17/2020    SBP (mmHg) 103 103 144 135 131    DBP (mmHg) 54 57 78 69 65    Pulse 64 64 66 69 67             Hypertension Medications             amLODIPine (NORVASC) 10 MG tablet TAKE 1 TABLET(10 MG) BY MOUTH EVERY DAY    carvedilol (COREG) 25 MG tablet TAKE 1 TABLET(25 MG) BY MOUTH TWICE DAILY WITH MEALS    chlorthalidone (HYGROTEN) 50 MG Tab TAKE 1 TABLET ONCE DAILY    spironolactone (ALDACTONE) 25 MG tablet TAKE 1 TABLET(25 MG) BY MOUTH EVERY DAY    valsartan (DIOVAN) 320 MG tablet TAKE 1 TABLET(320 MG) BY MOUTH EVERY DAY                 Screenings

## 2020-02-03 NOTE — TELEPHONE ENCOUNTER
----- Message from Salma Beckham sent at 2/3/2020 11:05 AM CST -----  Contact: pt   Would like to consult with nurse regarding a refill he has been trying to get since Wednesday. Please give a call back at [phone] before 12pm, if no answer leave message on [facility].           Thanks,  Salma KAMARA

## 2020-02-03 NOTE — TELEPHONE ENCOUNTER
----- Message from Suad Jurado sent at 2/3/2020  4:46 PM CST -----  Contact: Reggie Sawant   Patient states it's been 2 weeks without his testosterone injection, aware Dr. Sanchez is out, wanting to know if someone else can sign off on it. Ask that a response be given no later than tomorrow, states he has left several messages with no call back. Please call [phone]. Thanks/elr

## 2020-02-04 ENCOUNTER — PATIENT MESSAGE (OUTPATIENT)
Dept: OTHER | Facility: OTHER | Age: 55
End: 2020-02-04

## 2020-02-05 NOTE — PROGRESS NOTES
"2/4/2020:   Patient sent [employer] Message stating:     "Good Evening! Sorry that we keep missing one another. 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 All is well with me and I wish to continue on the program. My blood pressure is well under control and I'm feeling great. Not trying to come off as rude but the best means of communicating with me at the moment is thought the portal. I'll make every efforts respond asap. "       HC response:     " Good Morning Mr. Sawant,     Thank you so much for your message! I just wanted to introduce myself as your new Health  for the program, filling in the role for Cesilia, your previous Health , as she accepted a new role within [facility]! Your blood pressure is well under control and I am glad you are feeling great! Please feel free to contact me if you have any questions or concerns in regards to lifestyle or your blood pressure! I will continue monitoring and reach out if needed via [employer] or phone call.     Continue your great work and efforts!     Thanks again,   Vi Carter   [facility]    [phone]     "     I will follow-up with patient in 6-8 weeks unless patient's readings trend up.    "

## 2020-02-15 ENCOUNTER — PATIENT MESSAGE (OUTPATIENT)
Dept: ADMINISTRATIVE | Facility: OTHER | Age: 55
End: 2020-02-15

## 2020-03-06 ENCOUNTER — PATIENT MESSAGE (OUTPATIENT)
Dept: FAMILY MEDICINE | Facility: CLINIC | Age: 55
End: 2020-03-06

## 2020-03-09 ENCOUNTER — LAB VISIT (OUTPATIENT)
Dept: LAB | Facility: HOSPITAL | Age: 55
End: 2020-03-09
Payer: COMMERCIAL

## 2020-03-09 ENCOUNTER — OFFICE VISIT (OUTPATIENT)
Dept: FAMILY MEDICINE | Facility: CLINIC | Age: 55
End: 2020-03-09
Payer: COMMERCIAL

## 2020-03-09 VITALS
DIASTOLIC BLOOD PRESSURE: 66 MMHG | RESPIRATION RATE: 18 BRPM | WEIGHT: 315 LBS | SYSTOLIC BLOOD PRESSURE: 126 MMHG | OXYGEN SATURATION: 97 % | HEIGHT: 78 IN | TEMPERATURE: 98 F | HEART RATE: 72 BPM | BODY MASS INDEX: 36.45 KG/M2

## 2020-03-09 DIAGNOSIS — Z20.2 STD EXPOSURE: ICD-10-CM

## 2020-03-09 DIAGNOSIS — Z20.2 TRICHOMONAS CONTACT: Primary | ICD-10-CM

## 2020-03-09 PROCEDURE — 99999 PR PBB SHADOW E&M-EST. PATIENT-LVL III: ICD-10-PCS | Mod: PBBFAC,,, | Performed by: FAMILY MEDICINE

## 2020-03-09 PROCEDURE — 99999 PR PBB SHADOW E&M-EST. PATIENT-LVL III: CPT | Mod: PBBFAC,,, | Performed by: FAMILY MEDICINE

## 2020-03-09 PROCEDURE — 86803 HEPATITIS C AB TEST: CPT

## 2020-03-09 PROCEDURE — 3008F BODY MASS INDEX DOCD: CPT | Mod: CPTII,S$GLB,, | Performed by: FAMILY MEDICINE

## 2020-03-09 PROCEDURE — 86696 HERPES SIMPLEX TYPE 2 TEST: CPT

## 2020-03-09 PROCEDURE — 99213 PR OFFICE/OUTPT VISIT, EST, LEVL III, 20-29 MIN: ICD-10-PCS | Mod: S$GLB,,, | Performed by: FAMILY MEDICINE

## 2020-03-09 PROCEDURE — 86706 HEP B SURFACE ANTIBODY: CPT

## 2020-03-09 PROCEDURE — 87591 N.GONORRHOEAE DNA AMP PROB: CPT

## 2020-03-09 PROCEDURE — 3074F SYST BP LT 130 MM HG: CPT | Mod: CPTII,S$GLB,, | Performed by: FAMILY MEDICINE

## 2020-03-09 PROCEDURE — 3078F DIAST BP <80 MM HG: CPT | Mod: CPTII,S$GLB,, | Performed by: FAMILY MEDICINE

## 2020-03-09 PROCEDURE — 3078F PR MOST RECENT DIASTOLIC BLOOD PRESSURE < 80 MM HG: ICD-10-PCS | Mod: CPTII,S$GLB,, | Performed by: FAMILY MEDICINE

## 2020-03-09 PROCEDURE — 87340 HEPATITIS B SURFACE AG IA: CPT

## 2020-03-09 PROCEDURE — 3008F PR BODY MASS INDEX (BMI) DOCUMENTED: ICD-10-PCS | Mod: CPTII,S$GLB,, | Performed by: FAMILY MEDICINE

## 2020-03-09 PROCEDURE — 3074F PR MOST RECENT SYSTOLIC BLOOD PRESSURE < 130 MM HG: ICD-10-PCS | Mod: CPTII,S$GLB,, | Performed by: FAMILY MEDICINE

## 2020-03-09 PROCEDURE — 36415 COLL VENOUS BLD VENIPUNCTURE: CPT | Mod: PO

## 2020-03-09 PROCEDURE — 86703 HIV-1/HIV-2 1 RESULT ANTBDY: CPT

## 2020-03-09 PROCEDURE — 86592 SYPHILIS TEST NON-TREP QUAL: CPT

## 2020-03-09 PROCEDURE — 99213 OFFICE O/P EST LOW 20 MIN: CPT | Mod: S$GLB,,, | Performed by: FAMILY MEDICINE

## 2020-03-09 RX ORDER — METRONIDAZOLE 500 MG/1
500 TABLET ORAL EVERY 12 HOURS
Qty: 14 TABLET | Refills: 0 | Status: SHIPPED | OUTPATIENT
Start: 2020-03-09 | End: 2020-03-16

## 2020-03-09 NOTE — PROGRESS NOTES
Reggie Sawant    Chief Complaint   Patient presents with    Exposure to STD       History of Present Illness:   Mr. Sawant comes in today requesting STD testing.  He states his wife was recently treated for Trichomonas.  He states he is asymptomatic.  He denies having penile discharge, swelling, tenderness, unusual urinary symptoms, back pain, headaches, abdominal pain, nausea vomiting, diarrhea, constipation, shortness of breath, cough, wheezing, chest pain, palpitations, leg swelling, fever, chills, fatigue, appetite changes, anxiety, depression, homicidal or suicidal thoughts.          Current Outpatient Medications   Medication Sig    amLODIPine (NORVASC) 10 MG tablet Take 1 tablet (10 mg total) by mouth once daily.    aspirin (ASPIRIN LOW DOSE) 81 MG EC tablet Take 1 tablet by mouth Daily.    carvediloL (COREG) 25 MG tablet Take 1 tablet (25 mg total) by mouth 2 (two) times daily with meals.    chlorthalidone (HYGROTEN) 50 MG Tab Take 1 tablet (50 mg total) by mouth once daily.    fluticasone propionate (FLONASE) 50 mcg/actuation nasal spray SHAKE LIQUID AND USE 2 SPRAYS IN EACH NOSTRIL DAILY AS NEEDED    meloxicam (MOBIC) 15 MG tablet Take 1 tablet (15 mg total) by mouth daily as needed for Pain.    multivitamin (ONE DAILY MULTIVITAMIN) per tablet Take 1 tablet by mouth once daily.    potassium chloride SA (KLOR-CON M20) 20 MEQ tablet Take 1 tablet (20 mEq total) by mouth once daily.    pravastatin (PRAVACHOL) 20 MG tablet Take 1 tablet (20 mg total) by mouth once daily.    spironolactone (ALDACTONE) 25 MG tablet Take 1 tablet (25 mg total) by mouth once daily.    tadalafil (CIALIS) 20 MG Tab Take 1 tablet (20 mg total) by mouth once daily.    testosterone cypionate (DEPOTESTOTERONE CYPIONATE) 200 mg/mL injection INJECT 1ML INTO THE MUSCLE EVERY 14 DAYS    traZODone (DESYREL) 50 MG tablet Take 1 tablet (50 mg total) by mouth nightly as needed for Insomnia.    valsartan (DIOVAN) 320 MG tablet  Take 1 tablet (320 mg total) by mouth once daily.    epinephrine (EPIPEN JR) 0.15 mg/0.3 mL (1:2,000) pen injection Inject 0.3 mLs (0.15 mg total) into the muscle as needed for Anaphylaxis.       Review of Systems   Constitutional: Negative for appetite change, chills, fatigue and fever.   Respiratory: Negative for cough, shortness of breath and wheezing.    Cardiovascular: Negative for chest pain, palpitations and leg swelling.   Gastrointestinal: Negative for abdominal pain, constipation, diarrhea, nausea and vomiting.   Genitourinary: Negative for difficulty urinating, discharge, dysuria, frequency, hematuria, penile pain, penile swelling, scrotal swelling and testicular pain.   Musculoskeletal: Negative for back pain.   Neurological: Negative for headaches.   Psychiatric/Behavioral: Negative for dysphoric mood and suicidal ideas. The patient is not nervous/anxious.         Negative for homicidal ideas.       Objective:  Physical Exam   Constitutional: He is oriented to person, place, and time. He appears well-developed and well-nourished. No distress.   Pleasant.   Cardiovascular: Normal rate and regular rhythm.   Pulmonary/Chest: Effort normal and breath sounds normal. No respiratory distress. He has no wheezes.   Abdominal: Soft. Bowel sounds are normal. He exhibits no distension and no mass. There is no tenderness. There is no guarding.   Musculoskeletal: Normal range of motion. He exhibits no edema or tenderness.   She is ambulatory without problems.   Neurological: He is alert and oriented to person, place, and time.   Skin: He is not diaphoretic.   Psychiatric: He has a normal mood and affect. His behavior is normal. Judgment and thought content normal.   Vitals reviewed.      ASSESSMENT:  1. Trichomonas contact    2. STD exposure        PLAN:  Reggie was seen today for exposure to std.    Diagnoses and all orders for this visit:    Trichomonas contact  -     metroNIDAZOLE (FLAGYL) 500 MG tablet; Take 1  tablet (500 mg total) by mouth every 12 (twelve) hours. No alcohol with medication. for 7 days    STD exposure  -     RPR; Future  -     C. trachomatis/N. gonorrhoeae by AMP DNA  -     HIV 1/2 Ag/Ab (4th Gen); Future  -     Herpes Simplex Virus (HSV) Types 1 & 2, IgG, Herpes Titer; Future  -     Hepatitis B surface antibody; Future  -     Hepatitis B Surface Antigen; Future  -     Hepatitis C Antibody; Future       Patient advised to call for results.  Continue current medications, follow low sodium, low cholesterol, low carb diet, daily walks.  Empiric treatment with Flagyl as directed; medication precautions discussed with patient.  Follow up if symptoms worsen or fail to improve.

## 2020-03-10 LAB
C TRACH DNA SPEC QL NAA+PROBE: NOT DETECTED
HBV SURFACE AB SER-ACNC: NEGATIVE M[IU]/ML
HBV SURFACE AG SERPL QL IA: NEGATIVE
HCV AB SERPL QL IA: NEGATIVE
HIV 1+2 AB+HIV1 P24 AG SERPL QL IA: NEGATIVE
HSV1 IGG SERPL QL IA: POSITIVE
HSV2 IGG SERPL QL IA: NEGATIVE
N GONORRHOEA DNA SPEC QL NAA+PROBE: NOT DETECTED
RPR SER QL: NORMAL

## 2020-03-18 RX ORDER — TESTOSTERONE CYPIONATE 200 MG/ML
INJECTION, SOLUTION INTRAMUSCULAR
Qty: 2 ML | Refills: 0 | Status: SHIPPED | OUTPATIENT
Start: 2020-03-18 | End: 2020-03-20 | Stop reason: SDUPTHER

## 2020-03-20 ENCOUNTER — PATIENT MESSAGE (OUTPATIENT)
Dept: FAMILY MEDICINE | Facility: CLINIC | Age: 55
End: 2020-03-20

## 2020-03-20 RX ORDER — TESTOSTERONE CYPIONATE 200 MG/ML
INJECTION, SOLUTION INTRAMUSCULAR
Qty: 2 ML | Refills: 0 | Status: SHIPPED | OUTPATIENT
Start: 2020-03-20 | End: 2022-07-26 | Stop reason: SDUPTHER

## 2020-03-30 NOTE — PROGRESS NOTES
Patient experienced some elevated BPs in 2/2020, now trending back down  /66 mmHg at 3/9/20 PCP appointment  Home average 135/83 mmHg  Continue current regimen  Health  attempting contact  1/29/20 CMP reviewed    Hypertension Medications             amLODIPine (NORVASC) 10 MG tablet Take 1 tablet (10 mg total) by mouth once daily.    carvediloL (COREG) 25 MG tablet Take 1 tablet (25 mg total) by mouth 2 (two) times daily with meals.    chlorthalidone (HYGROTEN) 50 MG Tab Take 1 tablet (50 mg total) by mouth once daily.    spironolactone (ALDACTONE) 25 MG tablet Take 1 tablet (25 mg total) by mouth once daily.    valsartan (DIOVAN) 320 MG tablet Take 1 tablet (320 mg total) by mouth once daily.

## 2020-04-01 ENCOUNTER — PATIENT OUTREACH (OUTPATIENT)
Dept: OTHER | Facility: OTHER | Age: 55
End: 2020-04-01

## 2020-04-01 RX ORDER — TADALAFIL 20 MG/1
20 TABLET ORAL DAILY
Qty: 30 TABLET | Refills: 0 | Status: SHIPPED | OUTPATIENT
Start: 2020-04-01 | End: 2020-05-17 | Stop reason: SDUPTHER

## 2020-04-04 ENCOUNTER — PATIENT MESSAGE (OUTPATIENT)
Dept: FAMILY MEDICINE | Facility: CLINIC | Age: 55
End: 2020-04-04

## 2020-04-07 RX ORDER — FLUTICASONE PROPIONATE 50 MCG
SPRAY, SUSPENSION (ML) NASAL
Qty: 16 G | Refills: 5 | Status: SHIPPED | OUTPATIENT
Start: 2020-04-07 | End: 2020-07-10 | Stop reason: SDUPTHER

## 2020-05-01 ENCOUNTER — PATIENT OUTREACH (OUTPATIENT)
Dept: OTHER | Facility: OTHER | Age: 55
End: 2020-05-01

## 2020-05-01 DIAGNOSIS — I10 ESSENTIAL HYPERTENSION: Primary | ICD-10-CM

## 2020-05-01 RX ORDER — TELMISARTAN 80 MG/1
80 TABLET ORAL DAILY
Qty: 90 TABLET | Refills: 1 | Status: SHIPPED | OUTPATIENT
Start: 2020-05-01 | End: 2020-07-15 | Stop reason: DRUGHIGH

## 2020-05-01 NOTE — PROGRESS NOTES
Patient messaged health  about valsartan being on back order  Sent in telmisartan prescription as alternative and sent [facility] message to patient    BP average 123/75 mmHg    Hypertension Medications             amLODIPine (NORVASC) 10 MG tablet Take 1 tablet (10 mg total) by mouth once daily.    carvediloL (COREG) 25 MG tablet Take 1 tablet (25 mg total) by mouth 2 (two) times daily with meals.    chlorthalidone (HYGROTEN) 50 MG Tab Take 1 tablet (50 mg total) by mouth once daily.    spironolactone (ALDACTONE) 25 MG tablet Take 1 tablet (25 mg total) by mouth once daily.    telmisartan (MICARDIS) 80 MG Tab Take 1 tablet (80 mg total) by mouth once daily. (replaces valsartan for blood pressure)

## 2020-05-18 RX ORDER — TADALAFIL 20 MG/1
20 TABLET ORAL DAILY
Qty: 30 TABLET | Refills: 0 | Status: SHIPPED | OUTPATIENT
Start: 2020-05-18 | End: 2020-07-22

## 2020-05-20 DIAGNOSIS — I10 ESSENTIAL HYPERTENSION: Chronic | ICD-10-CM

## 2020-05-21 RX ORDER — POTASSIUM CHLORIDE 20 MEQ/1
20 TABLET, EXTENDED RELEASE ORAL DAILY
Qty: 90 TABLET | Refills: 0 | Status: SHIPPED | OUTPATIENT
Start: 2020-05-21 | End: 2020-09-29

## 2020-05-24 RX ORDER — CHLORTHALIDONE 50 MG/1
50 TABLET ORAL DAILY
Qty: 90 TABLET | Refills: 1 | Status: SHIPPED | OUTPATIENT
Start: 2020-05-24 | End: 2021-01-07

## 2020-05-28 NOTE — PROGRESS NOTES
Infrequent BP readings - health  attempting contact  Last reading 5/10/20, close to goal at 132/75 mmHg    Hypertension Medications             amLODIPine (NORVASC) 10 MG tablet Take 1 tablet (10 mg total) by mouth once daily.    carvediloL (COREG) 25 MG tablet Take 1 tablet (25 mg total) by mouth 2 (two) times daily with meals.    chlorthalidone (HYGROTEN) 50 MG Tab Take 1 tablet (50 mg total) by mouth once daily.    spironolactone (ALDACTONE) 25 MG tablet Take 1 tablet (25 mg total) by mouth once daily.    telmisartan (MICARDIS) 80 MG Tab Take 1 tablet (80 mg total) by mouth once daily. (replaces valsartan for blood pressure)

## 2020-06-29 ENCOUNTER — PATIENT OUTREACH (OUTPATIENT)
Dept: OTHER | Facility: OTHER | Age: 55
End: 2020-06-29

## 2020-06-29 DIAGNOSIS — I10 ESSENTIAL HYPERTENSION: Chronic | ICD-10-CM

## 2020-06-29 NOTE — PROGRESS NOTES
LMFCB to discuss how he is tolerating telmisartan and increase his readings. He switched in May over the national recall with valsartan.

## 2020-07-15 DIAGNOSIS — I10 ESSENTIAL HYPERTENSION: Chronic | ICD-10-CM

## 2020-07-15 RX ORDER — VALSARTAN 320 MG/1
320 TABLET ORAL DAILY
Qty: 90 TABLET | Refills: 0 | Status: SHIPPED | OUTPATIENT
Start: 2020-07-15 | End: 2021-01-01 | Stop reason: SDUPTHER

## 2020-07-15 RX ORDER — VALSARTAN 320 MG/1
320 TABLET ORAL DAILY
Qty: 90 TABLET | Refills: 1 | Status: SHIPPED | OUTPATIENT
Start: 2020-07-15 | End: 2020-07-15 | Stop reason: SDUPTHER

## 2020-07-15 NOTE — PROGRESS NOTES
Digital Medicine: Clinician Follow-Up    Called patient to discuss how he is tolerating telmisartan. He is concerned his numbers are higher with telmisartan. His average is elevated but he is close to goal. Patient asked to transition back to valsartan.  His wife is in the hospital undergoing a procedure.    The history is provided by the patient.   Follow-up reason(s): medication change follow-up.     Patient is not experiencing signs/symptoms of hypotension.  Patient is not experiencing signs/symptoms of hypertension.    Patient did make medication change.    Is patient tolerating med change?: no  Reasons:  His blood pressure is higher since changing from valsartan to telmisartan.        Last 5 Patient Entered Readings                                      Current 30 Day Average: 138/80     Recent Readings 7/13/2020 7/12/2020 7/12/2020 7/12/2020 6/28/2020    SBP (mmHg) 136 133 130 138 142    DBP (mmHg) 74 79 77 77 80    Pulse 66 59 55 56 60               Depression Screening  Did not address depression screening.    Sleep Apnea Screening    Did not address sleep apnea screening.     Medication Affordability Screening  Did not address medication affordability screening.           ASSESSMENT(S)  Patients BP average is 138/80 mmHg, which is above goal. Patient's BP goal is less than or equal to 130/80 per 2017 ACC/AHA Hypertension Guidelines.       PLAN  Medication change: Changing back to valsartan 320mg daily and will stop telmisartan.  Labs ordered: [facility] Expected Lab Date: 7/24/2020  Additional monitoring needed: Since changing medication regimen.    Patient verbalizes understanding. Patient did not express questions or concerns and patient has contact information if needed.          There are no preventive care reminders to display for this patient.      Hypertension Medications             amLODIPine (NORVASC) 10 MG tablet Take 1 tablet (10 mg total) by mouth once daily.    carvediloL (COREG) 25 MG tablet Take 1  tablet (25 mg total) by mouth 2 (two) times daily with meals.    chlorthalidone (HYGROTEN) 50 MG Tab Take 1 tablet (50 mg total) by mouth once daily.    spironolactone (ALDACTONE) 25 MG tablet Take 1 tablet (25 mg total) by mouth once daily.    telmisartan (MICARDIS) 80 MG Tab Take 1 tablet (80 mg total) by mouth once daily. (replaces valsartan for blood pressure)

## 2020-07-24 ENCOUNTER — LAB VISIT (OUTPATIENT)
Dept: LAB | Facility: HOSPITAL | Age: 55
End: 2020-07-24
Attending: FAMILY MEDICINE
Payer: COMMERCIAL

## 2020-07-24 DIAGNOSIS — I10 ESSENTIAL HYPERTENSION: Chronic | ICD-10-CM

## 2020-07-24 LAB
ANION GAP SERPL CALC-SCNC: 7 MMOL/L (ref 8–16)
BUN SERPL-MCNC: 23 MG/DL (ref 6–20)
CALCIUM SERPL-MCNC: 10 MG/DL (ref 8.7–10.5)
CHLORIDE SERPL-SCNC: 98 MMOL/L (ref 95–110)
CO2 SERPL-SCNC: 33 MMOL/L (ref 23–29)
CREAT SERPL-MCNC: 1.3 MG/DL (ref 0.5–1.4)
EST. GFR  (AFRICAN AMERICAN): >60 ML/MIN/1.73 M^2
EST. GFR  (NON AFRICAN AMERICAN): >60 ML/MIN/1.73 M^2
GLUCOSE SERPL-MCNC: 62 MG/DL (ref 70–110)
POTASSIUM SERPL-SCNC: 3.3 MMOL/L (ref 3.5–5.1)
SODIUM SERPL-SCNC: 138 MMOL/L (ref 136–145)

## 2020-07-24 PROCEDURE — 36415 COLL VENOUS BLD VENIPUNCTURE: CPT | Mod: PO

## 2020-07-24 PROCEDURE — 80048 BASIC METABOLIC PNL TOTAL CA: CPT

## 2020-07-27 ENCOUNTER — PATIENT OUTREACH (OUTPATIENT)
Dept: OTHER | Facility: OTHER | Age: 55
End: 2020-07-27

## 2020-07-27 DIAGNOSIS — I10 ESSENTIAL HYPERTENSION: Chronic | ICD-10-CM

## 2020-07-27 RX ORDER — SPIRONOLACTONE 50 MG/1
50 TABLET, FILM COATED ORAL DAILY
Qty: 90 TABLET | Refills: 1 | Status: SHIPPED | OUTPATIENT
Start: 2020-07-27 | End: 2021-01-07

## 2020-07-27 NOTE — PROGRESS NOTES
Digital Medicine: Clinician Follow-Up    Called patient to discuss his labs showing hypokalemia.     The history is provided by the patient.   Follow-up reason(s): lab follow up.     Patient is not experiencing signs/symptoms of hypotension.  Patient is not experiencing signs/symptoms of hypertension.        Last 5 Patient Entered Readings                                      Current 30 Day Average: 140/81     Recent Readings 7/14/2020 7/14/2020 7/13/2020 7/12/2020 7/12/2020    SBP (mmHg) 148 143 136 133 130    DBP (mmHg) 91 82 74 79 77    Pulse 59 57 66 59 55             Screenings    ASSESSMENT(S)  Patients BP average is 140/81 mmHg, which is above goal. Patient's BP goal is less than or equal to 130/80 per 2017 ACC/AHA Hypertension Guidelines.       PLAN  Medication change: Increase spironolactone to 50mg daily over hypokalemia and high blood pressure. He will continue his current dose of amlodipine, carvedilol, chlorthalidone and valsartan.    Labs ordered: [facility] Expected Lab Date: 8/17/2020  Additional monitoring needed: Patient will resume readings within the next few days.    Patient verbalizes understanding. Patient did not express questions or concerns and patient has contact information if needed.          There are no preventive care reminders to display for this patient.      Hypertension Medications             amLODIPine (NORVASC) 10 MG tablet Take 1 tablet (10 mg total) by mouth once daily.    carvediloL (COREG) 25 MG tablet Take 1 tablet (25 mg total) by mouth 2 (two) times daily with meals.    chlorthalidone (HYGROTEN) 50 MG Tab Take 1 tablet (50 mg total) by mouth once daily.    spironolactone (ALDACTONE) 25 MG tablet Take 1 tablet (25 mg total) by mouth once daily.    valsartan (DIOVAN) 320 MG tablet Take 1 tablet (320 mg total) by mouth once daily.

## 2020-08-10 ENCOUNTER — PATIENT OUTREACH (OUTPATIENT)
Dept: OTHER | Facility: OTHER | Age: 55
End: 2020-08-10

## 2020-08-14 DIAGNOSIS — K90.49 FOOD INTOLERANCE: ICD-10-CM

## 2020-08-14 RX ORDER — EPINEPHRINE 0.3 MG/.3ML
1 INJECTION SUBCUTANEOUS ONCE
Qty: 0.6 ML | Refills: 1 | Status: SHIPPED | OUTPATIENT
Start: 2020-08-14 | End: 2022-08-22

## 2020-09-29 ENCOUNTER — PATIENT MESSAGE (OUTPATIENT)
Dept: OTHER | Facility: OTHER | Age: 55
End: 2020-09-29

## 2020-09-29 DIAGNOSIS — I10 ESSENTIAL HYPERTENSION: Chronic | ICD-10-CM

## 2020-09-29 RX ORDER — CARVEDILOL 25 MG/1
25 TABLET ORAL 2 TIMES DAILY WITH MEALS
Qty: 180 TABLET | Refills: 1 | Status: SHIPPED | OUTPATIENT
Start: 2020-09-29 | End: 2021-04-26 | Stop reason: SDUPTHER

## 2020-10-05 ENCOUNTER — PATIENT MESSAGE (OUTPATIENT)
Dept: FAMILY MEDICINE | Facility: CLINIC | Age: 55
End: 2020-10-05

## 2020-10-07 ENCOUNTER — LAB VISIT (OUTPATIENT)
Dept: LAB | Facility: HOSPITAL | Age: 55
End: 2020-10-07
Attending: FAMILY MEDICINE
Payer: COMMERCIAL

## 2020-10-07 ENCOUNTER — IMMUNIZATION (OUTPATIENT)
Dept: FAMILY MEDICINE | Facility: CLINIC | Age: 55
End: 2020-10-07
Payer: COMMERCIAL

## 2020-10-07 DIAGNOSIS — I10 ESSENTIAL HYPERTENSION: Chronic | ICD-10-CM

## 2020-10-07 LAB
ANION GAP SERPL CALC-SCNC: 11 MMOL/L (ref 8–16)
BUN SERPL-MCNC: 23 MG/DL (ref 6–20)
CALCIUM SERPL-MCNC: 10 MG/DL (ref 8.7–10.5)
CHLORIDE SERPL-SCNC: 97 MMOL/L (ref 95–110)
CO2 SERPL-SCNC: 28 MMOL/L (ref 23–29)
CREAT SERPL-MCNC: 1.2 MG/DL (ref 0.5–1.4)
EST. GFR  (AFRICAN AMERICAN): >60 ML/MIN/1.73 M^2
EST. GFR  (NON AFRICAN AMERICAN): >60 ML/MIN/1.73 M^2
GLUCOSE SERPL-MCNC: 68 MG/DL (ref 70–110)
POTASSIUM SERPL-SCNC: 4 MMOL/L (ref 3.5–5.1)
SODIUM SERPL-SCNC: 136 MMOL/L (ref 136–145)

## 2020-10-07 PROCEDURE — 90471 FLU VACCINE (QUAD) GREATER THAN OR EQUAL TO 3YO PRESERVATIVE FREE IM: ICD-10-PCS | Mod: S$GLB,,, | Performed by: FAMILY MEDICINE

## 2020-10-07 PROCEDURE — 90686 IIV4 VACC NO PRSV 0.5 ML IM: CPT | Mod: S$GLB,,, | Performed by: FAMILY MEDICINE

## 2020-10-07 PROCEDURE — 90471 IMMUNIZATION ADMIN: CPT | Mod: S$GLB,,, | Performed by: FAMILY MEDICINE

## 2020-10-07 PROCEDURE — 99999 PR PBB SHADOW E&M-EST. PATIENT-LVL I: ICD-10-PCS | Mod: PBBFAC,,,

## 2020-10-07 PROCEDURE — 80048 BASIC METABOLIC PNL TOTAL CA: CPT

## 2020-10-07 PROCEDURE — 99999 PR PBB SHADOW E&M-EST. PATIENT-LVL I: CPT | Mod: PBBFAC,,,

## 2020-10-07 PROCEDURE — 90686 FLU VACCINE (QUAD) GREATER THAN OR EQUAL TO 3YO PRESERVATIVE FREE IM: ICD-10-PCS | Mod: S$GLB,,, | Performed by: FAMILY MEDICINE

## 2020-10-07 PROCEDURE — 36415 COLL VENOUS BLD VENIPUNCTURE: CPT | Mod: PO

## 2020-10-21 ENCOUNTER — PATIENT OUTREACH (OUTPATIENT)
Dept: OTHER | Facility: OTHER | Age: 55
End: 2020-10-21

## 2020-10-22 NOTE — PROGRESS NOTES
Digital Medicine: Clinician Follow-Up    Called patient to discuss how he is tolerating the higher spironolactone dose.    The history is provided by the patient.      Review of patient's allergies indicates:   -- Shrimp -- Anaphylaxis  Follow-up reason(s): medication change follow-up.     Hypertension  Patient needs assistance troubleshooting: Patient reminder needed..    Patient is not experiencing signs/symptoms of hypotension.  Patient is not experiencing signs/symptoms of hypertension.      Additional Follow-up details: Patient had his labs on 10/7 which showed a normal potassium since increasing spironolactone.     Patient did make medication change.    Is patient tolerating med change? yes            Last 5 Patient Entered Readings                                      Current 30 Day Average: 141/86     Recent Readings 10/20/2020 9/17/2020 8/25/2020 8/25/2020 8/12/2020    SBP (mmHg) 141 136 146 141 140    DBP (mmHg) 86 83 82 71 87    Pulse 56 65 64 64 55                 Depression Screening  Did not address depression screening.    Sleep Apnea Screening    Did not address sleep apnea screening.     Medication Affordability Screening  Did not address medication affordability screening.     Medication Adherence-Medication adherence was asssessed.    Patient reported missing medication: more than once a week.    Adherence tools used: Alarm and Pill box    Patient misses a dose more than once a week by getting busy and distracted.       ASSESSMENT(S)  Patients BP average is 141/86 mmHg, which is above goal. Patient's BP goal is less than or equal to 130/80.     Lack of readings to confirm if his BP is controlled.      Hypertension Plan  Additional monitoring needed. Set an alarm to take his BP once a week.  Continue current therapy.  Recommended adherence tool. Set an alarm on your phone as a reminder to take his medications and use a pill container.       Addressed patient questions and patient has my contact  information if needed prior to next outreach. Patient verbalizes understanding.             There are no preventive care reminders to display for this patient.  There are no preventive care reminders to display for this patient.      Hypertension Medications             amLODIPine (NORVASC) 10 MG tablet Take 1 tablet (10 mg total) by mouth once daily.    carvediloL (COREG) 25 MG tablet Take 1 tablet (25 mg total) by mouth 2 (two) times daily with meals.    chlorthalidone (HYGROTEN) 50 MG Tab Take 1 tablet (50 mg total) by mouth once daily.    spironolactone (ALDACTONE) 50 MG tablet Take 1 tablet (50 mg total) by mouth once daily.    valsartan (DIOVAN) 320 MG tablet Take 1 tablet (320 mg total) by mouth once daily.

## 2020-12-08 RX ORDER — PRAVASTATIN SODIUM 20 MG/1
20 TABLET ORAL DAILY
Qty: 90 TABLET | Refills: 0 | Status: SHIPPED | OUTPATIENT
Start: 2020-12-08 | End: 2021-01-01 | Stop reason: SDUPTHER

## 2020-12-09 NOTE — TELEPHONE ENCOUNTER
Please advise pt last refill.  Must see me/PCP on/after 1/29/2020 for physical.  Please schedule. Thanks.

## 2020-12-16 RX ORDER — PRAVASTATIN SODIUM 20 MG/1
20 TABLET ORAL DAILY
Qty: 90 TABLET | Refills: 0 | Status: CANCELLED | OUTPATIENT
Start: 2020-12-16

## 2020-12-17 ENCOUNTER — PATIENT MESSAGE (OUTPATIENT)
Dept: FAMILY MEDICINE | Facility: CLINIC | Age: 55
End: 2020-12-17

## 2020-12-17 DIAGNOSIS — I10 ESSENTIAL HYPERTENSION: Chronic | ICD-10-CM

## 2020-12-17 RX ORDER — AMLODIPINE BESYLATE 10 MG/1
10 TABLET ORAL DAILY
Qty: 90 TABLET | Refills: 1 | OUTPATIENT
Start: 2020-12-17

## 2020-12-17 RX ORDER — AMLODIPINE BESYLATE 10 MG/1
10 TABLET ORAL DAILY
Qty: 90 TABLET | Refills: 0 | Status: SHIPPED | OUTPATIENT
Start: 2020-12-17 | End: 2021-01-01 | Stop reason: SDUPTHER

## 2020-12-17 NOTE — TELEPHONE ENCOUNTER
Please call pharmacy to see if they received Rx I sent on on 12/8/2020.    Also, please contact patient to let him know I will need to see him for physical on/after 1/29/2021 to continue to honor refills. Thanks.

## 2020-12-30 ENCOUNTER — OFFICE VISIT (OUTPATIENT)
Dept: FAMILY MEDICINE | Facility: CLINIC | Age: 55
End: 2020-12-30
Payer: COMMERCIAL

## 2020-12-30 VITALS
WEIGHT: 315 LBS | BODY MASS INDEX: 36.45 KG/M2 | OXYGEN SATURATION: 97 % | HEART RATE: 62 BPM | DIASTOLIC BLOOD PRESSURE: 74 MMHG | TEMPERATURE: 98 F | HEIGHT: 78 IN | SYSTOLIC BLOOD PRESSURE: 120 MMHG

## 2020-12-30 DIAGNOSIS — E78.5 HYPERLIPIDEMIA, UNSPECIFIED HYPERLIPIDEMIA TYPE: Chronic | ICD-10-CM

## 2020-12-30 DIAGNOSIS — E29.1 HYPOGONADISM MALE: Chronic | ICD-10-CM

## 2020-12-30 DIAGNOSIS — I10 ESSENTIAL HYPERTENSION: Primary | Chronic | ICD-10-CM

## 2020-12-30 DIAGNOSIS — Z23 NEED FOR SHINGLES VACCINE: ICD-10-CM

## 2020-12-30 DIAGNOSIS — G47.33 OSA ON CPAP: ICD-10-CM

## 2020-12-30 DIAGNOSIS — E66.01 MORBID OBESITY WITH BMI OF 40.0-44.9, ADULT: ICD-10-CM

## 2020-12-30 DIAGNOSIS — G47.00 INSOMNIA, UNSPECIFIED TYPE: ICD-10-CM

## 2020-12-30 DIAGNOSIS — R73.03 PREDIABETES: ICD-10-CM

## 2020-12-30 PROCEDURE — 99999 PR PBB SHADOW E&M-EST. PATIENT-LVL IV: ICD-10-PCS | Mod: PBBFAC,,, | Performed by: FAMILY MEDICINE

## 2020-12-30 PROCEDURE — 3008F BODY MASS INDEX DOCD: CPT | Mod: CPTII,S$GLB,, | Performed by: FAMILY MEDICINE

## 2020-12-30 PROCEDURE — 3078F DIAST BP <80 MM HG: CPT | Mod: CPTII,S$GLB,, | Performed by: FAMILY MEDICINE

## 2020-12-30 PROCEDURE — 90750 ZOSTER RECOMBINANT VACCINE: ICD-10-PCS | Mod: S$GLB,,, | Performed by: FAMILY MEDICINE

## 2020-12-30 PROCEDURE — 99214 PR OFFICE/OUTPT VISIT, EST, LEVL IV, 30-39 MIN: ICD-10-PCS | Mod: 25,S$GLB,, | Performed by: FAMILY MEDICINE

## 2020-12-30 PROCEDURE — 1126F AMNT PAIN NOTED NONE PRSNT: CPT | Mod: S$GLB,,, | Performed by: FAMILY MEDICINE

## 2020-12-30 PROCEDURE — 90750 HZV VACC RECOMBINANT IM: CPT | Mod: S$GLB,,, | Performed by: FAMILY MEDICINE

## 2020-12-30 PROCEDURE — 3074F PR MOST RECENT SYSTOLIC BLOOD PRESSURE < 130 MM HG: ICD-10-PCS | Mod: CPTII,S$GLB,, | Performed by: FAMILY MEDICINE

## 2020-12-30 PROCEDURE — 90471 ZOSTER RECOMBINANT VACCINE: ICD-10-PCS | Mod: S$GLB,,, | Performed by: FAMILY MEDICINE

## 2020-12-30 PROCEDURE — 90471 IMMUNIZATION ADMIN: CPT | Mod: S$GLB,,, | Performed by: FAMILY MEDICINE

## 2020-12-30 PROCEDURE — 3008F PR BODY MASS INDEX (BMI) DOCUMENTED: ICD-10-PCS | Mod: CPTII,S$GLB,, | Performed by: FAMILY MEDICINE

## 2020-12-30 PROCEDURE — 3074F SYST BP LT 130 MM HG: CPT | Mod: CPTII,S$GLB,, | Performed by: FAMILY MEDICINE

## 2020-12-30 PROCEDURE — 1126F PR PAIN SEVERITY QUANTIFIED, NO PAIN PRESENT: ICD-10-PCS | Mod: S$GLB,,, | Performed by: FAMILY MEDICINE

## 2020-12-30 PROCEDURE — 99214 OFFICE O/P EST MOD 30 MIN: CPT | Mod: 25,S$GLB,, | Performed by: FAMILY MEDICINE

## 2020-12-30 PROCEDURE — 99999 PR PBB SHADOW E&M-EST. PATIENT-LVL IV: CPT | Mod: PBBFAC,,, | Performed by: FAMILY MEDICINE

## 2020-12-30 PROCEDURE — 3078F PR MOST RECENT DIASTOLIC BLOOD PRESSURE < 80 MM HG: ICD-10-PCS | Mod: CPTII,S$GLB,, | Performed by: FAMILY MEDICINE

## 2020-12-30 RX ORDER — TRAZODONE HYDROCHLORIDE 50 MG/1
50 TABLET ORAL NIGHTLY PRN
Qty: 90 TABLET | Refills: 1 | Status: SHIPPED | OUTPATIENT
Start: 2020-12-30 | End: 2021-09-03 | Stop reason: SDUPTHER

## 2020-12-30 NOTE — PROGRESS NOTES
Patient was given zoster vaccine per order using aseptic techtol well, patient instructed to wait 15 mins agreed to wait.

## 2020-12-30 NOTE — PROGRESS NOTES
Reggie Sawant    Chief Complaint   Patient presents with    Hypertension    Follow-up       History of Present Illness:   Mr. Sawant comes in today for hypertension follow-up.  He is not fasting but has taken medications today.  He states he monitors his diet but does not perform leisure exercise.  He reports having good blood pressure levels recently follows with [facility].    He states he has not been taking trazodone for insomnia for a while; he reports having insomnia.    Otherwise, he states he feels okay today.  He denies having fever, chills, fatigue, appetite changes; shortness of breath, cough, wheezing; chest pain, palpitations, leg swelling; abdominal pain, nausea, vomiting, diarrhea, constipation; unusual urinary symptoms; polydipsia, polyphagia, polyuria, hot or cold intolerance; back pain; headache, numbness, acute visual changes; anxiety, depression, homicidal or suicidal thoughts.     He states he continues to use CPAP for DEEPTHI.  He saw NP Elizabeth Lejeune with pulmonary on February 3, 2017.    He states he follows with Dr. Narayan Grove, urologist, for surveillance of BPH, testosterone deficiency/hypogonadism currently on weekly testosterone injection with last visit on June 5, 2020.      Labs:                      WBC                      4.21                01/29/2020                 HGB                      16.8                01/29/2020                 HCT                      52.8                01/29/2020                 PLT                      186                 01/29/2020                 CHOL                     222 (H)             01/29/2020                 TRIG                     78                  01/29/2020                 HDL                      48                  01/29/2020                 ALT                      43                  01/29/2020                 AST                      43 (H)              01/29/2020                 NA                       136                  10/07/2020                 K                        4.0                 10/07/2020                 CL                       97                  10/07/2020                 CREATININE               1.2                 10/07/2020                 BUN                      23 (H)              10/07/2020                 CO2                      28                  10/07/2020                 TSH                      1.902               01/29/2020                 PSA                      1.4                 01/29/2020                 HGBA1C                   5.7 (H)             01/29/2020               LDLCALC                  158.4               01/29/2020                Current Outpatient Medications   Medication Sig    amLODIPine (NORVASC) 10 MG tablet Take 1 tablet (10 mg total) by mouth once daily.    aspirin (ASPIRIN LOW DOSE) 81 MG EC tablet Take 1 tablet by mouth Daily.    carvediloL (COREG) 25 MG tablet Take 1 tablet (25 mg total) by mouth 2 (two) times daily with meals.    chlorthalidone (HYGROTEN) 50 MG Tab Take 1 tablet (50 mg total) by mouth once daily.    EPINEPHrine (EPIPEN 2-FRANCIA) 0.3 mg/0.3 mL AtIn Inject 0.3 mLs (0.3 mg total) into the muscle once. for 1 dose    fluticasone propionate (FLONASE) 50 mcg/actuation nasal spray SHAKE LIQUID AND USE 2 SPRAYS IN EACH NOSTRIL DAILY AS NEEDED    KLOR-CON M20 20 mEq tablet TAKE 1 TABLET ONCE DAILY    meloxicam (MOBIC) 15 MG tablet Take 1 tablet (15 mg total) by mouth daily as needed for Pain.    multivitamin (ONE DAILY MULTIVITAMIN) per tablet Take 1 tablet by mouth once daily.    pravastatin (PRAVACHOL) 20 MG tablet Take 1 tablet (20 mg total) by mouth once daily.    spironolactone (ALDACTONE) 50 MG tablet Take 1 tablet (50 mg total) by mouth once daily.    tadalafiL (CIALIS) 20 MG Tab TAKE 1 TABLET DAILY    testosterone cypionate (DEPOTESTOTERONE CYPIONATE) 200 mg/mL injection ADMINISTER 1 ML IN THE MUSCLE EVERY 14 DAYS    valsartan (DIOVAN) 320 MG  tablet Take 1 tablet (320 mg total) by mouth once daily.    traZODone (DESYREL) 50 MG tablet Take 1 tablet (50 mg total) by mouth nightly as needed for Insomnia. (Patient not taking: Reported on 12/30/2020)       Review of Systems   Constitutional: Negative for activity change, appetite change, chills, fatigue and fever.        Weight 163.6 kg (360 lb 10.8 oz) at March 9, 2020 visit.   Eyes: Negative for visual disturbance.   Respiratory: Negative for cough, shortness of breath and wheezing.         See history of present illness.   Cardiovascular: Negative for chest pain, palpitations and leg swelling.        See history of present illness.   Gastrointestinal: Negative for abdominal pain, constipation, diarrhea, nausea and vomiting.   Endocrine: Negative for cold intolerance, heat intolerance, polydipsia, polyphagia and polyuria.   Genitourinary: Negative for difficulty urinating.        See history of present illness.   Musculoskeletal: Negative for back pain.   Neurological: Negative for numbness and headaches.   Psychiatric/Behavioral: Positive for sleep disturbance. Negative for dysphoric mood and suicidal ideas. The patient is not nervous/anxious.         Negative for homicidal ideas. See history of present illness.       Objective:  Physical Exam  Vitals signs reviewed.   Constitutional:       General: He is not in acute distress.     Appearance: Normal appearance. He is well-developed. He is obese. He is not ill-appearing, toxic-appearing or diaphoretic.      Comments: Pleasant.   Eyes:      Comments: He wears glasses.   Neck:      Musculoskeletal: Normal range of motion and neck supple.      Thyroid: No thyromegaly.   Cardiovascular:      Rate and Rhythm: Normal rate and regular rhythm.      Heart sounds: Normal heart sounds. No murmur.   Pulmonary:      Effort: Pulmonary effort is normal. No respiratory distress.      Breath sounds: Normal breath sounds. No wheezing.   Abdominal:      General: Bowel  sounds are normal. There is no distension.      Palpations: Abdomen is soft. There is no mass.      Tenderness: There is no abdominal tenderness. There is no guarding or rebound.   Musculoskeletal: Normal range of motion.         General: No swelling or tenderness.      Comments: He is ambulatory without problems.    Lymphadenopathy:      Cervical: No cervical adenopathy.   Neurological:      General: No focal deficit present.      Mental Status: He is alert and oriented to person, place, and time.   Psychiatric:         Mood and Affect: Mood normal.         Behavior: Behavior normal.         Thought Content: Thought content normal.         Judgment: Judgment normal.         ASSESSMENT:  1. Essential hypertension    2. Hyperlipidemia, unspecified hyperlipidemia type    3. Prediabetes    4. Hypogonadism male    5. Insomnia, unspecified type    6. DEEPTHI on CPAP    7. Morbid obesity with BMI of 40.0-44.9, adult    8. Need for shingles vaccine        PLAN:  Reggie was seen today for hypertension and follow-up.    Diagnoses and all orders for this visit:    Essential hypertension  -     CBC Auto Differential; Future  -     TSH; Future  -     Comprehensive Metabolic Panel; Future  -     Lipid Panel; Future  -     Urinalysis; Future    Hyperlipidemia, unspecified hyperlipidemia type  -     Comprehensive Metabolic Panel; Future  -     Lipid Panel; Future    Prediabetes  -     Hemoglobin A1C; Future    Hypogonadism male    Insomnia, unspecified type  -     traZODone (DESYREL) 50 MG tablet; Take 1 tablet (50 mg total) by mouth nightly as needed for Insomnia.    DEEPTHI on CPAP    Morbid obesity with BMI of 40.0-44.9, adult    Need for shingles vaccine  -     Zoster Recombinant Vaccine       Patient advised to call for results.  Continue current medications, follow low sodium, low cholesterol, low carb diet, daily walks.  Keep follow up with specialists.  Follow up in about 6 months (around 6/30/2021) for hypertension and  prediabetes follow up.

## 2021-01-01 DIAGNOSIS — I10 ESSENTIAL HYPERTENSION: Chronic | ICD-10-CM

## 2021-01-04 RX ORDER — AMLODIPINE BESYLATE 10 MG/1
10 TABLET ORAL DAILY
Qty: 90 TABLET | Refills: 1 | Status: SHIPPED | OUTPATIENT
Start: 2021-01-04 | End: 2021-08-18

## 2021-01-04 RX ORDER — TADALAFIL 20 MG/1
20 TABLET ORAL DAILY
Qty: 10 TABLET | Refills: 0 | Status: SHIPPED | OUTPATIENT
Start: 2021-01-04 | End: 2021-05-09 | Stop reason: SDUPTHER

## 2021-01-04 RX ORDER — MELOXICAM 15 MG/1
15 TABLET ORAL DAILY PRN
Qty: 90 TABLET | Refills: 1 | Status: SHIPPED | OUTPATIENT
Start: 2021-01-04 | End: 2021-07-20

## 2021-01-04 RX ORDER — VALSARTAN 320 MG/1
320 TABLET ORAL DAILY
Qty: 90 TABLET | Refills: 1 | Status: SHIPPED | OUTPATIENT
Start: 2021-01-04 | End: 2021-01-07

## 2021-01-04 RX ORDER — PRAVASTATIN SODIUM 20 MG/1
20 TABLET ORAL DAILY
Qty: 90 TABLET | Refills: 1 | Status: SHIPPED | OUTPATIENT
Start: 2021-01-04 | End: 2021-09-03 | Stop reason: SDUPTHER

## 2021-01-04 RX ORDER — POTASSIUM CHLORIDE 20 MEQ/1
20 TABLET, EXTENDED RELEASE ORAL DAILY
Qty: 90 TABLET | Refills: 0 | Status: SHIPPED | OUTPATIENT
Start: 2021-01-04 | End: 2021-06-29

## 2021-01-22 ENCOUNTER — LAB VISIT (OUTPATIENT)
Dept: LAB | Facility: HOSPITAL | Age: 56
End: 2021-01-22
Attending: FAMILY MEDICINE
Payer: COMMERCIAL

## 2021-01-22 DIAGNOSIS — R73.03 PREDIABETES: ICD-10-CM

## 2021-01-22 DIAGNOSIS — E78.5 HYPERLIPIDEMIA, UNSPECIFIED HYPERLIPIDEMIA TYPE: Chronic | ICD-10-CM

## 2021-01-22 DIAGNOSIS — I10 ESSENTIAL HYPERTENSION: Chronic | ICD-10-CM

## 2021-01-22 PROCEDURE — 36415 COLL VENOUS BLD VENIPUNCTURE: CPT | Mod: PO

## 2021-01-22 PROCEDURE — 80061 LIPID PANEL: CPT

## 2021-01-22 PROCEDURE — 84443 ASSAY THYROID STIM HORMONE: CPT

## 2021-01-22 PROCEDURE — 83036 HEMOGLOBIN GLYCOSYLATED A1C: CPT

## 2021-01-22 PROCEDURE — 80053 COMPREHEN METABOLIC PANEL: CPT

## 2021-01-22 PROCEDURE — 85025 COMPLETE CBC W/AUTO DIFF WBC: CPT

## 2021-01-23 LAB
ALBUMIN SERPL BCP-MCNC: 4.1 G/DL (ref 3.5–5.2)
ALP SERPL-CCNC: 49 U/L (ref 55–135)
ALT SERPL W/O P-5'-P-CCNC: 35 U/L (ref 10–44)
ANION GAP SERPL CALC-SCNC: 11 MMOL/L (ref 8–16)
AST SERPL-CCNC: 33 U/L (ref 10–40)
BASOPHILS # BLD AUTO: 0.04 K/UL (ref 0–0.2)
BASOPHILS NFR BLD: 0.8 % (ref 0–1.9)
BILIRUB SERPL-MCNC: 1.1 MG/DL (ref 0.1–1)
BUN SERPL-MCNC: 22 MG/DL (ref 6–20)
CALCIUM SERPL-MCNC: 9.6 MG/DL (ref 8.7–10.5)
CHLORIDE SERPL-SCNC: 98 MMOL/L (ref 95–110)
CHOLEST SERPL-MCNC: 200 MG/DL (ref 120–199)
CHOLEST/HDLC SERPL: 4 {RATIO} (ref 2–5)
CO2 SERPL-SCNC: 30 MMOL/L (ref 23–29)
CREAT SERPL-MCNC: 1.2 MG/DL (ref 0.5–1.4)
DIFFERENTIAL METHOD: ABNORMAL
EOSINOPHIL # BLD AUTO: 0.1 K/UL (ref 0–0.5)
EOSINOPHIL NFR BLD: 2.1 % (ref 0–8)
ERYTHROCYTE [DISTWIDTH] IN BLOOD BY AUTOMATED COUNT: 13.1 % (ref 11.5–14.5)
EST. GFR  (AFRICAN AMERICAN): >60 ML/MIN/1.73 M^2
EST. GFR  (NON AFRICAN AMERICAN): >60 ML/MIN/1.73 M^2
ESTIMATED AVG GLUCOSE: 120 MG/DL (ref 68–131)
GLUCOSE SERPL-MCNC: 87 MG/DL (ref 70–110)
HBA1C MFR BLD HPLC: 5.8 % (ref 4–5.6)
HCT VFR BLD AUTO: 51.6 % (ref 40–54)
HDLC SERPL-MCNC: 50 MG/DL (ref 40–75)
HDLC SERPL: 25 % (ref 20–50)
HGB BLD-MCNC: 16.1 G/DL (ref 14–18)
IMM GRANULOCYTES # BLD AUTO: 0.01 K/UL (ref 0–0.04)
IMM GRANULOCYTES NFR BLD AUTO: 0.2 % (ref 0–0.5)
LDLC SERPL CALC-MCNC: 135.6 MG/DL (ref 63–159)
LYMPHOCYTES # BLD AUTO: 1.4 K/UL (ref 1–4.8)
LYMPHOCYTES NFR BLD: 27.1 % (ref 18–48)
MCH RBC QN AUTO: 30.6 PG (ref 27–31)
MCHC RBC AUTO-ENTMCNC: 31.2 G/DL (ref 32–36)
MCV RBC AUTO: 98 FL (ref 82–98)
MONOCYTES # BLD AUTO: 0.8 K/UL (ref 0.3–1)
MONOCYTES NFR BLD: 15.6 % (ref 4–15)
NEUTROPHILS # BLD AUTO: 2.8 K/UL (ref 1.8–7.7)
NEUTROPHILS NFR BLD: 54.2 % (ref 38–73)
NONHDLC SERPL-MCNC: 150 MG/DL
NRBC BLD-RTO: 0 /100 WBC
PLATELET # BLD AUTO: 203 K/UL (ref 150–350)
PMV BLD AUTO: 10.3 FL (ref 9.2–12.9)
POTASSIUM SERPL-SCNC: 4.5 MMOL/L (ref 3.5–5.1)
PROT SERPL-MCNC: 7.9 G/DL (ref 6–8.4)
RBC # BLD AUTO: 5.26 M/UL (ref 4.6–6.2)
SODIUM SERPL-SCNC: 139 MMOL/L (ref 136–145)
TRIGL SERPL-MCNC: 72 MG/DL (ref 30–150)
TSH SERPL DL<=0.005 MIU/L-ACNC: 1.75 UIU/ML (ref 0.4–4)
WBC # BLD AUTO: 5.2 K/UL (ref 3.9–12.7)

## 2021-02-09 ENCOUNTER — PATIENT MESSAGE (OUTPATIENT)
Dept: ADMINISTRATIVE | Facility: OTHER | Age: 56
End: 2021-02-09

## 2021-03-22 ENCOUNTER — PATIENT MESSAGE (OUTPATIENT)
Dept: FAMILY MEDICINE | Facility: CLINIC | Age: 56
End: 2021-03-22

## 2021-04-06 ENCOUNTER — PATIENT MESSAGE (OUTPATIENT)
Dept: FAMILY MEDICINE | Facility: CLINIC | Age: 56
End: 2021-04-06

## 2021-04-06 DIAGNOSIS — M16.9 OSTEOARTHRITIS OF HIP, UNSPECIFIED LATERALITY, UNSPECIFIED OSTEOARTHRITIS TYPE: Primary | ICD-10-CM

## 2021-04-06 RX ORDER — CELECOXIB 100 MG/1
100 CAPSULE ORAL 2 TIMES DAILY
Qty: 180 CAPSULE | Refills: 0 | Status: SHIPPED | OUTPATIENT
Start: 2021-04-06 | End: 2021-09-17 | Stop reason: ALTCHOICE

## 2021-04-26 DIAGNOSIS — I10 ESSENTIAL HYPERTENSION: Chronic | ICD-10-CM

## 2021-04-26 RX ORDER — CARVEDILOL 25 MG/1
25 TABLET ORAL 2 TIMES DAILY WITH MEALS
Qty: 180 TABLET | Refills: 1 | Status: SHIPPED | OUTPATIENT
Start: 2021-04-26 | End: 2021-09-15 | Stop reason: SDUPTHER

## 2021-04-27 ENCOUNTER — PATIENT MESSAGE (OUTPATIENT)
Dept: OTHER | Facility: OTHER | Age: 56
End: 2021-04-27

## 2021-05-10 RX ORDER — TADALAFIL 20 MG/1
20 TABLET ORAL DAILY
Qty: 10 TABLET | Refills: 0 | Status: SHIPPED | OUTPATIENT
Start: 2021-05-10 | End: 2021-07-06

## 2021-06-03 DIAGNOSIS — I10 ESSENTIAL HYPERTENSION: Chronic | ICD-10-CM

## 2021-06-04 RX ORDER — CHLORTHALIDONE 50 MG/1
50 TABLET ORAL DAILY
Qty: 90 TABLET | Refills: 0 | Status: SHIPPED | OUTPATIENT
Start: 2021-06-04 | End: 2021-09-08 | Stop reason: SDUPTHER

## 2021-06-29 ENCOUNTER — PATIENT MESSAGE (OUTPATIENT)
Dept: FAMILY MEDICINE | Facility: CLINIC | Age: 56
End: 2021-06-29

## 2021-07-20 ENCOUNTER — LAB VISIT (OUTPATIENT)
Dept: LAB | Facility: HOSPITAL | Age: 56
End: 2021-07-20
Payer: COMMERCIAL

## 2021-07-20 ENCOUNTER — PATIENT MESSAGE (OUTPATIENT)
Dept: FAMILY MEDICINE | Facility: CLINIC | Age: 56
End: 2021-07-20

## 2021-07-20 ENCOUNTER — OFFICE VISIT (OUTPATIENT)
Dept: FAMILY MEDICINE | Facility: CLINIC | Age: 56
End: 2021-07-20
Payer: COMMERCIAL

## 2021-07-20 VITALS
HEART RATE: 61 BPM | DIASTOLIC BLOOD PRESSURE: 78 MMHG | WEIGHT: 315 LBS | BODY MASS INDEX: 37.19 KG/M2 | HEIGHT: 77 IN | TEMPERATURE: 98 F | SYSTOLIC BLOOD PRESSURE: 123 MMHG | RESPIRATION RATE: 18 BRPM | OXYGEN SATURATION: 96 %

## 2021-07-20 DIAGNOSIS — Z00.00 ANNUAL PHYSICAL EXAM: Primary | ICD-10-CM

## 2021-07-20 DIAGNOSIS — E78.5 HYPERLIPIDEMIA, UNSPECIFIED HYPERLIPIDEMIA TYPE: ICD-10-CM

## 2021-07-20 DIAGNOSIS — N40.0 BENIGN PROSTATIC HYPERPLASIA, UNSPECIFIED WHETHER LOWER URINARY TRACT SYMPTOMS PRESENT: ICD-10-CM

## 2021-07-20 DIAGNOSIS — E29.1 HYPOGONADISM MALE: ICD-10-CM

## 2021-07-20 DIAGNOSIS — Z00.00 ANNUAL PHYSICAL EXAM: ICD-10-CM

## 2021-07-20 DIAGNOSIS — I10 ESSENTIAL HYPERTENSION: ICD-10-CM

## 2021-07-20 DIAGNOSIS — E66.01 MORBID OBESITY WITH BMI OF 40.0-44.9, ADULT: ICD-10-CM

## 2021-07-20 DIAGNOSIS — N52.9 ERECTILE DYSFUNCTION, UNSPECIFIED ERECTILE DYSFUNCTION TYPE: ICD-10-CM

## 2021-07-20 DIAGNOSIS — G47.33 OSA ON CPAP: ICD-10-CM

## 2021-07-20 DIAGNOSIS — R73.03 PREDIABETES: ICD-10-CM

## 2021-07-20 LAB
ALBUMIN SERPL BCP-MCNC: 4.4 G/DL (ref 3.5–5.2)
ALP SERPL-CCNC: 47 U/L (ref 55–135)
ALT SERPL W/O P-5'-P-CCNC: 38 U/L (ref 10–44)
ANION GAP SERPL CALC-SCNC: 9 MMOL/L (ref 8–16)
AST SERPL-CCNC: 30 U/L (ref 10–40)
BASOPHILS # BLD AUTO: 0.05 K/UL (ref 0–0.2)
BASOPHILS NFR BLD: 1 % (ref 0–1.9)
BILIRUB SERPL-MCNC: 2 MG/DL (ref 0.1–1)
BILIRUB UR QL STRIP: NEGATIVE
BUN SERPL-MCNC: 18 MG/DL (ref 6–20)
CALCIUM SERPL-MCNC: 10.5 MG/DL (ref 8.7–10.5)
CHLORIDE SERPL-SCNC: 96 MMOL/L (ref 95–110)
CHOLEST SERPL-MCNC: 205 MG/DL (ref 120–199)
CHOLEST/HDLC SERPL: 4.8 {RATIO} (ref 2–5)
CLARITY UR REFRACT.AUTO: ABNORMAL
CO2 SERPL-SCNC: 31 MMOL/L (ref 23–29)
COLOR UR AUTO: YELLOW
COMPLEXED PSA SERPL-MCNC: 2 NG/ML (ref 0–4)
CREAT SERPL-MCNC: 1.2 MG/DL (ref 0.5–1.4)
DIFFERENTIAL METHOD: NORMAL
EOSINOPHIL # BLD AUTO: 0.2 K/UL (ref 0–0.5)
EOSINOPHIL NFR BLD: 4.2 % (ref 0–8)
ERYTHROCYTE [DISTWIDTH] IN BLOOD BY AUTOMATED COUNT: 12.9 % (ref 11.5–14.5)
EST. GFR  (AFRICAN AMERICAN): >60 ML/MIN/1.73 M^2
EST. GFR  (NON AFRICAN AMERICAN): >60 ML/MIN/1.73 M^2
ESTIMATED AVG GLUCOSE: 117 MG/DL (ref 68–131)
GLUCOSE SERPL-MCNC: 93 MG/DL (ref 70–110)
GLUCOSE UR QL STRIP: NEGATIVE
HBA1C MFR BLD: 5.7 % (ref 4–5.6)
HCT VFR BLD AUTO: 51.6 % (ref 40–54)
HDLC SERPL-MCNC: 43 MG/DL (ref 40–75)
HDLC SERPL: 21 % (ref 20–50)
HGB BLD-MCNC: 17 G/DL (ref 14–18)
HGB UR QL STRIP: NEGATIVE
IMM GRANULOCYTES # BLD AUTO: 0.01 K/UL (ref 0–0.04)
IMM GRANULOCYTES NFR BLD AUTO: 0.2 % (ref 0–0.5)
KETONES UR QL STRIP: NEGATIVE
LDLC SERPL CALC-MCNC: 140.6 MG/DL (ref 63–159)
LEUKOCYTE ESTERASE UR QL STRIP: NEGATIVE
LYMPHOCYTES # BLD AUTO: 1.7 K/UL (ref 1–4.8)
LYMPHOCYTES NFR BLD: 31.4 % (ref 18–48)
MCH RBC QN AUTO: 31 PG (ref 27–31)
MCHC RBC AUTO-ENTMCNC: 32.9 G/DL (ref 32–36)
MCV RBC AUTO: 94 FL (ref 82–98)
MONOCYTES # BLD AUTO: 0.8 K/UL (ref 0.3–1)
MONOCYTES NFR BLD: 14.4 % (ref 4–15)
NEUTROPHILS # BLD AUTO: 2.6 K/UL (ref 1.8–7.7)
NEUTROPHILS NFR BLD: 48.8 % (ref 38–73)
NITRITE UR QL STRIP: NEGATIVE
NONHDLC SERPL-MCNC: 162 MG/DL
NRBC BLD-RTO: 0 /100 WBC
PH UR STRIP: 7 [PH] (ref 5–8)
PLATELET # BLD AUTO: 223 K/UL (ref 150–450)
PMV BLD AUTO: 11.1 FL (ref 9.2–12.9)
POTASSIUM SERPL-SCNC: 3.9 MMOL/L (ref 3.5–5.1)
PROT SERPL-MCNC: 8.5 G/DL (ref 6–8.4)
PROT UR QL STRIP: NEGATIVE
RBC # BLD AUTO: 5.48 M/UL (ref 4.6–6.2)
SODIUM SERPL-SCNC: 136 MMOL/L (ref 136–145)
SP GR UR STRIP: 1.02 (ref 1–1.03)
TESTOST SERPL-MCNC: 740 NG/DL (ref 304–1227)
TRIGL SERPL-MCNC: 107 MG/DL (ref 30–150)
TSH SERPL DL<=0.005 MIU/L-ACNC: 1.74 UIU/ML (ref 0.4–4)
URN SPEC COLLECT METH UR: ABNORMAL
WBC # BLD AUTO: 5.26 K/UL (ref 3.9–12.7)

## 2021-07-20 PROCEDURE — 85025 COMPLETE CBC W/AUTO DIFF WBC: CPT | Performed by: FAMILY MEDICINE

## 2021-07-20 PROCEDURE — 36415 COLL VENOUS BLD VENIPUNCTURE: CPT | Mod: PO | Performed by: FAMILY MEDICINE

## 2021-07-20 PROCEDURE — 80053 COMPREHEN METABOLIC PANEL: CPT | Performed by: FAMILY MEDICINE

## 2021-07-20 PROCEDURE — 99396 PREV VISIT EST AGE 40-64: CPT | Mod: S$GLB,,, | Performed by: FAMILY MEDICINE

## 2021-07-20 PROCEDURE — 84403 ASSAY OF TOTAL TESTOSTERONE: CPT | Performed by: FAMILY MEDICINE

## 2021-07-20 PROCEDURE — 99396 PR PREVENTIVE VISIT,EST,40-64: ICD-10-PCS | Mod: S$GLB,,, | Performed by: FAMILY MEDICINE

## 2021-07-20 PROCEDURE — 99999 PR PBB SHADOW E&M-EST. PATIENT-LVL IV: ICD-10-PCS | Mod: PBBFAC,,, | Performed by: FAMILY MEDICINE

## 2021-07-20 PROCEDURE — 83036 HEMOGLOBIN GLYCOSYLATED A1C: CPT | Performed by: FAMILY MEDICINE

## 2021-07-20 PROCEDURE — 80061 LIPID PANEL: CPT | Performed by: FAMILY MEDICINE

## 2021-07-20 PROCEDURE — 99999 PR PBB SHADOW E&M-EST. PATIENT-LVL IV: CPT | Mod: PBBFAC,,, | Performed by: FAMILY MEDICINE

## 2021-07-20 PROCEDURE — 84153 ASSAY OF PSA TOTAL: CPT | Performed by: FAMILY MEDICINE

## 2021-07-20 PROCEDURE — 81003 URINALYSIS AUTO W/O SCOPE: CPT | Performed by: FAMILY MEDICINE

## 2021-07-20 PROCEDURE — 84443 ASSAY THYROID STIM HORMONE: CPT | Performed by: FAMILY MEDICINE

## 2021-08-05 ENCOUNTER — TELEPHONE (OUTPATIENT)
Dept: FAMILY MEDICINE | Facility: CLINIC | Age: 56
End: 2021-08-05

## 2021-08-08 ENCOUNTER — PATIENT MESSAGE (OUTPATIENT)
Dept: ADMINISTRATIVE | Facility: OTHER | Age: 56
End: 2021-08-08

## 2021-09-07 DIAGNOSIS — I10 ESSENTIAL HYPERTENSION: Chronic | ICD-10-CM

## 2021-09-08 DIAGNOSIS — I10 ESSENTIAL HYPERTENSION: Chronic | ICD-10-CM

## 2021-09-08 RX ORDER — POTASSIUM CHLORIDE 20 MEQ/1
20 TABLET, EXTENDED RELEASE ORAL DAILY
Qty: 90 TABLET | Refills: 0 | Status: SHIPPED | OUTPATIENT
Start: 2021-09-08 | End: 2022-04-08

## 2021-09-09 RX ORDER — CHLORTHALIDONE 50 MG/1
50 TABLET ORAL DAILY
Qty: 90 TABLET | Refills: 1 | Status: SHIPPED | OUTPATIENT
Start: 2021-09-09 | End: 2022-06-21 | Stop reason: SDUPTHER

## 2021-09-09 RX ORDER — SPIRONOLACTONE 50 MG/1
50 TABLET, FILM COATED ORAL DAILY
Qty: 90 TABLET | Refills: 1 | Status: SHIPPED | OUTPATIENT
Start: 2021-09-09 | End: 2022-06-21 | Stop reason: SDUPTHER

## 2021-09-15 DIAGNOSIS — I10 ESSENTIAL HYPERTENSION: Chronic | ICD-10-CM

## 2021-09-15 RX ORDER — CARVEDILOL 25 MG/1
25 TABLET ORAL 2 TIMES DAILY WITH MEALS
Qty: 180 TABLET | Refills: 1 | Status: SHIPPED | OUTPATIENT
Start: 2021-09-15 | End: 2022-02-11 | Stop reason: SDUPTHER

## 2021-09-17 ENCOUNTER — PATIENT MESSAGE (OUTPATIENT)
Dept: FAMILY MEDICINE | Facility: CLINIC | Age: 56
End: 2021-09-17

## 2021-09-17 DIAGNOSIS — M16.0 PRIMARY OSTEOARTHRITIS OF BOTH HIPS: ICD-10-CM

## 2021-09-17 RX ORDER — MELOXICAM 15 MG/1
15 TABLET ORAL DAILY
Qty: 90 TABLET | Refills: 1 | Status: SHIPPED | OUTPATIENT
Start: 2021-09-17 | End: 2022-11-22 | Stop reason: SDUPTHER

## 2021-09-23 ENCOUNTER — OFFICE VISIT (OUTPATIENT)
Dept: FAMILY MEDICINE | Facility: CLINIC | Age: 56
End: 2021-09-23
Payer: COMMERCIAL

## 2021-09-23 ENCOUNTER — TELEPHONE (OUTPATIENT)
Dept: FAMILY MEDICINE | Facility: CLINIC | Age: 56
End: 2021-09-23

## 2021-09-23 ENCOUNTER — PATIENT MESSAGE (OUTPATIENT)
Dept: FAMILY MEDICINE | Facility: CLINIC | Age: 56
End: 2021-09-23

## 2021-09-23 DIAGNOSIS — M79.605 LEG PAIN, LEFT: Primary | ICD-10-CM

## 2021-09-23 PROCEDURE — 99213 PR OFFICE/OUTPT VISIT, EST, LEVL III, 20-29 MIN: ICD-10-PCS | Mod: 95,,, | Performed by: FAMILY MEDICINE

## 2021-09-23 PROCEDURE — 99213 OFFICE O/P EST LOW 20 MIN: CPT | Mod: 95,,, | Performed by: FAMILY MEDICINE

## 2021-09-23 RX ORDER — TIZANIDINE 4 MG/1
4 TABLET ORAL EVERY 12 HOURS PRN
Qty: 30 TABLET | Refills: 0 | Status: SHIPPED | OUTPATIENT
Start: 2021-09-23 | End: 2021-10-08

## 2021-09-23 RX ORDER — METHYLPREDNISOLONE 4 MG/1
TABLET ORAL
Qty: 1 PACKAGE | Refills: 0 | Status: SHIPPED | OUTPATIENT
Start: 2021-09-23 | End: 2022-08-22

## 2021-09-28 ENCOUNTER — TELEPHONE (OUTPATIENT)
Dept: FAMILY MEDICINE | Facility: CLINIC | Age: 56
End: 2021-09-28

## 2021-09-28 ENCOUNTER — OFFICE VISIT (OUTPATIENT)
Dept: FAMILY MEDICINE | Facility: CLINIC | Age: 56
End: 2021-09-28
Payer: COMMERCIAL

## 2021-09-28 VITALS
BODY MASS INDEX: 37.19 KG/M2 | TEMPERATURE: 99 F | OXYGEN SATURATION: 97 % | DIASTOLIC BLOOD PRESSURE: 78 MMHG | WEIGHT: 315 LBS | HEIGHT: 77 IN | SYSTOLIC BLOOD PRESSURE: 138 MMHG | HEART RATE: 72 BPM

## 2021-09-28 DIAGNOSIS — M16.12 OSTEOARTHRITIS OF LEFT HIP, UNSPECIFIED OSTEOARTHRITIS TYPE: ICD-10-CM

## 2021-09-28 DIAGNOSIS — Z23 NEED FOR INFLUENZA VACCINATION: ICD-10-CM

## 2021-09-28 PROCEDURE — 99999 PR PBB SHADOW E&M-EST. PATIENT-LVL IV: ICD-10-PCS | Mod: PBBFAC,,, | Performed by: FAMILY MEDICINE

## 2021-09-28 PROCEDURE — 90471 IMMUNIZATION ADMIN: CPT | Mod: 59,S$GLB,, | Performed by: FAMILY MEDICINE

## 2021-09-28 PROCEDURE — 96372 PR INJECTION,THERAP/PROPH/DIAG2ST, IM OR SUBCUT: ICD-10-PCS | Mod: S$GLB,,, | Performed by: FAMILY MEDICINE

## 2021-09-28 PROCEDURE — 99213 PR OFFICE/OUTPT VISIT, EST, LEVL III, 20-29 MIN: ICD-10-PCS | Mod: 25,S$GLB,, | Performed by: FAMILY MEDICINE

## 2021-09-28 PROCEDURE — 90686 FLU VACCINE (QUAD) GREATER THAN OR EQUAL TO 3YO PRESERVATIVE FREE IM: ICD-10-PCS | Mod: S$GLB,,, | Performed by: FAMILY MEDICINE

## 2021-09-28 PROCEDURE — 96372 THER/PROPH/DIAG INJ SC/IM: CPT | Mod: S$GLB,,, | Performed by: FAMILY MEDICINE

## 2021-09-28 PROCEDURE — 90686 IIV4 VACC NO PRSV 0.5 ML IM: CPT | Mod: S$GLB,,, | Performed by: FAMILY MEDICINE

## 2021-09-28 PROCEDURE — 99213 OFFICE O/P EST LOW 20 MIN: CPT | Mod: 25,S$GLB,, | Performed by: FAMILY MEDICINE

## 2021-09-28 PROCEDURE — 90471 FLU VACCINE (QUAD) GREATER THAN OR EQUAL TO 3YO PRESERVATIVE FREE IM: ICD-10-PCS | Mod: 59,S$GLB,, | Performed by: FAMILY MEDICINE

## 2021-09-28 PROCEDURE — 99999 PR PBB SHADOW E&M-EST. PATIENT-LVL IV: CPT | Mod: PBBFAC,,, | Performed by: FAMILY MEDICINE

## 2021-09-28 RX ORDER — KETOROLAC TROMETHAMINE 30 MG/ML
60 INJECTION, SOLUTION INTRAMUSCULAR; INTRAVENOUS
Status: COMPLETED | OUTPATIENT
Start: 2021-09-28 | End: 2021-09-28

## 2021-09-28 RX ADMIN — KETOROLAC TROMETHAMINE 60 MG: 30 INJECTION, SOLUTION INTRAMUSCULAR; INTRAVENOUS at 03:09

## 2021-09-29 ENCOUNTER — PATIENT MESSAGE (OUTPATIENT)
Dept: FAMILY MEDICINE | Facility: CLINIC | Age: 56
End: 2021-09-29

## 2021-11-30 ENCOUNTER — PATIENT MESSAGE (OUTPATIENT)
Dept: FAMILY MEDICINE | Facility: CLINIC | Age: 56
End: 2021-11-30
Payer: COMMERCIAL

## 2021-12-14 ENCOUNTER — PATIENT MESSAGE (OUTPATIENT)
Dept: FAMILY MEDICINE | Facility: CLINIC | Age: 56
End: 2021-12-14
Payer: COMMERCIAL

## 2021-12-15 ENCOUNTER — PATIENT MESSAGE (OUTPATIENT)
Dept: FAMILY MEDICINE | Facility: CLINIC | Age: 56
End: 2021-12-15
Payer: COMMERCIAL

## 2022-01-20 ENCOUNTER — PATIENT MESSAGE (OUTPATIENT)
Dept: OTHER | Facility: OTHER | Age: 57
End: 2022-01-20
Payer: COMMERCIAL

## 2022-02-17 RX ORDER — MOMETASONE FUROATE 50 UG/1
SPRAY, METERED NASAL
Qty: 17 G | Refills: 5 | Status: CANCELLED | OUTPATIENT
Start: 2022-02-17

## 2022-02-17 NOTE — TELEPHONE ENCOUNTER
Pharmacy cannot fill Rx without instructions. Can we please send this again with proper instruction?

## 2022-02-17 NOTE — TELEPHONE ENCOUNTER
No new care gaps identified.  Powered by [employer]. Reference number: [id number].   2/17/2022 1:24:42 PM CST

## 2022-02-17 NOTE — TELEPHONE ENCOUNTER
----- Message from Angi Wallace sent at 2/17/2022 12:36 PM CST -----  Contact[facility] would like a call back in regards to the instructions for the patients medication, mometasone (NASONEX) 50 mcg/actuation nasal spray. Please call her at [phone]. ref #: [id number]

## 2022-03-24 ENCOUNTER — PATIENT MESSAGE (OUTPATIENT)
Dept: FAMILY MEDICINE | Facility: CLINIC | Age: 57
End: 2022-03-24
Payer: COMMERCIAL

## 2022-03-29 ENCOUNTER — TELEPHONE (OUTPATIENT)
Dept: FAMILY MEDICINE | Facility: CLINIC | Age: 57
End: 2022-03-29
Payer: COMMERCIAL

## 2022-03-29 DIAGNOSIS — B35.1 TOENAIL FUNGUS: Primary | ICD-10-CM

## 2022-03-29 NOTE — TELEPHONE ENCOUNTER
"Pt portal message    "Good Evening! Im having a recurring problem with toenail fungus. I have taken the oral meds in the past and it seems to start working within a few months but slowly quit. I see that theres a new topical treatment on the market called Jubila that I would like to try if you approve. Im working away from home but can make arrangements for an office visit for any lab work if needed."    Please advise  "

## 2022-03-29 NOTE — TELEPHONE ENCOUNTER
Please advise pt I recommend he see/consult with podiatry for evaluation for toenail fungal infection as some times biopsy may been needed. Thanks.    I have put the following orders and/or medications to this note.  Please advise pt and assist.    Orders Placed This Encounter   Procedures    Ambulatory referral/consult to Podiatry     Standing Status:   Future     Standing Expiration Date:   4/29/2023     Referral Priority:   Routine     Referral Type:   Consultation     Referral Reason:   Specialty Services Required     Requested Specialty:   Podiatry     Number of Visits Requested:   1

## 2022-04-01 ENCOUNTER — OFFICE VISIT (OUTPATIENT)
Dept: PODIATRY | Facility: CLINIC | Age: 57
End: 2022-04-01
Payer: COMMERCIAL

## 2022-04-01 DIAGNOSIS — L60.8 DISCOLORATION AND THICKENING OF NAILS BOTH FEET: Primary | ICD-10-CM

## 2022-04-01 DIAGNOSIS — E66.01 MORBID OBESITY WITH BMI OF 40.0-44.9, ADULT: ICD-10-CM

## 2022-04-01 DIAGNOSIS — L60.3 ONYCHODYSTROPHY: ICD-10-CM

## 2022-04-01 PROCEDURE — 99999 PR PBB SHADOW E&M-EST. PATIENT-LVL III: ICD-10-PCS | Mod: PBBFAC,,, | Performed by: PODIATRIST

## 2022-04-01 PROCEDURE — 99203 PR OFFICE/OUTPT VISIT, NEW, LEVL III, 30-44 MIN: ICD-10-PCS | Mod: S$GLB,,, | Performed by: PODIATRIST

## 2022-04-01 PROCEDURE — 99999 PR PBB SHADOW E&M-EST. PATIENT-LVL III: CPT | Mod: PBBFAC,,, | Performed by: PODIATRIST

## 2022-04-01 PROCEDURE — 99203 OFFICE O/P NEW LOW 30 MIN: CPT | Mod: S$GLB,,, | Performed by: PODIATRIST

## 2022-04-01 NOTE — PROGRESS NOTES
Subjective:       Patient ID: Reggie Sawant is a 56 y.o. male.    Chief Complaint: Nail Problem (Patient complains of bilateral nail fungus. He states the problem has been ongoing for years. Denies pain at present and is non diabetic. )      HPI:  Patient presents to the office today at the referral of Dr. Sanchez, with complaint of elongated and thickened nail plates to the left and right.  Patient states using over-the-counter topical medications which have not been helpful curative.  Has failed Lamisil therapy twice.  The patient states slight discomfort due to the nail thickening and/or elongation.  Patient is interested in the definitive medical diagnosis.  States 0/10 pain currently.  This patient's PMD is Irene Sanchez MD. The patient states that the the nail plate/nails have appeared such for the past several months to years. Denies recent trauma which could lead to the diagnoses of nail dystrophy.     Review of patient's allergies indicates:   Allergen Reactions    Shrimp Anaphylaxis       Past Medical History:   Diagnosis Date    Cellulitis     left leg    Hip arthritis     right    Hyperlipidemia     Hypertension     Hypogonadism male     Hypokalemia     Morbid obesity     Sleep apnea     on CPAP       Family History   Problem Relation Age of Onset    Hypertension Brother     Diabetes Maternal Grandmother     Hypertension Father     Heart disease Father         CAD    Diabetes Sister     No Known Problems Daughter     No Known Problems Daughter     No Known Problems Daughter     No Known Problems Daughter     No Known Problems Mother     No Known Problems Sister     No Known Problems Other     Cancer Neg Hx     Stroke Neg Hx        Social History     Socioeconomic History    Marital status:     Number of children: 4   Occupational History    Occupation:      Employer: [employer]     Employer: [employer]   Tobacco Use    Smoking status:  Never Smoker    Smokeless tobacco: Never Used   Substance and Sexual Activity    Alcohol use: Yes     Alcohol/week: 0.0 standard drinks     Comment: Occasionally    Drug use: No    Sexual activity: Yes     Partners: Female   Social History Narrative    He wears seatbelt. He has 1 grandson and 2 granddaughters.     Social Determinants of Health     Financial Resource Strain: Low Risk     Difficulty of Paying Living Expenses: Not hard at all   Food Insecurity: No Food Insecurity    Worried About Running Out of Food in the Last Year: Never true    Ran Out of Food in the Last Year: Never true   Transportation Needs: No Transportation Needs    Lack of Transportation (Medical): No    Lack of Transportation (Non-Medical): No   Physical Activity: Unknown    Days of Exercise per Week: Patient refused    Minutes of Exercise per Session: 30 min   Stress: No Stress Concern Present    Feeling of Stress : Only a little   Social Connections: Unknown    Frequency of Communication with Friends and Family: Patient refused    Frequency of Social Gatherings with Friends and Family: Patient refused    Active Member of Clubs or Organizations: Patient refused    Attends Club or Organization Meetings: Patient refused    Marital Status: Patient refused   Housing Stability: Unknown    Unable to Pay for Housing in the Last Year: Patient refused    Number of Places Lived in the Last Year: 1    Unstable Housing in the Last Year: Patient refused       Past Surgical History:   Procedure Laterality Date    COLONOSCOPY N/A 12/6/2017    Procedure: COLONOSCOPY;  Surgeon: Rory Barone MD;  Location: [facility];  Service: Endoscopy;  Laterality: N/A;    right bunionectomy         Review of Systems       Objective:   There were no vitals taken for this visit.    X-Ray Chest PA And Lateral  Narrative: EXAMINATION:  XR CHEST PA AND LATERAL    CLINICAL HISTORY:  Cough    TECHNIQUE:  PA and lateral views of the chest were  performed.    COMPARISON:  None    FINDINGS:  The cardiac and mediastinal silhouettes appear within normal limits.   The lungs are clear bilaterally.  No acute osseous findings demonstrated.  Impression: No acute findings.    No significant discrepancy with preliminary radiology report.    Electronically signed by: Zhao Chacon MD  Date:    02/04/2019  Time:    08:05       Physical Exam    LOWER EXTREMITY PHYSICAL EXAMINATION  NEUROLOGY: Sensation to light touch is intact. Proprioception is intact.     DERMATOLOGY:  Skin is supple, dry and intact. No ulcerations are noted. No hyperkeratosis or calluses are noted. No ecchymosis appreciated.  There are nail changes which are consistent with onychodystrophy.  There is dystrophic changes to the nails on the right foot 1, 2, 3 in the left foot 1, 2, 3, 5. There is elevation of the nail plate, chalky subungual debris, discoloration, and thickening.    ORTHOPEDIC: Manual Muscle Testing is 5/5 in all planes on the left and right, without pains, with and without resistance. Gait pattern is non-antalgic.    VASCULAR: The right DP pulse is 2/4 and the left DP is 2/4. The right PT pulse is 2/4 and the left PT pulse is 2/4. Proximal to distal, warm to warm. No dependent rubor or elevation palor is noted. Capillary refill time is less than 3 seconds. Hair growth is appreciated to the dorsal foot and digits.    Assessment:     1. Discoloration and thickening of nails both feet    2. Onychodystrophy    3. Morbid obesity with BMI of 40.0-44.9, adult        Plan:     Discoloration and thickening of nails both feet    Onychodystrophy  -     Ambulatory referral/consult to Podiatry    Morbid obesity with BMI of 40.0-44.9, adult      We discussed pathology etiology associated with onychodystrophy.  We discussed possible etiology associated with trauma, will use of steel toe boots, fungus, psoriasis, vitamin-D insufficiency, diabetes, abnormal blood flow,etc.    We discussed both  conservative and surgical treatment for onychomycosis.  Definitive antifungal treatment options were discussed and reviewed with the patient at initial visit. We discussed oral medication, topical medication, and Vicks vapor rub with both risk and benefits of each of these options.  Also discussed temporary versus permanent removal of the entire nail plate to prevent recurrence.    As patient has failed anti fungal therapy in the past, would recommend he trim and file these nails down to the appropriate thickness and length.  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  The affected nails were filed down to the appropriate thickness utilizing a Dremel.  Patient tolerated this well without complications    Future Appointments   Date Time Provider Department Center   6/10/2022  1:00 PM [facility]

## 2022-04-02 ENCOUNTER — PATIENT MESSAGE (OUTPATIENT)
Dept: ADMINISTRATIVE | Facility: OTHER | Age: 57
End: 2022-04-02
Payer: COMMERCIAL

## 2022-04-11 ENCOUNTER — PATIENT MESSAGE (OUTPATIENT)
Dept: OTHER | Facility: OTHER | Age: 57
End: 2022-04-11
Payer: COMMERCIAL

## 2022-06-20 ENCOUNTER — TELEPHONE (OUTPATIENT)
Dept: FAMILY MEDICINE | Facility: CLINIC | Age: 57
End: 2022-06-20
Payer: COMMERCIAL

## 2022-06-20 ENCOUNTER — PATIENT MESSAGE (OUTPATIENT)
Dept: FAMILY MEDICINE | Facility: CLINIC | Age: 57
End: 2022-06-20
Payer: COMMERCIAL

## 2022-06-20 DIAGNOSIS — G47.00 INSOMNIA, UNSPECIFIED TYPE: ICD-10-CM

## 2022-06-20 DIAGNOSIS — I10 ESSENTIAL HYPERTENSION: Chronic | ICD-10-CM

## 2022-06-20 NOTE — TELEPHONE ENCOUNTER
Per pt portal ,please advise       Good Evening!     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 The only problem that I usually run into is when I bring along my meds. I normally order a 90 day supply which raises suspicion. Could you please rewrite all my meds to just dispense 30 at a time?

## 2022-06-21 RX ORDER — AMLODIPINE BESYLATE 10 MG/1
10 TABLET ORAL DAILY
Qty: 30 TABLET | Refills: 5 | Status: SHIPPED | OUTPATIENT
Start: 2022-06-21 | End: 2023-07-26

## 2022-06-21 RX ORDER — VALSARTAN 320 MG/1
320 TABLET ORAL DAILY
Qty: 30 TABLET | Refills: 5 | Status: SHIPPED | OUTPATIENT
Start: 2022-06-21 | End: 2022-10-28 | Stop reason: SDUPTHER

## 2022-06-21 RX ORDER — POTASSIUM CHLORIDE 20 MEQ/1
20 TABLET, EXTENDED RELEASE ORAL DAILY
Qty: 30 TABLET | Refills: 5 | Status: SHIPPED | OUTPATIENT
Start: 2022-06-21 | End: 2023-03-28

## 2022-06-21 RX ORDER — TRAZODONE HYDROCHLORIDE 50 MG/1
50 TABLET ORAL NIGHTLY
Qty: 30 TABLET | Refills: 5 | Status: SHIPPED | OUTPATIENT
Start: 2022-06-21 | End: 2023-08-15

## 2022-06-21 RX ORDER — CHLORTHALIDONE 50 MG/1
50 TABLET ORAL DAILY
Qty: 30 TABLET | Refills: 5 | Status: SHIPPED | OUTPATIENT
Start: 2022-06-21 | End: 2023-03-28

## 2022-06-21 RX ORDER — SPIRONOLACTONE 50 MG/1
50 TABLET, FILM COATED ORAL DAILY
Qty: 30 TABLET | Refills: 5 | Status: SHIPPED | OUTPATIENT
Start: 2022-06-21 | End: 2023-09-12 | Stop reason: SDUPTHER

## 2022-06-21 RX ORDER — CARVEDILOL 25 MG/1
25 TABLET ORAL 2 TIMES DAILY WITH MEALS
Qty: 60 TABLET | Refills: 5 | Status: SHIPPED | OUTPATIENT
Start: 2022-06-21 | End: 2023-07-26

## 2022-06-21 RX ORDER — PRAVASTATIN SODIUM 20 MG/1
20 TABLET ORAL DAILY
Qty: 30 TABLET | Refills: 5 | Status: SHIPPED | OUTPATIENT
Start: 2022-06-21 | End: 2023-03-28

## 2022-06-21 NOTE — TELEPHONE ENCOUNTER
I have put the following orders and/or medications to this note.  Please advise pt and confirm all medications listed below are correct.    No orders of the defined types were placed in this encounter.      Medications Ordered This Encounter   Medications    amLODIPine (NORVASC) 10 MG tablet     Sig: Take 1 tablet (10 mg total) by mouth once daily.     Dispense:  30 tablet     Refill:  5     .    carvediloL (COREG) 25 MG tablet     Sig: Take 1 tablet (25 mg total) by mouth 2 (two) times daily with meals.     Dispense:  60 tablet     Refill:  5    chlorthalidone (HYGROTEN) 50 MG Tab     Sig: Take 1 tablet (50 mg total) by mouth once daily.     Dispense:  30 tablet     Refill:  5    potassium chloride SA (KLOR-CON M20) 20 MEQ tablet     Sig: Take 1 tablet (20 mEq total) by mouth once daily.     Dispense:  30 tablet     Refill:  5    pravastatin (PRAVACHOL) 20 MG tablet     Sig: Take 1 tablet (20 mg total) by mouth once daily.     Dispense:  30 tablet     Refill:  5    spironolactone (ALDACTONE) 50 MG tablet     Sig: Take 1 tablet (50 mg total) by mouth once daily.     Dispense:  30 tablet     Refill:  5     .    traZODone (DESYREL) 50 MG tablet     Sig: Take 1 tablet (50 mg total) by mouth nightly.     Dispense:  30 tablet     Refill:  5    valsartan (DIOVAN) 320 MG tablet     Sig: Take 1 tablet (320 mg total) by mouth once daily.     Dispense:  30 tablet     Refill:  5     .

## 2022-07-26 ENCOUNTER — TELEPHONE (OUTPATIENT)
Dept: UROLOGY | Facility: CLINIC | Age: 57
End: 2022-07-26
Payer: COMMERCIAL

## 2022-07-26 ENCOUNTER — OFFICE VISIT (OUTPATIENT)
Dept: UROLOGY | Facility: CLINIC | Age: 57
End: 2022-07-26
Payer: COMMERCIAL

## 2022-07-26 VITALS
TEMPERATURE: 98 F | DIASTOLIC BLOOD PRESSURE: 76 MMHG | BODY MASS INDEX: 37.19 KG/M2 | WEIGHT: 315 LBS | HEART RATE: 68 BPM | HEIGHT: 77 IN | SYSTOLIC BLOOD PRESSURE: 138 MMHG

## 2022-07-26 DIAGNOSIS — R79.89 LOW TESTOSTERONE: Primary | ICD-10-CM

## 2022-07-26 PROCEDURE — 99999 PR PBB SHADOW E&M-EST. PATIENT-LVL IV: CPT | Mod: PBBFAC,,, | Performed by: UROLOGY

## 2022-07-26 PROCEDURE — 99999 PR PBB SHADOW E&M-EST. PATIENT-LVL IV: ICD-10-PCS | Mod: PBBFAC,,, | Performed by: UROLOGY

## 2022-07-26 PROCEDURE — 99204 PR OFFICE/OUTPT VISIT, NEW, LEVL IV, 45-59 MIN: ICD-10-PCS | Mod: S$GLB,,, | Performed by: UROLOGY

## 2022-07-26 PROCEDURE — 99204 OFFICE O/P NEW MOD 45 MIN: CPT | Mod: S$GLB,,, | Performed by: UROLOGY

## 2022-07-26 RX ORDER — TESTOSTERONE CYPIONATE 200 MG/ML
200 INJECTION, SOLUTION INTRAMUSCULAR
Qty: 10 ML | Refills: 3 | Status: SHIPPED | OUTPATIENT
Start: 2022-07-26 | End: 2022-07-26

## 2022-07-26 RX ORDER — TESTOSTERONE CYPIONATE 200 MG/ML
200 INJECTION, SOLUTION INTRAMUSCULAR
Qty: 10 ML | Refills: 3 | Status: SHIPPED | OUTPATIENT
Start: 2022-07-26 | End: 2023-01-11 | Stop reason: SDUPTHER

## 2022-07-26 NOTE — TELEPHONE ENCOUNTER
Spoke to pharmacy. Script was clarified         ----- Message from Leigh Burks sent at 7/26/2022  2:06 PM CDT -----  Contact: [facility]  Type:  Pharmacy Calling to Clarify an RX    Name of Caller: [facility]  Pharmacy Name:[facility]  Prescription Name:testosterone cypionate (DEPOTESTOTERONE CYPIONATE) 200 mg/mL injection  What do they need to clarify?:Clarify directions of prescription  Best Call Back Number:[phone]  Additional Information: Pharmacy reports patient is currently in the facility with differing directions of prescription. Please give pharmacy an immediate call back.  Thank you,  GH

## 2022-07-26 NOTE — TELEPHONE ENCOUNTER
Tried calling pt, first time he picked up but then the phone hung up second time the phone just rung.   I called the pharmacy and informed them that the prescription was supposed to be for every seven days. They will fix it and fill the prescription for the pt,          ----- Message from Lin Scott sent at 7/26/2022  2:44 PM CDT -----  Contact: PT  Pt is calling to follow up on his medication being corrected and called in to his pharm / has different directions and wasn't discussed while in the office and can be reached at [phone]/ pt is at the pharm     [facility]   [phone]

## 2022-07-27 ENCOUNTER — PATIENT MESSAGE (OUTPATIENT)
Dept: ADMINISTRATIVE | Facility: HOSPITAL | Age: 57
End: 2022-07-27
Payer: COMMERCIAL

## 2022-07-27 NOTE — PROGRESS NOTES
Chief Complaint: Low T    HPI:   Reggie Sawant is a 56 y.o. male that presents today for evaluation and continued management of low T. Up until recently, he was seeing Dr Narayan Grove for this issue, however he has had multiple communication issues with his staff that has made the whole process much more difficult that it should be. He notes a long history of T use, has been on it for about 10 yrs, typically on 150-200mg per week IM, [facility]. He notes that low energy was the initial indication, and that this dosage helps greatly. He also notes ED, takes cialis, which works well for him. Denies any voiding issues, denies GH, denies family history of  cancers. Denies prior urologic procedures.     PMH:  Past Medical History:   Diagnosis Date    Cellulitis     left leg    Hip arthritis     right    Hyperlipidemia     Hypertension     Hypogonadism male     Hypokalemia     Morbid obesity     Sleep apnea     on CPAP       PSH:  Past Surgical History:   Procedure Laterality Date    COLONOSCOPY N/A 12/6/2017    Procedure: COLONOSCOPY;  Surgeon: Rory Barone MD;  Location: [facility];  Service: Endoscopy;  Laterality: N/A;    right bunionectomy         Family History:  Family History   Problem Relation Age of Onset    Hypertension Brother     Diabetes Maternal Grandmother     Hypertension Father     Heart disease Father         CAD    Diabetes Sister     No Known Problems Daughter     No Known Problems Daughter     No Known Problems Daughter     No Known Problems Daughter     No Known Problems Mother     No Known Problems Sister     No Known Problems Other     Cancer Neg Hx     Stroke Neg Hx        Social History:  Social History     Tobacco Use    Smoking status: Never Smoker    Smokeless tobacco: Never Used   Substance Use Topics    Alcohol use: Yes     Alcohol/week: 0.0 standard drinks     Comment: Occasionally    Drug use: No        Review of  Systems:  General: No fever, chills  Skin: No rashes  Chest:  Denies cough and sputum production  Heart: Denies chest pain  Resp: Denies dyspnea  Abdomen: Denies diarrhea, abdominal pain, hematemesis, or blood in stool.  Musculoskeletal: No joint stiffness or swelling. Denies back pain.  : see HPI  Neuro: no dizziness or weakness    Allergies:  Shrimp    Medications:    Current Outpatient Medications:     amLODIPine (NORVASC) 10 MG tablet, Take 1 tablet (10 mg total) by mouth once daily., Disp: 30 tablet, Rfl: 5    aspirin (ECOTRIN) 81 MG EC tablet, Take 1 tablet by mouth Daily., Disp: , Rfl:     carvediloL (COREG) 25 MG tablet, Take 1 tablet (25 mg total) by mouth 2 (two) times daily with meals., Disp: 60 tablet, Rfl: 5    chlorthalidone (HYGROTEN) 50 MG Tab, Take 1 tablet (50 mg total) by mouth once daily., Disp: 30 tablet, Rfl: 5    methylPREDNISolone (MEDROL DOSEPACK) 4 mg tablet, use as directed, Disp: 1 Package, Rfl: 0    mometasone (NASONEX) 50 mcg/actuation nasal spray, PLEASE UPDATE FOR ANY MEDICATION CHANGE, Disp: 17 g, Rfl: 5    multivitamin (THERAGRAN) per tablet, Take 1 tablet by mouth once daily., Disp: , Rfl:     potassium chloride SA (KLOR-CON M20) 20 MEQ tablet, Take 1 tablet (20 mEq total) by mouth once daily., Disp: 30 tablet, Rfl: 5    pravastatin (PRAVACHOL) 20 MG tablet, Take 1 tablet (20 mg total) by mouth once daily., Disp: 30 tablet, Rfl: 5    spironolactone (ALDACTONE) 50 MG tablet, Take 1 tablet (50 mg total) by mouth once daily., Disp: 30 tablet, Rfl: 5    tadalafiL (CIALIS) 20 MG Tab, TAKE 1 TABLET ONCE DAILY, Disp: 10 tablet, Rfl: 0    traZODone (DESYREL) 50 MG tablet, Take 1 tablet (50 mg total) by mouth nightly., Disp: 30 tablet, Rfl: 5    valsartan (DIOVAN) 320 MG tablet, Take 1 tablet (320 mg total) by mouth once daily., Disp: 30 tablet, Rfl: 5    EPINEPHrine (EPIPEN 2-FRANCIA) 0.3 mg/0.3 mL AtIn, Inject 0.3 mLs (0.3 mg total) into the muscle once. for 1 dose, Disp: 0.6  mL, Rfl: 1    testosterone cypionate (DEPOTESTOTERONE CYPIONATE) 200 mg/mL injection, Inject 1 mL (200 mg total) into the muscle every 7 days. ADMINISTER 1 ML IN THE MUSCLE EVERY 14 DAYS, Disp: 10 mL, Rfl: 3    Physical Exam:  Vitals:    07/26/22 1304   BP: 138/76   Pulse: 68   Temp: 97.6 °F (36.4 °C)     Body mass index is 41.27 kg/m².  General: awake, alert, cooperative  Head: NC/AT  Ears: external ears normal  Eyes: sclera normal  Lungs: normal inspiration, NAD  Heart: well-perfused  : deferred, patient notes 2 normal exams in the last 6 months  Skin: The skin is warm and dry  Ext: No c/c/e.  Neuro: grossly intact, AOx3    RADIOLOGY:  No recent relevant imaging available for review.    LABS:  I personally reviewed the following lab values:  Lab Results   Component Value Date    WBC 5.26 07/20/2021    HGB 17.0 07/20/2021    HCT 51.6 07/20/2021     07/20/2021     07/20/2021    K 3.9 07/20/2021    CL 96 07/20/2021    CREATININE 1.2 07/20/2021    BUN 18 07/20/2021    CO2 31 (H) 07/20/2021    TSH 1.739 07/20/2021    PSA 2.0 07/20/2021    HGBA1C 5.7 (H) 07/20/2021    CHOL 205 (H) 07/20/2021    TRIG 107 07/20/2021    HDL 43 07/20/2021    ALT 38 07/20/2021    AST 30 07/20/2021       Assessment/Plan:   Reggie Sawant is a 56 y.o. male with:    Low T - continue 200mg/week, recent PSA in Feb normal, f/u 6 months with labs    Prostate Cancer Screening - BAIRON at next visit which will start annual screening for prostate cancer at age 57    Thank you for allowing me the opportunity to participate in this patient's care.     Alvin Moe MD  Urology

## 2022-07-28 ENCOUNTER — PATIENT MESSAGE (OUTPATIENT)
Dept: ADMINISTRATIVE | Facility: HOSPITAL | Age: 57
End: 2022-07-28
Payer: COMMERCIAL

## 2022-08-10 ENCOUNTER — PATIENT OUTREACH (OUTPATIENT)
Dept: ADMINISTRATIVE | Facility: HOSPITAL | Age: 57
End: 2022-08-10
Payer: COMMERCIAL

## 2022-08-10 NOTE — PROGRESS NOTES
HTN REPORT: Tried calling patient. No answer. Left message asking for a return call. Patient is part of the digital htn program. It looks as if it has been a month since last reading sent in. Patient is scheduled though to come in and see CARLOS Wilson NP on 8/23/22.

## 2022-08-22 NOTE — PROGRESS NOTES
"Subjective:     Reggie Sawant is a 57 y.o. male, to the office today for: ANNUAL WELLNESS EXAM    HPI:    Reggie Sawant has fasted to have bloodwork completed today.  I have reviewed the patient's medical history in detail and updated the computerized patient record.      PCP: Irene Sanchez MD --- date of last visit 9/28/2021  The patient's last visit with me:  1/29/2020.      Health Maintenance Due   Topic Date Due    PROSTATE-SPECIFIC ANTIGEN  07/20/2022    Lipid Panel  07/20/2022       Review of Systems   Constitutional:  Negative for activity change, appetite change, chills, diaphoresis, fatigue, fever and unexpected weight change.        Wt Readings from Last 3 Encounters:  08/23/22 0811 : (!) 163.7 kg (361 lb 0.1 oz)  07/26/22 1304 : (!) 157.9 kg (348 lb)  09/28/21 1419 : (!) 158 kg (348 lb 5.2 oz)  Diet "same as always", not good not bad.  No sweets or refined sugars.  Admits to lots of rich fatty foods.  Soft drinks ~ 1 to 2 a day.  Moderate water intake daily.   HENT:  Negative for congestion, facial swelling, hearing loss, mouth sores, nosebleeds, postnasal drip, rhinorrhea, sinus pressure, sore throat, trouble swallowing and voice change.    Eyes:  Negative for photophobia, pain and visual disturbance.   Respiratory:  Negative for cough, chest tightness, shortness of breath and wheezing.    Cardiovascular:  Negative for chest pain, palpitations and leg swelling.        Taking ASA 81 mg daily.  Followed by the HTN Digital program.  On rx as ordered.   Gastrointestinal:  Negative for abdominal pain, blood in stool, constipation, diarrhea, nausea, rectal pain and vomiting.   Genitourinary:  Negative for decreased urine volume, difficulty urinating, dysuria, flank pain, frequency, hematuria and urgency.        Followed by Urology for Cialis rx and T-supplementation.   Musculoskeletal:  Negative for arthralgias, back pain, gait problem, joint swelling, myalgias, neck pain and neck stiffness.   Skin:  " Negative for color change, pallor, rash and wound.   Neurological:  Negative for dizziness, syncope, facial asymmetry, speech difficulty, weakness, light-headedness, numbness and headaches.   Hematological:  Negative for adenopathy. Does not bruise/bleed easily.   Psychiatric/Behavioral:  Positive for sleep disturbance (trazodone prn). Negative for agitation, confusion, decreased concentration, dysphoric mood, hallucinations, self-injury and suicidal ideas. The patient is not nervous/anxious and is not hyperactive.        Review of patient's allergies indicates:   Allergen Reactions    Shrimp Anaphylaxis       Patient Active Problem List   Diagnosis    HTN (hypertension)    Hyperlipidemia    Hypogonadism male    DEEPTHI on CPAP    ED (erectile dysfunction)    DJD (degenerative joint disease) of hip    Morbid obesity with BMI of 40.0-44.9, adult    Prediabetes    Insomnia         Current Outpatient Medications:     amLODIPine (NORVASC) 10 MG tablet, Take 1 tablet (10 mg total) by mouth once daily., Disp: 30 tablet, Rfl: 5    aspirin (ECOTRIN) 81 MG EC tablet, Take 1 tablet by mouth Daily., Disp: , Rfl:     carvediloL (COREG) 25 MG tablet, Take 1 tablet (25 mg total) by mouth 2 (two) times daily with meals., Disp: 60 tablet, Rfl: 5    chlorthalidone (HYGROTEN) 50 MG Tab, Take 1 tablet (50 mg total) by mouth once daily., Disp: 30 tablet, Rfl: 5    mometasone (NASONEX) 50 mcg/actuation nasal spray, PLEASE UPDATE FOR ANY MEDICATION CHANGE, Disp: 17 g, Rfl: 5    multivitamin (THERAGRAN) per tablet, Take 1 tablet by mouth once daily., Disp: , Rfl:     potassium chloride SA (KLOR-CON M20) 20 MEQ tablet, Take 1 tablet (20 mEq total) by mouth once daily., Disp: 30 tablet, Rfl: 5    pravastatin (PRAVACHOL) 20 MG tablet, Take 1 tablet (20 mg total) by mouth once daily., Disp: 30 tablet, Rfl: 5    spironolactone (ALDACTONE) 50 MG tablet, Take 1 tablet (50 mg total) by mouth once daily., Disp: 30 tablet, Rfl: 5    tadalafiL (CIALIS)  "20 MG Tab, TAKE 1 TABLET ONCE DAILY, Disp: 10 tablet, Rfl: 0    testosterone cypionate (DEPOTESTOTERONE CYPIONATE) 200 mg/mL injection, Inject 1 mL (200 mg total) into the muscle every 7 days. ADMINISTER 1 ML IN THE MUSCLE EVERY 14 DAYS, Disp: 10 mL, Rfl: 3    traZODone (DESYREL) 50 MG tablet, Take 1 tablet (50 mg total) by mouth nightly., Disp: 30 tablet, Rfl: 5    valsartan (DIOVAN) 320 MG tablet, Take 1 tablet (320 mg total) by mouth once daily., Disp: 30 tablet, Rfl: 5      Past Medical History:   Diagnosis Date    Cellulitis     left leg    Hip arthritis     right    Hyperlipidemia     Hypertension     Hypogonadism male     Hypokalemia     Morbid obesity     Sleep apnea     on CPAP       Past Surgical History:   Procedure Laterality Date    COLONOSCOPY N/A 12/06/2017    Procedure: COLONOSCOPY;  Surgeon: Rory Barone MD;  Location: [facility];  Service: Endoscopy;  Laterality: N/A;    right bunionectomy         Family History   Problem Relation Age of Onset    No Known Problems Mother     Hypertension Father     Heart disease Father         CAD    Diabetes Sister     No Known Problems Sister     Hypertension Brother     Diabetes Maternal Grandmother     No Known Problems Daughter     No Known Problems Daughter     No Known Problems Daughter     No Known Problems Other     Cancer Neg Hx     Stroke Neg Hx     Thyroid disease Neg Hx        Social History     Tobacco Use    Smoking status: Light Tobacco Smoker     Packs/day: 0.00     Years: 1.00     Pack years: 0.00     Types: Cigars    Smokeless tobacco: Never Used   Substance Use Topics    Alcohol use: Yes     Alcohol/week: 10.0 standard drinks     Types: 4 Glasses of wine, 6 Shots of liquor per week     Comment: Occasionally    Drug use: Never         Objective:     Vitals:    08/23/22 0811   BP: 134/72   Pulse: 66   Temp: 98.1 °F (36.7 °C)   SpO2: 98%   Weight: (!) 163.7 kg (361 lb 0.1 oz)   Height: 6' 5" (1.956 m)       Body mass index is 42.81 kg/m². "       Physical Exam  Constitutional:       General: He is not in acute distress.     Appearance: He is well-developed. He is not diaphoretic.   HENT:      Head: Normocephalic and atraumatic.      Right Ear: Tympanic membrane normal. There is impacted cerumen.      Left Ear: Tympanic membrane normal. There is impacted cerumen.      Ears:      Comments: Bilat cerumen impaction, Left > Right     Nose: Nose normal. No congestion or rhinorrhea.      Mouth/Throat:      Mouth: Mucous membranes are moist.      Pharynx: Oropharynx is clear. No oropharyngeal exudate or posterior oropharyngeal erythema.   Eyes:      General: No scleral icterus.        Right eye: No discharge.         Left eye: No discharge.      Conjunctiva/sclera: Conjunctivae normal.      Pupils: Pupils are equal, round, and reactive to light.   Neck:      Thyroid: No thyromegaly.      Vascular: No JVD.      Trachea: No tracheal deviation.   Cardiovascular:      Rate and Rhythm: Normal rate and regular rhythm.      Pulses: Normal pulses.      Heart sounds: Normal heart sounds. No murmur heard.    No friction rub. No gallop.   Pulmonary:      Effort: Pulmonary effort is normal. No respiratory distress.      Breath sounds: Normal breath sounds. No wheezing.   Chest:      Chest wall: No tenderness.   Abdominal:      General: Bowel sounds are normal. There is no distension.      Palpations: Abdomen is soft. There is no mass.      Tenderness: There is no abdominal tenderness.   Musculoskeletal:         General: No swelling, tenderness or deformity. Normal range of motion.      Cervical back: Normal range of motion and neck supple.   Lymphadenopathy:      Cervical: No cervical adenopathy.   Skin:     General: Skin is warm and dry.      Capillary Refill: Capillary refill takes less than 2 seconds.      Findings: No erythema or rash.   Neurological:      Mental Status: He is alert and oriented to person, place, and time.      Sensory: No sensory deficit.      Motor:  No weakness or abnormal muscle tone.      Coordination: Coordination normal.      Gait: Gait normal.   Psychiatric:         Mood and Affect: Mood normal.         Behavior: Behavior normal.         Thought Content: Thought content normal.         Judgment: Judgment normal.       REVIEWED LABS:    Lab Results   Component Value Date    WBC 5.26 07/20/2021    HGB 17.0 07/20/2021    HCT 51.6 07/20/2021    MCV 94 07/20/2021     07/20/2021       Sodium   Date Value Ref Range Status   07/20/2021 136 136 - 145 mmol/L Final     Potassium   Date Value Ref Range Status   07/20/2021 3.9 3.5 - 5.1 mmol/L Final     Chloride   Date Value Ref Range Status   07/20/2021 96 95 - 110 mmol/L Final     CO2   Date Value Ref Range Status   07/20/2021 31 (H) 23 - 29 mmol/L Final     Glucose   Date Value Ref Range Status   07/20/2021 93 70 - 110 mg/dL Final     BUN   Date Value Ref Range Status   07/20/2021 18 6 - 20 mg/dL Final     Creatinine   Date Value Ref Range Status   07/20/2021 1.2 0.5 - 1.4 mg/dL Final     Calcium   Date Value Ref Range Status   07/20/2021 10.5 8.7 - 10.5 mg/dL Final     Total Protein   Date Value Ref Range Status   07/20/2021 8.5 (H) 6.0 - 8.4 g/dL Final     Albumin   Date Value Ref Range Status   07/20/2021 4.4 3.5 - 5.2 g/dL Final     Total Bilirubin   Date Value Ref Range Status   07/20/2021 2.0 (H) 0.1 - 1.0 mg/dL Final     Comment:     For infants and newborns, interpretation of results should be based  on gestational age, weight and in agreement with clinical  observations.    Premature Infant recommended reference ranges:  Up to 24 hours.............<8.0 mg/dL  Up to 48 hours............<12.0 mg/dL  3-5 days..................<15.0 mg/dL  6-29 days.................<15.0 mg/dL       Alkaline Phosphatase   Date Value Ref Range Status   07/20/2021 47 (L) 55 - 135 U/L Final     AST   Date Value Ref Range Status   07/20/2021 30 10 - 40 U/L Final     ALT   Date Value Ref Range Status   07/20/2021 38 10 - 44  U/L Final     Anion Gap   Date Value Ref Range Status   07/20/2021 9 8 - 16 mmol/L Final       eGFR if    Date Value Ref Range Status   07/20/2021 >60.0 >60 mL/min/1.73 m^2 Final       Lab Results   Component Value Date    CHOL 205 (H) 07/20/2021     Lab Results   Component Value Date    HDL 43 07/20/2021     Lab Results   Component Value Date    LDLCALC 140.6 07/20/2021     Lab Results   Component Value Date    TRIG 107 07/20/2021     Lab Results   Component Value Date    CHOLHDL 21.0 07/20/2021       Lab Results   Component Value Date    TSH 1.739 07/20/2021    FREET4 0.76 10/05/2018       Lab Results   Component Value Date    HGBA1C 5.7 (H) 07/20/2021       Lab Results   Component Value Date    PSA 2.0 07/20/2021    PSA 1.4 01/29/2020    PSA 1.3 10/05/2018       Lab Results   Component Value Date    TESTOSTERONE 10.3 10/05/2018       Lab Results   Component Value Date    URICACID 4.6 02/17/2016       Lab Results   Component Value Date    [id number] Negative 03/09/2020       Lab Results   Component Value Date    HEPAIGM Negative 01/07/2010    HEPBIGM Negative 01/07/2010    HEPCAB Negative 03/09/2020       Microalb/Creat Ratio   Date Value Ref Range Status   01/29/2020 14.3 0.0 - 30.0 ug/mg Final       Diagnosis/Assessment:     1. Preventative health care  - CBC Auto Differential; Future  - Comprehensive Metabolic Panel; Future  - TSH; Future  - Lipid Panel; Future  - PSA, Screening; Future  - Hemoglobin A1C; Future  - (In Office Administered) Pneumococcal Conjugate Vaccine (20 Valent) (IM)    2. Primary hypertension  - CBC Auto Differential; Future  - Comprehensive Metabolic Panel; Future  - TSH; Future  - Lipid Panel; Future  - Hemoglobin A1C; Future  - Ambulatory referral/consult to Optometry; Future    3. Hyperlipidemia, unspecified hyperlipidemia type  - CBC Auto Differential; Future  - Comprehensive Metabolic Panel; Future  - TSH; Future  - Lipid Panel; Future  - Hemoglobin A1C; Future    4.  Prediabetes  - CBC Auto Differential; Future  - Comprehensive Metabolic Panel; Future  - TSH; Future  - Lipid Panel; Future  - Hemoglobin A1C; Future    5. DEEPTHI on CPAP  - Ambulatory referral/consult to Pulmonology; Future    6. Insomnia, unspecified type --- stable, on trazodone prn as ordered    7. Bilateral impacted cerumen  - Ambulatory referral/consult to ENT; Future    8. Morbid obesity with BMI of 40.0-44.9, adult  - CBC Auto Differential; Future  - Comprehensive Metabolic Panel; Future  - TSH; Future  - Lipid Panel; Future  - Hemoglobin A1C; Future    9. Encounter for routine eye and vision examination  - Ambulatory referral/consult to Optometry; Future    10. Need for pneumococcal vaccination  - (In Office Administered) Pneumococcal Conjugate Vaccine (20 Valent) (IM)      Plan:     Lab pending.  Prevnar-20 today.  To Eye for updated routine eye exam, overdue.    Follow-Up:       RTC as directed or prn.        SURY Lee  [facility]

## 2022-08-23 ENCOUNTER — PATIENT MESSAGE (OUTPATIENT)
Dept: ADMINISTRATIVE | Facility: OTHER | Age: 57
End: 2022-08-23
Payer: COMMERCIAL

## 2022-08-23 ENCOUNTER — OFFICE VISIT (OUTPATIENT)
Dept: FAMILY MEDICINE | Facility: CLINIC | Age: 57
End: 2022-08-23
Payer: COMMERCIAL

## 2022-08-23 ENCOUNTER — LAB VISIT (OUTPATIENT)
Dept: LAB | Facility: HOSPITAL | Age: 57
End: 2022-08-23
Attending: REGISTERED NURSE
Payer: COMMERCIAL

## 2022-08-23 VITALS
HEIGHT: 77 IN | DIASTOLIC BLOOD PRESSURE: 72 MMHG | SYSTOLIC BLOOD PRESSURE: 134 MMHG | WEIGHT: 315 LBS | OXYGEN SATURATION: 98 % | HEART RATE: 66 BPM | TEMPERATURE: 98 F | BODY MASS INDEX: 37.19 KG/M2

## 2022-08-23 DIAGNOSIS — E66.01 MORBID OBESITY WITH BMI OF 40.0-44.9, ADULT: ICD-10-CM

## 2022-08-23 DIAGNOSIS — E78.5 HYPERLIPIDEMIA, UNSPECIFIED HYPERLIPIDEMIA TYPE: Chronic | ICD-10-CM

## 2022-08-23 DIAGNOSIS — G47.33 OSA ON CPAP: ICD-10-CM

## 2022-08-23 DIAGNOSIS — G47.00 INSOMNIA, UNSPECIFIED TYPE: ICD-10-CM

## 2022-08-23 DIAGNOSIS — Z00.00 PREVENTATIVE HEALTH CARE: Primary | ICD-10-CM

## 2022-08-23 DIAGNOSIS — I10 PRIMARY HYPERTENSION: Chronic | ICD-10-CM

## 2022-08-23 DIAGNOSIS — R73.03 PREDIABETES: ICD-10-CM

## 2022-08-23 DIAGNOSIS — Z00.00 PREVENTATIVE HEALTH CARE: ICD-10-CM

## 2022-08-23 DIAGNOSIS — H61.23 BILATERAL IMPACTED CERUMEN: ICD-10-CM

## 2022-08-23 DIAGNOSIS — Z01.00 ENCOUNTER FOR ROUTINE EYE AND VISION EXAMINATION: ICD-10-CM

## 2022-08-23 DIAGNOSIS — Z23 NEED FOR PNEUMOCOCCAL VACCINATION: ICD-10-CM

## 2022-08-23 LAB
ALBUMIN SERPL BCP-MCNC: 4.1 G/DL (ref 3.5–5.2)
ALP SERPL-CCNC: 53 U/L (ref 55–135)
ALT SERPL W/O P-5'-P-CCNC: 38 U/L (ref 10–44)
ANION GAP SERPL CALC-SCNC: 8 MMOL/L (ref 8–16)
AST SERPL-CCNC: 33 U/L (ref 10–40)
BASOPHILS # BLD AUTO: 0.03 K/UL (ref 0–0.2)
BASOPHILS NFR BLD: 0.5 % (ref 0–1.9)
BILIRUB SERPL-MCNC: 1 MG/DL (ref 0.1–1)
BUN SERPL-MCNC: 16 MG/DL (ref 6–20)
CALCIUM SERPL-MCNC: 9.9 MG/DL (ref 8.7–10.5)
CHLORIDE SERPL-SCNC: 98 MMOL/L (ref 95–110)
CHOLEST SERPL-MCNC: 207 MG/DL (ref 120–199)
CHOLEST/HDLC SERPL: 4.8 {RATIO} (ref 2–5)
CO2 SERPL-SCNC: 32 MMOL/L (ref 23–29)
COMPLEXED PSA SERPL-MCNC: 2.7 NG/ML (ref 0–4)
CREAT SERPL-MCNC: 1 MG/DL (ref 0.5–1.4)
DIFFERENTIAL METHOD: ABNORMAL
EOSINOPHIL # BLD AUTO: 0.2 K/UL (ref 0–0.5)
EOSINOPHIL NFR BLD: 3.6 % (ref 0–8)
ERYTHROCYTE [DISTWIDTH] IN BLOOD BY AUTOMATED COUNT: 13.1 % (ref 11.5–14.5)
EST. GFR  (NO RACE VARIABLE): >60 ML/MIN/1.73 M^2
ESTIMATED AVG GLUCOSE: 120 MG/DL (ref 68–131)
GLUCOSE SERPL-MCNC: 97 MG/DL (ref 70–110)
HBA1C MFR BLD: 5.8 % (ref 4–5.6)
HCT VFR BLD AUTO: 49.7 % (ref 40–54)
HDLC SERPL-MCNC: 43 MG/DL (ref 40–75)
HDLC SERPL: 20.8 % (ref 20–50)
HGB BLD-MCNC: 17 G/DL (ref 14–18)
IMM GRANULOCYTES # BLD AUTO: 0.04 K/UL (ref 0–0.04)
IMM GRANULOCYTES NFR BLD AUTO: 0.7 % (ref 0–0.5)
LDLC SERPL CALC-MCNC: 146.2 MG/DL (ref 63–159)
LYMPHOCYTES # BLD AUTO: 1.8 K/UL (ref 1–4.8)
LYMPHOCYTES NFR BLD: 28.5 % (ref 18–48)
MCH RBC QN AUTO: 32.8 PG (ref 27–31)
MCHC RBC AUTO-ENTMCNC: 34.2 G/DL (ref 32–36)
MCV RBC AUTO: 96 FL (ref 82–98)
MONOCYTES # BLD AUTO: 0.8 K/UL (ref 0.3–1)
MONOCYTES NFR BLD: 13.7 % (ref 4–15)
NEUTROPHILS # BLD AUTO: 3.3 K/UL (ref 1.8–7.7)
NEUTROPHILS NFR BLD: 53 % (ref 38–73)
NONHDLC SERPL-MCNC: 164 MG/DL
NRBC BLD-RTO: 0 /100 WBC
PLATELET # BLD AUTO: 179 K/UL (ref 150–450)
PMV BLD AUTO: 10.7 FL (ref 9.2–12.9)
POTASSIUM SERPL-SCNC: 4.1 MMOL/L (ref 3.5–5.1)
PROT SERPL-MCNC: 7.8 G/DL (ref 6–8.4)
RBC # BLD AUTO: 5.19 M/UL (ref 4.6–6.2)
SODIUM SERPL-SCNC: 138 MMOL/L (ref 136–145)
TRIGL SERPL-MCNC: 89 MG/DL (ref 30–150)
TSH SERPL DL<=0.005 MIU/L-ACNC: 2.28 UIU/ML (ref 0.4–4)
WBC # BLD AUTO: 6.14 K/UL (ref 3.9–12.7)

## 2022-08-23 PROCEDURE — 99999 PR PBB SHADOW E&M-EST. PATIENT-LVL V: CPT | Mod: PBBFAC,,, | Performed by: REGISTERED NURSE

## 2022-08-23 PROCEDURE — 83036 HEMOGLOBIN GLYCOSYLATED A1C: CPT | Performed by: REGISTERED NURSE

## 2022-08-23 PROCEDURE — 90677 PNEUMOCOCCAL CONJUGATE VACCINE 20-VALENT: ICD-10-PCS | Mod: S$GLB,,, | Performed by: REGISTERED NURSE

## 2022-08-23 PROCEDURE — 90471 PNEUMOCOCCAL CONJUGATE VACCINE 20-VALENT: ICD-10-PCS | Mod: S$GLB,,, | Performed by: REGISTERED NURSE

## 2022-08-23 PROCEDURE — 90471 IMMUNIZATION ADMIN: CPT | Mod: S$GLB,,, | Performed by: REGISTERED NURSE

## 2022-08-23 PROCEDURE — 80061 LIPID PANEL: CPT | Performed by: REGISTERED NURSE

## 2022-08-23 PROCEDURE — 90677 PCV20 VACCINE IM: CPT | Mod: S$GLB,,, | Performed by: REGISTERED NURSE

## 2022-08-23 PROCEDURE — 84153 ASSAY OF PSA TOTAL: CPT | Performed by: REGISTERED NURSE

## 2022-08-23 PROCEDURE — 36415 COLL VENOUS BLD VENIPUNCTURE: CPT | Mod: PO | Performed by: REGISTERED NURSE

## 2022-08-23 PROCEDURE — 84443 ASSAY THYROID STIM HORMONE: CPT | Performed by: REGISTERED NURSE

## 2022-08-23 PROCEDURE — 85025 COMPLETE CBC W/AUTO DIFF WBC: CPT | Performed by: REGISTERED NURSE

## 2022-08-23 PROCEDURE — 80053 COMPREHEN METABOLIC PANEL: CPT | Performed by: REGISTERED NURSE

## 2022-08-23 PROCEDURE — 99396 PREV VISIT EST AGE 40-64: CPT | Mod: 25,S$GLB,, | Performed by: REGISTERED NURSE

## 2022-08-23 PROCEDURE — 99999 PR PBB SHADOW E&M-EST. PATIENT-LVL V: ICD-10-PCS | Mod: PBBFAC,,, | Performed by: REGISTERED NURSE

## 2022-08-23 PROCEDURE — 99396 PR PREVENTIVE VISIT,EST,40-64: ICD-10-PCS | Mod: 25,S$GLB,, | Performed by: REGISTERED NURSE

## 2022-08-23 NOTE — PATIENT INSTRUCTIONS
"Ways to improve cholesterol levels:    Eat heart-healthy foods.  Quit smoking  Improves HDL cholesterol level.  Benefits occur quickly.  Within 20 minutes of quitting, your blood pressure and heart rate recover from the cigarette-induced spike.  Within three months of quitting, your blood circulation and lung function begin to improve.  Within a year of quitting, your risk of heart disease is half that of a smoker.  Consume alcohol in moderation.  Moderate use of alcohol has been linked with higher levels of HDL cholesterol.  But the benefits aren't strong enough to recommend alcohol for anyone who doesn't already drink.  For healthy adults, that means up to one drink a day for women of all ages and men older than age 65, and up to two drinks a day for men age 65 and younger.  Too much alcohol can lead to serious health problems, including high blood pressure, heart failure and strokes.  Diet rich in --- oats, barley & other whole grains.  Increase intake of foods rich in sterols and stanols.  Use these healthy oils to cook with --- olive, avocado, coconut, sesame.  Develop healthy sleep habits.  Cut back on added and refined sugars.  Limit unhealthy fats.  Eat more fiber.  Lose weight.  Carrying even a few extra pounds contributes to high cholesterol.   If you drink sugary beverages, switch to tap water.   Snack on air-popped popcorn or pretzels -- but keep track of the calories.   If you crave something sweet, try sherbet or candies with little or no fat, such as jelly beans.  Reduce saturated fats:  Found primarily in red meat and full-fat dairy products raise your total cholesterol.   Decreasing your consumption of saturated fats can reduce your low-density lipoprotein (LDL) cholesterol -- the "bad" cholesterol.  Eliminate trans fats. Sometimes listed on food labels as "partially hydrogenated vegetable oil," are often used in margarines and store-bought cookies, crackers and cakes. Trans fats raise overall " "cholesterol levels. The Food and Drug Administration has banned the use of partially hydrogenated vegetable oils.  Eat foods rich in omega-3 fatty acids. Omega-3 fatty acids don't affect LDL cholesterol. But they have other heart-healthy benefits, including reducing blood pressure.  Foods with omega-3 fatty acids include salmon, mackerel, herring, walnuts and flaxseeds.  Increase soluble fiber. Soluble fiber can reduce the absorption of cholesterol into your bloodstream. Soluble fiber is found in such foods as oatmeal, kidney beans, [place] sprouts, apples and pears.  Add whey protein. Whey protein, which is found in dairy products, may account for many of the health benefits attributed to dairy. Studies have shown that whey protein given as a supplement lowers both LDL cholesterol and total cholesterol as well as blood pressure.  Exercise on most days of the week and increase your physical activity.  Can improve cholesterol. Moderate physical activity can help raise high-density lipoprotein (HDL) cholesterol, the "good" cholesterol.  Try to work-up to at least 30 minutes of exercise five times a week or vigorous aerobic activity for 20 minutes three times a week.  Adding physical activity, even in short intervals several times a day, can help you begin to lose weight.  Consider:  Taking a brisk daily walk during your lunch hour or breaks at work.  Riding your bike to work.  Playing a favorite sport.  To stay motivated, consider finding an exercise jeffry or joining an exercise group.  Look for ways to incorporate more activity into your daily routine, such as using the stairs instead of taking the elevator or parking farther from your office.   Try to increase standing activities, such as cooking or doing yardwork.        HDL cholesterol:    The HDL is the good cholesterol, just remember it has an H (you want high).  The higher the number the better, serves to protect you against heart disease, stroke or other " cardiovascular issues.  The HDL absorbs cholesterol and carries it back to the liver, which then flushes it from the body. High levels of HDL cholesterol can lower your risk for heart disease and stroke.

## 2022-08-29 ENCOUNTER — PATIENT MESSAGE (OUTPATIENT)
Dept: FAMILY MEDICINE | Facility: CLINIC | Age: 57
End: 2022-08-29
Payer: COMMERCIAL

## 2022-08-30 ENCOUNTER — TELEPHONE (OUTPATIENT)
Dept: FAMILY MEDICINE | Facility: CLINIC | Age: 57
End: 2022-08-30
Payer: COMMERCIAL

## 2022-08-30 ENCOUNTER — OFFICE VISIT (OUTPATIENT)
Dept: PULMONOLOGY | Facility: CLINIC | Age: 57
End: 2022-08-30
Payer: COMMERCIAL

## 2022-08-30 ENCOUNTER — OFFICE VISIT (OUTPATIENT)
Dept: OTOLARYNGOLOGY | Facility: CLINIC | Age: 57
End: 2022-08-30
Payer: COMMERCIAL

## 2022-08-30 VITALS
SYSTOLIC BLOOD PRESSURE: 146 MMHG | HEIGHT: 77 IN | BODY MASS INDEX: 37.19 KG/M2 | WEIGHT: 315 LBS | RESPIRATION RATE: 21 BRPM | OXYGEN SATURATION: 98 % | HEART RATE: 65 BPM | DIASTOLIC BLOOD PRESSURE: 84 MMHG

## 2022-08-30 VITALS — TEMPERATURE: 98 F | DIASTOLIC BLOOD PRESSURE: 93 MMHG | SYSTOLIC BLOOD PRESSURE: 148 MMHG | HEART RATE: 61 BPM

## 2022-08-30 DIAGNOSIS — H61.23 BILATERAL IMPACTED CERUMEN: ICD-10-CM

## 2022-08-30 DIAGNOSIS — R73.03 PREDIABETES: ICD-10-CM

## 2022-08-30 DIAGNOSIS — I10 PRIMARY HYPERTENSION: Chronic | ICD-10-CM

## 2022-08-30 DIAGNOSIS — E66.01 MORBID OBESITY WITH BMI OF 40.0-44.9, ADULT: ICD-10-CM

## 2022-08-30 DIAGNOSIS — E78.5 HYPERLIPIDEMIA, UNSPECIFIED HYPERLIPIDEMIA TYPE: Chronic | ICD-10-CM

## 2022-08-30 DIAGNOSIS — G47.33 OSA ON CPAP: Primary | ICD-10-CM

## 2022-08-30 PROCEDURE — 99999 PR PBB SHADOW E&M-EST. PATIENT-LVL V: ICD-10-PCS | Mod: PBBFAC,,, | Performed by: NURSE PRACTITIONER

## 2022-08-30 PROCEDURE — 99499 UNLISTED E&M SERVICE: CPT | Mod: S$GLB,,, | Performed by: PHYSICIAN ASSISTANT

## 2022-08-30 PROCEDURE — 99999 PR PBB SHADOW E&M-EST. PATIENT-LVL III: CPT | Mod: PBBFAC,,, | Performed by: PHYSICIAN ASSISTANT

## 2022-08-30 PROCEDURE — 99499 NO LOS: ICD-10-PCS | Mod: S$GLB,,, | Performed by: PHYSICIAN ASSISTANT

## 2022-08-30 PROCEDURE — 99203 OFFICE O/P NEW LOW 30 MIN: CPT | Mod: S$GLB,,, | Performed by: NURSE PRACTITIONER

## 2022-08-30 PROCEDURE — 99203 PR OFFICE/OUTPT VISIT, NEW, LEVL III, 30-44 MIN: ICD-10-PCS | Mod: S$GLB,,, | Performed by: NURSE PRACTITIONER

## 2022-08-30 PROCEDURE — 69210 PR REMOVAL IMPACTED CERUMEN REQUIRING INSTRUMENTATION, UNILATERAL: ICD-10-PCS | Mod: S$GLB,,, | Performed by: PHYSICIAN ASSISTANT

## 2022-08-30 PROCEDURE — 99999 PR PBB SHADOW E&M-EST. PATIENT-LVL V: CPT | Mod: PBBFAC,,, | Performed by: NURSE PRACTITIONER

## 2022-08-30 PROCEDURE — 69210 REMOVE IMPACTED EAR WAX UNI: CPT | Mod: S$GLB,,, | Performed by: PHYSICIAN ASSISTANT

## 2022-08-30 PROCEDURE — 99999 PR PBB SHADOW E&M-EST. PATIENT-LVL III: ICD-10-PCS | Mod: PBBFAC,,, | Performed by: PHYSICIAN ASSISTANT

## 2022-08-30 NOTE — ASSESSMENT & PLAN NOTE
Managed by primary care provider  Hemoglobin A1C   Date Value Ref Range Status   08/23/2022 5.8 (H) 4.0 - 5.6 % Final     Comment:     ADA Screening Guidelines:  5.7-6.4%  Consistent with prediabetes  >or=6.5%  Consistent with diabetes    High levels of fetal hemoglobin interfere with the HbA1C  assay. Heterozygous hemoglobin variants (HbS, HgC, etc)do  not significantly interfere with this assay.   However, presence of multiple variants may affect accuracy.     07/20/2021 5.7 (H) 4.0 - 5.6 % Final     Comment:     ADA Screening Guidelines:  5.7-6.4%  Consistent with prediabetes  >or=6.5%  Consistent with diabetes    High levels of fetal hemoglobin interfere with the HbA1C  assay. Heterozygous hemoglobin variants (HbS, HgC, etc)do  not significantly interfere with this assay.   However, presence of multiple variants may affect accuracy.     01/22/2021 5.8 (H) 4.0 - 5.6 % Final     Comment:     ADA Screening Guidelines:  5.7-6.4%  Consistent with prediabetes  >or=6.5%  Consistent with diabetes    High levels of fetal hemoglobin interfere with the HbA1C  assay. Heterozygous hemoglobin variants (HbS, HgC, etc)do  not significantly interfere with this assay.   However, presence of multiple variants may affect accuracy.

## 2022-08-30 NOTE — PROGRESS NOTES
Subjective:   Patient ID: Reggie Sawant is a 57 y.o. male.    Chief Complaint: Cerumen Impaction    Patient is here to see me today for evaluation of a possible wax impaction in bilateral ears.  He has complaints of hearing loss in the affected ears, but denies pain or drainage.  This has been an issue in the past.  The patient has not been using any sort of ear drop to soften the wax.     Review of patient's allergies indicates:   Allergen Reactions    Shrimp Anaphylaxis           Review of Systems   Constitutional: Negative.    HENT: Negative.     Eyes: Negative.    Cardiovascular: Negative.    Gastrointestinal: Negative.    Endocrine: Negative.    Genitourinary: Negative.    Skin: Negative.    Allergic/Immunologic: Positive for food allergies.   Neurological: Negative.    Hematological: Negative.    Psychiatric/Behavioral: Negative.         Objective:   BP (!) 148/93   Pulse 61   Temp 98.2 °F (36.8 °C) (Temporal)     Physical Exam  HENT:      Right Ear: There is impacted cerumen.      Left Ear: There is impacted cerumen.        Procedure Note    CHIEF COMPLAINT:  Cerumen Impaction    Description:  The patient was seated in an exam chair.  An ear speculum was placed in the right EAC and was examined under the microscope.  Suction and/or loop curettes were used to remove a large cerumen impaction.  The tympanic membrane was visualized and was normal in appearance.  The procedure was repeated on the left side in a similar fashion.  The TM was intact and normal on this side as well.  The patient tolerated the procedure well.     Assessment:     1. Bilateral impacted cerumen        Plan:     Bilateral impacted cerumen  -     Ambulatory referral/consult to ENT     Cerumen impaction:  Removed today without difficulty.  I would recommend the use of a wax softening drop, either over the counter Debrox or mineral oil, on a weekly basis.  I also instructed the patient to avoid Qtips.

## 2022-08-30 NOTE — PROGRESS NOTES
Subjective:      Patient ID: Reggie Sawant is a 57 y.o. male.    Chief Complaint: Sleep Apnea    HPI: Reggie Sawant presents to clinic for follow up for DEEPTHI with CPAP complaince assessment, last seen 2/2017 by Janett.  He is on Auto CPAP of 6-20 cmH2O pressure which is optimally controlling sleep apnea with apneic index (AHI) 4.2 events an hour.   Complaince download today reveals 66.7% of days with greater than 4 hours of device use.   Patient reports benefit from CPAP use and denies snoring or excessive daytime sleepiness.  Patient reports no complaints. Full face mask is used.     He is ready for replacement CPAP machine or obtaining travel CPAP machine. Last received CPAP machine with insurance     1/28/2007 PSG The diagnostic polysomnography revealed a mild obstructive sleep apnea / hypopnea syndrome (A + H Index = 9.9 hr.     Compliance Summary  7/31/2022 - 8/29/2022 (30 days)  Days with Device Usage 27 days  Days without Device Usage 3 days  Percent Days with Device Usage 90.0%  Cumulative Usage 5 days 11 hrs. 58 mins. 49 secs.  Maximum Usage (1 Day) 10 hrs. 11 mins. 17 secs.  Average Usage (All Days) 4 hrs. 23 mins. 57 secs.  Average Usage (Days Used) 4 hrs. 53 mins. 17 secs.  Minimum Usage (1 Day) 13 mins. 39 secs.  Percent of Days with Usage >= 4 Hours 66.7%  Percent of Days with Usage < 4 Hours 33.3%  Date Range  Total Blower Time 5 days 11 hrs. 59 mins. 2 secs.  Average AHI 4.2  Auto-CPAP Summary  Auto-CPAP Mean Pressure 9.5 cmH2O  Auto-CPAP Peak Average Pressure 14.6 cmH2O  Device Pressure <= 90% of Time 13.4 cmH2O  Average Time in Large Leak Per Day 21 mins. 22 secs.    [place] Sleepiness Scale   EPWORTH SLEEPINESS SCALE 8/30/2022 2/3/2017 11/4/2016 10/30/2015   Sitting and reading 1 0 0 1   Watching TV 1 1 1 1   Sitting, inactive in a public place (e.g. a theatre or a meeting) 0 0 0 0   As a passenger in a car for an hour without a break 0 0 0 1   Lying down to rest in the afternoon when  circumstances permit 0 2 1 1   Sitting and talking to someone 0 0 0 0   Sitting quietly after a lunch without alcohol 1 0 0 0   In a car, while stopped for a few minutes in traffic 0 0 0 0   Total score 3 3 2 4         Previous Report Reviewed: lab reports and office notes     Past Medical History: The following portions of the patient's history were reviewed and updated as appropriate:   He  has a past surgical history that includes right bunionectomy and Colonoscopy (N/A, 12/06/2017).  His family history includes Diabetes in his maternal grandmother and sister; Heart disease in his father; Hypertension in his brother and father; No Known Problems in his daughter, daughter, daughter, mother, sister, and another family member.  He  reports that he has been smoking cigars. He has never used smokeless tobacco. He reports current alcohol use of about 10.0 standard drinks per week. He reports that he does not use drugs.  He has a current medication list which includes the following prescription(s): amlodipine, aspirin, carvedilol, chlorthalidone, mometasone, multivitamin, potassium chloride sa, pravastatin, spironolactone, tadalafil, testosterone cypionate, trazodone, and valsartan.  He is allergic to shrimp..    The following portions of the patient's history were reviewed and updated as appropriate: allergies, current medications, past family history, past medical history, past social history, past surgical history and problem list.    Review of Systems   Constitutional:  Negative for fever, chills, weight loss, weight gain, activity change, appetite change, fatigue and night sweats.   HENT:  Negative for postnasal drip, rhinorrhea, sinus pressure, voice change and congestion.    Eyes:  Negative for redness and itching.   Respiratory:  Negative for snoring, cough, sputum production, chest tightness, shortness of breath, wheezing, orthopnea, asthma nighttime symptoms, dyspnea on extertion, use of rescue inhaler and  "somnolence.    Cardiovascular: Negative.  Negative for chest pain, palpitations and leg swelling.   Genitourinary:  Negative for difficulty urinating and hematuria.   Endocrine:  Negative for cold intolerance and heat intolerance.    Musculoskeletal:  Negative for arthralgias, gait problem, joint swelling and myalgias.   Skin: Negative.    Gastrointestinal:  Negative for nausea, vomiting, abdominal pain and acid reflux.   Neurological:  Negative for dizziness, weakness, light-headedness and headaches.   Hematological:  Negative for adenopathy. No excessive bruising.   All other systems reviewed and are negative.   Objective:   BP (!) 146/84   Pulse 65   Resp (!) 21   Ht 6' 5" (1.956 m)   Wt (!) 161.1 kg (355 lb 2.6 oz)   SpO2 98%   BMI 42.12 kg/m²   Physical Exam  Vitals and nursing note reviewed.   Constitutional:       General: He is not in acute distress.     Appearance: He is well-developed. He is not ill-appearing or toxic-appearing.   HENT:      Head: Normocephalic and atraumatic.      Right Ear: External ear normal.      Left Ear: External ear normal.      Nose: Nose normal.      Mouth/Throat:      Pharynx: No oropharyngeal exudate.   Eyes:      Conjunctiva/sclera: Conjunctivae normal.   Cardiovascular:      Rate and Rhythm: Normal rate and regular rhythm.      Heart sounds: Normal heart sounds.   Pulmonary:      Effort: Pulmonary effort is normal.      Breath sounds: Normal breath sounds.   Abdominal:      Palpations: Abdomen is soft.   Musculoskeletal:      Cervical back: Normal range of motion and neck supple.   Skin:     General: Skin is warm and dry.   Neurological:      Mental Status: He is alert and oriented to person, place, and time.   Psychiatric:         Behavior: Behavior normal. Behavior is cooperative.         Thought Content: Thought content normal.         Judgment: Judgment normal.       Personal Diagnostic Review  CPAP download  Assessment:     1. DEEPTHI on CPAP    2. Primary hypertension "    3. Morbid obesity with BMI of 40.0-44.9, adult    4. Prediabetes    5. Hyperlipidemia, unspecified hyperlipidemia type        Orders Placed This Encounter   Procedures    CPAP/BIPAP SUPPLIES     Benefits and compliant  90 day supply. 4 refills  HME: [facility], patient request change to [facility]     Order Specific Question:   Length of need (1-99 months):     Answer:   99     Order Specific Question:   Choose ONE mask type and its corresponding cushions and/or pillows:     Answer:    Full Face Mask, 1 per 90 days:  Full Face Cushion, (3 per 90 days)     Order Specific Question:   Choose EITHER Heated or Non-Heated Tubjing     Answer:    Non-Heated Tubing, 1 per 90 days     Order Specific Question:   Number of Days Needed:     Answer:   99     Order Specific Question:   All other supplies as needed as listed below:     Answer:    Headgear, 1 per 180 days     Order Specific Question:   All other supplies as needed as listed below:     Answer:    Chin Strap, 1 per 180 days     Order Specific Question:   All other supplies as needed as listed below:     Answer:    Disposable Filter, 6 per 90 days     Order Specific Question:   All other supplies as needed as listed below:     Answer:    Humidifier Chamber, 1 per 180 days     Order Specific Question:   All other supplies as needed as listed below:     Answer:    Non-Disposable Filter, 1 per 180 days    CPAP FOR HOME USE     Benefits from CPAP therapy.   Patient is ready for replacement CPAP machine covered by his insurance, prior machine obtained 11/2016   [facility]: [facility], patient request change to [facility]   No lapse in CPAP therapy.     1/28/2007 PSG The diagnostic polysomnography revealed a mild obstructive sleep apnea / hypopnea syndrome (A + H Index = 9.9 hr.     Order Specific Question:   Length of need (1-99 months):     Answer:   99     Order Specific Question:   Type ():     Answer:   Auto CPAP     Order  Specific Question:   Auto CPAP pressure setting range (cmH20):     Answer:   6-20     Order Specific Question:   Fulfillment Priority:     Answer:   Level 4:  all others     Order Specific Question:   Humidification ():     Answer:   Heated     Order Specific Question:   Choose ONE mask type and its corresponding cushions and/or pillows:     Answer:    Full Face Mask, 1 per 90 days:  Full Face Cushion, (3 per 90 days)     Order Specific Question:   Choose EITHER Heated or Non-Heated Tubjing     Answer:    Non-Heated Tubing, 1 per 90 days     Order Specific Question:   Number of Days Needed:     Answer:   99     Order Specific Question:   All other supplies as needed as listed below:     Answer:    Headgear, 1 per 180 days     Order Specific Question:   All other supplies as needed as listed below:     Answer:    Chin Strap, 1 per 180 days     Order Specific Question:   All other supplies as needed as listed below:     Answer:    Disposable Filter, 6 per 90 days     Order Specific Question:   All other supplies as needed as listed below:     Answer:    Non-Disposable Filter, 1 per 180 days     Order Specific Question:   All other supplies as needed as listed below:     Answer:    Humidifier Chamber, 1 per 180 days       Plan:     Problem List Items Addressed This Visit       Hyperlipidemia (Chronic)     Stable, managed by primary care provider           HTN (hypertension) (Chronic)     Managed by primary care provider          Prediabetes     Managed by primary care provider  Hemoglobin A1C   Date Value Ref Range Status   08/23/2022 5.8 (H) 4.0 - 5.6 % Final     Comment:     ADA Screening Guidelines:  5.7-6.4%  Consistent with prediabetes  >or=6.5%  Consistent with diabetes    High levels of fetal hemoglobin interfere with the HbA1C  assay. Heterozygous hemoglobin variants (HbS, HgC, etc)do  not significantly interfere with this assay.   However, presence of multiple  variants may affect accuracy.     07/20/2021 5.7 (H) 4.0 - 5.6 % Final     Comment:     ADA Screening Guidelines:  5.7-6.4%  Consistent with prediabetes  >or=6.5%  Consistent with diabetes    High levels of fetal hemoglobin interfere with the HbA1C  assay. Heterozygous hemoglobin variants (HbS, HgC, etc)do  not significantly interfere with this assay.   However, presence of multiple variants may affect accuracy.     01/22/2021 5.8 (H) 4.0 - 5.6 % Final     Comment:     ADA Screening Guidelines:  5.7-6.4%  Consistent with prediabetes  >or=6.5%  Consistent with diabetes    High levels of fetal hemoglobin interfere with the HbA1C  assay. Heterozygous hemoglobin variants (HbS, HgC, etc)do  not significantly interfere with this assay.   However, presence of multiple variants may affect accuracy.                DEEPTHI on CPAP - Primary     1/28/2007 PSG The diagnostic polysomnography revealed a mild obstructive sleep apnea / hypopnea syndrome (A + H Index = 9.9 hr.   Benefits and compliant with Auto CPAP 6-20 cm  AHI 4.2  Full face mask   H[facility]: [facility], request change to [facility]   Patient has [employer] 2 machine, would like replacement with insurance if possible, if not he may purchase out of [place].          Relevant Orders    CPAP/BIPAP SUPPLIES    CPAP FOR HOME USE    Morbid obesity with BMI of 40.0-44.9, adult     Encouraged calorie reduction and 30 minutes of exercise daily. Discussed impact of obesity on general health.  Wt Readings from Last 9 Encounters:   08/30/22 (!) 161.1 kg (355 lb 2.6 oz)   08/23/22 (!) 163.7 kg (361 lb 0.1 oz)   07/26/22 (!) 157.9 kg (348 lb)   09/28/21 (!) 158 kg (348 lb 5.2 oz)   07/20/21 (!) 163.5 kg (360 lb 7.2 oz)   12/30/20 (!) 161.9 kg (356 lb 14.8 oz)   03/09/20 (!) 163.6 kg (360 lb 10.8 oz)   01/29/20 (!) 164 kg (361 lb 8.9 oz)   04/15/19 (!) 160 kg (352 lb 11.8 oz)   Body mass index is 42.12 kg/m².             (DME) - [facility]  Reviewed  therapeutic goals for positive airway pressure therapy Auto CPAP  Ideal is usage 100% of nights for 6 - 8 hours per night. Minimum usage is 70% of night for at least 4 hours per night used.     I spent a total of 37 minutes on the day of the visit.  This includes face to face time and non-face to face time preparing to see the patient (eg, review of tests), obtaining and/or reviewing separately obtained history, documenting clinical information in the electronic or other health record, independently interpreting results and communicating results to the patient/family/caregiver, or care coordinator.    Follow up in about 10 weeks (around 11/8/2022) for CPAP compliance dwld after replacing cpap machine.

## 2022-08-30 NOTE — TELEPHONE ENCOUNTER
"Pt portal message    Sent thru poral by other staff    "Good Evening! Christy recently had my annual performed by the nurse practitioner. Will I still have to follow up with you on 11/3/22?"    Please advise  "

## 2022-08-30 NOTE — ASSESSMENT & PLAN NOTE
Encouraged calorie reduction and 30 minutes of exercise daily. Discussed impact of obesity on general health.  Wt Readings from Last 9 Encounters:   08/30/22 (!) 161.1 kg (355 lb 2.6 oz)   08/23/22 (!) 163.7 kg (361 lb 0.1 oz)   07/26/22 (!) 157.9 kg (348 lb)   09/28/21 (!) 158 kg (348 lb 5.2 oz)   07/20/21 (!) 163.5 kg (360 lb 7.2 oz)   12/30/20 (!) 161.9 kg (356 lb 14.8 oz)   03/09/20 (!) 163.6 kg (360 lb 10.8 oz)   01/29/20 (!) 164 kg (361 lb 8.9 oz)   04/15/19 (!) 160 kg (352 lb 11.8 oz)   Body mass index is 42.12 kg/m².

## 2022-08-30 NOTE — ASSESSMENT & PLAN NOTE
1/28/2007 PSG The diagnostic polysomnography revealed a mild obstructive sleep apnea / hypopnea syndrome (A + H Index = 9.9 hr.   Benefits and compliant with Auto CPAP 6-20 cm  AHI 4.2  Full face mask   HME: [facility], request change to [facility]   Patient has [employer] 2 machine, would like replacement with insurance if possible, if not he may purchase out of pocket since [place] 2 [place].

## 2022-09-13 ENCOUNTER — PATIENT MESSAGE (OUTPATIENT)
Dept: FAMILY MEDICINE | Facility: CLINIC | Age: 57
End: 2022-09-13
Payer: COMMERCIAL

## 2022-09-14 NOTE — TELEPHONE ENCOUNTER
Called pt to inform pt about [facility] pt agreed to get the referral placed please advise     DIONI Sagastume

## 2022-09-20 DIAGNOSIS — M25.511 CHRONIC RIGHT SHOULDER PAIN: Primary | ICD-10-CM

## 2022-09-20 DIAGNOSIS — G89.29 CHRONIC RIGHT SHOULDER PAIN: Primary | ICD-10-CM

## 2022-09-27 DIAGNOSIS — M25.511 RIGHT SHOULDER PAIN, UNSPECIFIED CHRONICITY: Primary | ICD-10-CM

## 2022-10-07 ENCOUNTER — PATIENT MESSAGE (OUTPATIENT)
Dept: PULMONOLOGY | Facility: CLINIC | Age: 57
End: 2022-10-07
Payer: COMMERCIAL

## 2022-10-14 ENCOUNTER — OFFICE VISIT (OUTPATIENT)
Dept: ORTHOPEDICS | Facility: CLINIC | Age: 57
End: 2022-10-14
Payer: COMMERCIAL

## 2022-10-14 ENCOUNTER — HOSPITAL ENCOUNTER (OUTPATIENT)
Dept: RADIOLOGY | Facility: HOSPITAL | Age: 57
Discharge: HOME | End: 2022-10-14
Attending: STUDENT IN AN ORGANIZED HEALTH CARE EDUCATION/TRAINING PROGRAM
Payer: COMMERCIAL

## 2022-10-14 ENCOUNTER — PATIENT MESSAGE (OUTPATIENT)
Dept: ORTHOPEDICS | Facility: CLINIC | Age: 57
End: 2022-10-14

## 2022-10-14 VITALS — WEIGHT: 315 LBS | BODY MASS INDEX: 37.19 KG/M2 | HEIGHT: 77 IN

## 2022-10-14 DIAGNOSIS — M25.511 CHRONIC RIGHT SHOULDER PAIN: ICD-10-CM

## 2022-10-14 DIAGNOSIS — G89.29 CHRONIC RIGHT SHOULDER PAIN: ICD-10-CM

## 2022-10-14 DIAGNOSIS — M25.511 RIGHT SHOULDER PAIN, UNSPECIFIED CHRONICITY: ICD-10-CM

## 2022-10-14 DIAGNOSIS — M75.41 SUBACROMIAL IMPINGEMENT OF RIGHT SHOULDER: Primary | ICD-10-CM

## 2022-10-14 PROCEDURE — 20610 DRAIN/INJ JOINT/BURSA W/O US: CPT | Mod: RT,S$GLB,, | Performed by: STUDENT IN AN ORGANIZED HEALTH CARE EDUCATION/TRAINING PROGRAM

## 2022-10-14 PROCEDURE — 73030 X-RAY EXAM OF SHOULDER: CPT | Mod: TC,RT

## 2022-10-14 PROCEDURE — 99203 OFFICE O/P NEW LOW 30 MIN: CPT | Mod: 25,S$GLB,, | Performed by: STUDENT IN AN ORGANIZED HEALTH CARE EDUCATION/TRAINING PROGRAM

## 2022-10-14 PROCEDURE — 99999 PR PBB SHADOW E&M-EST. PATIENT-LVL IV: ICD-10-PCS | Mod: PBBFAC,,, | Performed by: STUDENT IN AN ORGANIZED HEALTH CARE EDUCATION/TRAINING PROGRAM

## 2022-10-14 PROCEDURE — 99203 PR OFFICE/OUTPT VISIT, NEW, LEVL III, 30-44 MIN: ICD-10-PCS | Mod: 25,S$GLB,, | Performed by: STUDENT IN AN ORGANIZED HEALTH CARE EDUCATION/TRAINING PROGRAM

## 2022-10-14 PROCEDURE — 73030 XR SHOULDER COMPLETE 2 OR MORE VIEWS RIGHT: ICD-10-PCS | Mod: 26,RT,, | Performed by: RADIOLOGY

## 2022-10-14 PROCEDURE — 20610 LARGE JOINT ASPIRATION/INJECTION: R SUBACROMIAL BURSA: ICD-10-PCS | Mod: RT,S$GLB,, | Performed by: STUDENT IN AN ORGANIZED HEALTH CARE EDUCATION/TRAINING PROGRAM

## 2022-10-14 PROCEDURE — 99999 PR PBB SHADOW E&M-EST. PATIENT-LVL IV: CPT | Mod: PBBFAC,,, | Performed by: STUDENT IN AN ORGANIZED HEALTH CARE EDUCATION/TRAINING PROGRAM

## 2022-10-14 PROCEDURE — 73030 X-RAY EXAM OF SHOULDER: CPT | Mod: 26,RT,, | Performed by: RADIOLOGY

## 2022-10-14 RX ORDER — TRIAMCINOLONE ACETONIDE 40 MG/ML
40 INJECTION, SUSPENSION INTRA-ARTICULAR; INTRAMUSCULAR
Status: DISCONTINUED | OUTPATIENT
Start: 2022-10-14 | End: 2022-10-14 | Stop reason: HOSPADM

## 2022-10-14 RX ADMIN — TRIAMCINOLONE ACETONIDE 40 MG: 40 INJECTION, SUSPENSION INTRA-ARTICULAR; INTRAMUSCULAR at 08:10

## 2022-10-14 NOTE — PROGRESS NOTES
Orthopaedics Sports Medicine     Shoulder Initial Visit         10/14/2022    Referring MD: Diego Wilson, MIRYAM    Chief Complaint   Patient presents with    Right Shoulder - Pain         History of Present Illness:   Reggie Sawant is a 57 y.o. right-hand dominant male who presents with right shoulder pain and dysfunction.    Onset of the symptoms was a 3-6 months ago     Inciting event:  No specific injury, gradual onset of symptoms    Current symptoms include pain in the right shoulder, localized laterally, pain quality is throbbing achiness over shoulder joint    Pain is aggravated by ADLs, especially lifting, reaching, overhead activity also endorses significant pain when sleeping     Evaluation to date: X-Ray     Treatment to date: Rest, activity modification, meloxicam    Past Medical History:   Past Medical History:   Diagnosis Date    Cellulitis     left leg    Hip arthritis     right    Hyperlipidemia     Hypertension     Hypogonadism male     Hypokalemia     Morbid obesity     Sleep apnea     on CPAP       Past Surgical History:   Past Surgical History:   Procedure Laterality Date    COLONOSCOPY N/A 12/06/2017    Procedure: COLONOSCOPY;  Surgeon: Rory Barone MD;  Location: [facility];  Service: Endoscopy;  Laterality: N/A;    right bunionectomy         Medications:  Patient's Medications   New Prescriptions    No medications on file   Previous Medications    AMLODIPINE (NORVASC) 10 MG TABLET    Take 1 tablet (10 mg total) by mouth once daily.    ASPIRIN (ECOTRIN) 81 MG EC TABLET    Take 1 tablet by mouth Daily.    CARVEDILOL (COREG) 25 MG TABLET    Take 1 tablet (25 mg total) by mouth 2 (two) times daily with meals.    CHLORTHALIDONE (HYGROTEN) 50 MG TAB    Take 1 tablet (50 mg total) by mouth once daily.    MOMETASONE (NASONEX) 50 MCG/ACTUATION NASAL SPRAY    PLEASE UPDATE FOR ANY MEDICATION CHANGE    MULTIVITAMIN (THERAGRAN) PER TABLET    Take 1 tablet by mouth once daily.    POTASSIUM  "CHLORIDE SA (KLOR-CON M20) 20 MEQ TABLET    Take 1 tablet (20 mEq total) by mouth once daily.    PRAVASTATIN (PRAVACHOL) 20 MG TABLET    Take 1 tablet (20 mg total) by mouth once daily.    SPIRONOLACTONE (ALDACTONE) 50 MG TABLET    Take 1 tablet (50 mg total) by mouth once daily.    TADALAFIL (CIALIS) 20 MG TAB    TAKE 1 TABLET ONCE DAILY    TESTOSTERONE CYPIONATE (DEPOTESTOTERONE CYPIONATE) 200 MG/ML INJECTION    Inject 1 mL (200 mg total) into the muscle every 7 days. ADMINISTER 1 ML IN THE MUSCLE EVERY 14 DAYS    TRAZODONE (DESYREL) 50 MG TABLET    Take 1 tablet (50 mg total) by mouth nightly.    VALSARTAN (DIOVAN) 320 MG TABLET    Take 1 tablet (320 mg total) by mouth once daily.   Modified Medications    No medications on file   Discontinued Medications    No medications on file       Allergies:   Review of patient's allergies indicates:   Allergen Reactions    Shrimp Anaphylaxis       Social History:   Home town: [place]  Occupation: Oil field  Alcohol use: He reports current alcohol use of about 10.0 standard drinks per week.  Tobacco use: He reports that he has been smoking cigars. He has never used smokeless tobacco.    Review of systems:  History of recent illness, fevers, shakes, or chills: no  History of cardiac problems or chest pain: no  History of pulmonary problems or asthma: no  History of diabetes: no  History of prior dvt or clotting problems: no  History of sleep apnea: no      Physical Examination:  Estimated body mass index is 42.1 kg/m² as calculated from the following:    Height as of this encounter: 6' 5" (1.956 m).    Weight as of this encounter: 161 kg (355 lb).    General  Healthy appearing male in no acute distress  Alert and oriented, normal mood, appropriate affect    Shoulder Examination:  Patient is alert and oriented, no distress. Skin is intact. Neuro is normal with no focal motor or sensory findings.    Cervical exam is unremarkable. Intact cervical ROM. Negative Spurling's " test    Physical Exam:  RIGHT    LEFT    Scap Dyskinesis/Winging (-)    (-)    Tenderness:          Greater Tuberosity             +    (-)  Bicipital Groove  (-)    (-)  AC joint   (-)    (-)  Other:     ROM:  Forward Elevation 170    180  Abduction  120    120  ER (at side)  65    80  IR   T8    T8    Strength:   Supraspinatus  5/5    5/5  Infraspinatus  5/5    5/5  Subscap / IR  5/5    5/5     Special Tests:   Neer:    +    (-)   Olivares:   +    (-)   SS Stress:   +    (-)   Bear Hug:   (-)    (-)   Racine's:   +    (-)   Resisted Thrower's:   (-)    (-)   Cross Arm Abduction:  (-)    (-)    Neurovascular examination  - Motor grossly intact bilaterally to shoulder abduction, elbow flexion and extension, wrist flexion and extension, and intrinsic hand musculature  - Sensation intact to light touch bilaterally in axillary, median, radial, and ulnar distributions  - Symmetrical radial pulses      Imaging:  XR Results:  No results found for this or any previous visit.    MRI Results:  No results found for this or any previous visit.    CT Results:  No results found for this or any previous visit.      Physician Read: I agree with the above impression.      Impression:  57 y.o. male with right shoulder pain, subacromial impingement, rotator cuff tendonitis      Plan:  Discussed diagnosis and treatment options with patient today.  His history and physical exam are most consistent with subacromial impingement resulting in rotator cuff tendinitis.  He has maintained good range of motion and strength but has pain with activity and pain at night.  I recommend proceeding with corticosteroid injection into the right subacromial space today. The patient is in agreement with this plan.   He tolerated the procedure well with no immediate complications.    Follow up with me as needed.  If symptoms persist, we will obtain MRI of the right shoulder            Filiberto Sherman MD

## 2022-11-09 ENCOUNTER — PATIENT MESSAGE (OUTPATIENT)
Dept: ORTHOPEDICS | Facility: CLINIC | Age: 57
End: 2022-11-09
Payer: COMMERCIAL

## 2022-11-14 NOTE — PROGRESS NOTES
Orthopaedic Follow-Up Visit    Last Appointment: 10/14/22  Diagnosis: Right shoulder pain, subacromial impingement, rotator cuff tendonitis  Prior Procedure: Right SAS CSI    Reggie Sawant is a 57 y.o. male who is here for f/u evaluation of his right shoulder. The patient was last seen here by me on 10/14/22 at which point we decided to proceed with right shoulder subacromial space corticosteroid injection  prior to considering further treatment options. The patient returns today reporting that the symptoms have persisted and he only received a few weeks relief from the prior injection. His pain has begun to increase over the last week or so. He is interested in discussing further treatment options.     To review his history, Reggie Sawant is a 57 y.o. right-hand dominant male who presented with right shoulder pain and dysfunction that began approximately 4-7 months ago with no specific injury but reported gradual onset of symptoms. His symptoms included pain in the right shoulder, localized laterally with pain quality of throbbing achiness over shoulder joint. His pain was aggravated by ADLs, especially lifting, reaching, overhead activity and he also endorsed significant pain when sleeping. At this time he has tried rest, activity modification, Meloxicam, and corticosteroid injection with no long term relief of his symptoms.    Patient's medications, allergies, past medical, surgical, social and family histories were reviewed and updated as appropriate.    Review of Systems   All systems reviewed were negative.  Specifically, the patient denies fever, chills, weight loss, chest pain, shortness of breath, or dyspnea on exertion.      Past Medical History:   Diagnosis Date    Cellulitis     left leg    Hip arthritis     right    Hyperlipidemia     Hypertension     Hypogonadism male     Hypokalemia     Morbid obesity     Sleep apnea     on CPAP       Past Surgical History:   Procedure Laterality Date    COLONOSCOPY  N/A 12/06/2017    Procedure: COLONOSCOPY;  Surgeon: Rory Barone MD;  Location: [facility];  Service: Endoscopy;  Laterality: N/A;    right bunionectomy         Patient's Medications   New Prescriptions    No medications on file   Previous Medications    AMLODIPINE (NORVASC) 10 MG TABLET    Take 1 tablet (10 mg total) by mouth once daily.    ASPIRIN (ECOTRIN) 81 MG EC TABLET    Take 1 tablet by mouth Daily.    CARVEDILOL (COREG) 25 MG TABLET    Take 1 tablet (25 mg total) by mouth 2 (two) times daily with meals.    CHLORTHALIDONE (HYGROTEN) 50 MG TAB    Take 1 tablet (50 mg total) by mouth once daily.    MOMETASONE (NASONEX) 50 MCG/ACTUATION NASAL SPRAY    PLEASE UPDATE FOR ANY MEDICATION CHANGE    MULTIVITAMIN (THERAGRAN) PER TABLET    Take 1 tablet by mouth once daily.    POTASSIUM CHLORIDE SA (KLOR-CON M20) 20 MEQ TABLET    Take 1 tablet (20 mEq total) by mouth once daily.    PRAVASTATIN (PRAVACHOL) 20 MG TABLET    Take 1 tablet (20 mg total) by mouth once daily.    SPIRONOLACTONE (ALDACTONE) 50 MG TABLET    Take 1 tablet (50 mg total) by mouth once daily.    TADALAFIL (CIALIS) 20 MG TAB    TAKE 1 TABLET ONCE DAILY    TESTOSTERONE CYPIONATE (DEPOTESTOTERONE CYPIONATE) 200 MG/ML INJECTION    Inject 1 mL (200 mg total) into the muscle every 7 days. ADMINISTER 1 ML IN THE MUSCLE EVERY 14 DAYS    TRAZODONE (DESYREL) 50 MG TABLET    Take 1 tablet (50 mg total) by mouth nightly.    VALSARTAN (DIOVAN) 320 MG TABLET    Take 1 tablet (320 mg total) by mouth once daily.   Modified Medications    No medications on file   Discontinued Medications    No medications on file       Family History   Problem Relation Age of Onset    No Known Problems Mother     Hypertension Father     Heart disease Father         CAD    Diabetes Sister     No Known Problems Sister     Hypertension Brother     Diabetes Maternal Grandmother     No Known Problems Daughter     No Known Problems Daughter     No Known Problems Daughter     No  Known Problems Other     Cancer Neg Hx     Stroke Neg Hx     Thyroid disease Neg Hx        Review of patient's allergies indicates:   Allergen Reactions    Shrimp Anaphylaxis         Objective:      Physical Exam  Patient is alert and oriented, no distress. Skin is intact. Neuro is normal with no focal motor or sensory findings.    Cervical exam is unremarkable. Intact cervical ROM. Negative Spurling's test    Physical Exam:                       RIGHT                                     LEFT     Scap Dyskinesis/Winging       (-)                                             (-)     Tenderness:                                                                              Greater Tuberosity                  +                                              (-)  Bicipital Groove                       (-)                                             (-)  AC joint                                   (-)                                             (-)  Other:      ROM:  Forward Elevation       170                                          180  Abduction                    120                                          120  ER (at side)                 65                                            80  IR                                 T8                                            T8     Strength:   Supraspinatus             4/5                                           5/5  Infraspinatus               5/5                                           5/5  Subscap / IR               5/5                                           5/5      Special Tests:              Neer:                                       +                                              (-)              Olivares:                                 +                                              (-)              SS Stress:                               +                                              (-)              Bear Hug:                                (-)                                              (-)              Hettinger's:                                 +                                              (-)              Resisted Thrower's:                (-)                                             (-)              Cross Arm Abduction:             (-)                                             (-)    Neurovascular examination  - Motor grossly intact bilaterally to shoulder abduction, elbow flexion and extension, wrist flexion and extension, and intrinsic hand musculature  - Sensation intact to light touch bilaterally in axillary, median, radial, and ulnar distributions  - Symmetrical radial pulses    Imaging:    XR Results:  Results for orders placed during the hospital encounter of 10/14/22    X-ray Shoulder 2 or More Views Right    Narrative  EXAMINATION:  XR SHOULDER COMPLETE 2 OR MORE VIEWS RIGHT    INDICATION:  Pain in right shoulder    COMPARISON:  None    FINDINGS:  Internal rotation, external rotation scapular Y and axillary views of the right shoulder are obtained.  There is no fracture.  Alignment is anatomic.  Acromioclavicular joint is intact.  There are mild degenerative changes at the acromioclavicular and glenohumeral articulation.  Unremarkable soft tissues.  Partially visualized lung fields are clear.    Impression  1. Mild degenerative changes.  No evidence of acute injury.      Electronically signed by: Filiberto Douglas MD  Date:    10/14/2022  Time:    08:25      MRI Results:  No results found for this or any previous visit.      CT Results:  No results found for this or any previous visit.      Physician read: I agree with the above impression.    Assessment/Plan:   Reggie Sawant is a 57 y.o. male with chronic right shoulder pain, suspected rotator cuff tear     Plan:    The patient has had chronic right shoulder pain for several months that has not responded to conservative treatment measures in the form of rest, activity, modifications, oral  anti-inflammatories, or corticosteroid injection. He received short  term relief with prior corticosteroid injection but his symptoms have begun to return. He also has evidence of rotator cuff pathology on XR as well. I suspect that he may have a rotator cuff tear   I recommend proceeding with MRI of the right shoulder to evaluate for suspected rotator cuff tear.   Follow up after MRI.           Filiberto Sherman MD    I, Kvng Prescott, acted as a scribe for Filiberto Sherman MD for the duration of this office visit.

## 2022-11-15 ENCOUNTER — OFFICE VISIT (OUTPATIENT)
Dept: PULMONOLOGY | Facility: CLINIC | Age: 57
End: 2022-11-15
Payer: COMMERCIAL

## 2022-11-15 ENCOUNTER — OFFICE VISIT (OUTPATIENT)
Dept: ORTHOPEDICS | Facility: CLINIC | Age: 57
End: 2022-11-15
Payer: COMMERCIAL

## 2022-11-15 VITALS
DIASTOLIC BLOOD PRESSURE: 80 MMHG | SYSTOLIC BLOOD PRESSURE: 136 MMHG | HEIGHT: 77 IN | WEIGHT: 315 LBS | OXYGEN SATURATION: 97 % | RESPIRATION RATE: 18 BRPM | HEART RATE: 60 BPM | BODY MASS INDEX: 37.19 KG/M2

## 2022-11-15 VITALS — HEIGHT: 77 IN | BODY MASS INDEX: 37.19 KG/M2 | WEIGHT: 315 LBS

## 2022-11-15 DIAGNOSIS — M75.101 NONTRAUMATIC TEAR OF RIGHT ROTATOR CUFF, UNSPECIFIED TEAR EXTENT: Primary | ICD-10-CM

## 2022-11-15 DIAGNOSIS — M25.511 CHRONIC RIGHT SHOULDER PAIN: ICD-10-CM

## 2022-11-15 DIAGNOSIS — G89.29 CHRONIC RIGHT SHOULDER PAIN: ICD-10-CM

## 2022-11-15 DIAGNOSIS — I10 PRIMARY HYPERTENSION: Chronic | ICD-10-CM

## 2022-11-15 DIAGNOSIS — E66.01 MORBID OBESITY WITH BMI OF 40.0-44.9, ADULT: ICD-10-CM

## 2022-11-15 DIAGNOSIS — R73.03 PREDIABETES: ICD-10-CM

## 2022-11-15 DIAGNOSIS — G47.33 OSA ON CPAP: Primary | ICD-10-CM

## 2022-11-15 DIAGNOSIS — G47.00 INSOMNIA, UNSPECIFIED TYPE: ICD-10-CM

## 2022-11-15 PROCEDURE — 99214 OFFICE O/P EST MOD 30 MIN: CPT | Mod: S$GLB,,, | Performed by: NURSE PRACTITIONER

## 2022-11-15 PROCEDURE — 99999 PR PBB SHADOW E&M-EST. PATIENT-LVL IV: CPT | Mod: PBBFAC,,, | Performed by: NURSE PRACTITIONER

## 2022-11-15 PROCEDURE — 99213 PR OFFICE/OUTPT VISIT, EST, LEVL III, 20-29 MIN: ICD-10-PCS | Mod: S$GLB,,, | Performed by: STUDENT IN AN ORGANIZED HEALTH CARE EDUCATION/TRAINING PROGRAM

## 2022-11-15 PROCEDURE — 99999 PR PBB SHADOW E&M-EST. PATIENT-LVL III: CPT | Mod: PBBFAC,,, | Performed by: STUDENT IN AN ORGANIZED HEALTH CARE EDUCATION/TRAINING PROGRAM

## 2022-11-15 PROCEDURE — 99214 PR OFFICE/OUTPT VISIT, EST, LEVL IV, 30-39 MIN: ICD-10-PCS | Mod: S$GLB,,, | Performed by: NURSE PRACTITIONER

## 2022-11-15 PROCEDURE — 99213 OFFICE O/P EST LOW 20 MIN: CPT | Mod: S$GLB,,, | Performed by: STUDENT IN AN ORGANIZED HEALTH CARE EDUCATION/TRAINING PROGRAM

## 2022-11-15 PROCEDURE — 99999 PR PBB SHADOW E&M-EST. PATIENT-LVL IV: ICD-10-PCS | Mod: PBBFAC,,, | Performed by: NURSE PRACTITIONER

## 2022-11-15 PROCEDURE — 99999 PR PBB SHADOW E&M-EST. PATIENT-LVL III: ICD-10-PCS | Mod: PBBFAC,,, | Performed by: STUDENT IN AN ORGANIZED HEALTH CARE EDUCATION/TRAINING PROGRAM

## 2022-11-15 NOTE — PROGRESS NOTES
Subjective:      Patient ID: Reggie Sawant is a 57 y.o. male.    Chief Complaint: Sleep Apnea    HPI: Reggie Sawant presents to clinic for follow up for DEEPTHI with CPAP complaince assessment, after replacing CPAP.  [facility] SANDIE [facility]in AutoPAP--No Modem  Set Up Date: 9/15/22  He is on Auto CPAP of 6-20 cmH2O pressure which is optimally controlling sleep apnea with apneic index (AHI) 2.9 events an hour.   He is compliant with CPAP use. Complaince download today reveals 94% of days with greater than 4 hours of device use.   Patient reports benefit from CPAP use and denies snoring or excessive daytime sleepiness.  Patient reports no complaints. Full face mask is used.     He would like a script to obtain travel size CPAP machine.     1/28/2007 PSG The diagnostic polysomnography revealed a mild obstructive sleep apnea / hypopnea syndrome (A + H Index = 9.9 hr.     See download in media.     [place] Sleepiness Scale   EPWORTH SLEEPINESS SCALE 11/8/2022 8/30/2022 2/3/2017 11/4/2016 10/30/2015   Sitting and reading 0 1 0 0 1   Watching TV 1 1 1 1 1   Sitting, inactive in a public place (e.g. a theatre or a meeting) 1 0 0 0 0   As a passenger in a car for an hour without a break 0 0 0 0 1   Lying down to rest in the afternoon when circumstances permit 1 0 2 1 1   Sitting and talking to someone 0 0 0 0 0   Sitting quietly after a lunch without alcohol 0 1 0 0 0   In a car, while stopped for a few minutes in traffic 0 0 0 0 0   Total score 3 3 3 2 4       Previous Report Reviewed: lab reports and office notes     Past Medical History: The following portions of the patient's history were reviewed and updated as appropriate:   He  has a past surgical history that includes right bunionectomy and Colonoscopy (N/A, 12/06/2017).  His family history includes Diabetes in his maternal grandmother and sister; Heart disease in his father; Hypertension in his brother and father; No Known Problems in his daughter, daughter,  "daughter, mother, sister, and another family member.  He  reports that he has been smoking cigars. He has never used smokeless tobacco. He reports current alcohol use of about 10.0 standard drinks per week. He reports that he does not use drugs.  He has a current medication list which includes the following prescription(s): amlodipine, aspirin, carvedilol, chlorthalidone, mometasone, multivitamin, potassium chloride sa, pravastatin, spironolactone, tadalafil, testosterone cypionate, trazodone, and valsartan.  He is allergic to shrimp..    The following portions of the patient's history were reviewed and updated as appropriate: allergies, current medications, past family history, past medical history, past social history, past surgical history and problem list.    Review of Systems   Constitutional:  Negative for fever, chills, weight loss, weight gain, activity change, appetite change, fatigue and night sweats.   HENT:  Negative for postnasal drip, rhinorrhea, sinus pressure, voice change and congestion.    Eyes:  Negative for redness and itching.   Respiratory:  Negative for snoring, cough, sputum production, chest tightness, shortness of breath, wheezing, orthopnea, asthma nighttime symptoms, dyspnea on extertion, use of rescue inhaler and somnolence.    Cardiovascular: Negative.  Negative for chest pain, palpitations and leg swelling.   Genitourinary:  Negative for difficulty urinating and hematuria.   Endocrine:  Negative for cold intolerance and heat intolerance.    Musculoskeletal:  Negative for arthralgias, gait problem, joint swelling and myalgias.   Skin: Negative.    Gastrointestinal:  Negative for nausea, vomiting, abdominal pain and acid reflux.   Neurological:  Negative for dizziness, weakness, light-headedness and headaches.   Hematological:  Negative for adenopathy. No excessive bruising.   All other systems reviewed and are negative.   Objective:   /80   Pulse 60   Resp 18   Ht 6' 5" (1.956 " m)   Wt (!) 161.4 kg (355 lb 13.2 oz)   SpO2 97%   BMI 42.19 kg/m²   Physical Exam  Vitals and nursing note reviewed.   Constitutional:       General: He is not in acute distress.     Appearance: He is well-developed. He is not ill-appearing or toxic-appearing.   HENT:      Head: Normocephalic and atraumatic.      Right Ear: External ear normal.      Left Ear: External ear normal.      Nose: Nose normal.      Mouth/Throat:      Pharynx: No oropharyngeal exudate.   Eyes:      Conjunctiva/sclera: Conjunctivae normal.   Cardiovascular:      Rate and Rhythm: Normal rate and regular rhythm.      Heart sounds: Normal heart sounds.   Pulmonary:      Effort: Pulmonary effort is normal.      Breath sounds: Normal breath sounds.   Abdominal:      Palpations: Abdomen is soft.   Musculoskeletal:      Cervical back: Normal range of motion and neck supple.   Skin:     General: Skin is warm and dry.   Neurological:      Mental Status: He is alert and oriented to person, place, and time.   Psychiatric:         Behavior: Behavior normal. Behavior is cooperative.         Thought Content: Thought content normal.         Judgment: Judgment normal.       Personal Diagnostic Review  CPAP download  Assessment:     1. DEEPTHI on CPAP    2. Morbid obesity with BMI of 40.0-44.9, adult    3. Prediabetes    4. Insomnia, unspecified type    5. Primary hypertension        Orders Placed This Encounter   Procedures    CPAP FOR HOME USE     This order for travel CPAP machine. He will purchase on line. Thank you     Order Specific Question:   Length of need (1-99 months):     Answer:   99     Order Specific Question:   Type ():     Answer:   Auto CPAP     Order Specific Question:   Auto CPAP pressure setting range (cmH20):     Answer:   6-20     Order Specific Question:   Fulfillment Priority:     Answer:   Level 4:  all others     Order Specific Question:   Humidification ():     Answer:   Heated     Order Specific Question:   Choose ONE  mask type and its corresponding cushions and/or pillows:     Answer:    Full Face Mask, 1 per 90 days:  Full Face Cushion, (3 per 90 days)     Order Specific Question:   Choose EITHER Heated or Non-Heated Tubjing     Answer:    Non-Heated Tubing, 1 per 90 days     Order Specific Question:   Number of Days Needed:     Answer:   99     Order Specific Question:   All other supplies as needed as listed below:     Answer:    Headgear, 1 per 180 days     Order Specific Question:   All other supplies as needed as listed below:     Answer:    Chin Strap, 1 per 180 days     Order Specific Question:   All other supplies as needed as listed below:     Answer:    Disposable Filter, 6 per 90 days     Order Specific Question:   All other supplies as needed as listed below:     Answer:    Non-Disposable Filter, 1 per 180 days     Order Specific Question:   All other supplies as needed as listed below:     Answer:    Humidifier Chamber, 1 per 180 days         Plan:     Problem List Items Addressed This Visit       HTN (hypertension) (Chronic)     Stable and controlled. Continue current treatment plan as previously prescribed with your PCP.              Prediabetes     Managed by primary care provider  Hemoglobin A1C   Date Value Ref Range Status   08/23/2022 5.8 (H) 4.0 - 5.6 % Final     Comment:     ADA Screening Guidelines:  5.7-6.4%  Consistent with prediabetes  >or=6.5%  Consistent with diabetes    High levels of fetal hemoglobin interfere with the HbA1C  assay. Heterozygous hemoglobin variants (HbS, HgC, etc)do  not significantly interfere with this assay.   However, presence of multiple variants may affect accuracy.     07/20/2021 5.7 (H) 4.0 - 5.6 % Final     Comment:     ADA Screening Guidelines:  5.7-6.4%  Consistent with prediabetes  >or=6.5%  Consistent with diabetes    High levels of fetal hemoglobin interfere with the HbA1C  assay. Heterozygous hemoglobin variants (HbS, HgC,  etc)do  not significantly interfere with this assay.   However, presence of multiple variants may affect accuracy.     01/22/2021 5.8 (H) 4.0 - 5.6 % Final     Comment:     ADA Screening Guidelines:  5.7-6.4%  Consistent with prediabetes  >or=6.5%  Consistent with diabetes    High levels of fetal hemoglobin interfere with the HbA1C  assay. Heterozygous hemoglobin variants (HbS, HgC, etc)do  not significantly interfere with this assay.   However, presence of multiple variants may affect accuracy.                DEEPTHI on CPAP - Primary     1/28/2007 PSG The diagnostic polysomnography revealed a mild obstructive sleep apnea / hypopnea syndrome (A + H Index = 9.9 hr.   Benefits and compliant with Auto CPAP 6-20 cm  Full face mask   HME: [facility]   Order travel CPAP machine APAP 6-20  Follow up 1 year            Relevant Orders    CPAP FOR HOME USE    Morbid obesity with BMI of 40.0-44.9, adult     Encouraged calorie reduction and 30 minutes of exercise daily. Discussed impact of obesity on general health.  Wt Readings from Last 9 Encounters:   11/15/22 (!) 161.4 kg (355 lb 13.2 oz)   10/14/22 (!) 161 kg (355 lb)   08/30/22 (!) 161.1 kg (355 lb 2.6 oz)   08/23/22 (!) 163.7 kg (361 lb 0.1 oz)   07/26/22 (!) 157.9 kg (348 lb)   09/28/21 (!) 158 kg (348 lb 5.2 oz)   07/20/21 (!) 163.5 kg (360 lb 7.2 oz)   12/30/20 (!) 161.9 kg (356 lb 14.8 oz)   03/09/20 (!) 163.6 kg (360 lb 10.8 oz)   Body mass index is 42.19 kg/m².           Insomnia     Trazodone 50 mg, managed by primary care provider             (DME) - [facility]  Reviewed therapeutic goals for positive airway pressure therapy Auto CPAP  Ideal is usage 100% of nights for 6 - 8 hours per night. Minimum usage is 70% of night for at least 4 hours per night used.       Follow up in about 1 year (around 11/15/2023) for CPAP 1 year compliance download.

## 2022-11-15 NOTE — ASSESSMENT & PLAN NOTE
1/28/2007 PSG The diagnostic polysomnography revealed a mild obstructive sleep apnea / hypopnea syndrome (A + H Index = 9.9 hr.   Benefits and compliant with Auto CPAP 6-20 cm  Full face mask   HME: [facility] Order travel CPAP machine APAP 6-20  Follow up 1 year

## 2022-11-15 NOTE — ASSESSMENT & PLAN NOTE
Encouraged calorie reduction and 30 minutes of exercise daily. Discussed impact of obesity on general health.  Wt Readings from Last 9 Encounters:   11/15/22 (!) 161.4 kg (355 lb 13.2 oz)   10/14/22 (!) 161 kg (355 lb)   08/30/22 (!) 161.1 kg (355 lb 2.6 oz)   08/23/22 (!) 163.7 kg (361 lb 0.1 oz)   07/26/22 (!) 157.9 kg (348 lb)   09/28/21 (!) 158 kg (348 lb 5.2 oz)   07/20/21 (!) 163.5 kg (360 lb 7.2 oz)   12/30/20 (!) 161.9 kg (356 lb 14.8 oz)   03/09/20 (!) 163.6 kg (360 lb 10.8 oz)   Body mass index is 42.19 kg/m².

## 2022-11-22 ENCOUNTER — PATIENT MESSAGE (OUTPATIENT)
Dept: FAMILY MEDICINE | Facility: CLINIC | Age: 57
End: 2022-11-22
Payer: COMMERCIAL

## 2022-11-22 ENCOUNTER — TELEPHONE (OUTPATIENT)
Dept: FAMILY MEDICINE | Facility: CLINIC | Age: 57
End: 2022-11-22
Payer: COMMERCIAL

## 2022-11-22 DIAGNOSIS — M16.0 PRIMARY OSTEOARTHRITIS OF BOTH HIPS: ICD-10-CM

## 2022-11-22 RX ORDER — MELOXICAM 15 MG/1
15 TABLET ORAL DAILY
Qty: 90 TABLET | Refills: 1 | Status: SHIPPED | OUTPATIENT
Start: 2022-11-22 | End: 2023-02-20

## 2022-12-12 ENCOUNTER — PATIENT MESSAGE (OUTPATIENT)
Dept: ORTHOPEDICS | Facility: CLINIC | Age: 57
End: 2022-12-12
Payer: COMMERCIAL

## 2022-12-13 ENCOUNTER — TELEPHONE (OUTPATIENT)
Dept: ORTHOPEDICS | Facility: CLINIC | Age: 57
End: 2022-12-13
Payer: COMMERCIAL

## 2022-12-13 ENCOUNTER — PATIENT MESSAGE (OUTPATIENT)
Dept: ORTHOPEDICS | Facility: CLINIC | Age: 57
End: 2022-12-13
Payer: COMMERCIAL

## 2022-12-13 NOTE — TELEPHONE ENCOUNTER
Contacted Carmine at [phone] and scheduled P2P review for today, 12/13 at 5:15pm with Dr. Kinsey Rossi.

## 2023-01-11 ENCOUNTER — OFFICE VISIT (OUTPATIENT)
Dept: UROLOGY | Facility: CLINIC | Age: 58
End: 2023-01-11
Payer: COMMERCIAL

## 2023-01-11 ENCOUNTER — LAB VISIT (OUTPATIENT)
Dept: LAB | Facility: HOSPITAL | Age: 58
End: 2023-01-11
Attending: UROLOGY
Payer: COMMERCIAL

## 2023-01-11 VITALS
WEIGHT: 315 LBS | TEMPERATURE: 98 F | BODY MASS INDEX: 42.17 KG/M2 | HEART RATE: 65 BPM | DIASTOLIC BLOOD PRESSURE: 87 MMHG | SYSTOLIC BLOOD PRESSURE: 133 MMHG

## 2023-01-11 DIAGNOSIS — N52.9 ERECTILE DYSFUNCTION, UNSPECIFIED ERECTILE DYSFUNCTION TYPE: Primary | ICD-10-CM

## 2023-01-11 DIAGNOSIS — R79.89 LOW TESTOSTERONE: ICD-10-CM

## 2023-01-11 LAB — HGB BLD-MCNC: 17.2 G/DL (ref 14–18)

## 2023-01-11 PROCEDURE — 3008F PR BODY MASS INDEX (BMI) DOCUMENTED: ICD-10-PCS | Mod: CPTII,S$GLB,, | Performed by: UROLOGY

## 2023-01-11 PROCEDURE — 99999 PR PBB SHADOW E&M-EST. PATIENT-LVL IV: ICD-10-PCS | Mod: PBBFAC,,, | Performed by: UROLOGY

## 2023-01-11 PROCEDURE — 99213 OFFICE O/P EST LOW 20 MIN: CPT | Mod: S$GLB,,, | Performed by: UROLOGY

## 2023-01-11 PROCEDURE — 3079F DIAST BP 80-89 MM HG: CPT | Mod: CPTII,S$GLB,, | Performed by: UROLOGY

## 2023-01-11 PROCEDURE — 99999 PR PBB SHADOW E&M-EST. PATIENT-LVL IV: CPT | Mod: PBBFAC,,, | Performed by: UROLOGY

## 2023-01-11 PROCEDURE — 99213 PR OFFICE/OUTPT VISIT, EST, LEVL III, 20-29 MIN: ICD-10-PCS | Mod: S$GLB,,, | Performed by: UROLOGY

## 2023-01-11 PROCEDURE — 36415 COLL VENOUS BLD VENIPUNCTURE: CPT | Performed by: UROLOGY

## 2023-01-11 PROCEDURE — 3075F SYST BP GE 130 - 139MM HG: CPT | Mod: CPTII,S$GLB,, | Performed by: UROLOGY

## 2023-01-11 PROCEDURE — 3079F PR MOST RECENT DIASTOLIC BLOOD PRESSURE 80-89 MM HG: ICD-10-PCS | Mod: CPTII,S$GLB,, | Performed by: UROLOGY

## 2023-01-11 PROCEDURE — 3008F BODY MASS INDEX DOCD: CPT | Mod: CPTII,S$GLB,, | Performed by: UROLOGY

## 2023-01-11 PROCEDURE — 3075F PR MOST RECENT SYSTOLIC BLOOD PRESS GE 130-139MM HG: ICD-10-PCS | Mod: CPTII,S$GLB,, | Performed by: UROLOGY

## 2023-01-11 PROCEDURE — 80076 HEPATIC FUNCTION PANEL: CPT | Performed by: UROLOGY

## 2023-01-11 PROCEDURE — 85018 HEMOGLOBIN: CPT | Performed by: UROLOGY

## 2023-01-11 RX ORDER — TESTOSTERONE CYPIONATE 200 MG/ML
200 INJECTION, SOLUTION INTRAMUSCULAR
Qty: 10 ML | Refills: 3 | Status: SHIPPED | OUTPATIENT
Start: 2023-01-11 | End: 2023-01-11

## 2023-01-11 RX ORDER — TESTOSTERONE CYPIONATE 200 MG/ML
200 INJECTION, SOLUTION INTRAMUSCULAR
Qty: 10 ML | Refills: 3 | Status: SHIPPED | OUTPATIENT
Start: 2023-01-11 | End: 2023-04-24 | Stop reason: SDUPTHER

## 2023-01-11 RX ORDER — TADALAFIL 20 MG/1
20 TABLET ORAL DAILY
Qty: 10 TABLET | Refills: 5 | Status: SHIPPED | OUTPATIENT
Start: 2023-01-11 | End: 2023-09-12 | Stop reason: SDUPTHER

## 2023-01-11 NOTE — PROGRESS NOTES
Chief Complaint: Low T    HPI:   01/11/2023 - returns today for follow-up, no new issues in the interim, testosterone working well at the current dosage, due for labs today, no voiding issues or gross hematuria    07/26/2022 - 57 yo male that presents for evaluation and continued management of low T. Up until recently, he was seeing Dr Narayan Grove for this issue, however he has had multiple communication issues with his staff that has made the whole process much more difficult that it should be. He notes a long history of T use, has been on it for about 10 yrs, typically on 150-200mg per week IM, obtains it from [facility]. He notes that low energy was the initial indication, and that this dosage helps greatly. He also notes ED, takes cialis, which works well for him. Denies any voiding issues, denies GH, denies family history of  cancers. Denies prior urologic procedures.     PMH:  Past Medical History:   Diagnosis Date    Cellulitis     left leg    Hip arthritis     right    Hyperlipidemia     Hypertension     Hypogonadism male     Hypokalemia     Morbid obesity     Sleep apnea     on CPAP       PSH:  Past Surgical History:   Procedure Laterality Date    COLONOSCOPY N/A 12/06/2017    Procedure: COLONOSCOPY;  Surgeon: Rory Barone MD;  Location: [facility];  Service: Endoscopy;  Laterality: N/A;    right bunionectomy         Family History:  Family History   Problem Relation Age of Onset    No Known Problems Mother     Hypertension Father     Heart disease Father         CAD    Diabetes Sister     No Known Problems Sister     Hypertension Brother     Diabetes Maternal Grandmother     No Known Problems Daughter     No Known Problems Daughter     No Known Problems Daughter     No Known Problems Other     Cancer Neg Hx     Stroke Neg Hx     Thyroid disease Neg Hx        Social History:  Social History     Tobacco Use    Smoking status: Light Smoker     Types: Cigars    Smokeless tobacco:  Never   Substance Use Topics    Alcohol use: Yes     Alcohol/week: 10.0 standard drinks     Types: 4 Glasses of wine, 6 Shots of liquor per week     Comment: Occasionally    Drug use: Never        Review of Systems:  General: No fever, chills  Skin: No rashes  Chest:  Denies cough and sputum production  Heart: Denies chest pain  Resp: Denies dyspnea  Abdomen: Denies diarrhea, abdominal pain, hematemesis, or blood in stool.  Musculoskeletal: No joint stiffness or swelling. Denies back pain.  : see HPI  Neuro: no dizziness or weakness    Allergies:  Shrimp    Medications:    Current Outpatient Medications:     amLODIPine (NORVASC) 10 MG tablet, Take 1 tablet (10 mg total) by mouth once daily., Disp: 30 tablet, Rfl: 5    aspirin (ECOTRIN) 81 MG EC tablet, Take 1 tablet by mouth Daily., Disp: , Rfl:     carvediloL (COREG) 25 MG tablet, Take 1 tablet (25 mg total) by mouth 2 (two) times daily with meals., Disp: 60 tablet, Rfl: 5    chlorthalidone (HYGROTEN) 50 MG Tab, Take 1 tablet (50 mg total) by mouth once daily., Disp: 30 tablet, Rfl: 5    meloxicam (MOBIC) 15 MG tablet, Take 1 tablet (15 mg total) by mouth once daily., Disp: 90 tablet, Rfl: 1    mometasone (NASONEX) 50 mcg/actuation nasal spray, PLEASE UPDATE FOR ANY MEDICATION CHANGE, Disp: 17 g, Rfl: 5    multivitamin (THERAGRAN) per tablet, Take 1 tablet by mouth once daily., Disp: , Rfl:     potassium chloride SA (KLOR-CON M20) 20 MEQ tablet, Take 1 tablet (20 mEq total) by mouth once daily., Disp: 30 tablet, Rfl: 5    pravastatin (PRAVACHOL) 20 MG tablet, Take 1 tablet (20 mg total) by mouth once daily., Disp: 30 tablet, Rfl: 5    spironolactone (ALDACTONE) 50 MG tablet, Take 1 tablet (50 mg total) by mouth once daily., Disp: 30 tablet, Rfl: 5    tadalafiL (CIALIS) 20 MG Tab, TAKE 1 TABLET ONCE DAILY, Disp: 10 tablet, Rfl: 0    testosterone cypionate (DEPOTESTOTERONE CYPIONATE) 200 mg/mL injection, Inject 1 mL (200 mg total) into the muscle every 7 days.  ADMINISTER 1 ML IN THE MUSCLE EVERY 14 DAYS, Disp: 10 mL, Rfl: 3    traZODone (DESYREL) 50 MG tablet, Take 1 tablet (50 mg total) by mouth nightly., Disp: 30 tablet, Rfl: 5    valsartan (DIOVAN) 320 MG tablet, Take 1 tablet (320 mg total) by mouth once daily., Disp: 30 tablet, Rfl: 5    Physical Exam:  Vitals:    01/11/23 1020   BP: 133/87   Pulse: 65   Temp: 98.2 °F (36.8 °C)     Body mass index is 42.17 kg/m².  General: awake, alert, cooperative  Head: NC/AT  Ears: external ears normal  Eyes: sclera normal  Lungs: normal inspiration, NAD  Heart: well-perfused  : deferred, patient notes 2 normal exams in the last 6 months  Skin: The skin is warm and dry  Ext: No c/c/e.  Neuro: grossly intact, AOx3    RADIOLOGY:  No recent relevant imaging available for review.    LABS:  I personally reviewed the following lab values:  Lab Results   Component Value Date    WBC 6.14 08/23/2022    HGB 17.0 08/23/2022    HCT 49.7 08/23/2022     08/23/2022     08/23/2022    K 4.1 08/23/2022    CL 98 08/23/2022    CREATININE 1.0 08/23/2022    BUN 16 08/23/2022    CO2 32 (H) 08/23/2022    TSH 2.283 08/23/2022    PSA 2.7 08/23/2022    HGBA1C 5.8 (H) 08/23/2022    CHOL 207 (H) 08/23/2022    TRIG 89 08/23/2022    HDL 43 08/23/2022    ALT 38 08/23/2022    AST 33 08/23/2022       Assessment/Plan:   Reggie Sawant is a 57 y.o. male with:    Low T - continue 200mg/week, f/u 6 months with labs    Prostate Cancer Screening - PSA 2.7 in August, f/u 6 months with PSA and BAIRON    Alvin Moe MD  Urology

## 2023-01-12 ENCOUNTER — HOSPITAL ENCOUNTER (OUTPATIENT)
Dept: RADIOLOGY | Facility: HOSPITAL | Age: 58
Discharge: HOME | End: 2023-01-12
Attending: STUDENT IN AN ORGANIZED HEALTH CARE EDUCATION/TRAINING PROGRAM
Payer: COMMERCIAL

## 2023-01-12 DIAGNOSIS — M25.511 CHRONIC RIGHT SHOULDER PAIN: ICD-10-CM

## 2023-01-12 DIAGNOSIS — M75.101 NONTRAUMATIC TEAR OF RIGHT ROTATOR CUFF, UNSPECIFIED TEAR EXTENT: ICD-10-CM

## 2023-01-12 DIAGNOSIS — G89.29 CHRONIC RIGHT SHOULDER PAIN: ICD-10-CM

## 2023-01-12 LAB
ALBUMIN SERPL BCP-MCNC: 4.2 G/DL (ref 3.5–5.2)
ALP SERPL-CCNC: 44 U/L (ref 55–135)
ALT SERPL W/O P-5'-P-CCNC: 31 U/L (ref 10–44)
AST SERPL-CCNC: 31 U/L (ref 10–40)
BILIRUB DIRECT SERPL-MCNC: 0.4 MG/DL (ref 0.1–0.3)
BILIRUB SERPL-MCNC: 1.4 MG/DL (ref 0.1–1)
PROT SERPL-MCNC: 7.8 G/DL (ref 6–8.4)

## 2023-01-12 PROCEDURE — 73221 MRI SHOULDER WITHOUT CONTRAST RIGHT: ICD-10-PCS | Mod: 26,RT,, | Performed by: RADIOLOGY

## 2023-01-12 PROCEDURE — 73221 MRI JOINT UPR EXTREM W/O DYE: CPT | Mod: 26,RT,, | Performed by: RADIOLOGY

## 2023-01-12 PROCEDURE — 73221 MRI JOINT UPR EXTREM W/O DYE: CPT | Mod: TC,RT

## 2023-01-12 NOTE — PROGRESS NOTES
Orthopaedic Follow-Up Visit    Last Appointment: 11/15/22  Diagnosis: Chronic right shoulder pain, suspected rotator cuff tear   Prior Procedure: MRI    Reggie Sawant is a 57 y.o. male who is here for f/u evaluation of his right shoulder. The patient was last seen here by me on 11/15/22 at which point we decided to send him for an MRI of his right shoulder to evaluate for possible rotator cuff tear. The patient returns today to review his MRI and discuss further treatment options.     To review his history, Reggie Sawant is a 57 y.o. right-hand dominant male who presented with right shoulder pain and dysfunction that began approximately 6-9 months ago with no specific injury but reported gradual onset of symptoms. His symptoms included pain in the right shoulder, localized laterally with pain quality of throbbing achiness over shoulder joint. His pain was aggravated by ADLs, especially lifting, reaching, overhead activity and he also endorsed significant pain when sleeping. At this time he has tried rest, activity modification, Meloxicam, and corticosteroid injection with no long term relief of his symptoms, so we proceeded with MRI of his shoulder.     Patient's medications, allergies, past medical, surgical, social and family histories were reviewed and updated as appropriate.    Review of Systems   All systems reviewed were negative.  Specifically, the patient denies fever, chills, weight loss, chest pain, shortness of breath, or dyspnea on exertion.      Past Medical History:   Diagnosis Date    Cellulitis     left leg    Hip arthritis     right    Hyperlipidemia     Hypertension     Hypogonadism male     Hypokalemia     Morbid obesity     Sleep apnea     on CPAP       Past Surgical History:   Procedure Laterality Date    COLONOSCOPY N/A 12/06/2017    Procedure: COLONOSCOPY;  Surgeon: Rory Barone MD;  Location: [facility];  Service: Endoscopy;  Laterality: N/A;    right bunionectomy         Patient's  Medications   New Prescriptions    No medications on file   Previous Medications    AMLODIPINE (NORVASC) 10 MG TABLET    Take 1 tablet (10 mg total) by mouth once daily.    ASPIRIN (ECOTRIN) 81 MG EC TABLET    Take 1 tablet by mouth Daily.    CARVEDILOL (COREG) 25 MG TABLET    Take 1 tablet (25 mg total) by mouth 2 (two) times daily with meals.    CHLORTHALIDONE (HYGROTEN) 50 MG TAB    Take 1 tablet (50 mg total) by mouth once daily.    MELOXICAM (MOBIC) 15 MG TABLET    Take 1 tablet (15 mg total) by mouth once daily.    MOMETASONE (NASONEX) 50 MCG/ACTUATION NASAL SPRAY    PLEASE UPDATE FOR ANY MEDICATION CHANGE    MULTIVITAMIN (THERAGRAN) PER TABLET    Take 1 tablet by mouth once daily.    POTASSIUM CHLORIDE SA (KLOR-CON M20) 20 MEQ TABLET    Take 1 tablet (20 mEq total) by mouth once daily.    PRAVASTATIN (PRAVACHOL) 20 MG TABLET    Take 1 tablet (20 mg total) by mouth once daily.    SPIRONOLACTONE (ALDACTONE) 50 MG TABLET    Take 1 tablet (50 mg total) by mouth once daily.    TADALAFIL (CIALIS) 20 MG TAB    Take 1 tablet (20 mg total) by mouth once daily.    TESTOSTERONE CYPIONATE (DEPOTESTOTERONE CYPIONATE) 200 MG/ML INJECTION    Inject 1 mL (200 mg total) into the muscle every 7 days. ADMINISTER 1 ML IN THE MUSCLE EVERY 14 DAYS    TRAZODONE (DESYREL) 50 MG TABLET    Take 1 tablet (50 mg total) by mouth nightly.    VALSARTAN (DIOVAN) 320 MG TABLET    Take 1 tablet (320 mg total) by mouth once daily.   Modified Medications    No medications on file   Discontinued Medications    No medications on file       Family History   Problem Relation Age of Onset    No Known Problems Mother     Hypertension Father     Heart disease Father         CAD    Diabetes Sister     No Known Problems Sister     Hypertension Brother     Diabetes Maternal Grandmother     No Known Problems Daughter     No Known Problems Daughter     No Known Problems Daughter     No Known Problems Other     Cancer Neg Hx     Stroke Neg Hx     Thyroid  disease Neg Hx        Review of patient's allergies indicates:   Allergen Reactions    Shrimp Anaphylaxis         Objective:      Physical Exam  Patient is alert and oriented, no distress. Skin is intact. Neuro is normal with no focal motor or sensory findings.    Cervical exam is unremarkable. Intact cervical ROM. Negative Spurling's test    Physical Exam:                       RIGHT                                     LEFT     Scap Dyskinesis/Winging       (-)                                             (-)     Tenderness:                                                                              Greater Tuberosity                  +                                              (-)  Bicipital Groove                       (-)                                             (-)  AC joint                                   (-)                                             (-)  Other:      ROM:  Forward Elevation       170                                          180  Abduction                    120                                          120  ER (at side)                 65                                            80  IR                                 T8                                            T8     Strength:   Supraspinatus             5/5                                           5/5  Infraspinatus               5/5                                           5/5  Subscap / IR               5/5                                           5/5      Special Tests:              Neer:                                       (-)                                              (-)              Olivares:                                 (-)                                             (-)              SS Stress:                               +                                              (-)              Bear Hug:                                (-)                                             (-)              [place]'s:                                  (-)                                             (-)              Resisted Thrower's:                (-)                                             (-)              Cross Arm Abduction:             (-)                                             (-)    Neurovascular examination  - Motor grossly intact bilaterally to shoulder abduction, elbow flexion and extension, wrist flexion and extension, and intrinsic hand musculature  - Sensation intact to light touch bilaterally in axillary, median, radial, and ulnar distributions  - Symmetrical radial pulses    Imaging:    XR Results:  Results for orders placed during the hospital encounter of 10/14/22    X-ray Shoulder 2 or More Views Right    Narrative  EXAMINATION:  XR SHOULDER COMPLETE 2 OR MORE VIEWS RIGHT    INDICATION:  Pain in right shoulder    COMPARISON:  None    FINDINGS:  Internal rotation, external rotation scapular Y and axillary views of the right shoulder are obtained.  There is no fracture.  Alignment is anatomic.  Acromioclavicular joint is intact.  There are mild degenerative changes at the acromioclavicular and glenohumeral articulation.  Unremarkable soft tissues.  Partially visualized lung fields are clear.    Impression  1. Mild degenerative changes.  No evidence of acute injury.      Electronically signed by: Filiberto Douglas MD  Date:    10/14/2022  Time:    08:25      MRI Results:  MRI Shoulder Without Contrast Right  Narrative: EXAMINATION:  MRI SHOULDER WITHOUT CONTRAST RIGHT    CLINICAL HISTORY:  Shoulder pain, rotator cuff disorder suspected, xray done;  Pain in right shoulder    TECHNIQUE:  Multiplanar multislice images are were performed through the shoulder.    COMPARISON:  None    FINDINGS:  Rotator cuff: Partial tearing of the anterior supraspinatus tendon.  Mild tendinosis of the supraspinatus and infraspinatus tendons.  Mild tendinosis of the subscapularis tendon.  The teres minor tendon is intact and normal in signal  intensity.  Mild atrophy of the supraspinatus muscle.  No subacromial/subdeltoid bursal fluid.    Acromioclavicular arch: Mild degenerative changes at the acromioclavicular joint.  Acromioclavicular alignment is anatomic.  The coracoacromial and coracoclavicular ligaments are intact.    Glenohumeral joint: The humeral head is well position within the glenoid fossa.  There is prominent increased T2 signal within the greater tuberosity at the insertion of the supraspinatus tendon.  Minimal cortical irregularity.  There are mild degenerative changes of the glenohumeral joint with osteophytes and joint space narrowing.  The long head of the biceps tendon is intact.  The labrum is intact.  Please note evaluation of the labrum is limited by the lack of joint fluid.  Impression: Partial tear of the supraspinatus tendon with tendinosis and mild muscle atrophy.  The bone marrow edema and cortical irregularity at the insertion of the supraspinatus tendon is likely due to the rotator cuff pathology.    Mild glenohumeral and acromioclavicular osteoarthritis.    Electronically signed by: Narayan Russell  Date:    01/12/2023  Time:    16:11           CT Results:  No results found for this or any previous visit.      Physician read: I agree with the above impression.    Assessment/Plan:   Reggie Sawant is a 57 y.o. male with right shoulder partial thickness articular sided rotator cuff tear     Plan:    Reviewed MRI with the patient today. His MRI shows that his rotator cuff is mostly intact with a small partial articular sided tear. He reports that he continues to experience symptoms on an intermittent basis but these are tolerable. The most symptomatic time for him is night. He will routinely wake early in the morning with pain in the shoulder. He reports no significant activity related symptoms.  As he has a small partial articular sided tear and no activity related symptoms, I recommend continuing with non-operative management.  We reviewed non-operative treatment options in the form of rest, activity modifications, oral anti-inflammatories, physical therapy/home exercise, and injections which include corticosteroid or PRP.   I recommend proceeding with intra-articular corticosteroid injection into the right glenohumeral joint and home exercise program. The patient is in agreement with this plan.   Corticosteroid injection into the right glenohumeral joint performed today. Patient tolerated the procedure well with no immediate complications.   At least 15 minutes were spent developing, teaching, and performing a home exercise program.  A written summary was provided and all questions were answered.  This service was performed by Kvng Prescott, Sports Medicine Assistant under direction of Filiberto Sherman MD.  CPT 90470-FL.  Follow up as needed.           Filiberto Sherman MD    I, Kvng Prescott, acted as a scribe for Filiberto Sherman MD for the duration of this office visit.

## 2023-01-13 ENCOUNTER — PATIENT MESSAGE (OUTPATIENT)
Dept: PRIMARY CARE CLINIC | Facility: CLINIC | Age: 58
End: 2023-01-13
Payer: COMMERCIAL

## 2023-01-13 ENCOUNTER — OFFICE VISIT (OUTPATIENT)
Dept: ORTHOPEDICS | Facility: CLINIC | Age: 58
End: 2023-01-13
Payer: COMMERCIAL

## 2023-01-13 VITALS — HEIGHT: 77 IN | BODY MASS INDEX: 37.19 KG/M2 | WEIGHT: 315 LBS

## 2023-01-13 DIAGNOSIS — M75.111 NONTRAUMATIC INCOMPLETE TEAR OF RIGHT ROTATOR CUFF: Primary | ICD-10-CM

## 2023-01-13 PROCEDURE — 99999 PR PBB SHADOW E&M-EST. PATIENT-LVL III: CPT | Mod: PBBFAC,,, | Performed by: STUDENT IN AN ORGANIZED HEALTH CARE EDUCATION/TRAINING PROGRAM

## 2023-01-13 PROCEDURE — 1160F RVW MEDS BY RX/DR IN RCRD: CPT | Mod: CPTII,S$GLB,, | Performed by: STUDENT IN AN ORGANIZED HEALTH CARE EDUCATION/TRAINING PROGRAM

## 2023-01-13 PROCEDURE — 3008F BODY MASS INDEX DOCD: CPT | Mod: CPTII,S$GLB,, | Performed by: STUDENT IN AN ORGANIZED HEALTH CARE EDUCATION/TRAINING PROGRAM

## 2023-01-13 PROCEDURE — 99213 PR OFFICE/OUTPT VISIT, EST, LEVL III, 20-29 MIN: ICD-10-PCS | Mod: 25,S$GLB,, | Performed by: STUDENT IN AN ORGANIZED HEALTH CARE EDUCATION/TRAINING PROGRAM

## 2023-01-13 PROCEDURE — 1159F MED LIST DOCD IN RCRD: CPT | Mod: CPTII,S$GLB,, | Performed by: STUDENT IN AN ORGANIZED HEALTH CARE EDUCATION/TRAINING PROGRAM

## 2023-01-13 PROCEDURE — 20610 LARGE JOINT ASPIRATION/INJECTION: R GLENOHUMERAL: ICD-10-PCS | Mod: RT,S$GLB,, | Performed by: STUDENT IN AN ORGANIZED HEALTH CARE EDUCATION/TRAINING PROGRAM

## 2023-01-13 PROCEDURE — 20610 DRAIN/INJ JOINT/BURSA W/O US: CPT | Mod: RT,S$GLB,, | Performed by: STUDENT IN AN ORGANIZED HEALTH CARE EDUCATION/TRAINING PROGRAM

## 2023-01-13 PROCEDURE — 99999 PR PBB SHADOW E&M-EST. PATIENT-LVL III: ICD-10-PCS | Mod: PBBFAC,,, | Performed by: STUDENT IN AN ORGANIZED HEALTH CARE EDUCATION/TRAINING PROGRAM

## 2023-01-13 PROCEDURE — 1160F PR REVIEW ALL MEDS BY PRESCRIBER/CLIN PHARMACIST DOCUMENTED: ICD-10-PCS | Mod: CPTII,S$GLB,, | Performed by: STUDENT IN AN ORGANIZED HEALTH CARE EDUCATION/TRAINING PROGRAM

## 2023-01-13 PROCEDURE — 1159F PR MEDICATION LIST DOCUMENTED IN MEDICAL RECORD: ICD-10-PCS | Mod: CPTII,S$GLB,, | Performed by: STUDENT IN AN ORGANIZED HEALTH CARE EDUCATION/TRAINING PROGRAM

## 2023-01-13 PROCEDURE — 3008F PR BODY MASS INDEX (BMI) DOCUMENTED: ICD-10-PCS | Mod: CPTII,S$GLB,, | Performed by: STUDENT IN AN ORGANIZED HEALTH CARE EDUCATION/TRAINING PROGRAM

## 2023-01-13 PROCEDURE — 99213 OFFICE O/P EST LOW 20 MIN: CPT | Mod: 25,S$GLB,, | Performed by: STUDENT IN AN ORGANIZED HEALTH CARE EDUCATION/TRAINING PROGRAM

## 2023-01-13 RX ADMIN — TRIAMCINOLONE ACETONIDE 40 MG: 40 INJECTION, SUSPENSION INTRA-ARTICULAR; INTRAMUSCULAR at 10:01

## 2023-01-16 RX ORDER — TRIAMCINOLONE ACETONIDE 40 MG/ML
40 INJECTION, SUSPENSION INTRA-ARTICULAR; INTRAMUSCULAR
Status: DISCONTINUED | OUTPATIENT
Start: 2023-01-13 | End: 2023-01-16 | Stop reason: HOSPADM

## 2023-01-16 NOTE — PROCEDURES
Large Joint Aspiration/Injection: R glenohumeral    Date/Time: 1/13/2023 10:45 AM  Performed by: Filiberto Sherman MD  Authorized by: Filiberto Sherman MD     Consent Done?:  Yes (Verbal)  Indications:  Pain  Site marked: the procedure site was marked    Timeout: prior to procedure the correct patient, procedure, and site was verified    Prep: patient was prepped and draped in usual sterile fashion      Local anesthesia used?: Yes    Anesthesia:  Local infiltration  Local anesthetic:  Lidocaine 1% without epinephrine    Details:  Needle Size:  22 G  Ultrasonic Guidance for needle placement?: No    Approach:  Posterior  Location:  Shoulder  Site:  R glenohumeral  Medications:  40 mg triamcinolone acetonide 40 mg/mL  Patient tolerance:  Patient tolerated the procedure well with no immediate complications

## 2023-01-30 ENCOUNTER — PATIENT MESSAGE (OUTPATIENT)
Dept: ADMINISTRATIVE | Facility: OTHER | Age: 58
End: 2023-01-30
Payer: COMMERCIAL

## 2023-02-03 ENCOUNTER — PATIENT MESSAGE (OUTPATIENT)
Dept: UROLOGY | Facility: CLINIC | Age: 58
End: 2023-02-03
Payer: COMMERCIAL

## 2023-02-23 ENCOUNTER — LAB VISIT (OUTPATIENT)
Dept: LAB | Facility: HOSPITAL | Age: 58
End: 2023-02-23
Attending: FAMILY MEDICINE
Payer: COMMERCIAL

## 2023-02-23 ENCOUNTER — OFFICE VISIT (OUTPATIENT)
Dept: FAMILY MEDICINE | Facility: CLINIC | Age: 58
End: 2023-02-23
Payer: COMMERCIAL

## 2023-02-23 VITALS
BODY MASS INDEX: 37.19 KG/M2 | RESPIRATION RATE: 18 BRPM | HEIGHT: 77 IN | TEMPERATURE: 98 F | OXYGEN SATURATION: 95 % | WEIGHT: 315 LBS | HEART RATE: 66 BPM | DIASTOLIC BLOOD PRESSURE: 80 MMHG | SYSTOLIC BLOOD PRESSURE: 142 MMHG

## 2023-02-23 DIAGNOSIS — E66.01 MORBID OBESITY WITH BMI OF 40.0-44.9, ADULT: ICD-10-CM

## 2023-02-23 DIAGNOSIS — E78.5 HYPERLIPIDEMIA, UNSPECIFIED HYPERLIPIDEMIA TYPE: ICD-10-CM

## 2023-02-23 DIAGNOSIS — I10 ESSENTIAL HYPERTENSION: Primary | ICD-10-CM

## 2023-02-23 DIAGNOSIS — G47.33 OSA ON CPAP: ICD-10-CM

## 2023-02-23 DIAGNOSIS — K63.5 BENIGN COLON POLYP: ICD-10-CM

## 2023-02-23 DIAGNOSIS — R73.03 PREDIABETES: ICD-10-CM

## 2023-02-23 DIAGNOSIS — I10 ESSENTIAL HYPERTENSION: ICD-10-CM

## 2023-02-23 LAB
ANION GAP SERPL CALC-SCNC: 8 MMOL/L (ref 8–16)
BUN SERPL-MCNC: 15 MG/DL (ref 6–20)
CALCIUM SERPL-MCNC: 10.3 MG/DL (ref 8.7–10.5)
CHLORIDE SERPL-SCNC: 97 MMOL/L (ref 95–110)
CO2 SERPL-SCNC: 31 MMOL/L (ref 23–29)
CREAT SERPL-MCNC: 1.1 MG/DL (ref 0.5–1.4)
EST. GFR  (NO RACE VARIABLE): >60 ML/MIN/1.73 M^2
GLUCOSE SERPL-MCNC: 85 MG/DL (ref 70–110)
POTASSIUM SERPL-SCNC: 4 MMOL/L (ref 3.5–5.1)
SODIUM SERPL-SCNC: 136 MMOL/L (ref 136–145)

## 2023-02-23 PROCEDURE — 3008F BODY MASS INDEX DOCD: CPT | Mod: CPTII,S$GLB,, | Performed by: FAMILY MEDICINE

## 2023-02-23 PROCEDURE — 3077F PR MOST RECENT SYSTOLIC BLOOD PRESSURE >= 140 MM HG: ICD-10-PCS | Mod: CPTII,S$GLB,, | Performed by: FAMILY MEDICINE

## 2023-02-23 PROCEDURE — 99214 OFFICE O/P EST MOD 30 MIN: CPT | Mod: S$GLB,,, | Performed by: FAMILY MEDICINE

## 2023-02-23 PROCEDURE — 1160F PR REVIEW ALL MEDS BY PRESCRIBER/CLIN PHARMACIST DOCUMENTED: ICD-10-PCS | Mod: CPTII,S$GLB,, | Performed by: FAMILY MEDICINE

## 2023-02-23 PROCEDURE — 3008F PR BODY MASS INDEX (BMI) DOCUMENTED: ICD-10-PCS | Mod: CPTII,S$GLB,, | Performed by: FAMILY MEDICINE

## 2023-02-23 PROCEDURE — 36415 COLL VENOUS BLD VENIPUNCTURE: CPT | Mod: PO | Performed by: FAMILY MEDICINE

## 2023-02-23 PROCEDURE — 80048 BASIC METABOLIC PNL TOTAL CA: CPT | Performed by: FAMILY MEDICINE

## 2023-02-23 PROCEDURE — 1160F RVW MEDS BY RX/DR IN RCRD: CPT | Mod: CPTII,S$GLB,, | Performed by: FAMILY MEDICINE

## 2023-02-23 PROCEDURE — 3079F DIAST BP 80-89 MM HG: CPT | Mod: CPTII,S$GLB,, | Performed by: FAMILY MEDICINE

## 2023-02-23 PROCEDURE — 3044F HG A1C LEVEL LT 7.0%: CPT | Mod: CPTII,S$GLB,, | Performed by: FAMILY MEDICINE

## 2023-02-23 PROCEDURE — 3077F SYST BP >= 140 MM HG: CPT | Mod: CPTII,S$GLB,, | Performed by: FAMILY MEDICINE

## 2023-02-23 PROCEDURE — 99999 PR PBB SHADOW E&M-EST. PATIENT-LVL IV: CPT | Mod: PBBFAC,,, | Performed by: FAMILY MEDICINE

## 2023-02-23 PROCEDURE — 1159F MED LIST DOCD IN RCRD: CPT | Mod: CPTII,S$GLB,, | Performed by: FAMILY MEDICINE

## 2023-02-23 PROCEDURE — 1159F PR MEDICATION LIST DOCUMENTED IN MEDICAL RECORD: ICD-10-PCS | Mod: CPTII,S$GLB,, | Performed by: FAMILY MEDICINE

## 2023-02-23 PROCEDURE — 3044F PR MOST RECENT HEMOGLOBIN A1C LEVEL <7.0%: ICD-10-PCS | Mod: CPTII,S$GLB,, | Performed by: FAMILY MEDICINE

## 2023-02-23 PROCEDURE — 83036 HEMOGLOBIN GLYCOSYLATED A1C: CPT | Performed by: FAMILY MEDICINE

## 2023-02-23 PROCEDURE — 99214 PR OFFICE/OUTPT VISIT, EST, LEVL IV, 30-39 MIN: ICD-10-PCS | Mod: S$GLB,,, | Performed by: FAMILY MEDICINE

## 2023-02-23 PROCEDURE — 99999 PR PBB SHADOW E&M-EST. PATIENT-LVL IV: ICD-10-PCS | Mod: PBBFAC,,, | Performed by: FAMILY MEDICINE

## 2023-02-23 PROCEDURE — 3079F PR MOST RECENT DIASTOLIC BLOOD PRESSURE 80-89 MM HG: ICD-10-PCS | Mod: CPTII,S$GLB,, | Performed by: FAMILY MEDICINE

## 2023-02-23 RX ORDER — METFORMIN HYDROCHLORIDE 500 MG/1
500 TABLET ORAL 2 TIMES DAILY WITH MEALS
Qty: 180 TABLET | Refills: 1 | Status: SHIPPED | OUTPATIENT
Start: 2023-02-23 | End: 2023-08-15 | Stop reason: SDUPTHER

## 2023-02-23 NOTE — PROGRESS NOTES
Reggie Sawant    Chief Complaint   Patient presents with    Hypertension    Pre-diabetes    Follow-up       History of Present Illness:   Mr. Sellers comes in today for hypertension and pre diabetes follow up. He is fasting and has taken morning medications. He states he monitors his diet but has not been leisurely exercising. He states he has not been recently performing home blood pressure checks. He states he did not sleep with the CPAP last night.  He saw NP Christie Beckman on November 15, 2022 for surveillance of DEEPTHI on CPAP with one- year follow-up advised. He saw Dr. Moe, urologist, on January 11, 2023 for surveillance of hypogonadism, ED. He saw Dr. Sherman, orthopedist, on January 13, 2023 for Nontraumatic incomplete tear of right rotator cuff with injection given.    Otherwise, he denies having fever, chills, fatigue, appetite changes; shortness of breath, cough, wheezing; chest pain, palpitations, leg swelling; abdominal pain, nausea, vomiting, diarrhea, constipation; unusual urinary symptoms; polydipsia, polyphagia, polyuria, hot or cold intolerance; back pain; acute visual changes, numbness, headache; anxiety, depression, homicidal or suicidal thoughts.           Labs:                       WBC                      6.14                08/23/2022                 HGB                      17.2                01/11/2023                 HCT                      49.7                08/23/2022                 PLT                      179                 08/23/2022                 CHOL                     207 (H)             08/23/2022                 TRIG                     89                  08/23/2022                 HDL                      43                  08/23/2022                 ALT                      31                  01/11/2023                 AST                      31                  01/11/2023                 NA                       138                 08/23/2022                 K                         4.1                 08/23/2022                 CL                       98                  08/23/2022                 CREATININE               1.0                 08/23/2022                 BUN                      16                  08/23/2022                 CO2                      32 (H)              08/23/2022                 TSH                      2.283               08/23/2022                 PSA                      2.7                 08/23/2022                 HGBA1C                   5.8 (H)             08/23/2022              LDLCALC                  146.2               08/23/2022                Current Outpatient Medications   Medication Sig    amLODIPine (NORVASC) 10 MG tablet Take 1 tablet (10 mg total) by mouth once daily.    aspirin (ECOTRIN) 81 MG EC tablet Take 1 tablet by mouth Daily.    carvediloL (COREG) 25 MG tablet Take 1 tablet (25 mg total) by mouth 2 (two) times daily with meals.    chlorthalidone (HYGROTEN) 50 MG Tab Take 1 tablet (50 mg total) by mouth once daily.    multivitamin (THERAGRAN) per tablet Take 1 tablet by mouth once daily.    potassium chloride SA (KLOR-CON M20) 20 MEQ tablet Take 1 tablet (20 mEq total) by mouth once daily.    pravastatin (PRAVACHOL) 20 MG tablet Take 1 tablet (20 mg total) by mouth once daily.    spironolactone (ALDACTONE) 50 MG tablet Take 1 tablet (50 mg total) by mouth once daily.    tadalafiL (CIALIS) 20 MG Tab Take 1 tablet (20 mg total) by mouth once daily.    testosterone cypionate (DEPOTESTOTERONE CYPIONATE) 200 mg/mL injection Inject 1 mL (200 mg total) into the muscle every 7 days. ADMINISTER 1 ML IN THE MUSCLE EVERY 14 DAYS    traZODone (DESYREL) 50 MG tablet Take 1 tablet (50 mg total) by mouth nightly.    valsartan (DIOVAN) 320 MG tablet Take 1 tablet (320 mg total) by mouth once daily.    mometasone (NASONEX) 50 mcg/actuation nasal spray PLEASE UPDATE FOR ANY MEDICATION CHANGE (Patient not taking: Reported on  2/23/2023)       Review of Systems   Constitutional:  Negative for activity change, appetite change, chills, fatigue and fever.        Weight 163.7 kg (361 lb 0.1 oz) at August 23, 2022 visit.   Eyes:  Negative for visual disturbance.   Respiratory:  Negative for cough, shortness of breath and wheezing.         See history of present illness.   Cardiovascular:  Negative for chest pain, palpitations and leg swelling.        See history of present illness.   Gastrointestinal:  Negative for abdominal pain, constipation, diarrhea, nausea and vomiting.   Endocrine: Negative for cold intolerance, heat intolerance, polydipsia, polyphagia and polyuria.   Genitourinary:  Negative for difficulty urinating.        See history of present illness.   Musculoskeletal:  Negative for back pain.   Neurological:  Negative for numbness and headaches.   Psychiatric/Behavioral:  Positive for sleep disturbance. Negative for dysphoric mood and suicidal ideas. The patient is not nervous/anxious.         Negative for homicidal ideas. See history of present illness.     Objective:  Physical Exam  Vitals reviewed.   Constitutional:       General: He is not in acute distress.     Appearance: Normal appearance. He is well-developed. He is obese. He is not ill-appearing, toxic-appearing or diaphoretic.      Comments: Pleasant.   Eyes:      Comments: He wears glasses.   Neck:      Thyroid: No thyromegaly.   Cardiovascular:      Rate and Rhythm: Normal rate and regular rhythm.      Pulses:           Dorsalis pedis pulses are 3+ on the right side and 3+ on the left side.      Heart sounds: Normal heart sounds. No murmur heard.  Pulmonary:      Effort: Pulmonary effort is normal. No respiratory distress.      Breath sounds: Normal breath sounds. No wheezing.   Abdominal:      General: Bowel sounds are normal. There is no distension.      Palpations: Abdomen is soft. There is no mass.      Tenderness: There is no abdominal tenderness. There is no  guarding or rebound.   Musculoskeletal:         General: No swelling or tenderness. Normal range of motion.      Cervical back: Normal range of motion and neck supple. No tenderness.      Comments: He is ambulatory without problems.    Feet:      Right foot:      Protective Sensation: 5 sites tested.  5 sites sensed.      Skin integrity: No ulcer or skin breakdown.      Left foot:      Protective Sensation: 5 sites tested.  5 sites sensed.      Skin integrity: No ulcer or skin breakdown.   Lymphadenopathy:      Cervical: No cervical adenopathy.   Neurological:      General: No focal deficit present.      Mental Status: He is alert and oriented to person, place, and time.   Psychiatric:         Mood and Affect: Mood normal.         Behavior: Behavior normal.         Thought Content: Thought content normal.         Judgment: Judgment normal.     ASSESSMENT:  1. Essential hypertension    2. Prediabetes    3. Hyperlipidemia, unspecified hyperlipidemia type    4. DEEPTHI on CPAP    5. Benign colon polyp    6. Morbid obesity with BMI of 40.0-44.9, adult        PLAN:  Reggie was seen today for hypertension, pre-diabetes and follow-up.    Diagnoses and all orders for this visit:    Essential hypertension  -     Basic Metabolic Panel; Future    Prediabetes  -     metFORMIN (GLUCOPHAGE) 500 MG tablet; Take 1 tablet (500 mg total) by mouth 2 (two) times daily with meals.  -     Hemoglobin A1C; Future    Hyperlipidemia, unspecified hyperlipidemia type    DEEPTHI on CPAP    Benign colon polyp  -     Ambulatory referral/consult to Endo Procedure ; Future    Morbid obesity with BMI of 40.0-44.9, adult      Patient advised to call for results.  Continue current medications, follow low sodium, low cholesterol, low carb diet, daily walks.  Start Metformin 500 mg twice daily for prediabetes; medication precautions discussed with patient.  Keep follow up with specialists.  Follow up in about 6 months (around 8/15/2023) for  physical.

## 2023-02-24 LAB
ESTIMATED AVG GLUCOSE: 111 MG/DL (ref 68–131)
HBA1C MFR BLD: 5.5 % (ref 4–5.6)

## 2023-03-07 ENCOUNTER — HOSPITAL ENCOUNTER (OUTPATIENT)
Dept: PREADMISSION TESTING | Facility: HOSPITAL | Age: 58
Discharge: HOME | End: 2023-03-07
Attending: COLON & RECTAL SURGERY
Payer: COMMERCIAL

## 2023-03-07 DIAGNOSIS — K63.5 BENIGN COLON POLYP: Primary | ICD-10-CM

## 2023-03-07 RX ORDER — SODIUM, POTASSIUM,MAG SULFATES 17.5-3.13G
1 SOLUTION, RECONSTITUTED, ORAL ORAL DAILY
Qty: 1 KIT | Refills: 0 | Status: SHIPPED | OUTPATIENT
Start: 2023-03-07 | End: 2023-03-09

## 2023-04-04 ENCOUNTER — TELEPHONE (OUTPATIENT)
Dept: UROLOGY | Facility: CLINIC | Age: 58
End: 2023-04-04
Payer: COMMERCIAL

## 2023-04-04 NOTE — TELEPHONE ENCOUNTER
Called pt and he stated he would wait for pharmaceutical  specialties to get their supply in because he thing that [facility] ding not have Testosterone in stock. He will be gone for 2 wks and let us know if he receives it.       ----- Message from Yasmin Torres sent at 4/4/2023 10:35 AM CDT -----  Contact: ryder  Patient stated that pharmacy is out of stock of testosterone and is requesting a emergency fill to be sent to local [facility] due to him leaving out of town in the morning. Please call him back at  [phone].        [id number] - [facility] [address] [address] [address] [facility] [address]  [address] [address] [address]  Phone: [phone] Fax: [phone]        Thanks  DD

## 2023-04-24 ENCOUNTER — ANESTHESIA EVENT (OUTPATIENT)
Dept: ENDOSCOPY | Facility: HOSPITAL | Age: 58
End: 2023-04-24
Payer: COMMERCIAL

## 2023-04-24 DIAGNOSIS — R79.89 LOW TESTOSTERONE: ICD-10-CM

## 2023-04-24 NOTE — TELEPHONE ENCOUNTER
Called the patient, after verification of name and , the patient stated that he has been out of Testosterone for a few weeks now. He said he has called [facility] mailservice pharmacy and they have the prescription in stock. Pt asked if he can have it sent there. Pt informed that I will send Dr Moe a  message so the rx can be sent there. Pt voiced understanding       ----- Message from Margarita Aguilar sent at 2023 11:58 AM CDT -----  Contact: [phone]  Patient would like to consult with a nurse in regards to a prescription request. Please call to advise at [phone]. Thanks

## 2023-04-24 NOTE — ANESTHESIA PREPROCEDURE EVALUATION
04/24/2023  Reggie Sawant is a 57 y.o., male.    Past Medical History:   Diagnosis Date    Cellulitis     left leg    Hip arthritis     right    Hyperlipidemia     Hypertension     Hypogonadism male     Hypokalemia     Morbid obesity     Sleep apnea     on CPAP     Past Surgical History:   Procedure Laterality Date    COLONOSCOPY N/A 12/06/2017    Procedure: COLONOSCOPY;  Surgeon: Rory Barone MD;  Location: [facility];  Service: Endoscopy;  Laterality: N/A;    right bunionectomy       PCP 2/23  ASSESSMENT:  1. Essential hypertension    2. Prediabetes    3. Hyperlipidemia, unspecified hyperlipidemia type    4. DEEPTHI on CPAP    5. Benign colon polyp    6. Morbid obesity with BMI of 40.0-44.9, adult       PLAN:  Reggie was seen today for hypertension, pre-diabetes and follow-up.     Diagnoses and all orders for this visit:    Essential hypertension  -     Basic Metabolic Panel; Future  Prediabetes  -     metFORMIN (GLUCOPHAGE) 500 MG tablet; Take 1 tablet (500 mg total) by mouth 2 (two) times daily with meals.  -     Hemoglobin A1C; Future   Hyperlipidemia, unspecified hyperlipidemia type     DEEPTHI on CPAP    Benign colon polyp  -     Ambulatory referral/consult to [facility] Procedure ; Future  Morbid obesity with BMI of 40.0-44.9, adult     Patient advised to call for results.  Continue current medications, follow low sodium, low cholesterol, low carb diet, daily walks.  Start Metformin 500 mg twice daily for prediabetes; medication precautions discussed with patient.  Keep follow up with specialists.  Follow up in about 6 months (around 8/15/2023) for physical.      Pre-op Assessment    I have reviewed the Patient Summary Reports.     I have reviewed the Nursing Notes. I have reviewed the NPO Status.   I have reviewed the Medications.     Review of Systems  Anesthesia Hx:  No problems with  previous Anesthesia  History of prior surgery of interest to airway management or planning:   Social:  Smoker    Cardiovascular:   Hypertension hyperlipidemia    Pulmonary:   Sleep Apnea, CPAP    Musculoskeletal:   Arthritis     Endocrine:   Diabetes  Morbid Obesity / BMI > 40      Physical Exam  General: Well nourished    Airway:  Mallampati: III   Mouth Opening: Normal  TM Distance: Normal  Tongue: Normal  Neck ROM: Normal ROM    Dental:  Intact    Chest/Lungs:  Normal Respiratory Rate    Heart:  Rate: Normal        Anesthesia Plan  Type of Anesthesia, risks & benefits discussed:    Anesthesia Type: Gen Natural Airway  Intra-op Monitoring Plan: Standard ASA Monitors  Post Op Pain Control Plan: multimodal analgesia  Induction:  IV  Informed Consent: Informed consent signed with the Patient and all parties understand the risks and agree with anesthesia plan.  All questions answered.   ASA Score: 3  Day of Surgery Review of History & Physical: H&P Update referred to the surgeon/provider.    Ready For Surgery From Anesthesia Perspective.     .

## 2023-04-27 ENCOUNTER — ANESTHESIA (OUTPATIENT)
Dept: ENDOSCOPY | Facility: HOSPITAL | Age: 58
End: 2023-04-27
Payer: COMMERCIAL

## 2023-04-27 ENCOUNTER — HOSPITAL ENCOUNTER (OUTPATIENT)
Facility: HOSPITAL | Age: 58
Discharge: HOME | End: 2023-04-27
Attending: INTERNAL MEDICINE | Admitting: INTERNAL MEDICINE
Payer: COMMERCIAL

## 2023-04-27 VITALS
TEMPERATURE: 97 F | HEIGHT: 77 IN | BODY MASS INDEX: 37.19 KG/M2 | WEIGHT: 315 LBS | SYSTOLIC BLOOD PRESSURE: 136 MMHG | OXYGEN SATURATION: 98 % | HEART RATE: 63 BPM | DIASTOLIC BLOOD PRESSURE: 83 MMHG | RESPIRATION RATE: 19 BRPM

## 2023-04-27 DIAGNOSIS — K63.5 POLYP OF COLON, UNSPECIFIED PART OF COLON, UNSPECIFIED TYPE: Primary | ICD-10-CM

## 2023-04-27 DIAGNOSIS — K63.5 COLON POLYPS: ICD-10-CM

## 2023-04-27 DIAGNOSIS — R79.89 LOW TESTOSTERONE: ICD-10-CM

## 2023-04-27 LAB — POCT GLUCOSE: 105 MG/DL (ref 70–110)

## 2023-04-27 PROCEDURE — G0105 COLORECTAL SCRN; HI RISK IND: ICD-10-PCS | Mod: ,,, | Performed by: INTERNAL MEDICINE

## 2023-04-27 PROCEDURE — 63600175 PHARM REV CODE 636 W HCPCS: Performed by: INTERNAL MEDICINE

## 2023-04-27 PROCEDURE — 63600175 PHARM REV CODE 636 W HCPCS: Performed by: NURSE ANESTHETIST, CERTIFIED REGISTERED

## 2023-04-27 PROCEDURE — 25000003 PHARM REV CODE 250: Performed by: NURSE ANESTHETIST, CERTIFIED REGISTERED

## 2023-04-27 PROCEDURE — 37000009 HC ANESTHESIA EA ADD 15 MINS: Performed by: INTERNAL MEDICINE

## 2023-04-27 PROCEDURE — 37000008 HC ANESTHESIA 1ST 15 MINUTES: Performed by: INTERNAL MEDICINE

## 2023-04-27 PROCEDURE — D9220A PRA ANESTHESIA: ICD-10-PCS | Mod: ,,, | Performed by: NURSE ANESTHETIST, CERTIFIED REGISTERED

## 2023-04-27 PROCEDURE — 82962 GLUCOSE BLOOD TEST: CPT | Performed by: INTERNAL MEDICINE

## 2023-04-27 PROCEDURE — G0105 COLORECTAL SCRN; HI RISK IND: HCPCS | Performed by: INTERNAL MEDICINE

## 2023-04-27 PROCEDURE — G0105 COLORECTAL SCRN; HI RISK IND: HCPCS | Mod: ,,, | Performed by: INTERNAL MEDICINE

## 2023-04-27 PROCEDURE — D9220A PRA ANESTHESIA: Mod: ,,, | Performed by: NURSE ANESTHETIST, CERTIFIED REGISTERED

## 2023-04-27 RX ORDER — PROPOFOL 10 MG/ML
VIAL (ML) INTRAVENOUS
Status: DISCONTINUED | OUTPATIENT
Start: 2023-04-27 | End: 2023-04-27

## 2023-04-27 RX ORDER — SODIUM CHLORIDE, SODIUM LACTATE, POTASSIUM CHLORIDE, CALCIUM CHLORIDE 600; 310; 30; 20 MG/100ML; MG/100ML; MG/100ML; MG/100ML
INJECTION, SOLUTION INTRAVENOUS CONTINUOUS
Status: ACTIVE | OUTPATIENT
Start: 2023-04-27

## 2023-04-27 RX ORDER — LIDOCAINE HYDROCHLORIDE 20 MG/ML
INJECTION INTRAVENOUS
Status: DISCONTINUED | OUTPATIENT
Start: 2023-04-27 | End: 2023-04-27

## 2023-04-27 RX ADMIN — PROPOFOL 20 MG: 10 INJECTION, EMULSION INTRAVENOUS at 12:04

## 2023-04-27 RX ADMIN — LIDOCAINE HYDROCHLORIDE 40 MG: 20 INJECTION INTRAVENOUS at 11:04

## 2023-04-27 RX ADMIN — SODIUM CHLORIDE, SODIUM LACTATE, POTASSIUM CHLORIDE, AND CALCIUM CHLORIDE: 600; 310; 30; 20 INJECTION, SOLUTION INTRAVENOUS at 11:04

## 2023-04-27 RX ADMIN — PROPOFOL 50 MG: 10 INJECTION, EMULSION INTRAVENOUS at 11:04

## 2023-04-27 NOTE — PROVATION PATIENT INSTRUCTIONS
Discharge Summary/Instructions after an Endoscopic Procedure  Patient Name: Reggie Sawant  Patient MRN: [id number]  Patient YOB: 1965 Thursday, April 27, 2023  Jeimy Garcia MD  Dear patient,  As a result of recent federal legislation (The Federal Cures Act), you may   receive lab or pathology results from your procedure in your [facility]   account before your physician is able to contact you. Your physician or   their representative will relay the results to you with their   recommendations at their soonest availability.  Thank you,  RESTRICTIONS:  During your procedure today, you received medications for sedation.  These   medications may affect your judgment, balance and coordination.  Therefore,   for 24 hours, you have the following restrictions:   - DO NOT drive a car, operate machinery, make legal/financial decisions,   sign important papers or drink alcohol.    ACTIVITY:  Today: no heavy lifting, straining or running due to procedural   sedation/anesthesia.  The following day: return to full activity including work.  DIET:  Eat and drink normally unless instructed otherwise.     TREATMENT FOR COMMON SIDE EFFECTS:  - Mild abdominal pain, nausea, belching, bloating or excessive gas:  rest,   eat lightly and use a heating pad.  - Sore Throat: treat with throat lozenges and/or gargle with warm salt   water.  - Because air was used during the procedure, expelling large amounts of air   from your rectum or belching is normal.  - If a bowel prep was taken, you may not have a bowel movement for 1-3 days.    This is normal.  SYMPTOMS TO WATCH FOR AND REPORT TO YOUR PHYSICIAN:  1. Abdominal pain or bloating, other than gas cramps.  2. Chest pain.  3. Back pain.  4. Signs of infection such as: chills or fever occurring within 24 hours   after the procedure.  5. Rectal bleeding, which would show as bright red, maroon, or black stools.   (A tablespoon of blood from the rectum is not serious, especially  if   hemorrhoids are present.)  6. Vomiting.  7. Weakness or dizziness.  GO DIRECTLY TO THE NEAREST EMERGENCY ROOM IF YOU HAVE ANY OF THE FOLLOWING:      Difficulty breathing              Chills and/or fever over 101 F   Persistent vomiting and/or vomiting blood   Severe abdominal pain   Severe chest pain   Black, tarry stools   Bleeding- more than one tablespoon   Any other symptom or condition that you feel may need urgent attention  Your doctor recommends these additional instructions:  If any biopsies were taken, your doctors clinic will contact you in 1 to 2   weeks with any results.  - Discharge patient to home.   - Resume previous diet.   - Continue present medications.   - Repeat colonoscopy in 5 years for surveillance.   - Return to referring physician.   - Patient has a contact number available for emergencies.  The signs and   symptoms of potential delayed complications were discussed with the   patient.  Return to normal activities tomorrow.  Written discharge   instructions were provided to the patient.  For questions, problems or results please call your physician Jeimy Garcia MD at Work:  [phone]  If you have any questions about the above instructions, call the GI   department at [phone] or call the endoscopy unit at [phone]   from 7am until 3 pm.  [facility], EMERGENCY ROOM PHONE NUMBER:   [phone]  IF A COMPLICATION OR EMERGENCY SITUATION ARISES AND YOU ARE UNABLE TO REACH   YOUR PHYSICIAN - GO DIRECTLY TO THE EMERGENCY ROOM.  I have read or have had read to me these discharge instructions for my   procedure and have received a written copy.  I understand these   instructions and will follow-up with my physician if I have any questions.     __________________________________       _____________________________________  Nurse Signature                                          Patient/Designated   Responsible Party Signature  Jeimy COON  MD Jose  4/27/2023 12:11:20 PM  This report has been verified and signed electronically.  Dear patient,  As a result of recent federal legislation (The Federal Cures Act), you may   receive lab or pathology results from your procedure in your [facility]   account before your physician is able to contact you. Your physician or   their representative will relay the results to you with their   recommendations at their soonest availability.  Thank you,  PROVATION

## 2023-04-27 NOTE — H&P
PRE PROCEDURE H&P    Patient Name: Reggie Sawant  MRN: [id number]  : 1965  Date of Procedure:  2023  Referring Physician: Irene Sanchez MD  Primary Physician: Irene Sanchez MD  Procedure Physician: Jeimy Garcia MD       Planned Procedure: Colonoscopy  Diagnosis: previous adenomatous polyp  Chief Complaint: Same as above    HPI: Patient is an 57 y.o. male is here for the above.     Last colonoscopy:   Family history: None   Anticoagulation: None     Past Medical History:   Past Medical History:   Diagnosis Date    Cellulitis     left leg    Hip arthritis     right    Hyperlipidemia     Hypertension     Hypogonadism male     Hypokalemia     Morbid obesity     Sleep apnea     on CPAP        Past Surgical History:  Past Surgical History:   Procedure Laterality Date    COLONOSCOPY N/A 2017    Procedure: COLONOSCOPY;  Surgeon: Rory Barone MD;  Location: [facility];  Service: Endoscopy;  Laterality: N/A;    right bunionectomy          Home Medications:  Prior to Admission medications    Medication Sig Start Date End Date Taking? Authorizing Provider   amLODIPine (NORVASC) 10 MG tablet Take 1 tablet (10 mg total) by mouth once daily. 22  Yes Irene Sanchez MD   aspirin (ECOTRIN) 81 MG EC tablet Take 1 tablet by mouth Daily.   Yes Historical Provider   carvediloL (COREG) 25 MG tablet Take 1 tablet (25 mg total) by mouth 2 (two) times daily with meals. 22  Yes Irene Sanchez MD   spironolactone (ALDACTONE) 50 MG tablet Take 1 tablet (50 mg total) by mouth once daily. 22  Yes Irene Sanchez MD   valsartan (DIOVAN) 320 MG tablet Take 1 tablet (320 mg total) by mouth once daily. 10/28/22  Yes Irene Sanchez MD   chlorthalidone (HYGROTEN) 50 MG Tab TAKE 1 TABLET(50 MG) BY MOUTH EVERY DAY 3/28/23   Irene Sanchez MD   metFORMIN (GLUCOPHAGE) 500 MG tablet Take 1 tablet (500 mg total) by mouth 2 (two) times daily with meals. 23  Irene Sanchez MD    mometasone (NASONEX) 50 mcg/actuation nasal spray PLEASE UPDATE FOR ANY MEDICATION CHANGE 4/26/23   Irene Sanchez MD   multivitamin (THERAGRAN) per tablet Take 1 tablet by mouth once daily.    Historical Provider   potassium chloride SA (K-DUR,KLOR-CON) 20 MEQ tablet TAKE 1 TABLET(20 MEQ) BY MOUTH EVERY DAY 3/28/23   Irene Sanchez MD   pravastatin (PRAVACHOL) 20 MG tablet TAKE 1 TABLET(20 MG) BY MOUTH EVERY DAY 3/28/23   Irene Sanchez MD   tadalafiL (CIALIS) 20 MG Tab Take 1 tablet (20 mg total) by mouth once daily. 1/11/23   Alvin Moe MD   testosterone cypionate (DEPOTESTOTERONE CYPIONATE) 200 mg/mL injection Inject 1 mL (200 mg total) into the muscle every 7 days. ADMINISTER 1 ML IN THE MUSCLE EVERY 14 DAYS 1/11/23   Alvin Moe MD   traZODone (DESYREL) 50 MG tablet Take 1 tablet (50 mg total) by mouth nightly. 6/21/22   Irene Sanchez MD        Allergies:  Review of patient's allergies indicates:   Allergen Reactions    Shrimp Anaphylaxis        Social History:   Social History     Socioeconomic History    Marital status:     Number of children: 4   Occupational History    Occupation:      Employer: [employer]     Employer: [employer]   Tobacco Use    Smoking status: Light Smoker     Types: Cigars    Smokeless tobacco: Never   Substance and Sexual Activity    Alcohol use: Yes     Alcohol/week: 10.0 standard drinks     Types: 4 Glasses of wine, 6 Shots of liquor per week     Comment: Occasionally    Drug use: Never    Sexual activity: Yes     Partners: Female     Birth control/protection: None   Social History Narrative    He wears seatbelt. He has 1 grandson and 2 granddaughters.     Social Determinants of Health     Financial Resource Strain: Unknown    Difficulty of Paying Living Expenses: Patient refused   Food Insecurity: Unknown    Worried About Running Out of Food in the Last Year: Patient refused    Ran Out of Food in the  "Last Year: Patient refused   Transportation Needs: Unknown    Lack of Transportation (Medical): Patient refused    Lack of Transportation (Non-Medical): Patient refused   Physical Activity: Inactive    Days of Exercise per Week: 0 days    Minutes of Exercise per Session: 0 min   Stress: Unknown    Feeling of Stress : Patient refused   Social Connections: Unknown    Frequency of Communication with Friends and Family: Patient refused    Frequency of Social Gatherings with Friends and Family: Patient refused    Active Member of Clubs or Organizations: Patient refused    Attends Club or Organization Meetings: Patient refused    Marital Status: Patient refused   Housing Stability: Unknown    Unable to Pay for Housing in the Last Year: Patient refused    Unstable Housing in the Last Year: Patient refused       Family History:  Family History   Problem Relation Age of Onset    No Known Problems Mother     Hypertension Father     Heart disease Father         CAD    Diabetes Sister     No Known Problems Sister     Hypertension Brother     Diabetes Maternal Grandmother     No Known Problems Daughter     No Known Problems Daughter     No Known Problems Daughter     No Known Problems Other     Cancer Neg Hx     Stroke Neg Hx     Thyroid disease Neg Hx     Thyroid cancer Neg Hx         MEN2       ROS: No acute cardiac events, no acute respiratory complaints.     Physical Exam (all patients):    BP (!) 159/83 (BP Location: Right arm, Patient Position: Sitting)   Pulse (!) 56   Temp 97 °F (36.1 °C) (Temporal)   Resp 16   Ht 6' 5" (1.956 m)   Wt (!) 155.2 kg (342 lb 2.5 oz)   SpO2 99%   BMI 40.57 kg/m²   Lungs: Clear to auscultation bilaterally, respirations unlabored  Heart: Regular rate and rhythm, S1 and S2 normal, no obvious murmurs  Abdomen:         Soft, non-tender, bowel sounds normal, no masses, no organomegaly    Lab Results   Component Value Date    WBC 6.14 08/23/2022    MCV 96 08/23/2022    RDW 13.1 08/23/2022    "  08/23/2022    GLU 85 02/23/2023    HGBA1C 5.5 02/23/2023    BUN 15 02/23/2023     02/23/2023    K 4.0 02/23/2023    CL 97 02/23/2023        SEDATION PLAN: per anesthesia      History reviewed, vital signs satisfactory, cardiopulmonary status satisfactory, sedation options, risks and plans have been discussed with the patient  All their questions were answered and the patient agrees to the sedation procedures as planned and the patient is deemed an appropriate candidate for the sedation as planned.    Procedure explained to patient, informed consent obtained and placed in chart.    Jeimy Garcia  4/27/2023

## 2023-04-27 NOTE — TELEPHONE ENCOUNTER
Refill Routing Note   Medication(s) are not appropriate for processing by [facility] for the following reason(s):      Verify sig    [facility] action(s):  Defer       Medication Therapy Plan: Verify sig      Appointments  past 12m or future 3m with PCP    Date Provider   Last Visit   2/23/2023 Irene Sanchez MD   Next Visit   8/15/2023 Irene Sanchez MD   ED visits in past 90 days: 0        Note composed:3:11 PM 04/27/2023

## 2023-04-27 NOTE — ANESTHESIA POSTPROCEDURE EVALUATION
Anesthesia Post Evaluation    Patient: Reggie Sawant    Procedure(s) Performed: Procedure(s) (LRB):  COLONOSCOPY (N/A)    Final Anesthesia Type: general      Patient location during evaluation: GI PACU  Patient participation: Yes- Able to Participate  Level of consciousness: awake  Post-procedure vital signs: reviewed and stable  Pain management: adequate  Airway patency: patent    PONV status at discharge: No PONV  Anesthetic complications: no      Cardiovascular status: stable  Respiratory status: unassisted  Hydration status: euvolemic  Follow-up not needed.          Vitals Value Taken Time   /83 04/27/23 1245   Temp 36.2 °C (97.2 °F) 04/27/23 1215   Pulse 63 04/27/23 1245   Resp 19 04/27/23 1245   SpO2 98 % 04/27/23 1245         Event Time   Out of Recovery 12:48:20         Pain/Des Score: Des Score: 10 (4/27/2023 12:45 PM)

## 2023-04-27 NOTE — TELEPHONE ENCOUNTER
----- Message from Mattie Moncada sent at 4/27/2023  1:57 PM CDT -----  Contact: Reggie  Patient is calling to speak with the nurse regarding pharmacy change with medication testosterone cypionate (DEPOTESTOTERONE CYPIONATE) 200 mg/mL injection. Explains will like to know the status of the change.Please give a call back at.[phone] as requested.  Thanks  LR     .    [facility] - [place] - [facility] AT Portal to [facility]  [place] [address]  Phone: [phone] Fax: [phone]

## 2023-04-27 NOTE — PLAN OF CARE
Discharge instructions reviewed with pt and family, handouts given, verbalized understanding with no further questions at this time. Dr. Garcia spoke to pt at bedside, reviewed procedure and answered questions, MD telephone number provided per AVS sheet. No pain or nausea noted, tolerating po fluids without difficulty, no other complaints noted. Fall precautions reviewed, consents in chart, PIV to be removed at discharge.

## 2023-04-27 NOTE — TELEPHONE ENCOUNTER
No care due was identified.  Health [facility] Embedded Care Due Messages. Reference number: [id number].   4/27/2023 11:21:38 AM CDT

## 2023-04-27 NOTE — TRANSFER OF CARE
"Anesthesia Transfer of Care Note    Patient: Reggie Sawant    Procedure(s) Performed: Procedure(s) (LRB):  COLONOSCOPY (N/A)    Patient location: PACU    Anesthesia Type: general    Transport from OR: Transported from OR on room air with adequate spontaneous ventilation    Post pain: adequate analgesia    Post assessment: no apparent anesthetic complications    Post vital signs: stable    Level of consciousness: alert    Nausea/Vomiting: no nausea/vomiting    Complications: none    Transfer of care protocol was followed      Last vitals:   Visit Vitals  BP (!) 159/83 (BP Location: Right arm, Patient Position: Sitting)   Pulse (!) 56   Temp 36.1 °C (97 °F) (Temporal)   Resp 16   Ht 6' 5" (1.956 m)   Wt (!) 155.2 kg (342 lb 2.5 oz)   SpO2 99%   BMI 40.57 kg/m²     "

## 2023-04-28 RX ORDER — TESTOSTERONE CYPIONATE 200 MG/ML
200 INJECTION, SOLUTION INTRAMUSCULAR
Qty: 10 ML | Refills: 3 | Status: SHIPPED | OUTPATIENT
Start: 2023-04-28 | End: 2023-10-23 | Stop reason: SDUPTHER

## 2023-04-28 RX ORDER — TESTOSTERONE CYPIONATE 200 MG/ML
200 INJECTION, SOLUTION INTRAMUSCULAR
Qty: 10 ML | Refills: 3 | OUTPATIENT
Start: 2023-04-28

## 2023-04-28 RX ORDER — TESTOSTERONE CYPIONATE 200 MG/ML
200 INJECTION, SOLUTION INTRAMUSCULAR
Qty: 10 ML | Refills: 3 | Status: SHIPPED | OUTPATIENT
Start: 2023-04-28 | End: 2023-04-28

## 2023-05-01 RX ORDER — MOMETASONE FUROATE 50 UG/1
SPRAY, METERED NASAL
Qty: 17 G | Refills: 5 | Status: SHIPPED | OUTPATIENT
Start: 2023-05-01

## 2023-05-03 ENCOUNTER — PATIENT MESSAGE (OUTPATIENT)
Dept: UROLOGY | Facility: CLINIC | Age: 58
End: 2023-05-03
Payer: COMMERCIAL

## 2023-05-09 ENCOUNTER — TELEPHONE (OUTPATIENT)
Dept: UROLOGY | Facility: CLINIC | Age: 58
End: 2023-05-09
Payer: COMMERCIAL

## 2023-05-09 NOTE — TELEPHONE ENCOUNTER
----- Message from Natalia Tafoya sent at 5/9/2023  1:05 PM CDT -----  Contact: [employer]  Alphonso Werner is calling in regards to the testosterone cypionate (DEPOTESTOTERONE CYPIONATE) 200 mg/mL injection.  Please call her back at [phone] or call the Pharmacy at [phone]    Thanks  CF

## 2023-05-09 NOTE — TELEPHONE ENCOUNTER
Message sent to pt on portal from Jackie      ----- Message from Natalia Tafoya sent at 5/9/2023  1:05 PM CDT -----  Contact: [employer]  Alphonso Werner is calling in regards to the testosterone cypionate (DEPOTESTOTERONE CYPIONATE) 200 mg/mL injection.  Please call her back at [phone] or call the Pharmacy at [phone]    Thanks  CF

## 2023-05-12 ENCOUNTER — TELEPHONE (OUTPATIENT)
Dept: UROLOGY | Facility: CLINIC | Age: 58
End: 2023-05-12
Payer: COMMERCIAL

## 2023-05-12 NOTE — TELEPHONE ENCOUNTER
Returned the call to specify the details of the medication to the pharmacy .  ----- Message from Alvin Moe MD sent at 5/10/2023  7:45 AM CDT -----  1mL of 200mg testosterone per week as specified in the order  ----- Message -----  From: Alexandria Reynolds MA  Sent: 5/5/2023  10:00 AM CDT  To: Alvin Moe MD    Please advise, or is there anything you would like for me to call back and tell the pharmacy in regards to this?  ----- Message -----  From: Lindsey Nguyen  Sent: 5/5/2023   8:32 AM CDT  To: Abhi Fried from [facility] called in this morning requesting dosage instruction for TESTOSTORNE CYPIONATE 200MG PER ML [phone] opt 2 ref [id number]      [facility] - [place] - [facility] AT Portal to [facility]  [place] [address]  Phone: [phone] Fax: [phone]

## 2023-05-15 ENCOUNTER — TELEPHONE (OUTPATIENT)
Dept: FAMILY MEDICINE | Facility: CLINIC | Age: 58
End: 2023-05-15
Payer: COMMERCIAL

## 2023-05-15 ENCOUNTER — PATIENT MESSAGE (OUTPATIENT)
Dept: FAMILY MEDICINE | Facility: CLINIC | Age: 58
End: 2023-05-15
Payer: COMMERCIAL

## 2023-05-15 NOTE — TELEPHONE ENCOUNTER
"Pt portal message    " GM. 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 Any advice, or is an office visit preferred?"    Please advise    "

## 2023-07-13 ENCOUNTER — LAB VISIT (OUTPATIENT)
Dept: LAB | Facility: HOSPITAL | Age: 58
End: 2023-07-13
Attending: UROLOGY
Payer: COMMERCIAL

## 2023-07-13 DIAGNOSIS — R79.89 LOW TESTOSTERONE: ICD-10-CM

## 2023-07-13 LAB
ALBUMIN SERPL BCP-MCNC: 4.2 G/DL (ref 3.5–5.2)
ALP SERPL-CCNC: 47 U/L (ref 55–135)
ALT SERPL W/O P-5'-P-CCNC: 27 U/L (ref 10–44)
AST SERPL-CCNC: 26 U/L (ref 10–40)
BILIRUB DIRECT SERPL-MCNC: 0.4 MG/DL (ref 0.1–0.3)
BILIRUB SERPL-MCNC: 1.5 MG/DL (ref 0.1–1)
HGB BLD-MCNC: 15.6 G/DL (ref 14–18)
PROT SERPL-MCNC: 7.5 G/DL (ref 6–8.4)

## 2023-07-13 PROCEDURE — 36415 COLL VENOUS BLD VENIPUNCTURE: CPT | Performed by: UROLOGY

## 2023-07-13 PROCEDURE — 85018 HEMOGLOBIN: CPT | Performed by: UROLOGY

## 2023-07-13 PROCEDURE — 80076 HEPATIC FUNCTION PANEL: CPT | Performed by: UROLOGY

## 2023-07-18 ENCOUNTER — OFFICE VISIT (OUTPATIENT)
Dept: UROLOGY | Facility: CLINIC | Age: 58
End: 2023-07-18
Payer: COMMERCIAL

## 2023-07-18 VITALS
BODY MASS INDEX: 43.11 KG/M2 | SYSTOLIC BLOOD PRESSURE: 147 MMHG | WEIGHT: 315 LBS | DIASTOLIC BLOOD PRESSURE: 80 MMHG | HEART RATE: 83 BPM

## 2023-07-18 DIAGNOSIS — Z12.5 PROSTATE CANCER SCREENING: ICD-10-CM

## 2023-07-18 DIAGNOSIS — R79.89 LOW TESTOSTERONE: Primary | ICD-10-CM

## 2023-07-18 PROCEDURE — 3079F PR MOST RECENT DIASTOLIC BLOOD PRESSURE 80-89 MM HG: ICD-10-PCS | Mod: CPTII,S$GLB,, | Performed by: UROLOGY

## 2023-07-18 PROCEDURE — 1159F MED LIST DOCD IN RCRD: CPT | Mod: CPTII,S$GLB,, | Performed by: UROLOGY

## 2023-07-18 PROCEDURE — 4010F PR ACE/ARB THEARPY RXD/TAKEN: ICD-10-PCS | Mod: CPTII,S$GLB,, | Performed by: UROLOGY

## 2023-07-18 PROCEDURE — 3044F PR MOST RECENT HEMOGLOBIN A1C LEVEL <7.0%: ICD-10-PCS | Mod: CPTII,S$GLB,, | Performed by: UROLOGY

## 2023-07-18 PROCEDURE — 3044F HG A1C LEVEL LT 7.0%: CPT | Mod: CPTII,S$GLB,, | Performed by: UROLOGY

## 2023-07-18 PROCEDURE — 4010F ACE/ARB THERAPY RXD/TAKEN: CPT | Mod: CPTII,S$GLB,, | Performed by: UROLOGY

## 2023-07-18 PROCEDURE — 99999 PR PBB SHADOW E&M-EST. PATIENT-LVL III: ICD-10-PCS | Mod: PBBFAC,,, | Performed by: UROLOGY

## 2023-07-18 PROCEDURE — 3008F BODY MASS INDEX DOCD: CPT | Mod: CPTII,S$GLB,, | Performed by: UROLOGY

## 2023-07-18 PROCEDURE — 3077F PR MOST RECENT SYSTOLIC BLOOD PRESSURE >= 140 MM HG: ICD-10-PCS | Mod: CPTII,S$GLB,, | Performed by: UROLOGY

## 2023-07-18 PROCEDURE — 3008F PR BODY MASS INDEX (BMI) DOCUMENTED: ICD-10-PCS | Mod: CPTII,S$GLB,, | Performed by: UROLOGY

## 2023-07-18 PROCEDURE — 1159F PR MEDICATION LIST DOCUMENTED IN MEDICAL RECORD: ICD-10-PCS | Mod: CPTII,S$GLB,, | Performed by: UROLOGY

## 2023-07-18 PROCEDURE — 99999 PR PBB SHADOW E&M-EST. PATIENT-LVL III: CPT | Mod: PBBFAC,,, | Performed by: UROLOGY

## 2023-07-18 PROCEDURE — 1160F RVW MEDS BY RX/DR IN RCRD: CPT | Mod: CPTII,S$GLB,, | Performed by: UROLOGY

## 2023-07-18 PROCEDURE — 3077F SYST BP >= 140 MM HG: CPT | Mod: CPTII,S$GLB,, | Performed by: UROLOGY

## 2023-07-18 PROCEDURE — 99213 PR OFFICE/OUTPT VISIT, EST, LEVL III, 20-29 MIN: ICD-10-PCS | Mod: S$GLB,,, | Performed by: UROLOGY

## 2023-07-18 PROCEDURE — 99213 OFFICE O/P EST LOW 20 MIN: CPT | Mod: S$GLB,,, | Performed by: UROLOGY

## 2023-07-18 PROCEDURE — 3079F DIAST BP 80-89 MM HG: CPT | Mod: CPTII,S$GLB,, | Performed by: UROLOGY

## 2023-07-18 PROCEDURE — 1160F PR REVIEW ALL MEDS BY PRESCRIBER/CLIN PHARMACIST DOCUMENTED: ICD-10-PCS | Mod: CPTII,S$GLB,, | Performed by: UROLOGY

## 2023-07-18 NOTE — PROGRESS NOTES
Chief Complaint: Low T    HPI:   07/18/2023 - patient returns today for follow-up, no new issues in the interim, testosterone going well, labs okay    01/11/2023 - returns today for follow-up, no new issues in the interim, testosterone working well at the current dosage, due for labs today, no voiding issues or gross hematuria    07/26/2022 - 57 yo male that presents for evaluation and continued management of low T. Up until recently, he was seeing Dr Narayan Grove for this issue, however he has had multiple communication issues with his staff that has made the whole process much more difficult that it should be. He notes a long history of T use, has been on it for about 10 yrs, typically on 150-200mg per week IM, obtains it from [facility]. He notes that low energy was the initial indication, and that this dosage helps greatly. He also notes ED, takes cialis, which works well for him. Denies any voiding issues, denies GH, denies family history of  cancers. Denies prior urologic procedures.     PMH:  Past Medical History:   Diagnosis Date    Cellulitis     left leg    Hip arthritis     right    Hyperlipidemia     Hypertension     Hypogonadism male     Hypokalemia     Morbid obesity     Sleep apnea     on CPAP       PSH:  Past Surgical History:   Procedure Laterality Date    COLONOSCOPY N/A 12/06/2017    Procedure: COLONOSCOPY;  Surgeon: Rory Barone MD;  Location: [facility];  Service: Endoscopy;  Laterality: N/A;    COLONOSCOPY N/A 4/27/2023    Procedure: COLONOSCOPY;  Surgeon: Jeimy Garcia MD;  Location: [facility] ENDO;  Service: Endoscopy;  Laterality: N/A;    right bunionectomy         Family History:  Family History   Problem Relation Age of Onset    No Known Problems Mother     Hypertension Father     Heart disease Father         CAD    Diabetes Sister     No Known Problems Sister     Hypertension Brother     Diabetes Maternal Grandmother     No Known Problems Daughter     No Known  Problems Daughter     No Known Problems Daughter     No Known Problems Other     Cancer Neg Hx     Stroke Neg Hx     Thyroid disease Neg Hx     Thyroid cancer Neg Hx         MEN2       Social History:  Social History     Tobacco Use    Smoking status: Light Smoker     Types: Cigars    Smokeless tobacco: Never   Substance Use Topics    Alcohol use: Yes     Alcohol/week: 10.0 standard drinks     Types: 4 Glasses of wine, 6 Shots of liquor per week     Comment: Occasionally    Drug use: Never        Review of Systems:  General: No fever, chills  Skin: No rashes  Chest:  Denies cough and sputum production  Heart: Denies chest pain  Resp: Denies dyspnea  Abdomen: Denies diarrhea, abdominal pain, hematemesis, or blood in stool.  Musculoskeletal: No joint stiffness or swelling. Denies back pain.  : see HPI  Neuro: no dizziness or weakness    Allergies:  Shrimp    Medications:    Current Outpatient Medications:     amLODIPine (NORVASC) 10 MG tablet, Take 1 tablet (10 mg total) by mouth once daily., Disp: 30 tablet, Rfl: 5    aspirin (ECOTRIN) 81 MG EC tablet, Take 1 tablet by mouth Daily., Disp: , Rfl:     carvediloL (COREG) 25 MG tablet, Take 1 tablet (25 mg total) by mouth 2 (two) times daily with meals., Disp: 60 tablet, Rfl: 5    chlorthalidone (HYGROTEN) 50 MG Tab, TAKE 1 TABLET(50 MG) BY MOUTH EVERY DAY, Disp: 90 tablet, Rfl: 3    metFORMIN (GLUCOPHAGE) 500 MG tablet, Take 1 tablet (500 mg total) by mouth 2 (two) times daily with meals., Disp: 180 tablet, Rfl: 1    mometasone (NASONEX) 50 mcg/actuation nasal spray, PLEASE UPDATE FOR ANY MEDICATION CHANGE, Disp: 17 g, Rfl: 5    multivitamin (THERAGRAN) per tablet, Take 1 tablet by mouth once daily., Disp: , Rfl:     potassium chloride SA (K-DUR,KLOR-CON) 20 MEQ tablet, TAKE 1 TABLET(20 MEQ) BY MOUTH EVERY DAY, Disp: 90 tablet, Rfl: 3    pravastatin (PRAVACHOL) 20 MG tablet, TAKE 1 TABLET(20 MG) BY MOUTH EVERY DAY, Disp: 90 tablet, Rfl: 1    spironolactone (ALDACTONE)  50 MG tablet, Take 1 tablet (50 mg total) by mouth once daily., Disp: 30 tablet, Rfl: 5    tadalafiL (CIALIS) 20 MG Tab, Take 1 tablet (20 mg total) by mouth once daily., Disp: 10 tablet, Rfl: 5    testosterone cypionate (DEPOTESTOTERONE CYPIONATE) 200 mg/mL injection, Inject 1 mL (200 mg total) into the muscle every 7 days. ADMINISTER 1 ML IN THE MUSCLE EVERY 14 DAYS, Disp: 10 mL, Rfl: 3    traZODone (DESYREL) 50 MG tablet, Take 1 tablet (50 mg total) by mouth nightly., Disp: 30 tablet, Rfl: 5    valsartan (DIOVAN) 320 MG tablet, Take 1 tablet (320 mg total) by mouth once daily., Disp: 30 tablet, Rfl: 5  No current facility-administered medications for this visit.    Facility-Administered Medications Ordered in Other Visits:     lactated ringers infusion, , Intravenous, Continuous, Jeimy Garcia MD, New Bag at 04/27/23 1150    Physical Exam:  Vitals:    07/18/23 1005   BP: (!) 147/80   Pulse: 83     Body mass index is 43.11 kg/m².  General: awake, alert, cooperative  Head: NC/AT  Ears: external ears normal  Eyes: sclera normal  Lungs: normal inspiration, NAD  Heart: well-perfused  : deferred, patient notes 2 normal exams in the last 6 months  Skin: The skin is warm and dry  Ext: No c/c/e.  Neuro: grossly intact, AOx3    RADIOLOGY:  No recent relevant imaging available for review.    LABS:  I personally reviewed the following lab values:  Lab Results   Component Value Date    WBC 6.14 08/23/2022    HGB 15.6 07/13/2023    HCT 49.7 08/23/2022     08/23/2022     02/23/2023    K 4.0 02/23/2023    CL 97 02/23/2023    CREATININE 1.1 02/23/2023    BUN 15 02/23/2023    CO2 31 (H) 02/23/2023    TSH 2.283 08/23/2022    PSA 2.7 08/23/2022    HGBA1C 5.5 02/23/2023    CHOL 207 (H) 08/23/2022    TRIG 89 08/23/2022    HDL 43 08/23/2022    ALT 27 07/13/2023    AST 26 07/13/2023       Assessment/Plan:   Reggie Sawant is a 57 y.o. male with:    Low T - continue 200mg/week, f/u 6 months with labs    Prostate  Cancer Screening - PSA 2.7 in August, f/u 6 months with PSA and BAIRON    Alvin Moe MD  Urology

## 2023-07-23 DIAGNOSIS — I10 ESSENTIAL HYPERTENSION: Chronic | ICD-10-CM

## 2023-07-24 NOTE — TELEPHONE ENCOUNTER
Refill Routing Note   Medication(s) are not appropriate for processing by [facility] for the following reason(s):      Required vitals abnormal    [facility] action(s):  Defer Care Due:  None identified            Appointments  past 12m or future 3m with PCP    Date Provider   Last Visit   2/23/2023 Irene Sanchez MD   Next Visit   8/15/2023 Irene Sanchez MD   ED visits in past 90 days: 0        Note composed:10:42 AM 07/24/2023

## 2023-07-26 RX ORDER — AMLODIPINE BESYLATE 10 MG/1
TABLET ORAL
Qty: 30 TABLET | Refills: 5 | Status: SHIPPED | OUTPATIENT
Start: 2023-07-26 | End: 2023-10-14 | Stop reason: SDUPTHER

## 2023-07-26 RX ORDER — CARVEDILOL 25 MG/1
TABLET ORAL
Qty: 60 TABLET | Refills: 5 | Status: SHIPPED | OUTPATIENT
Start: 2023-07-26 | End: 2023-11-22 | Stop reason: SDUPTHER

## 2023-07-29 ENCOUNTER — PATIENT MESSAGE (OUTPATIENT)
Dept: ADMINISTRATIVE | Facility: OTHER | Age: 58
End: 2023-07-29
Payer: COMMERCIAL

## 2023-08-15 ENCOUNTER — LAB VISIT (OUTPATIENT)
Dept: LAB | Facility: HOSPITAL | Age: 58
End: 2023-08-15
Attending: FAMILY MEDICINE
Payer: COMMERCIAL

## 2023-08-15 ENCOUNTER — OFFICE VISIT (OUTPATIENT)
Dept: FAMILY MEDICINE | Facility: CLINIC | Age: 58
End: 2023-08-15
Payer: COMMERCIAL

## 2023-08-15 VITALS
OXYGEN SATURATION: 96 % | HEIGHT: 77 IN | TEMPERATURE: 98 F | HEART RATE: 65 BPM | WEIGHT: 315 LBS | SYSTOLIC BLOOD PRESSURE: 136 MMHG | DIASTOLIC BLOOD PRESSURE: 76 MMHG | BODY MASS INDEX: 37.19 KG/M2 | RESPIRATION RATE: 18 BRPM

## 2023-08-15 DIAGNOSIS — E66.01 MORBID OBESITY WITH BMI OF 40.0-44.9, ADULT: ICD-10-CM

## 2023-08-15 DIAGNOSIS — I10 ESSENTIAL HYPERTENSION: ICD-10-CM

## 2023-08-15 DIAGNOSIS — R73.03 PREDIABETES: ICD-10-CM

## 2023-08-15 DIAGNOSIS — G47.00 INSOMNIA, UNSPECIFIED TYPE: ICD-10-CM

## 2023-08-15 DIAGNOSIS — Z72.0 TOBACCO USE: ICD-10-CM

## 2023-08-15 DIAGNOSIS — E78.5 HYPERLIPIDEMIA, UNSPECIFIED HYPERLIPIDEMIA TYPE: ICD-10-CM

## 2023-08-15 DIAGNOSIS — Z00.00 ANNUAL PHYSICAL EXAM: Primary | ICD-10-CM

## 2023-08-15 DIAGNOSIS — Z00.00 ANNUAL PHYSICAL EXAM: ICD-10-CM

## 2023-08-15 DIAGNOSIS — G47.33 OSA ON CPAP: ICD-10-CM

## 2023-08-15 LAB
ANION GAP SERPL CALC-SCNC: 13 MMOL/L (ref 8–16)
BASOPHILS # BLD AUTO: 0.05 K/UL (ref 0–0.2)
BASOPHILS NFR BLD: 0.8 % (ref 0–1.9)
BUN SERPL-MCNC: 24 MG/DL (ref 6–20)
CALCIUM SERPL-MCNC: 10 MG/DL (ref 8.7–10.5)
CHLORIDE SERPL-SCNC: 97 MMOL/L (ref 95–110)
CHOLEST SERPL-MCNC: 232 MG/DL (ref 120–199)
CHOLEST/HDLC SERPL: 5.4 {RATIO} (ref 2–5)
CO2 SERPL-SCNC: 26 MMOL/L (ref 23–29)
CREAT SERPL-MCNC: 1.2 MG/DL (ref 0.5–1.4)
DIFFERENTIAL METHOD: ABNORMAL
EOSINOPHIL # BLD AUTO: 0.2 K/UL (ref 0–0.5)
EOSINOPHIL NFR BLD: 2.8 % (ref 0–8)
ERYTHROCYTE [DISTWIDTH] IN BLOOD BY AUTOMATED COUNT: 13.2 % (ref 11.5–14.5)
EST. GFR  (NO RACE VARIABLE): >60 ML/MIN/1.73 M^2
ESTIMATED AVG GLUCOSE: 108 MG/DL (ref 68–131)
GLUCOSE SERPL-MCNC: 75 MG/DL (ref 70–110)
HBA1C MFR BLD: 5.4 % (ref 4–5.6)
HCT VFR BLD AUTO: 49.8 % (ref 40–54)
HDLC SERPL-MCNC: 43 MG/DL (ref 40–75)
HDLC SERPL: 18.5 % (ref 20–50)
HGB BLD-MCNC: 16.2 G/DL (ref 14–18)
IMM GRANULOCYTES # BLD AUTO: 0.05 K/UL (ref 0–0.04)
IMM GRANULOCYTES NFR BLD AUTO: 0.8 % (ref 0–0.5)
LDLC SERPL CALC-MCNC: 165 MG/DL (ref 63–159)
LYMPHOCYTES # BLD AUTO: 1.7 K/UL (ref 1–4.8)
LYMPHOCYTES NFR BLD: 26.4 % (ref 18–48)
MCH RBC QN AUTO: 31.2 PG (ref 27–31)
MCHC RBC AUTO-ENTMCNC: 32.5 G/DL (ref 32–36)
MCV RBC AUTO: 96 FL (ref 82–98)
MONOCYTES # BLD AUTO: 0.7 K/UL (ref 0.3–1)
MONOCYTES NFR BLD: 11.3 % (ref 4–15)
NEUTROPHILS # BLD AUTO: 3.7 K/UL (ref 1.8–7.7)
NEUTROPHILS NFR BLD: 57.9 % (ref 38–73)
NONHDLC SERPL-MCNC: 189 MG/DL
NRBC BLD-RTO: 0 /100 WBC
PLATELET # BLD AUTO: 228 K/UL (ref 150–450)
PMV BLD AUTO: 10.5 FL (ref 9.2–12.9)
POTASSIUM SERPL-SCNC: 3.9 MMOL/L (ref 3.5–5.1)
RBC # BLD AUTO: 5.2 M/UL (ref 4.6–6.2)
SODIUM SERPL-SCNC: 136 MMOL/L (ref 136–145)
TRIGL SERPL-MCNC: 120 MG/DL (ref 30–150)
TSH SERPL DL<=0.005 MIU/L-ACNC: 1.98 UIU/ML (ref 0.4–4)
WBC # BLD AUTO: 6.36 K/UL (ref 3.9–12.7)

## 2023-08-15 PROCEDURE — 1160F RVW MEDS BY RX/DR IN RCRD: CPT | Mod: CPTII,S$GLB,, | Performed by: FAMILY MEDICINE

## 2023-08-15 PROCEDURE — 3078F DIAST BP <80 MM HG: CPT | Mod: CPTII,S$GLB,, | Performed by: FAMILY MEDICINE

## 2023-08-15 PROCEDURE — 3008F PR BODY MASS INDEX (BMI) DOCUMENTED: ICD-10-PCS | Mod: CPTII,S$GLB,, | Performed by: FAMILY MEDICINE

## 2023-08-15 PROCEDURE — 3044F HG A1C LEVEL LT 7.0%: CPT | Mod: CPTII,S$GLB,, | Performed by: FAMILY MEDICINE

## 2023-08-15 PROCEDURE — 3075F PR MOST RECENT SYSTOLIC BLOOD PRESS GE 130-139MM HG: ICD-10-PCS | Mod: CPTII,S$GLB,, | Performed by: FAMILY MEDICINE

## 2023-08-15 PROCEDURE — 85025 COMPLETE CBC W/AUTO DIFF WBC: CPT | Performed by: FAMILY MEDICINE

## 2023-08-15 PROCEDURE — 1159F MED LIST DOCD IN RCRD: CPT | Mod: CPTII,S$GLB,, | Performed by: FAMILY MEDICINE

## 2023-08-15 PROCEDURE — 99396 PREV VISIT EST AGE 40-64: CPT | Mod: S$GLB,,, | Performed by: FAMILY MEDICINE

## 2023-08-15 PROCEDURE — 99999 PR PBB SHADOW E&M-EST. PATIENT-LVL V: ICD-10-PCS | Mod: PBBFAC,,, | Performed by: FAMILY MEDICINE

## 2023-08-15 PROCEDURE — 3075F SYST BP GE 130 - 139MM HG: CPT | Mod: CPTII,S$GLB,, | Performed by: FAMILY MEDICINE

## 2023-08-15 PROCEDURE — 3078F PR MOST RECENT DIASTOLIC BLOOD PRESSURE < 80 MM HG: ICD-10-PCS | Mod: CPTII,S$GLB,, | Performed by: FAMILY MEDICINE

## 2023-08-15 PROCEDURE — 36415 COLL VENOUS BLD VENIPUNCTURE: CPT | Mod: PO | Performed by: FAMILY MEDICINE

## 2023-08-15 PROCEDURE — 80048 BASIC METABOLIC PNL TOTAL CA: CPT | Performed by: FAMILY MEDICINE

## 2023-08-15 PROCEDURE — 1160F PR REVIEW ALL MEDS BY PRESCRIBER/CLIN PHARMACIST DOCUMENTED: ICD-10-PCS | Mod: CPTII,S$GLB,, | Performed by: FAMILY MEDICINE

## 2023-08-15 PROCEDURE — 99396 PR PREVENTIVE VISIT,EST,40-64: ICD-10-PCS | Mod: S$GLB,,, | Performed by: FAMILY MEDICINE

## 2023-08-15 PROCEDURE — 80061 LIPID PANEL: CPT | Performed by: FAMILY MEDICINE

## 2023-08-15 PROCEDURE — 4010F PR ACE/ARB THEARPY RXD/TAKEN: ICD-10-PCS | Mod: CPTII,S$GLB,, | Performed by: FAMILY MEDICINE

## 2023-08-15 PROCEDURE — 83036 HEMOGLOBIN GLYCOSYLATED A1C: CPT | Performed by: FAMILY MEDICINE

## 2023-08-15 PROCEDURE — 84443 ASSAY THYROID STIM HORMONE: CPT | Performed by: FAMILY MEDICINE

## 2023-08-15 PROCEDURE — 1159F PR MEDICATION LIST DOCUMENTED IN MEDICAL RECORD: ICD-10-PCS | Mod: CPTII,S$GLB,, | Performed by: FAMILY MEDICINE

## 2023-08-15 PROCEDURE — 3044F PR MOST RECENT HEMOGLOBIN A1C LEVEL <7.0%: ICD-10-PCS | Mod: CPTII,S$GLB,, | Performed by: FAMILY MEDICINE

## 2023-08-15 PROCEDURE — 4010F ACE/ARB THERAPY RXD/TAKEN: CPT | Mod: CPTII,S$GLB,, | Performed by: FAMILY MEDICINE

## 2023-08-15 PROCEDURE — 99999 PR PBB SHADOW E&M-EST. PATIENT-LVL V: CPT | Mod: PBBFAC,,, | Performed by: FAMILY MEDICINE

## 2023-08-15 PROCEDURE — 3008F BODY MASS INDEX DOCD: CPT | Mod: CPTII,S$GLB,, | Performed by: FAMILY MEDICINE

## 2023-08-15 RX ORDER — METFORMIN HYDROCHLORIDE 500 MG/1
500 TABLET ORAL 2 TIMES DAILY WITH MEALS
Qty: 180 TABLET | Refills: 3 | Status: SHIPPED | OUTPATIENT
Start: 2023-08-15 | End: 2024-08-14

## 2023-08-15 RX ORDER — PRAVASTATIN SODIUM 20 MG/1
20 TABLET ORAL DAILY
Qty: 90 TABLET | Refills: 3 | Status: SHIPPED | OUTPATIENT
Start: 2023-08-15

## 2023-08-15 RX ORDER — MELOXICAM 15 MG/1
15 TABLET ORAL
COMMUNITY
Start: 2023-07-17 | End: 2023-12-19 | Stop reason: SDUPTHER

## 2023-08-15 RX ORDER — VALSARTAN 320 MG/1
320 TABLET ORAL DAILY
Qty: 90 TABLET | Refills: 3 | Status: SHIPPED | OUTPATIENT
Start: 2023-08-15 | End: 2023-10-16

## 2023-08-15 NOTE — PROGRESS NOTES
HISTORY OF PRESENT ILLNESS: Mr. Sawant comes in today fasting with taking medication and without acute problems for annual wellness examination.    END OF LIFE DECISION: He has a living will but desires life-support conditionally.    Current Outpatient Medications   Medication Sig    amLODIPine (NORVASC) 10 MG tablet TAKE 1 TABLET(10 MG) BY MOUTH EVERY DAY    aspirin (ECOTRIN) 81 MG EC tablet Take 1 tablet by mouth Daily.    carvediloL (COREG) 25 MG tablet TAKE 1 TABLET(25 MG) BY MOUTH TWICE DAILY WITH MEALS    chlorthalidone (HYGROTEN) 50 MG Tab TAKE 1 TABLET(50 MG) BY MOUTH EVERY DAY    meloxicam (MOBIC) 15 MG tablet Take 15 mg by mouth.    metFORMIN (GLUCOPHAGE) 500 MG tablet Take 1 tablet (500 mg total) by mouth 2 (two) times daily with meals.    mometasone (NASONEX) 50 mcg/actuation nasal spray PLEASE UPDATE FOR ANY MEDICATION CHANGE    multivitamin (THERAGRAN) per tablet Take 1 tablet by mouth once daily.    potassium chloride SA (K-DUR,KLOR-CON) 20 MEQ tablet TAKE 1 TABLET(20 MEQ) BY MOUTH EVERY DAY    pravastatin (PRAVACHOL) 20 MG tablet TAKE 1 TABLET(20 MG) BY MOUTH EVERY DAY    spironolactone (ALDACTONE) 50 MG tablet Take 1 tablet (50 mg total) by mouth once daily.    tadalafiL (CIALIS) 20 MG Tab Take 1 tablet (20 mg total) by mouth once daily.    testosterone cypionate (DEPOTESTOTERONE CYPIONATE) 200 mg/mL injection Inject 1 mL (200 mg total) into the muscle every 7 days. ADMINISTER 1 ML IN THE MUSCLE EVERY 14 DAYS    valsartan (DIOVAN) 320 MG tablet Take 1 tablet (320 mg total) by mouth once daily.      SCREENINGS:   Cholesterol: August 23, 2022.  FFS/Colonoscopy: April 27, 2023 - okay; repeat in 5 years. December 6, 2017 - benign colon polyp, internal hemorrhoid.  Prostate/PSA: August 23, 2022 - 2.7.  Dexa Scan: Never.  Eye Exam: June 2018 with Eye [facility] per patient. He wears glasses. He states he will scheduled.  PPD: Negative in the past.  Immunizations: Tdap - January 23, 2014.  Zostavax -  N./A.  Shingrix - January 29, 2020, December 30, 2020.  Pneumovax - Never.  Seasonal Flu - September 28, 2021.  Hepatitis B vaccine - completed per patient.  Covid-19 (Moderna) vaccine series - March 16, 2021, April 13, 2021, July 20, 2022, November 16, 2022.    ROS:  GENERAL: No fever, chills, fatigue or unusual weight change. Appetite normal. Weight 156.4 kg (344 lb 12.8 oz) Important  at February 23, 2023 visit. Does not exercise. Monitors diet very little.  SKIN: No rashes, itching, changes in mole, color or texture of skin or easy bruising.      HEAD: No headaches or recent head trauma.  EYES: No change in vision, no pain, diplopia, redness or discharge.Wears glasses.  EARS: Denies ear pain, discharge, vertigo or decreased hearing.  NOSE: No epistaxis or rhinitis. Non tender external nose.  MOUTH & THROAT: No hoarseness or change in voice. No excessive gum bleeding or mouth sores. No sore throat.  NODES: Denies swollen glands.  CHEST: Denies GOMEZ, wheezing, cough, hemoptysis or sputum production. Saw NP Christie Beckman with pulmonary on November 15, 2022 for DEEPTHI on CPAP surveillance with 1-year follow up advised.   CARDIOVASCULAR: Denies chest pain, No palpitations. Reports follows with HDM but rarely performs blood pressure checks with levels ranging 130's/70's.  ABDOMEN: Denies diarrhea, constipation, nausea, vomiting, abdominal pain, or blood in stool.  GENITOURINARY: No flank pain, dysuria or hematuria.No nocturia or frequency. No lesions, pain or swelling in genital area. Performs monthly self testicular exam. Follows with Dr. Alvin Moe, urologist, for surveillance of BPH, testicular hypofunction with last visit on July 18, 2023.   ENDOCRINE: Denies diabetes, thyroid problems.  HEME/LYMPH: Denies bleeding problems.  PERIPHERAL VASCULAR: No claudication or cyanosis  MUSCULOSKELETAL: No joint stiffness, pain or swelling. No edema.  NEUROLOGIC: No history of seizures, tremors, alteration of gait or  "coordination.  PSYCHIATRIC: Denies mood swings, depression, anxiety, homicidal or suicidal thoughts. Denies sleep problems as takes Unisom with more help than Trazodone and without sedation as Trazodone.    PE:   VS:  Blood Pressure 136/76 (BP Location: Left arm, Patient Position: Sitting, BP Method: X-Large (Manual))   Pulse 65   Temperature 97.9 °F (36.6 °C)   Respiration 18   Height 6' 5" (1.956 m)   Weight (Abnormal) 159.6 kg (351 lb 13.7 oz)   Oxygen Saturation 96%   Body Mass Index 41.72 kg/m²   APPEARANCE: Well nourished, well developed male, obese and pleasant, alert and oriented in no acute distress.   HEAD: Non tender . Full range of motion.  EYES: PERRL, conjunctiva pink, lids no edema. He wears glasses.  EARS: External canal patent, no swelling or redness. TM's shiny and clear.  NOSE: Mucosa and turbinates pink, not swollen. No discharge  THROAT: No pharyngeal erythema or exudate. No stridor.   NECK: Supple, no mass, thyroid not enlarged. No carotid bruit.  NODES: No cervical, axillary lymph node enlargement.  CHEST: Normal respiratory effort. Lungs clear to auscultation.  CARDIOVASCULAR: Normal S1, S2. No rubs, murmurs or gallops.No edema.Pedal pulses palpable bilaterally.  ABDOMEN: Bowel sounds present. Not distended. Soft. No tenderness, masses or organomegaly.  BREAST: Non tender, no asymmetry, nipple discharge, abnormal masses, nodules, lumps.  GENITALIA: Not examined as deferred to Urology.  RECTAL: Not examined as deferred to Urology and as he had colonoscopy earlier this year.  MUSCULOSKELETAL: No joint deformities or stiffness. He is ambulatory without problems.  SKIN: No rashes or suspicious lesions, normal color and turgor.    NEUROLOGIC: Cranial Nerves: II-XII grossly intact. DTR's: Knees, Ankles 2+ and equal bilaterally Gait & Posture: Normal gait and fine motion.  PSYCHIATRIC: Patient alert, oriented x 3. Mood/Affect normal without acute anxiety or depression noted. Judgement and " insight-good as he makes appropriate decisions on today's examination.    Protective Sensation (w/ 10 gram monofilament):  Right: Intact  Left: Intact    Visual Inspection:  Normal -  Bilateral    Pedal Pulses:   Right: Present  Left: Present    Posterior Tibialis Pulses:   Right:Present  Left: Present     DIAGNOSIS:    ICD-10-CM ICD-9-CM    1. Annual physical exam  Z00.00 V70.0 Lipid Panel      CBC Auto Differential      TSH      Basic Metabolic Panel      Hemoglobin A1C      2. Essential hypertension  I10 401.9 valsartan (DIOVAN) 320 MG tablet      3. Prediabetes  R73.03 790.29 metFORMIN (GLUCOPHAGE) 500 MG tablet      4. Hyperlipidemia, unspecified hyperlipidemia type  E78.5 272.4 pravastatin (PRAVACHOL) 20 MG tablet      5. DEEPTHI on CPAP  G47.33 327.23     Z99.89 V46.8       6. Insomnia, unspecified type  G47.00 780.52       7. Tobacco use  Z72.0 305.1       8. Morbid obesity with BMI of 40.0-44.9, adult  E66.01 278.01     Z68.41 V85.41           PLAN:   1. Age-appropriate counseling-appropriate low-sodium, low-cholesterol, low carbohydrate diet and exercise daily, monthly self testicular examination, annual wellness examination.  2. Patient advised to call for results.  3. Continue current medications.  4. Prescription refills as noted above.  5. Keep follow up with specialists.  6. Eye exam this year advised.  7. Flu shot this fall.   8. Smoking cessation advised.  9. Follow up in about 6 months (around 2/15/2024) for prediabetes and hypertension follow up.

## 2023-08-21 PROBLEM — Z72.0 TOBACCO USE: Status: ACTIVE | Noted: 2023-08-21

## 2023-08-24 ENCOUNTER — TELEPHONE (OUTPATIENT)
Dept: FAMILY MEDICINE | Facility: CLINIC | Age: 58
End: 2023-08-24

## 2023-08-24 ENCOUNTER — PATIENT MESSAGE (OUTPATIENT)
Dept: FAMILY MEDICINE | Facility: CLINIC | Age: 58
End: 2023-08-24
Payer: COMMERCIAL

## 2023-08-24 NOTE — TELEPHONE ENCOUNTER
Patient sent over a my chart message stating he has forms from his employer to be filled out. 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 He was seen on 8/15/23. I did inform that he may have to be seen for this to be completed.

## 2023-08-28 ENCOUNTER — TELEPHONE (OUTPATIENT)
Dept: FAMILY MEDICINE | Facility: CLINIC | Age: 58
End: 2023-08-28
Payer: COMMERCIAL

## 2023-08-29 ENCOUNTER — TELEPHONE (OUTPATIENT)
Dept: FAMILY MEDICINE | Facility: CLINIC | Age: 58
End: 2023-08-29
Payer: COMMERCIAL

## 2023-09-12 DIAGNOSIS — I10 ESSENTIAL HYPERTENSION: ICD-10-CM

## 2023-09-12 DIAGNOSIS — N52.9 ERECTILE DYSFUNCTION, UNSPECIFIED ERECTILE DYSFUNCTION TYPE: ICD-10-CM

## 2023-09-12 DIAGNOSIS — E78.5 HYPERLIPIDEMIA, UNSPECIFIED HYPERLIPIDEMIA TYPE: ICD-10-CM

## 2023-09-12 DIAGNOSIS — R73.03 PREDIABETES: ICD-10-CM

## 2023-09-12 RX ORDER — METFORMIN HYDROCHLORIDE 500 MG/1
500 TABLET ORAL 2 TIMES DAILY WITH MEALS
Qty: 180 TABLET | Refills: 3 | Status: CANCELLED | OUTPATIENT
Start: 2023-09-12 | End: 2024-09-11

## 2023-09-12 RX ORDER — AMLODIPINE BESYLATE 10 MG/1
10 TABLET ORAL DAILY
Qty: 30 TABLET | Refills: 5 | Status: CANCELLED | OUTPATIENT
Start: 2023-09-12

## 2023-09-12 RX ORDER — VALSARTAN 320 MG/1
320 TABLET ORAL DAILY
Qty: 90 TABLET | Refills: 3 | Status: CANCELLED | OUTPATIENT
Start: 2023-09-12

## 2023-09-12 RX ORDER — CARVEDILOL 25 MG/1
25 TABLET ORAL 2 TIMES DAILY WITH MEALS
Qty: 60 TABLET | Refills: 5 | Status: CANCELLED | OUTPATIENT
Start: 2023-09-12

## 2023-09-12 RX ORDER — PRAVASTATIN SODIUM 20 MG/1
20 TABLET ORAL DAILY
Qty: 90 TABLET | Refills: 3 | Status: CANCELLED | OUTPATIENT
Start: 2023-09-12

## 2023-09-12 RX ORDER — SPIRONOLACTONE 50 MG/1
50 TABLET, FILM COATED ORAL DAILY
Qty: 30 TABLET | Refills: 5 | Status: SHIPPED | OUTPATIENT
Start: 2023-09-12 | End: 2023-10-17

## 2023-09-12 RX ORDER — CHLORTHALIDONE 50 MG/1
50 TABLET ORAL DAILY
Qty: 90 TABLET | Refills: 3 | Status: SHIPPED | OUTPATIENT
Start: 2023-09-12

## 2023-09-12 RX ORDER — POTASSIUM CHLORIDE 20 MEQ/1
20 TABLET, EXTENDED RELEASE ORAL DAILY
Qty: 90 TABLET | Refills: 3 | Status: SHIPPED | OUTPATIENT
Start: 2023-09-12

## 2023-09-12 NOTE — TELEPHONE ENCOUNTER
No care due was identified.  [facility] Embedded Care Due Messages. Reference number: [id number].   9/12/2023 4:04:57 PM CDT

## 2023-09-15 ENCOUNTER — PATIENT MESSAGE (OUTPATIENT)
Dept: FAMILY MEDICINE | Facility: CLINIC | Age: 58
End: 2023-09-15
Payer: COMMERCIAL

## 2023-09-15 NOTE — TELEPHONE ENCOUNTER
He does not need appointment for form completion. Completed health form is at desk. He needs to sign his section.

## 2023-09-15 NOTE — TELEPHONE ENCOUNTER
Patient was seen last month on 8/15/23 for his physical. He wants to know if you could fill out his forms for his job without seeing him?   I have printed out the forms.

## 2023-09-19 ENCOUNTER — TELEPHONE (OUTPATIENT)
Dept: FAMILY MEDICINE | Facility: CLINIC | Age: 58
End: 2023-09-19
Payer: COMMERCIAL

## 2023-09-19 RX ORDER — TADALAFIL 20 MG/1
20 TABLET ORAL DAILY
Qty: 10 TABLET | Refills: 5 | Status: SHIPPED | OUTPATIENT
Start: 2023-09-19 | End: 2024-02-27 | Stop reason: SDUPTHER

## 2023-10-11 ENCOUNTER — PATIENT MESSAGE (OUTPATIENT)
Dept: UROLOGY | Facility: CLINIC | Age: 58
End: 2023-10-11
Payer: COMMERCIAL

## 2023-10-12 DIAGNOSIS — R79.89 LOW TESTOSTERONE: ICD-10-CM

## 2023-10-12 RX ORDER — TESTOSTERONE CYPIONATE 200 MG/ML
200 INJECTION, SOLUTION INTRAMUSCULAR
Qty: 10 ML | Refills: 3 | Status: CANCELLED | OUTPATIENT
Start: 2023-10-12

## 2023-10-14 DIAGNOSIS — I10 ESSENTIAL HYPERTENSION: Chronic | ICD-10-CM

## 2023-10-14 NOTE — TELEPHONE ENCOUNTER
No care due was identified.  Health [facility] Embedded Care Due Messages. Reference number: [id number].   10/14/2023 11:57:24 AM CDT

## 2023-10-15 DIAGNOSIS — I10 ESSENTIAL HYPERTENSION: ICD-10-CM

## 2023-10-15 NOTE — TELEPHONE ENCOUNTER
No care due was identified.  [facility] Embedded Care Due Messages. Reference number: [id number].   10/15/2023 6:33:36 PM CDT

## 2023-10-16 DIAGNOSIS — R79.89 LOW TESTOSTERONE: ICD-10-CM

## 2023-10-16 RX ORDER — TESTOSTERONE CYPIONATE 200 MG/ML
200 INJECTION, SOLUTION INTRAMUSCULAR
Qty: 10 ML | Refills: 3 | Status: CANCELLED | OUTPATIENT
Start: 2023-10-16

## 2023-10-16 RX ORDER — AMLODIPINE BESYLATE 10 MG/1
10 TABLET ORAL DAILY
Qty: 90 TABLET | Refills: 3 | Status: SHIPPED | OUTPATIENT
Start: 2023-10-16

## 2023-10-16 RX ORDER — VALSARTAN 320 MG/1
320 TABLET ORAL
Qty: 90 TABLET | Refills: 3 | Status: SHIPPED | OUTPATIENT
Start: 2023-10-16

## 2023-10-16 NOTE — TELEPHONE ENCOUNTER
Refill Decision Note   Reggie Sawant  is requesting a refill authorization.  Brief Assessment and Rationale for Refill:  Approve     Medication Therapy Plan:         Comments:     Note composed:11:15 AM 10/16/2023

## 2023-10-17 RX ORDER — SPIRONOLACTONE 50 MG/1
50 TABLET, FILM COATED ORAL
Qty: 90 TABLET | Refills: 3 | Status: SHIPPED | OUTPATIENT
Start: 2023-10-17 | End: 2024-02-03

## 2023-10-17 NOTE — TELEPHONE ENCOUNTER
Refill Decision Note   Reggie Sawant  is requesting a refill authorization.  Brief Assessment and Rationale for Refill:  Approve     Medication Therapy Plan:         Pharmacist review requested: Yes   Extended chart review required: Yes   Comments:     Note composed:2:45 AM 10/17/2023

## 2023-10-17 NOTE — TELEPHONE ENCOUNTER
Refill Routing Note   Medication(s) are not appropriate for processing by [facility] for the following reason(s):      Drug-drug interaction    [facility] action(s):  Defer  Approve Care Due:  None identified     Medication Therapy Plan: Duplicate Therapy: chlorthalidone, spironolactone      Pharmacist review requested: Yes     Appointments  past 12m or future 3m with PCP    Date Provider   Last Visit   8/15/2023 Irene Sanchez MD   Next Visit   2/15/2024 Irene Sanchez MD   ED visits in past 90 days: 0        Note composed:7:01 PM 10/16/2023

## 2023-10-23 DIAGNOSIS — R79.89 LOW TESTOSTERONE: ICD-10-CM

## 2023-10-27 RX ORDER — TESTOSTERONE CYPIONATE 200 MG/ML
200 INJECTION, SOLUTION INTRAMUSCULAR
Qty: 10 ML | Refills: 3 | Status: SHIPPED | OUTPATIENT
Start: 2023-10-27

## 2023-11-03 ENCOUNTER — PATIENT MESSAGE (OUTPATIENT)
Dept: UROLOGY | Facility: CLINIC | Age: 58
End: 2023-11-03
Payer: COMMERCIAL

## 2023-11-22 DIAGNOSIS — I10 ESSENTIAL HYPERTENSION: Chronic | ICD-10-CM

## 2023-11-23 RX ORDER — CARVEDILOL 25 MG/1
25 TABLET ORAL 2 TIMES DAILY WITH MEALS
Qty: 60 TABLET | Refills: 5 | Status: SHIPPED | OUTPATIENT
Start: 2023-11-23

## 2023-12-19 RX ORDER — MELOXICAM 15 MG/1
15 TABLET ORAL DAILY
Qty: 90 TABLET | Refills: 1 | Status: SHIPPED | OUTPATIENT
Start: 2023-12-19 | End: 2023-12-26 | Stop reason: SDUPTHER

## 2023-12-19 NOTE — TELEPHONE ENCOUNTER
No care due was identified.  [facility] Embedded Care Due Messages. Reference number: [id number].   12/19/2023 3:20:29 PM CST

## 2023-12-26 RX ORDER — MELOXICAM 15 MG/1
15 TABLET ORAL DAILY
Qty: 90 TABLET | Refills: 1 | Status: SHIPPED | OUTPATIENT
Start: 2023-12-26

## 2023-12-26 NOTE — TELEPHONE ENCOUNTER
No care due was identified.  Health [facility] Embedded Care Due Messages. Reference number: [id number].   12/26/2023 9:06:21 AM CST

## 2024-02-02 DIAGNOSIS — I10 ESSENTIAL HYPERTENSION: ICD-10-CM

## 2024-02-02 NOTE — TELEPHONE ENCOUNTER
Care Due:                  Date            Visit Type   Department     Provider  --------------------------------------------------------------------------------                                EP -                              PRIMARY      [facility]  Last Visit: 08-      CARE ([facility])   MEDICINE       Irene Sanchez                              EP -                              PRIMARY      [facility]  Next Visit: 04-      CARE ([facility])   MEDICINE       Irene Sanchez                                                            Last  Test          Frequency    Reason                     Performed    Due Date  --------------------------------------------------------------------------------    HBA1C.......  6 months...  metFORMIN................  08- 02-    Health [facility] Embedded Care Due Messages. Reference number: [id number].   2/02/2024 3:32:22 PM CST

## 2024-02-03 RX ORDER — SPIRONOLACTONE 50 MG/1
50 TABLET, FILM COATED ORAL
Qty: 90 TABLET | Refills: 1 | Status: SHIPPED | OUTPATIENT
Start: 2024-02-03

## 2024-02-03 NOTE — TELEPHONE ENCOUNTER
Provider Staff:  Action required for this patient     Please see care gap opportunities below in Care Due Message.    Thanks!  [facility]     Appointments      Date Provider   Last Visit   8/15/2023 Irene Sanchez MD   Next Visit   4/25/2024 Irene Sanchez MD         Refill Decision Note   Reggie Sawant  is requesting a refill authorization.  Brief Assessment and Rationale for Refill:  Approve     Medication Therapy Plan:         Comments:     Note composed:4:29 PM 02/03/2024

## 2024-02-22 ENCOUNTER — PATIENT MESSAGE (OUTPATIENT)
Dept: PULMONOLOGY | Facility: CLINIC | Age: 59
End: 2024-02-22
Payer: COMMERCIAL

## 2024-02-27 ENCOUNTER — PATIENT MESSAGE (OUTPATIENT)
Dept: UROLOGY | Facility: CLINIC | Age: 59
End: 2024-02-27

## 2024-02-27 ENCOUNTER — OFFICE VISIT (OUTPATIENT)
Dept: UROLOGY | Facility: CLINIC | Age: 59
End: 2024-02-27
Payer: COMMERCIAL

## 2024-02-27 ENCOUNTER — LAB VISIT (OUTPATIENT)
Dept: LAB | Facility: HOSPITAL | Age: 59
End: 2024-02-27
Attending: UROLOGY
Payer: COMMERCIAL

## 2024-02-27 VITALS
BODY MASS INDEX: 37.19 KG/M2 | HEART RATE: 64 BPM | DIASTOLIC BLOOD PRESSURE: 86 MMHG | HEIGHT: 77 IN | SYSTOLIC BLOOD PRESSURE: 136 MMHG | WEIGHT: 315 LBS

## 2024-02-27 DIAGNOSIS — R79.89 LOW TESTOSTERONE: Primary | ICD-10-CM

## 2024-02-27 DIAGNOSIS — N52.9 ERECTILE DYSFUNCTION, UNSPECIFIED ERECTILE DYSFUNCTION TYPE: ICD-10-CM

## 2024-02-27 DIAGNOSIS — Z12.5 PROSTATE CANCER SCREENING: ICD-10-CM

## 2024-02-27 LAB — COMPLEXED PSA SERPL-MCNC: 3.8 NG/ML (ref 0–4)

## 2024-02-27 PROCEDURE — 4010F ACE/ARB THERAPY RXD/TAKEN: CPT | Mod: CPTII,S$GLB,, | Performed by: UROLOGY

## 2024-02-27 PROCEDURE — 1160F RVW MEDS BY RX/DR IN RCRD: CPT | Mod: CPTII,S$GLB,, | Performed by: UROLOGY

## 2024-02-27 PROCEDURE — 3075F SYST BP GE 130 - 139MM HG: CPT | Mod: CPTII,S$GLB,, | Performed by: UROLOGY

## 2024-02-27 PROCEDURE — 99999 PR PBB SHADOW E&M-EST. PATIENT-LVL IV: CPT | Mod: PBBFAC,,, | Performed by: UROLOGY

## 2024-02-27 PROCEDURE — 3008F BODY MASS INDEX DOCD: CPT | Mod: CPTII,S$GLB,, | Performed by: UROLOGY

## 2024-02-27 PROCEDURE — 1159F MED LIST DOCD IN RCRD: CPT | Mod: CPTII,S$GLB,, | Performed by: UROLOGY

## 2024-02-27 PROCEDURE — 84153 ASSAY OF PSA TOTAL: CPT | Performed by: UROLOGY

## 2024-02-27 PROCEDURE — 36415 COLL VENOUS BLD VENIPUNCTURE: CPT | Performed by: UROLOGY

## 2024-02-27 PROCEDURE — 3079F DIAST BP 80-89 MM HG: CPT | Mod: CPTII,S$GLB,, | Performed by: UROLOGY

## 2024-02-27 PROCEDURE — 99214 OFFICE O/P EST MOD 30 MIN: CPT | Mod: S$GLB,,, | Performed by: UROLOGY

## 2024-02-27 RX ORDER — TADALAFIL 20 MG/1
20 TABLET ORAL DAILY
Qty: 30 TABLET | Refills: 5 | Status: SHIPPED | OUTPATIENT
Start: 2024-02-27

## 2024-02-27 NOTE — PROGRESS NOTES
Chief Complaint: Low T    HPI:   02/27/2024 - patient returns today for follow-up, no new issues in the interim, testosterone going well, Cialis still working well for him, no side effects that he is noticed, no voiding issues or gross hematuria, due for labs    07/18/2023 - patient returns today for follow-up, no new issues in the interim, testosterone going well, labs okay    01/11/2023 - returns today for follow-up, no new issues in the interim, testosterone working well at the current dosage, due for labs today, no voiding issues or gross hematuria    07/26/2022 - 55 yo male that presents for evaluation and continued management of low T. Up until recently, he was seeing Dr Narayan Grove for this issue, however he has had multiple communication issues with his staff that has made the whole process much more difficult that it should be. He notes a long history of T use, has been on it for about 10 yrs, typically on 150-200mg per week IM, obtains it from [facility]. He notes that low energy was the initial indication, and that this dosage helps greatly. He also notes ED, takes cialis, which works well for him. Denies any voiding issues, denies GH, denies family history of  cancers. Denies prior urologic procedures.     PMH:  Past Medical History:   Diagnosis Date    Cellulitis     left leg    Hip arthritis     right    Hyperlipidemia     Hypertension     Hypogonadism male     Hypokalemia     Morbid obesity     Sleep apnea     on CPAP       PSH:  Past Surgical History:   Procedure Laterality Date    COLONOSCOPY N/A 12/06/2017    Procedure: COLONOSCOPY;  Surgeon: Rory Barone MD;  Location: [facility];  Service: Endoscopy;  Laterality: N/A;    COLONOSCOPY N/A 4/27/2023    Procedure: COLONOSCOPY;  Surgeon: Jeimy Garcia MD;  Location: [facility];  Service: Endoscopy;  Laterality: N/A;    right bunionectomy         Family History:  Family History   Problem Relation Age of Onset    No  Known Problems Mother     Hypertension Father     Heart disease Father         CAD    Diabetes Sister     No Known Problems Sister     Hypertension Brother     Diabetes Maternal Grandmother     No Known Problems Daughter     No Known Problems Daughter     No Known Problems Daughter     No Known Problems Other     Cancer Neg Hx     Stroke Neg Hx     Thyroid disease Neg Hx     Thyroid cancer Neg Hx         MEN2       Social History:  Social History     Tobacco Use    Smoking status: Light Smoker     Types: Cigars    Smokeless tobacco: Never   Substance Use Topics    Alcohol use: Yes     Alcohol/week: 10.0 standard drinks of alcohol     Types: 4 Glasses of wine, 6 Shots of liquor per week     Comment: Occasionally    Drug use: Never        Review of Systems:  General: No fever, chills  Skin: No rashes  Chest:  Denies cough and sputum production  Heart: Denies chest pain  Resp: Denies dyspnea  Abdomen: Denies diarrhea, abdominal pain, hematemesis, or blood in stool.  Musculoskeletal: No joint stiffness or swelling. Denies back pain.  : see HPI  Neuro: no dizziness or weakness    Allergies:  Shrimp    Medications:    Current Outpatient Medications:     amLODIPine (NORVASC) 10 MG tablet, Take 1 tablet (10 mg total) by mouth once daily., Disp: 90 tablet, Rfl: 3    aspirin (ECOTRIN) 81 MG EC tablet, Take 1 tablet by mouth Daily., Disp: , Rfl:     carvediloL (COREG) 25 MG tablet, Take 1 tablet (25 mg total) by mouth 2 (two) times daily with meals., Disp: 60 tablet, Rfl: 5    chlorthalidone (HYGROTEN) 50 MG Tab, Take 1 tablet (50 mg total) by mouth once daily., Disp: 90 tablet, Rfl: 3    meloxicam (MOBIC) 15 MG tablet, Take 1 tablet (15 mg total) by mouth once daily., Disp: 90 tablet, Rfl: 1    metFORMIN (GLUCOPHAGE) 500 MG tablet, Take 1 tablet (500 mg total) by mouth 2 (two) times daily with meals., Disp: 180 tablet, Rfl: 3    mometasone (NASONEX) 50 mcg/actuation nasal spray, PLEASE UPDATE FOR ANY MEDICATION CHANGE,  Disp: 17 g, Rfl: 5    multivitamin (THERAGRAN) per tablet, Take 1 tablet by mouth once daily., Disp: , Rfl:     potassium chloride SA (K-DUR,KLOR-CON) 20 MEQ tablet, Take 1 tablet (20 mEq total) by mouth once daily., Disp: 90 tablet, Rfl: 3    pravastatin (PRAVACHOL) 20 MG tablet, Take 1 tablet (20 mg total) by mouth once daily., Disp: 90 tablet, Rfl: 3    spironolactone (ALDACTONE) 50 MG tablet, TAKE 1 TABLET ONCE DAILY, Disp: 90 tablet, Rfl: 1    tadalafiL (CIALIS) 20 MG Tab, Take 1 tablet (20 mg total) by mouth once daily., Disp: 10 tablet, Rfl: 5    testosterone cypionate (DEPOTESTOTERONE CYPIONATE) 200 mg/mL injection, Inject 1 mL (200 mg total) into the muscle every 7 days. ADMINISTER 1 ML IN THE MUSCLE EVERY 14 DAYS, Disp: 10 mL, Rfl: 3    valsartan (DIOVAN) 320 MG tablet, TAKE 1 TABLET(320 MG) BY MOUTH EVERY DAY, Disp: 90 tablet, Rfl: 3  No current facility-administered medications for this visit.    Facility-Administered Medications Ordered in Other Visits:     lactated ringers infusion, , Intravenous, Continuous, Jeimy Garcia MD, New Bag at 04/27/23 1150    Physical Exam:  Vitals:    02/27/24 0915   BP: 136/86   Pulse: 64     Body mass index is 42.56 kg/m².  General: awake, alert, cooperative  Head: NC/AT  Ears: external ears normal  Eyes: sclera normal  Lungs: normal inspiration, NAD  Heart: well-perfused  : deferred, patient notes 2 normal exams in the last 6 months  Skin: The skin is warm and dry  Ext: No c/c/e.  Neuro: grossly intact, AOx3    RADIOLOGY:  No recent relevant imaging available for review.    LABS:  I personally reviewed the following lab values:  Lab Results   Component Value Date    WBC 6.36 08/15/2023    HGB 16.2 08/15/2023    HCT 49.8 08/15/2023     08/15/2023     08/15/2023    K 3.9 08/15/2023    CL 97 08/15/2023    CREATININE 1.2 08/15/2023    BUN 24 (H) 08/15/2023    CO2 26 08/15/2023    TSH 1.984 08/15/2023    PSA 2.7 08/23/2022    HGBA1C 5.4 08/15/2023     CHOL 232 (H) 08/15/2023    TRIG 120 08/15/2023    HDL 43 08/15/2023    ALT 27 07/13/2023    AST 26 07/13/2023       Assessment/Plan:   Reggie Sawant is a 58 y.o. male with:    Low T - continue 200mg/week, f/u 6 months with labs    ED - continue Cialis    Prostate Cancer Screening - PSA today    Alvin Moe MD  Urology

## 2024-04-03 ENCOUNTER — PATIENT MESSAGE (OUTPATIENT)
Dept: PULMONOLOGY | Facility: CLINIC | Age: 59
End: 2024-04-03
Payer: COMMERCIAL

## 2024-04-20 DIAGNOSIS — I10 ESSENTIAL HYPERTENSION: Chronic | ICD-10-CM

## 2024-04-20 NOTE — TELEPHONE ENCOUNTER
Care Due:                  Date            Visit Type   Department     Provider  --------------------------------------------------------------------------------                                EP -                              PRIMARY      [facility]  Last Visit: 08-      CARE ([facility])   MEDICINE       Irene Sanchez                              EP -                              PRIMARY      [facility]  Next Visit: 04-      CARE ([facility])   MEDICINE       Irene Sanchez                                                            Last  Test          Frequency    Reason                     Performed    Due Date  --------------------------------------------------------------------------------    CMP.........  12 months..  meloxicam, pravastatin...  08- 07-    HBA1C.......  6 months...  metFORMIN................  08- 02-    [facility] Embedded Care Due Messages. Reference number: [id number].   4/20/2024 3:56:16 PM CDT

## 2024-04-22 RX ORDER — CARVEDILOL 25 MG/1
25 TABLET ORAL 2 TIMES DAILY WITH MEALS
Qty: 180 TABLET | Refills: 1 | Status: SHIPPED | OUTPATIENT
Start: 2024-04-22

## 2024-04-22 NOTE — TELEPHONE ENCOUNTER
Provider Staff:  Action required for this patient     Please see care gap opportunities below in Care Due Message.    Thanks!  [facility]     Appointments      Date Provider   Last Visit   8/15/2023 Irene Sanchez MD   Next Visit   4/25/2024 Irene Sanchez MD      Refill Decision Note   Reggie Sawant  is requesting a refill authorization.  Brief Assessment and Rationale for Refill:  Approve     Medication Therapy Plan:  CMP labs due      Comments:     Note composed:3:41 AM 04/22/2024            How Severe Are Your Spot(S)?: mild Have Your Spot(S) Been Treated In The Past?: has not been treated Hpi Title: Evaluation of Skin Lesions

## 2024-05-17 ENCOUNTER — LAB VISIT (OUTPATIENT)
Dept: LAB | Facility: HOSPITAL | Age: 59
End: 2024-05-17
Attending: NURSE PRACTITIONER
Payer: COMMERCIAL

## 2024-05-17 ENCOUNTER — OFFICE VISIT (OUTPATIENT)
Dept: FAMILY MEDICINE | Facility: CLINIC | Age: 59
End: 2024-05-17
Payer: COMMERCIAL

## 2024-05-17 VITALS
OXYGEN SATURATION: 97 % | TEMPERATURE: 98 F | SYSTOLIC BLOOD PRESSURE: 122 MMHG | RESPIRATION RATE: 18 BRPM | HEIGHT: 77 IN | DIASTOLIC BLOOD PRESSURE: 78 MMHG | WEIGHT: 315 LBS | HEART RATE: 63 BPM | BODY MASS INDEX: 37.19 KG/M2

## 2024-05-17 DIAGNOSIS — Z00.00 WELLNESS EXAMINATION: ICD-10-CM

## 2024-05-17 DIAGNOSIS — E66.01 MORBID OBESITY WITH BMI OF 40.0-44.9, ADULT: ICD-10-CM

## 2024-05-17 DIAGNOSIS — Z00.00 WELLNESS EXAMINATION: Primary | ICD-10-CM

## 2024-05-17 DIAGNOSIS — I10 ESSENTIAL HYPERTENSION: ICD-10-CM

## 2024-05-17 DIAGNOSIS — E78.5 HYPERLIPIDEMIA, UNSPECIFIED HYPERLIPIDEMIA TYPE: Chronic | ICD-10-CM

## 2024-05-17 PROBLEM — R73.03 PREDIABETES: Status: RESOLVED | Noted: 2020-12-30 | Resolved: 2024-05-17

## 2024-05-17 LAB
ALBUMIN SERPL BCP-MCNC: 4 G/DL (ref 3.5–5.2)
ALP SERPL-CCNC: 40 U/L (ref 55–135)
ALT SERPL W/O P-5'-P-CCNC: 32 U/L (ref 10–44)
ANION GAP SERPL CALC-SCNC: 9 MMOL/L (ref 8–16)
AST SERPL-CCNC: 27 U/L (ref 10–40)
BASOPHILS # BLD AUTO: 0.07 K/UL (ref 0–0.2)
BASOPHILS NFR BLD: 1.1 % (ref 0–1.9)
BILIRUB SERPL-MCNC: 1.3 MG/DL (ref 0.1–1)
BUN SERPL-MCNC: 20 MG/DL (ref 6–20)
CALCIUM SERPL-MCNC: 10 MG/DL (ref 8.7–10.5)
CHLORIDE SERPL-SCNC: 99 MMOL/L (ref 95–110)
CHOLEST SERPL-MCNC: 189 MG/DL (ref 120–199)
CHOLEST/HDLC SERPL: 4.6 {RATIO} (ref 2–5)
CO2 SERPL-SCNC: 29 MMOL/L (ref 23–29)
CREAT SERPL-MCNC: 1.2 MG/DL (ref 0.5–1.4)
DIFFERENTIAL METHOD BLD: ABNORMAL
EOSINOPHIL # BLD AUTO: 0.4 K/UL (ref 0–0.5)
EOSINOPHIL NFR BLD: 6.1 % (ref 0–8)
ERYTHROCYTE [DISTWIDTH] IN BLOOD BY AUTOMATED COUNT: 13.6 % (ref 11.5–14.5)
EST. GFR  (NO RACE VARIABLE): >60 ML/MIN/1.73 M^2
ESTIMATED AVG GLUCOSE: 117 MG/DL (ref 68–131)
GLUCOSE SERPL-MCNC: 82 MG/DL (ref 70–110)
HBA1C MFR BLD: 5.7 % (ref 4–5.6)
HCT VFR BLD AUTO: 51.7 % (ref 40–54)
HDLC SERPL-MCNC: 41 MG/DL (ref 40–75)
HDLC SERPL: 21.7 % (ref 20–50)
HGB BLD-MCNC: 16.5 G/DL (ref 14–18)
IMM GRANULOCYTES # BLD AUTO: 0.04 K/UL (ref 0–0.04)
IMM GRANULOCYTES NFR BLD AUTO: 0.6 % (ref 0–0.5)
LDLC SERPL CALC-MCNC: 129.2 MG/DL (ref 63–159)
LYMPHOCYTES # BLD AUTO: 1.5 K/UL (ref 1–4.8)
LYMPHOCYTES NFR BLD: 23 % (ref 18–48)
MCH RBC QN AUTO: 31.5 PG (ref 27–31)
MCHC RBC AUTO-ENTMCNC: 31.9 G/DL (ref 32–36)
MCV RBC AUTO: 99 FL (ref 82–98)
MONOCYTES # BLD AUTO: 0.9 K/UL (ref 0.3–1)
MONOCYTES NFR BLD: 13.8 % (ref 4–15)
NEUTROPHILS # BLD AUTO: 3.6 K/UL (ref 1.8–7.7)
NEUTROPHILS NFR BLD: 55.4 % (ref 38–73)
NONHDLC SERPL-MCNC: 148 MG/DL
NRBC BLD-RTO: 0 /100 WBC
PLATELET # BLD AUTO: 197 K/UL (ref 150–450)
PMV BLD AUTO: 10.8 FL (ref 9.2–12.9)
POTASSIUM SERPL-SCNC: 4.2 MMOL/L (ref 3.5–5.1)
PROT SERPL-MCNC: 7.5 G/DL (ref 6–8.4)
RBC # BLD AUTO: 5.24 M/UL (ref 4.6–6.2)
SODIUM SERPL-SCNC: 137 MMOL/L (ref 136–145)
T4 FREE SERPL-MCNC: 0.95 NG/DL (ref 0.71–1.51)
TRIGL SERPL-MCNC: 94 MG/DL (ref 30–150)
TSH SERPL DL<=0.005 MIU/L-ACNC: 2.09 UIU/ML (ref 0.4–4)
WBC # BLD AUTO: 6.4 K/UL (ref 3.9–12.7)

## 2024-05-17 PROCEDURE — 80061 LIPID PANEL: CPT | Performed by: NURSE PRACTITIONER

## 2024-05-17 PROCEDURE — 80053 COMPREHEN METABOLIC PANEL: CPT | Performed by: NURSE PRACTITIONER

## 2024-05-17 PROCEDURE — 85025 COMPLETE CBC W/AUTO DIFF WBC: CPT | Performed by: NURSE PRACTITIONER

## 2024-05-17 PROCEDURE — 83036 HEMOGLOBIN GLYCOSYLATED A1C: CPT | Performed by: NURSE PRACTITIONER

## 2024-05-17 PROCEDURE — 84443 ASSAY THYROID STIM HORMONE: CPT | Performed by: NURSE PRACTITIONER

## 2024-05-17 PROCEDURE — 1159F MED LIST DOCD IN RCRD: CPT | Mod: CPTII,S$GLB,, | Performed by: NURSE PRACTITIONER

## 2024-05-17 PROCEDURE — 3078F DIAST BP <80 MM HG: CPT | Mod: CPTII,S$GLB,, | Performed by: NURSE PRACTITIONER

## 2024-05-17 PROCEDURE — 3008F BODY MASS INDEX DOCD: CPT | Mod: CPTII,S$GLB,, | Performed by: NURSE PRACTITIONER

## 2024-05-17 PROCEDURE — 84439 ASSAY OF FREE THYROXINE: CPT | Performed by: NURSE PRACTITIONER

## 2024-05-17 PROCEDURE — 36415 COLL VENOUS BLD VENIPUNCTURE: CPT | Mod: PO | Performed by: NURSE PRACTITIONER

## 2024-05-17 PROCEDURE — 99999 PR PBB SHADOW E&M-EST. PATIENT-LVL IV: CPT | Mod: PBBFAC,,, | Performed by: NURSE PRACTITIONER

## 2024-05-17 PROCEDURE — 99214 OFFICE O/P EST MOD 30 MIN: CPT | Mod: S$GLB,,, | Performed by: NURSE PRACTITIONER

## 2024-05-17 PROCEDURE — 4010F ACE/ARB THERAPY RXD/TAKEN: CPT | Mod: CPTII,S$GLB,, | Performed by: NURSE PRACTITIONER

## 2024-05-17 PROCEDURE — 3074F SYST BP LT 130 MM HG: CPT | Mod: CPTII,S$GLB,, | Performed by: NURSE PRACTITIONER

## 2024-05-17 PROCEDURE — 1160F RVW MEDS BY RX/DR IN RCRD: CPT | Mod: CPTII,S$GLB,, | Performed by: NURSE PRACTITIONER

## 2024-05-17 NOTE — PROGRESS NOTES
Reggie Sawant  05/17/2024  [id number]    Irene Sanchez MD  Patient Care Team:  Irene Sanchez MD as PCP - General (Family Medicine)  Cinda Gill PharmD as Hypertension Digital Medicine Clinician (Pharmacist)  Irene Sanchez MD as Hypertension Digital Medicine Responsible Provider (Family Medicine)  Company[employer] as Hypertension Digital Medicine Contract  Heber Chan as Digital Medicine Health           Visit Type:a scheduled routine follow-up visit    Chief Complaint:  Chief Complaint   Patient presents with    Annual Exam       History of Present Illness:     58 year old male presents for follow up and repeat labs.   No medication refills being requested at this time.  No other complaints at this time.      History:  Past Medical History:   Diagnosis Date    Cellulitis     left leg    Hip arthritis     right    Hyperlipidemia     Hypertension     Hypogonadism male     Hypokalemia     Morbid obesity     Sleep apnea     on CPAP     Past Surgical History:   Procedure Laterality Date    COLONOSCOPY N/A 12/06/2017    Procedure: COLONOSCOPY;  Surgeon: Rory Barone MD;  Location: [facility];  Service: Endoscopy;  Laterality: N/A;    COLONOSCOPY N/A 4/27/2023    Procedure: COLONOSCOPY;  Surgeon: Jeimy Garcia MD;  Location: [facility];  Service: Endoscopy;  Laterality: N/A;    right bunionectomy       Family History   Problem Relation Name Age of Onset    No Known Problems Mother Yael     Hypertension Father Amanuel     Heart disease Father Amanuel         CAD    Diabetes Sister Fawn     No Known Problems Sister Sherri     Hypertension Brother Amanuel Jr     Diabetes Maternal Grandmother Lissa     No Known Problems Daughter      No Known Problems Daughter      No Known Problems Daughter      No Known Problems Other grandson     Cancer Neg Hx      Stroke Neg Hx      Thyroid disease Neg Hx      Thyroid cancer Neg Hx          MEN2     Social History     Socioeconomic History     Marital status:     Number of children: 4   Occupational History    Occupation:      Employer: [employer]     Employer: [employer]   Tobacco Use    Smoking status: Light Smoker     Types: Cigars    Smokeless tobacco: Never   Substance and Sexual Activity    Alcohol use: Yes     Alcohol/week: 10.0 standard drinks of alcohol     Types: 4 Glasses of wine, 6 Shots of liquor per week     Comment: Occasionally    Drug use: Never    Sexual activity: Yes     Partners: Female     Birth control/protection: None   Social History Narrative    He wears seatbelt. He has 1 grandson and 2 granddaughters.     Social Determinants of Health     Financial Resource Strain: Low Risk  (5/14/2024)    Overall Financial Resource Strain (CARDIA)     Difficulty of Paying Living Expenses: Not hard at all   Food Insecurity: No Food Insecurity (5/14/2024)    Hunger Vital Sign     Worried About Running Out of Food in the Last Year: Never true     Ran Out of Food in the Last Year: Never true   Transportation Needs: No Transportation Needs (5/14/2024)    PRAPARE - Transportation     Lack of Transportation (Medical): No     Lack of Transportation (Non-Medical): No   Physical Activity: Inactive (5/14/2024)    Exercise Vital Sign     Days of Exercise per Week: 1 day     Minutes of Exercise per Session: 0 min   Stress: Patient Declined (5/14/2024)    [employer] [employer] - Occupational Stress Questionnaire     Feeling of Stress : Patient declined   Housing Stability: Low Risk  (8/14/2023)    Housing Stability Vital Sign     Unable to Pay for Housing in the Last Year: No     Number of Places Lived in the Last Year: 1     Unstable Housing in the Last Year: No     Patient Active Problem List   Diagnosis    Essential hypertension    Hyperlipidemia    Hypogonadism male    DEEPTHI on CPAP    ED (erectile dysfunction)    DJD (degenerative joint disease) of hip    Morbid obesity with BMI of 40.0-44.9,  adult    Prediabetes    Insomnia    Tobacco use     Review of patient's allergies indicates:   Allergen Reactions    Shrimp Anaphylaxis       The following were reviewed at this visit: active problem list, medication list, allergies, family history, social history, and health maintenance.    Medications:  Current Outpatient Medications on File Prior to Visit   Medication Sig Dispense Refill    amLODIPine (NORVASC) 10 MG tablet Take 1 tablet (10 mg total) by mouth once daily. 90 tablet 3    aspirin (ECOTRIN) 81 MG EC tablet Take 1 tablet by mouth Daily.      carvediloL (COREG) 25 MG tablet TAKE 1 TABLET TWICE DAILY  WITH MEALS 180 tablet 1    chlorthalidone (HYGROTEN) 50 MG Tab Take 1 tablet (50 mg total) by mouth once daily. 90 tablet 3    meloxicam (MOBIC) 15 MG tablet Take 1 tablet (15 mg total) by mouth once daily. 90 tablet 1    mometasone (NASONEX) 50 mcg/actuation nasal spray PLEASE UPDATE FOR ANY MEDICATION CHANGE 17 g 5    multivitamin (THERAGRAN) per tablet Take 1 tablet by mouth once daily.      potassium chloride SA (K-DUR,KLOR-CON) 20 MEQ tablet Take 1 tablet (20 mEq total) by mouth once daily. 90 tablet 3    pravastatin (PRAVACHOL) 20 MG tablet Take 1 tablet (20 mg total) by mouth once daily. 90 tablet 3    spironolactone (ALDACTONE) 50 MG tablet TAKE 1 TABLET ONCE DAILY 90 tablet 1    tadalafiL (CIALIS) 20 MG Tab Take 1 tablet (20 mg total) by mouth once daily. 30 tablet 5    testosterone cypionate (DEPOTESTOTERONE CYPIONATE) 200 mg/mL injection Inject 1 mL (200 mg total) into the muscle every 7 days. ADMINISTER 1 ML IN THE MUSCLE EVERY 14 DAYS 10 mL 3    valsartan (DIOVAN) 320 MG tablet TAKE 1 TABLET(320 MG) BY MOUTH EVERY DAY 90 tablet 3    [DISCONTINUED] metFORMIN (GLUCOPHAGE) 500 MG tablet Take 1 tablet (500 mg total) by mouth 2 (two) times daily with meals. 180 tablet 3     Current Facility-Administered Medications on File Prior to Visit   Medication Dose Route Frequency Provider Last Rate Last  Admin    lactated ringers infusion   Intravenous Continuous Jeimy Garcia MD   New Bag at 04/27/23 1150       Medications have been reviewed and reconciled with patient at this visit.  Barriers to medications reviewed with patient.    Adverse reactions to current medications reviewed with patient..    Over the counter medications reviewed and reconciled with patient.    Exam:  Wt Readings from Last 3 Encounters:   05/17/24 (!) 159.8 kg (352 lb 4.7 oz)   02/27/24 (!) 162.8 kg (358 lb 14.5 oz)   08/15/23 (!) 159.6 kg (351 lb 13.7 oz)     Temp Readings from Last 3 Encounters:   05/17/24 97.8 °F (36.6 °C) (Tympanic)   08/15/23 97.9 °F (36.6 °C)   04/27/23 97.2 °F (36.2 °C) (Temporal)     BP Readings from Last 3 Encounters:   05/17/24 122/78   02/27/24 136/86   08/15/23 136/76     Pulse Readings from Last 3 Encounters:   05/17/24 63   02/27/24 64   08/15/23 65     Body mass index is 41.78 kg/m².      Review of Systems   HENT:  Negative for hearing loss.    Eyes:  Negative for discharge.   Respiratory:  Negative for wheezing.    Cardiovascular:  Negative for chest pain and palpitations.   Gastrointestinal:  Negative for blood in stool, constipation, diarrhea and vomiting.   Genitourinary:  Negative for hematuria and urgency.   Musculoskeletal:  Negative for neck pain.   Neurological:  Negative for weakness and headaches.   Endo/Heme/Allergies:  Negative for polydipsia.     Physical Exam  Vitals and nursing note reviewed.   Constitutional:       Appearance: Normal appearance. He is obese.   HENT:      Head: Normocephalic and atraumatic.      Right Ear: Tympanic membrane, ear canal and external ear normal.      Left Ear: Tympanic membrane, ear canal and external ear normal.      Nose: Nose normal.      Mouth/Throat:      Mouth: Mucous membranes are moist.      Pharynx: Oropharynx is clear.   Eyes:      Extraocular Movements: Extraocular movements intact.      Conjunctiva/sclera: Conjunctivae normal.      Pupils:  Pupils are equal, round, and reactive to light.   Cardiovascular:      Rate and Rhythm: Normal rate and regular rhythm.      Pulses: Normal pulses.      Heart sounds: Normal heart sounds.   Pulmonary:      Effort: Pulmonary effort is normal.      Breath sounds: Normal breath sounds.   Abdominal:      General: Bowel sounds are normal.      Palpations: Abdomen is soft.   Musculoskeletal:         General: Normal range of motion.      Cervical back: Normal range of motion and neck supple.   Skin:     General: Skin is warm and dry.      Capillary Refill: Capillary refill takes less than 2 seconds.   Neurological:      General: No focal deficit present.      Mental Status: He is alert and oriented to person, place, and time.   Psychiatric:         Mood and Affect: Mood normal.         Behavior: Behavior normal.         Thought Content: Thought content normal.         Judgment: Judgment normal.         Laboratory Reviewed ({Yes)  Lab Results   Component Value Date    WBC 6.36 08/15/2023    HGB 16.2 08/15/2023    HCT 49.8 08/15/2023     08/15/2023    CHOL 232 (H) 08/15/2023    TRIG 120 08/15/2023    HDL 43 08/15/2023    ALT 27 07/13/2023    AST 26 07/13/2023     08/15/2023    K 3.9 08/15/2023    CL 97 08/15/2023    CREATININE 1.2 08/15/2023    BUN 24 (H) 08/15/2023    CO2 26 08/15/2023    TSH 1.984 08/15/2023    PSA 3.8 02/27/2024    HGBA1C 5.4 08/15/2023       Reggie was seen today for annual exam.    Diagnoses and all orders for this visit:    Wellness examination  -     Lipid Panel; Future  -     T4, Free; Future  -     TSH; Future  -     Hemoglobin A1C; Future  -     Comprehensive Metabolic Panel; Future  -     CBC Auto Differential; Future                Care Plan/Goals: Reviewed    Goals        Blood Pressure < 130/80      Exercise at least 150 minutes per week.      Aim to get at least 30 minutes of moderate intensity physical activity most days of the week.       Maintain a low sodium diet      Read  nutrition labels and limit sodium intake to 2,000mg or less per day.       Meal plan      Take at least one BP reading per week at various times of the day         Plan   Labs   The current medical regimen is effective;  continue present plan and medications.     Follow up: Follow up in about 6 months (around 11/17/2024) for with  for 6 month follow up.    After visit summary was printed and given to patient upon discharge today.  Patient goals and care plan are included in After Visit Summary.

## 2024-05-26 ENCOUNTER — PATIENT MESSAGE (OUTPATIENT)
Dept: FAMILY MEDICINE | Facility: CLINIC | Age: 59
End: 2024-05-26
Payer: COMMERCIAL

## 2024-06-03 DIAGNOSIS — E78.5 HYPERLIPIDEMIA, UNSPECIFIED HYPERLIPIDEMIA TYPE: ICD-10-CM

## 2024-06-03 NOTE — TELEPHONE ENCOUNTER
Care Due:                  Date            Visit Type   Department     Provider  --------------------------------------------------------------------------------                                EP -                              PRIMARY      [facility]  Last Visit: 08-      CARE ([facility])   MEDICINE       Irene Sanchez  Next Visit: None Scheduled  None         None Found                                                            Last  Test          Frequency    Reason                     Performed    Due Date  --------------------------------------------------------------------------------    Office Visit  12 months..  meloxicam................  08-   08-    [facility] Embedded Care Due Messages. Reference number: [id number].   6/03/2024 4:05:48 PM CDT

## 2024-06-04 RX ORDER — PRAVASTATIN SODIUM 20 MG/1
20 TABLET ORAL
Qty: 90 TABLET | Refills: 0 | Status: SHIPPED | OUTPATIENT
Start: 2024-06-04

## 2024-06-04 NOTE — TELEPHONE ENCOUNTER
Provider Staff:  Action required for this patient     Please see care gap opportunities below in Care Due Message.    Thanks!  [facility]     Appointments      Date Provider   Last Visit   8/15/2023 Irene Sanchez MD   Next Visit   Visit date not found Irene Sanchez MD      Refill Decision Note   Reggie Sawant  is requesting a refill authorization.  Brief Assessment and Rationale for Refill:  Approve     Medication Therapy Plan:         Comments:     Note composed:3:45 AM 06/04/2024

## 2024-06-17 ENCOUNTER — OFFICE VISIT (OUTPATIENT)
Dept: FAMILY MEDICINE | Facility: CLINIC | Age: 59
End: 2024-06-17
Payer: COMMERCIAL

## 2024-06-17 DIAGNOSIS — I10 ESSENTIAL HYPERTENSION: ICD-10-CM

## 2024-06-17 DIAGNOSIS — F41.9 ANXIETY: Primary | ICD-10-CM

## 2024-06-17 PROCEDURE — 3044F HG A1C LEVEL LT 7.0%: CPT | Mod: CPTII,95,, | Performed by: NURSE PRACTITIONER

## 2024-06-17 PROCEDURE — 1160F RVW MEDS BY RX/DR IN RCRD: CPT | Mod: CPTII,95,, | Performed by: NURSE PRACTITIONER

## 2024-06-17 PROCEDURE — 99214 OFFICE O/P EST MOD 30 MIN: CPT | Mod: 95,,, | Performed by: NURSE PRACTITIONER

## 2024-06-17 PROCEDURE — 4010F ACE/ARB THERAPY RXD/TAKEN: CPT | Mod: CPTII,95,, | Performed by: NURSE PRACTITIONER

## 2024-06-17 PROCEDURE — 1159F MED LIST DOCD IN RCRD: CPT | Mod: CPTII,95,, | Performed by: NURSE PRACTITIONER

## 2024-06-17 RX ORDER — ESCITALOPRAM OXALATE 5 MG/1
5 TABLET ORAL DAILY
Qty: 90 TABLET | Refills: 3 | Status: SHIPPED | OUTPATIENT
Start: 2024-06-17 | End: 2025-06-17

## 2024-06-17 RX ORDER — BUSPIRONE HYDROCHLORIDE 10 MG/1
10 TABLET ORAL 3 TIMES DAILY
Qty: 90 TABLET | Refills: 11 | Status: SHIPPED | OUTPATIENT
Start: 2024-06-17 | End: 2025-06-17

## 2024-06-17 NOTE — PROGRESS NOTES
Reggie Sawant  06/17/2024  [id number]      The patient location is: home   The chief complaint leading to consultation is: anxiety  Visit type: Virtual visit with synchronous audio and video  Total time spent with patient: 11 min  Each patient to whom he or she provides medical services by telemedicine is:  (1) informed of the relationship between the physician and patient and the respective role of any other health care provider with respect to management of the patient; and (2) notified that he or she may decline to receive medical services by telemedicine and may withdraw from such care at any time.        Chief Complaint:      History of Present Illness:    57 yo male presents with co anxiety for 6 months.   Tried  OTC kava kava and relaxation techniques but reports it made him more sleepy.   Reports he has had increased stress at work with a supervisor. States he followed up with HR.  Reports the stress at work keeps repeating over and over in his mind. Wife stated he has lost weight and it has been affecting his sleep.      Review of Systems   HENT:  Negative for hearing loss.    Eyes:  Negative for discharge.   Respiratory:  Negative for wheezing.    Cardiovascular:  Negative for chest pain and palpitations.   Gastrointestinal:  Negative for blood in stool, constipation, diarrhea and vomiting.   Genitourinary:  Negative for hematuria and urgency.   Musculoskeletal:  Negative for neck pain.   Neurological:  Negative for weakness and headaches.   Endo/Heme/Allergies:  Negative for polydipsia.   Psychiatric/Behavioral:  The patient is nervous/anxious.          History:  Past Medical History:   Diagnosis Date    Cellulitis     left leg    Hip arthritis     right    Hyperlipidemia     Hypertension     Hypogonadism male     Hypokalemia     Morbid obesity     Prediabetes 12/30/2020    Sleep apnea     on CPAP     Past Surgical History:   Procedure Laterality Date    COLONOSCOPY N/A 12/06/2017    Procedure: COLONOSCOPY;   Surgeon: Rory Barone MD;  Location: [facility] ENDO;  Service: Endoscopy;  Laterality: N/A;    COLONOSCOPY N/A 4/27/2023    Procedure: COLONOSCOPY;  Surgeon: Jeimy Garcia MD;  Location: [facility] ENDO;  Service: Endoscopy;  Laterality: N/A;    right bunionectomy         Family History   Problem Relation Name Age of Onset    No Known Problems Mother Yael     Hypertension Father Amanuel     Heart disease Father Amanuel         CAD    Diabetes Sister Fawn     No Known Problems Sister Sherri     Hypertension Brother Amanuel Jr     Diabetes Maternal Grandmother Lissa     No Known Problems Daughter      No Known Problems Daughter      No Known Problems Daughter      No Known Problems Other grandson     Cancer Neg Hx      Stroke Neg Hx      Thyroid disease Neg Hx      Thyroid cancer Neg Hx          MEN2     Social History     Socioeconomic History    Marital status:     Number of children: 4   Occupational History    Occupation:      Employer: [employer]     Employer: [employer]   Tobacco Use    Smoking status: Light Smoker     Types: Cigars    Smokeless tobacco: Never   Substance and Sexual Activity    Alcohol use: Yes     Alcohol/week: 10.0 standard drinks of alcohol     Types: 4 Glasses of wine, 6 Shots of liquor per week     Comment: Occasionally    Drug use: Never    Sexual activity: Yes     Partners: Female     Birth control/protection: None   Social History Narrative    He wears seatbelt. He has 1 grandson and 2 granddaughters.     Social Determinants of Health     Financial Resource Strain: Low Risk  (5/14/2024)    Overall Financial Resource Strain (CARDIA)     Difficulty of Paying Living Expenses: Not hard at all   Food Insecurity: No Food Insecurity (5/14/2024)    Hunger Vital Sign     Worried About Running Out of Food in the Last Year: Never true     Ran Out of Food in the Last Year: Never true   Transportation Needs: No Transportation Needs (5/14/2024)     PRAPARE - Transportation     Lack of Transportation (Medical): No     Lack of Transportation (Non-Medical): No   Physical Activity: Inactive (5/14/2024)    Exercise Vital Sign     Days of Exercise per Week: 1 day     Minutes of Exercise per Session: 0 min   Stress: Patient Declined (5/14/2024)    [employer] [place] [employer] - Occupational Stress Questionnaire     Feeling of Stress : Patient declined   Housing Stability: Low Risk  (8/14/2023)    Housing Stability Vital Sign     Unable to Pay for Housing in the Last Year: No     Number of Places Lived in the Last Year: 1     Unstable Housing in the Last Year: No     Patient Active Problem List   Diagnosis    Essential hypertension    Hyperlipidemia    Hypogonadism male    DEEPTHI on CPAP    ED (erectile dysfunction)    DJD (degenerative joint disease) of hip    Morbid obesity with BMI of 40.0-44.9, adult    Insomnia    Tobacco use     Review of patient's allergies indicates:   Allergen Reactions    Shrimp Anaphylaxis       The following were reviewed at this visit: active problem list, medication list, allergies, family history, social history, and health maintenance.    Medications:  Current Outpatient Medications on File Prior to Visit   Medication Sig Dispense Refill    amLODIPine (NORVASC) 10 MG tablet Take 1 tablet (10 mg total) by mouth once daily. 90 tablet 3    aspirin (ECOTRIN) 81 MG EC tablet Take 1 tablet by mouth Daily.      carvediloL (COREG) 25 MG tablet TAKE 1 TABLET TWICE DAILY  WITH MEALS 180 tablet 1    chlorthalidone (HYGROTEN) 50 MG Tab Take 1 tablet (50 mg total) by mouth once daily. 90 tablet 3    meloxicam (MOBIC) 15 MG tablet Take 1 tablet (15 mg total) by mouth once daily. 90 tablet 1    multivitamin (THERAGRAN) per tablet Take 1 tablet by mouth once daily.      potassium chloride SA (K-DUR,KLOR-CON) 20 MEQ tablet Take 1 tablet (20 mEq total) by mouth once daily. 90 tablet 3    pravastatin (PRAVACHOL) 20 MG tablet TAKE 1 TABLET ONCE  DAILY 90 tablet 0    spironolactone (ALDACTONE) 50 MG tablet TAKE 1 TABLET ONCE DAILY 90 tablet 1    tadalafiL (CIALIS) 20 MG Tab Take 1 tablet (20 mg total) by mouth once daily. 30 tablet 5    testosterone cypionate (DEPOTESTOTERONE CYPIONATE) 200 mg/mL injection Inject 1 mL (200 mg total) into the muscle every 7 days. ADMINISTER 1 ML IN THE MUSCLE EVERY 14 DAYS 10 mL 3    valsartan (DIOVAN) 320 MG tablet TAKE 1 TABLET(320 MG) BY MOUTH EVERY DAY 90 tablet 3    [DISCONTINUED] mometasone (NASONEX) 50 mcg/actuation nasal spray PLEASE UPDATE FOR ANY MEDICATION CHANGE 17 g 5     Current Facility-Administered Medications on File Prior to Visit   Medication Dose Route Frequency Provider Last Rate Last Admin    lactated ringers infusion   Intravenous Continuous Jeimy Garcia MD   New Bag at 04/27/23 1150       Exam:  Wt Readings from Last 3 Encounters:   05/17/24 (!) 159.8 kg (352 lb 4.7 oz)   02/27/24 (!) 162.8 kg (358 lb 14.5 oz)   08/15/23 (!) 159.6 kg (351 lb 13.7 oz)     Temp Readings from Last 3 Encounters:   05/17/24 97.8 °F (36.6 °C) (Tympanic)   08/15/23 97.9 °F (36.6 °C)   04/27/23 97.2 °F (36.2 °C) (Temporal)     BP Readings from Last 3 Encounters:   05/17/24 122/78   02/27/24 136/86   08/15/23 136/76     Pulse Readings from Last 3 Encounters:   05/17/24 63   02/27/24 64   08/15/23 65     There is no height or weight on file to calculate BMI.      @ROS@    Physical Exam  Vitals and nursing note reviewed.   Constitutional:       Appearance: Normal appearance.   Eyes:      Conjunctiva/sclera: Conjunctivae normal.   Pulmonary:      Effort: Pulmonary effort is normal.   Neurological:      General: No focal deficit present.      Mental Status: He is alert and oriented to person, place, and time.   Psychiatric:         Attention and Perception: Attention and perception normal.         Mood and Affect: Mood is anxious.         Speech: Speech normal.         Behavior: Behavior normal. Behavior is cooperative.          Thought Content: Thought content normal.         Cognition and Memory: Cognition and memory normal.         Judgment: Judgment normal.         Laboratory Reviewed ({Yes)  Lab Results   Component Value Date    WBC 6.40 05/17/2024    HGB 16.5 05/17/2024    HCT 51.7 05/17/2024     05/17/2024    CHOL 189 05/17/2024    TRIG 94 05/17/2024    HDL 41 05/17/2024    ALT 32 05/17/2024    AST 27 05/17/2024     05/17/2024    K 4.2 05/17/2024    CL 99 05/17/2024    CREATININE 1.2 05/17/2024    BUN 20 05/17/2024    CO2 29 05/17/2024    TSH 2.090 05/17/2024    PSA 3.8 02/27/2024    HGBA1C 5.7 (H) 05/17/2024       Diagnoses and all orders for this visit:    Anxiety  -     EScitalopram oxalate (LEXAPRO) 5 MG Tab; Take 1 tablet (5 mg total) by mouth once daily.  -     busPIRone (BUSPAR) 10 MG tablet; Take 1 tablet (10 mg total) by mouth 3 (three) times daily.      Start   Lexapro  Buspar  F/U with therapy

## 2024-06-17 NOTE — TELEPHONE ENCOUNTER
----- Message from SKY Peter sent at 3/23/2023  2:05 PM CDT -----  Please contact the patient and let them know that their results were fine and do not require any change in treatment.   No care due was identified.  [facility] Embedded Care Due Messages. Reference number: [id number].   6/17/2024 4:06:05 PM CDT

## 2024-06-18 RX ORDER — VALSARTAN 320 MG/1
320 TABLET ORAL
Qty: 90 TABLET | Refills: 0 | Status: SHIPPED | OUTPATIENT
Start: 2024-06-18

## 2024-06-18 NOTE — TELEPHONE ENCOUNTER
Refill Decision Note   Reggie Sawant  is requesting a refill authorization.  Brief Assessment and Rationale for Refill:  Approve     Medication Therapy Plan:         Comments:     Note composed:2:44 AM 06/18/2024

## 2024-06-20 ENCOUNTER — PATIENT MESSAGE (OUTPATIENT)
Dept: OTOLARYNGOLOGY | Facility: CLINIC | Age: 59
End: 2024-06-20
Payer: COMMERCIAL

## 2024-06-26 DIAGNOSIS — I10 ESSENTIAL HYPERTENSION: ICD-10-CM

## 2024-06-26 RX ORDER — SPIRONOLACTONE 50 MG/1
50 TABLET, FILM COATED ORAL
Qty: 90 TABLET | Refills: 0 | Status: SHIPPED | OUTPATIENT
Start: 2024-06-26

## 2024-06-26 NOTE — TELEPHONE ENCOUNTER
Refill Routing Note   Medication(s) are not appropriate for processing by [facility] for the following reason(s):        Drug-drug interaction    [facility] action(s):  Defer        Medication Therapy Plan: Duplicate Therapy: chlorthalidone, spironolactone    Pharmacist review requested: Yes     Appointments  past 12m or future 3m with PCP    Date Provider   Last Visit   8/15/2023 Irene Sanchez MD   Next Visit   Visit date not found Irene Sanchez MD   ED visits in past 90 days: 0        Note composed:4:55 PM 06/26/2024

## 2024-06-26 NOTE — TELEPHONE ENCOUNTER
Refill Decision Note   Reggie Sawant  is requesting a refill authorization.  Brief Assessment and Rationale for Refill:  Approve     Medication Therapy Plan:        Pharmacist review requested: Yes   Extended chart review required: Yes   Comments:     Note composed:5:16 PM 06/26/2024

## 2024-06-26 NOTE — TELEPHONE ENCOUNTER
No care due was identified.  [facility] Embedded Care Due Messages. Reference number: [id number].   6/26/2024 4:44:54 PM CDT

## 2024-06-29 ENCOUNTER — PATIENT MESSAGE (OUTPATIENT)
Dept: UROLOGY | Facility: CLINIC | Age: 59
End: 2024-06-29
Payer: COMMERCIAL

## 2024-06-29 DIAGNOSIS — I10 ESSENTIAL HYPERTENSION: ICD-10-CM

## 2024-06-29 DIAGNOSIS — N52.9 ERECTILE DYSFUNCTION, UNSPECIFIED ERECTILE DYSFUNCTION TYPE: ICD-10-CM

## 2024-06-29 DIAGNOSIS — R79.89 LOW TESTOSTERONE: ICD-10-CM

## 2024-06-29 DIAGNOSIS — E78.5 HYPERLIPIDEMIA, UNSPECIFIED HYPERLIPIDEMIA TYPE: ICD-10-CM

## 2024-06-29 NOTE — TELEPHONE ENCOUNTER
No care due was identified.  Health [facility] Embedded Care Due Messages. Reference number: [id number].   6/29/2024 10:14:13 AM CDT

## 2024-06-30 DIAGNOSIS — I10 ESSENTIAL HYPERTENSION: ICD-10-CM

## 2024-06-30 RX ORDER — CHLORTHALIDONE 50 MG/1
50 TABLET ORAL
Qty: 90 TABLET | Refills: 0 | Status: SHIPPED | OUTPATIENT
Start: 2024-06-30

## 2024-06-30 NOTE — TELEPHONE ENCOUNTER
Refill Decision Note   Reggie Sawant  is requesting a refill authorization.  Brief Assessment and Rationale for Refill:  Approve     Medication Therapy Plan:         Comments:     Note composed:4:08 PM 06/30/2024

## 2024-06-30 NOTE — TELEPHONE ENCOUNTER
No care due was identified.  Health [facility] Embedded Care Due Messages. Reference number: [id number].   6/30/2024 4:02:26 PM CDT

## 2024-07-01 RX ORDER — VALSARTAN 320 MG/1
320 TABLET ORAL DAILY
Qty: 90 TABLET | Refills: 0 | Status: SHIPPED | OUTPATIENT
Start: 2024-07-01

## 2024-07-01 RX ORDER — TADALAFIL 20 MG/1
20 TABLET ORAL DAILY
Qty: 30 TABLET | Refills: 5 | Status: SHIPPED | OUTPATIENT
Start: 2024-07-01

## 2024-07-01 RX ORDER — TESTOSTERONE CYPIONATE 200 MG/ML
200 INJECTION, SOLUTION INTRAMUSCULAR
Qty: 10 ML | Refills: 3 | Status: SHIPPED | OUTPATIENT
Start: 2024-07-01

## 2024-07-01 RX ORDER — SPIRONOLACTONE 50 MG/1
50 TABLET, FILM COATED ORAL DAILY
Qty: 90 TABLET | Refills: 0 | Status: SHIPPED | OUTPATIENT
Start: 2024-07-01

## 2024-07-01 RX ORDER — PRAVASTATIN SODIUM 20 MG/1
20 TABLET ORAL DAILY
Qty: 90 TABLET | Refills: 0 | Status: SHIPPED | OUTPATIENT
Start: 2024-07-01

## 2024-07-01 NOTE — TELEPHONE ENCOUNTER
Refill Decision Note   Reggie Sawant  is requesting a refill authorization.  Brief Assessment and Rationale for Refill:  Approve     Medication Therapy Plan:         Comments:     Note composed:9:57 AM 07/01/2024

## 2024-07-01 NOTE — TELEPHONE ENCOUNTER
No care due was identified.  [facility] Embedded Care Due Messages. Reference number: [id number].   7/01/2024 4:54:36 PM CDT

## 2024-07-02 RX ORDER — MELOXICAM 15 MG/1
15 TABLET ORAL
Qty: 90 TABLET | Refills: 1 | Status: SHIPPED | OUTPATIENT
Start: 2024-07-02

## 2024-07-05 ENCOUNTER — TELEPHONE (OUTPATIENT)
Dept: UROLOGY | Facility: CLINIC | Age: 59
End: 2024-07-05
Payer: COMMERCIAL

## 2024-07-05 NOTE — TELEPHONE ENCOUNTER
I called [facility] mail order and gave verbal instructions that the patient is getting testosterone IM every 7 days.     ----- Message from Heike Gonzáles sent at 7/5/2024  3:57 PM CDT -----  Contact: Sherrie Balderas with [facility] [employer] is calling to speak with the nurse regarding medication   . Reports needing directions on testosterone cypionate (DEPOTESTOTERONE CYPIONATE) 200 mg/mL injection . Sherrie states this is the 3rd attempt trying to reach office. Please give Sherrie  a call back at [phone] Ref#   [id number]

## 2024-07-10 ENCOUNTER — PATIENT MESSAGE (OUTPATIENT)
Dept: FAMILY MEDICINE | Facility: CLINIC | Age: 59
End: 2024-07-10
Payer: COMMERCIAL

## 2024-08-14 ENCOUNTER — PATIENT MESSAGE (OUTPATIENT)
Dept: ADMINISTRATIVE | Facility: OTHER | Age: 59
End: 2024-08-14
Payer: COMMERCIAL

## 2024-08-14 ENCOUNTER — OFFICE VISIT (OUTPATIENT)
Dept: UROLOGY | Facility: CLINIC | Age: 59
End: 2024-08-14
Payer: COMMERCIAL

## 2024-08-14 VITALS
BODY MASS INDEX: 37.19 KG/M2 | WEIGHT: 315 LBS | HEIGHT: 77 IN | SYSTOLIC BLOOD PRESSURE: 153 MMHG | DIASTOLIC BLOOD PRESSURE: 71 MMHG | HEART RATE: 64 BPM

## 2024-08-14 DIAGNOSIS — Z12.5 PROSTATE CANCER SCREENING: Primary | ICD-10-CM

## 2024-08-14 PROCEDURE — 1159F MED LIST DOCD IN RCRD: CPT | Mod: CPTII,S$GLB,, | Performed by: UROLOGY

## 2024-08-14 PROCEDURE — 3008F BODY MASS INDEX DOCD: CPT | Mod: CPTII,S$GLB,, | Performed by: UROLOGY

## 2024-08-14 PROCEDURE — 99214 OFFICE O/P EST MOD 30 MIN: CPT | Mod: S$GLB,,, | Performed by: UROLOGY

## 2024-08-14 PROCEDURE — 1160F RVW MEDS BY RX/DR IN RCRD: CPT | Mod: CPTII,S$GLB,, | Performed by: UROLOGY

## 2024-08-14 PROCEDURE — 3078F DIAST BP <80 MM HG: CPT | Mod: CPTII,S$GLB,, | Performed by: UROLOGY

## 2024-08-14 PROCEDURE — 4010F ACE/ARB THERAPY RXD/TAKEN: CPT | Mod: CPTII,S$GLB,, | Performed by: UROLOGY

## 2024-08-14 PROCEDURE — 3077F SYST BP >= 140 MM HG: CPT | Mod: CPTII,S$GLB,, | Performed by: UROLOGY

## 2024-08-14 PROCEDURE — 3044F HG A1C LEVEL LT 7.0%: CPT | Mod: CPTII,S$GLB,, | Performed by: UROLOGY

## 2024-08-14 PROCEDURE — 99999 PR PBB SHADOW E&M-EST. PATIENT-LVL IV: CPT | Mod: PBBFAC,,, | Performed by: UROLOGY

## 2024-08-14 NOTE — PROGRESS NOTES
Chief Complaint: Low T    HPI:   08/14/2024 - patient returns today for follow-up, no new issues in the interim, testosterone working very well for him, Cialis working well for him, no voiding issues, no gross hematuria, labs in May were appropriate    02/27/2024 - patient returns today for follow-up, no new issues in the interim, testosterone going well, Cialis still working well for him, no side effects that he is noticed, no voiding issues or gross hematuria, due for labs    07/18/2023 - patient returns today for follow-up, no new issues in the interim, testosterone going well, labs okay    01/11/2023 - returns today for follow-up, no new issues in the interim, testosterone working well at the current dosage, due for labs today, no voiding issues or gross hematuria    07/26/2022 - 55 yo male that presents for evaluation and continued management of low T. Up until recently, he was seeing Dr Narayan Grove for this issue, however he has had multiple communication issues with his staff that has made the whole process much more difficult that it should be. He notes a long history of T use, has been on it for about 10 yrs, typically on 150-200mg per week IM, obtains it [facility]. He notes that low energy was the initial indication, and that this dosage helps greatly. He also notes ED, takes cialis, which works well for him. Denies any voiding issues, denies GH, denies family history of  cancers. Denies prior urologic procedures.     PMH:  Past Medical History:   Diagnosis Date    Cellulitis     left leg    Hip arthritis     right    Hyperlipidemia     Hypertension     Hypogonadism male     Hypokalemia     Morbid obesity     Prediabetes 12/30/2020    Sleep apnea     on CPAP       PSH:  Past Surgical History:   Procedure Laterality Date    COLONOSCOPY N/A 12/06/2017    Procedure: COLONOSCOPY;  Surgeon: Rory Barone MD;  Location: [facility];  Service: Endoscopy;  Laterality: N/A;     COLONOSCOPY N/A 4/27/2023    Procedure: COLONOSCOPY;  Surgeon: Jeimy Garcia MD;  Location: [facility];  Service: Endoscopy;  Laterality: N/A;    right bunionectomy         Family History:  Family History   Problem Relation Name Age of Onset    No Known Problems Mother Yael     Hypertension Father Amanuel     Heart disease Father Amanuel         CAD    Diabetes Sister Fawn     No Known Problems Sister Sherri     Hypertension Brother Amanuel Jacobsen     Diabetes Maternal Grandmother Lissa     No Known Problems Daughter      No Known Problems Daughter      No Known Problems Daughter      No Known Problems Other grandson     Cancer Neg Hx      Stroke Neg Hx      Thyroid disease Neg Hx      Thyroid cancer Neg Hx          MEN2       Social History:  Social History     Tobacco Use    Smoking status: Light Smoker     Types: Cigars    Smokeless tobacco: Never   Substance Use Topics    Alcohol use: Yes     Alcohol/week: 10.0 standard drinks of alcohol     Types: 4 Glasses of wine, 6 Shots of liquor per week     Comment: Occasionally    Drug use: Never        Review of Systems:  General: No fever, chills  Skin: No rashes  Chest:  Denies cough and sputum production  Heart: Denies chest pain  Resp: Denies dyspnea  Abdomen: Denies diarrhea, abdominal pain, hematemesis, or blood in stool.  Musculoskeletal: No joint stiffness or swelling. Denies back pain.  : see HPI  Neuro: no dizziness or weakness    Allergies:  Shrimp    Medications:    Current Outpatient Medications:     amLODIPine (NORVASC) 10 MG tablet, Take 1 tablet (10 mg total) by mouth once daily., Disp: 90 tablet, Rfl: 3    aspirin (ECOTRIN) 81 MG EC tablet, Take 1 tablet by mouth Daily., Disp: , Rfl:     busPIRone (BUSPAR) 10 MG tablet, Take 1 tablet (10 mg total) by mouth 3 (three) times daily., Disp: 90 tablet, Rfl: 11    carvediloL (COREG) 25 MG tablet, TAKE 1 TABLET TWICE DAILY  WITH MEALS, Disp: 180 tablet, Rfl: 1    chlorthalidone (HYGROTEN) 50 MG Tab,  TAKE 1 TABLET ONCE DAILY, Disp: 90 tablet, Rfl: 0    EScitalopram oxalate (LEXAPRO) 5 MG Tab, Take 1 tablet (5 mg total) by mouth once daily., Disp: 90 tablet, Rfl: 3    meloxicam (MOBIC) 15 MG tablet, TAKE 1 TABLET ONCE DAILY, Disp: 90 tablet, Rfl: 1    multivitamin (THERAGRAN) per tablet, Take 1 tablet by mouth once daily., Disp: , Rfl:     potassium chloride SA (K-DUR,KLOR-CON) 20 MEQ tablet, Take 1 tablet (20 mEq total) by mouth once daily., Disp: 90 tablet, Rfl: 3    pravastatin (PRAVACHOL) 20 MG tablet, Take 1 tablet (20 mg total) by mouth once daily., Disp: 90 tablet, Rfl: 0    spironolactone (ALDACTONE) 50 MG tablet, Take 1 tablet (50 mg total) by mouth once daily., Disp: 90 tablet, Rfl: 0    tadalafiL (CIALIS) 20 MG Tab, Take 1 tablet (20 mg total) by mouth once daily., Disp: 30 tablet, Rfl: 5    testosterone cypionate (DEPOTESTOTERONE CYPIONATE) 200 mg/mL injection, Inject 1 mL (200 mg total) into the muscle every 7 days. ADMINISTER 1 ML IN THE MUSCLE EVERY 14 DAYS, Disp: 10 mL, Rfl: 3    valsartan (DIOVAN) 320 MG tablet, Take 1 tablet (320 mg total) by mouth once daily., Disp: 90 tablet, Rfl: 0  No current facility-administered medications for this visit.    Facility-Administered Medications Ordered in Other Visits:     lactated ringers infusion, , Intravenous, Continuous, Jeimy Garcia MD, New Bag at 04/27/23 1150    Physical Exam:  There were no vitals filed for this visit.    Body mass index is 42.98 kg/m².  General: awake, alert, cooperative  Head: NC/AT  Ears: external ears normal  Eyes: sclera normal  Lungs: normal inspiration, NAD  Heart: well-perfused  : deferred, patient notes 2 normal exams in the last 6 months  Skin: The skin is warm and dry  Ext: No c/c/e.  Neuro: grossly intact, AOx3    RADIOLOGY:  No recent relevant imaging available for review.    LABS:  I personally reviewed the following lab values:  Lab Results   Component Value Date    WBC 6.40 05/17/2024    HGB 16.5 05/17/2024     HCT 51.7 05/17/2024     05/17/2024     05/17/2024    K 4.2 05/17/2024    CL 99 05/17/2024    CREATININE 1.2 05/17/2024    BUN 20 05/17/2024    CO2 29 05/17/2024    TSH 2.090 05/17/2024    PSA 3.8 02/27/2024    HGBA1C 5.7 (H) 05/17/2024    CHOL 189 05/17/2024    TRIG 94 05/17/2024    HDL 41 05/17/2024    ALT 32 05/17/2024    AST 27 05/17/2024       Assessment/Plan:   Reggie Sawant is a 59 y.o. male with:    Low T - continue 200mg/week, f/u 6 months with labs    ED - continue Cialis    Prostate Cancer Screening - PSA at next visit    Alvin Moe MD  Urology

## 2024-08-27 DIAGNOSIS — F41.9 ANXIETY: ICD-10-CM

## 2024-08-27 RX ORDER — ESCITALOPRAM OXALATE 5 MG/1
5 TABLET ORAL DAILY
Qty: 90 TABLET | Refills: 1 | Status: SHIPPED | OUTPATIENT
Start: 2024-08-27 | End: 2025-08-27

## 2024-08-27 RX ORDER — BUSPIRONE HYDROCHLORIDE 10 MG/1
10 TABLET ORAL 3 TIMES DAILY
Qty: 90 TABLET | Refills: 5 | Status: SHIPPED | OUTPATIENT
Start: 2024-08-27 | End: 2025-08-27

## 2024-11-07 DIAGNOSIS — I10 ESSENTIAL HYPERTENSION: ICD-10-CM

## 2024-11-07 RX ORDER — CHLORTHALIDONE 50 MG/1
50 TABLET ORAL
Qty: 90 TABLET | Refills: 0 | Status: SHIPPED | OUTPATIENT
Start: 2024-11-07

## 2024-11-07 NOTE — TELEPHONE ENCOUNTER
Care Due:                  Date            Visit Type   Department     Provider  --------------------------------------------------------------------------------                                EP -                              PRIMARY      [facility]  Last Visit: 08-      CARE ([facility])   MEDICINE       Irene Sanchez  Next Visit: None Scheduled  None         None Found                                                            Last  Test          Frequency    Reason                     Performed    Due Date  --------------------------------------------------------------------------------    Office Visit  12 months..  EScitalopram, amLODIPine,   08-   08-                             busPIRone, carvediloL,                             chlorthalidone,                             meloxicam, potassium,                             pravastatin,                             spironolactone, valsartan    [facility] Embedded Care Due Messages. Reference number: [id number].   11/07/2024 9:28:16 AM CST

## 2024-11-07 NOTE — TELEPHONE ENCOUNTER
Refill Routing Note   Medication(s) are not appropriate for processing by [facility] for the following reason(s):        Required vitals abnormal    [facility] action(s):  Defer   Requires appointment : Yes             Appointments  past 12m or future 3m with PCP    Date Provider   Last Visit   8/15/2023 Irene Sanchez MD   Next Visit   Visit date not found Irene Sanchez MD   ED visits in past 90 days: 0        Note composed:11:47 AM 11/07/2024

## 2024-12-02 DIAGNOSIS — E78.5 HYPERLIPIDEMIA, UNSPECIFIED HYPERLIPIDEMIA TYPE: ICD-10-CM

## 2024-12-02 DIAGNOSIS — I10 ESSENTIAL HYPERTENSION: ICD-10-CM

## 2024-12-02 RX ORDER — AMLODIPINE BESYLATE 10 MG/1
10 TABLET ORAL DAILY
Qty: 90 TABLET | Refills: 0 | Status: SHIPPED | OUTPATIENT
Start: 2024-12-02

## 2024-12-02 RX ORDER — POTASSIUM CHLORIDE 20 MEQ/1
20 TABLET, EXTENDED RELEASE ORAL DAILY
Qty: 90 TABLET | Refills: 0 | Status: SHIPPED | OUTPATIENT
Start: 2024-12-02

## 2024-12-02 RX ORDER — SPIRONOLACTONE 50 MG/1
50 TABLET, FILM COATED ORAL DAILY
Qty: 90 TABLET | Refills: 0 | Status: SHIPPED | OUTPATIENT
Start: 2024-12-02

## 2024-12-02 RX ORDER — CHLORTHALIDONE 50 MG/1
50 TABLET ORAL DAILY
Qty: 90 TABLET | Refills: 0 | Status: SHIPPED | OUTPATIENT
Start: 2024-12-02

## 2024-12-02 RX ORDER — VALSARTAN 320 MG/1
320 TABLET ORAL DAILY
Qty: 90 TABLET | Refills: 0 | Status: SHIPPED | OUTPATIENT
Start: 2024-12-02

## 2024-12-02 RX ORDER — PRAVASTATIN SODIUM 20 MG/1
20 TABLET ORAL DAILY
Qty: 90 TABLET | Refills: 0 | Status: SHIPPED | OUTPATIENT
Start: 2024-12-02

## 2024-12-02 NOTE — TELEPHONE ENCOUNTER
No care due was identified.  Health [facility] Embedded Care Due Messages. Reference number: [id number].   12/02/2024 12:21:58 PM CST

## 2025-01-16 ENCOUNTER — PATIENT MESSAGE (OUTPATIENT)
Dept: SPORTS MEDICINE | Facility: CLINIC | Age: 60
End: 2025-01-16
Payer: COMMERCIAL

## 2025-01-16 DIAGNOSIS — M25.552 LEFT HIP PAIN: Primary | ICD-10-CM

## 2025-01-18 DIAGNOSIS — F41.9 ANXIETY: ICD-10-CM

## 2025-01-18 NOTE — TELEPHONE ENCOUNTER
Care Due:                  Date            Visit Type   Department     Provider  --------------------------------------------------------------------------------                                EP -                              PRIMARY      [facility]  Last Visit: 08-      CARE ([facility])   MEDICINE       Irene Sanchez  Next Visit: None Scheduled  None         None Found                                                            Last  Test          Frequency    Reason                     Performed    Due Date  --------------------------------------------------------------------------------    Office Visit  12 months..  EScitalopram, amLODIPine,   08-   08-                             busPIRone, carvediloL,                             chlorthalidone,                             meloxicam, potassium,                             pravastatin,                             spironolactone, valsartan    Health [facility] Embedded Care Due Messages. Reference number: [id number].   1/18/2025 2:57:20 PM CST

## 2025-01-19 ENCOUNTER — PATIENT MESSAGE (OUTPATIENT)
Dept: SPORTS MEDICINE | Facility: CLINIC | Age: 60
End: 2025-01-19
Payer: COMMERCIAL

## 2025-01-21 ENCOUNTER — PATIENT MESSAGE (OUTPATIENT)
Dept: ORTHOPEDICS | Facility: CLINIC | Age: 60
End: 2025-01-21
Payer: COMMERCIAL

## 2025-01-22 DIAGNOSIS — R79.89 LOW TESTOSTERONE: ICD-10-CM

## 2025-01-22 RX ORDER — ESCITALOPRAM OXALATE 5 MG/1
5 TABLET ORAL
Qty: 90 TABLET | Refills: 0 | Status: SHIPPED | OUTPATIENT
Start: 2025-01-22

## 2025-01-24 ENCOUNTER — TELEPHONE (OUTPATIENT)
Dept: FAMILY MEDICINE | Facility: CLINIC | Age: 60
End: 2025-01-24
Payer: COMMERCIAL

## 2025-01-24 ENCOUNTER — HOSPITAL ENCOUNTER (OUTPATIENT)
Dept: RADIOLOGY | Facility: HOSPITAL | Age: 60
Discharge: HOME | End: 2025-01-24
Attending: STUDENT IN AN ORGANIZED HEALTH CARE EDUCATION/TRAINING PROGRAM
Payer: COMMERCIAL

## 2025-01-24 ENCOUNTER — OFFICE VISIT (OUTPATIENT)
Dept: SPORTS MEDICINE | Facility: CLINIC | Age: 60
End: 2025-01-24
Payer: COMMERCIAL

## 2025-01-24 ENCOUNTER — TELEPHONE (OUTPATIENT)
Dept: SPORTS MEDICINE | Facility: CLINIC | Age: 60
End: 2025-01-24

## 2025-01-24 VITALS — HEIGHT: 77 IN | BODY MASS INDEX: 37.19 KG/M2 | WEIGHT: 315 LBS | RESPIRATION RATE: 17 BRPM

## 2025-01-24 DIAGNOSIS — M16.12 PRIMARY OSTEOARTHRITIS OF LEFT HIP: Primary | ICD-10-CM

## 2025-01-24 DIAGNOSIS — M25.552 LEFT HIP PAIN: ICD-10-CM

## 2025-01-24 PROCEDURE — 73502 X-RAY EXAM HIP UNI 2-3 VIEWS: CPT | Mod: 26,LT,, | Performed by: RADIOLOGY

## 2025-01-24 PROCEDURE — 1159F MED LIST DOCD IN RCRD: CPT | Mod: CPTII,S$GLB,, | Performed by: STUDENT IN AN ORGANIZED HEALTH CARE EDUCATION/TRAINING PROGRAM

## 2025-01-24 PROCEDURE — 99214 OFFICE O/P EST MOD 30 MIN: CPT | Mod: S$GLB,,, | Performed by: STUDENT IN AN ORGANIZED HEALTH CARE EDUCATION/TRAINING PROGRAM

## 2025-01-24 PROCEDURE — 4010F ACE/ARB THERAPY RXD/TAKEN: CPT | Mod: CPTII,S$GLB,, | Performed by: STUDENT IN AN ORGANIZED HEALTH CARE EDUCATION/TRAINING PROGRAM

## 2025-01-24 PROCEDURE — 1160F RVW MEDS BY RX/DR IN RCRD: CPT | Mod: CPTII,S$GLB,, | Performed by: STUDENT IN AN ORGANIZED HEALTH CARE EDUCATION/TRAINING PROGRAM

## 2025-01-24 PROCEDURE — 99999 PR PBB SHADOW E&M-EST. PATIENT-LVL IV: CPT | Mod: PBBFAC,,, | Performed by: STUDENT IN AN ORGANIZED HEALTH CARE EDUCATION/TRAINING PROGRAM

## 2025-01-24 PROCEDURE — 73502 X-RAY EXAM HIP UNI 2-3 VIEWS: CPT | Mod: TC,PN,LT

## 2025-01-24 PROCEDURE — 3008F BODY MASS INDEX DOCD: CPT | Mod: CPTII,S$GLB,, | Performed by: STUDENT IN AN ORGANIZED HEALTH CARE EDUCATION/TRAINING PROGRAM

## 2025-01-24 RX ORDER — TRAMADOL HYDROCHLORIDE 50 MG/1
50 TABLET ORAL EVERY 6 HOURS
Refills: 0 | Status: CANCELLED | OUTPATIENT
Start: 2025-01-24

## 2025-01-24 RX ORDER — KETOROLAC TROMETHAMINE 10 MG/1
10 TABLET, FILM COATED ORAL EVERY 6 HOURS
Qty: 12 TABLET | Refills: 0 | Status: SHIPPED | OUTPATIENT
Start: 2025-01-24 | End: 2025-01-27

## 2025-01-24 NOTE — PROGRESS NOTES
Orthopaedics Sports Medicine     Hip Initial Visit         1/24/2025    Referring MD: No ref. provider found    Chief Complaint   Patient presents with    Left Hip - Pain       History of Present Illness    CHIEF COMPLAINT:  - Left hip pain.    HPI:  Reggie presents with hip pain that started on 1/13/25. Pain is localized to the side and back of the hip. He initially assumed it was sciatica but noted there was no numbness. Reggie reports feeling very rigid and stiff, finding it painful to move around initially, though he is able to get moving after a while. Pain is primarily experienced when sitting for extended periods or upon waking up. He has difficulty transitioning from sitting to standing.    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     He denies any previous history of similar hip problems, though he mentions having experienced sciatic pain in the past.    Reggie denies any history of requiring hip replacement.    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      WORK STATUS:  - [place]  - Job involves sitting for long periods, causing pain and stiffness  - Pain affects mobility at work, especially when transitioning from sitting to standing         Evaluation to date: XR     Treatment to date: Rest, activity modification, stretching, Aleve, Meloxicam    Past Medical History:   Past Medical History:    Diagnosis Date    Cellulitis     left leg    Hip arthritis     right    Hyperlipidemia     Hypertension     Hypogonadism male     Hypokalemia     Morbid obesity     Prediabetes 12/30/2020    Sleep apnea     on CPAP       Past Surgical History:   Past Surgical History:   Procedure Laterality Date    COLONOSCOPY N/A 12/06/2017    Procedure: COLONOSCOPY;  Surgeon: Rory Barone MD;  Location: [facility] ENDO;  Service: Endoscopy;  Laterality: N/A;    COLONOSCOPY N/A 4/27/2023    Procedure: COLONOSCOPY;  Surgeon: Jeimy Garcia MD;  Location: [facility] ENDO;  Service: Endoscopy;  Laterality: N/A;    right bunionectomy         Medications:  Patient's Medications   New Prescriptions    No medications on file   Previous Medications    AMLODIPINE (NORVASC) 10 MG TABLET    Take 1 tablet (10 mg total) by mouth once daily.    ASPIRIN (ECOTRIN) 81 MG EC TABLET    Take 1 tablet by mouth Daily.    BUSPIRONE (BUSPAR) 10 MG TABLET    Take 1 tablet (10 mg total) by mouth 3 (three) times daily.    CARVEDILOL (COREG) 25 MG TABLET    TAKE 1 TABLET TWICE DAILY  WITH MEALS    CHLORTHALIDONE (HYGROTEN) 50 MG TAB    Take 1 tablet (50 mg total) by mouth once daily.    ESCITALOPRAM OXALATE (LEXAPRO) 5 MG TAB    TAKE 1 TABLET ONCE DAILY    MELOXICAM (MOBIC) 15 MG TABLET    TAKE 1 TABLET ONCE DAILY    MULTIVITAMIN (THERAGRAN) PER TABLET    Take 1 tablet by mouth once daily.    POTASSIUM CHLORIDE SA (K-DUR,KLOR-CON) 20 MEQ TABLET    Take 1 tablet (20 mEq total) by mouth once daily.    PRAVASTATIN (PRAVACHOL) 20 MG TABLET    Take 1 tablet (20 mg total) by mouth once daily.    SPIRONOLACTONE (ALDACTONE) 50 MG TABLET    Take 1 tablet (50 mg total) by mouth once daily.    TADALAFIL (CIALIS) 20 MG TAB    Take 1 tablet (20 mg total) by mouth once daily.    TESTOSTERONE CYPIONATE (DEPOTESTOTERONE CYPIONATE) 200 MG/ML INJECTION    Inject 1 mL (200 mg total) into the muscle every 7 days. ADMINISTER 1 ML IN THE MUSCLE EVERY 14 DAYS    VALSARTAN  "(DIOVAN) 320 MG TABLET    Take 1 tablet (320 mg total) by mouth once daily.   Modified Medications    No medications on file   Discontinued Medications    No medications on file       Allergies:   Review of patient's allergies indicates:   Allergen Reactions    Shrimp Anaphylaxis       Social History:   Home town: [place], [place]  Occupation: [employer]  Alcohol use: He reports current alcohol use of about 10.0 standard drinks of alcohol per week.  Tobacco use: He reports that he has been smoking cigars. He has never used smokeless tobacco.    Review of systems:  History of recent illness, fevers, shakes, or chills: no  History of cardiac problems or chest pain: no  History of pulmonary problems or asthma: no  History of diabetes: no  History of prior dvt or clotting problems: no  History of sleep apnea: Yes      Physical Examination:  Estimated body mass index is 42.98 kg/m² as calculated from the following:    Height as of this encounter: 6' 5" (1.956 m).    Weight as of this encounter: 164.4 kg (362 lb 7 oz).    General  Healthy appearing male in no acute distress  Alert and oriented, normal mood, appropriate affect    Ortho Exam    Left Hip:    Inspection:   Normal    Palpation tenderness: none    Range of motion:  90 deg Flexion          10 deg IR with some pain          40 deg ER    Strength:   5/5 Extension     5/5 Flexion     5/5 Abduction     5/5 Adduction    Special Tests:   Negative Log Roll     Negative KAYLA     + FADIR     Negative Microinstability test     Negative Circumduction     Negative Maty's         N/V Exam:   Tibial:    Normal sensory (plantar foot)   Normal motor (FHL)     Sup Peroneal:  Normal sensory (dorsal foot)   Normal motor (Peroneals)             Deep Peroneal:  Normal sensory (1st web space)  Normal motor (EHL)     Sural:   Normal sensory (lateral foot)    Saphenous:   Normal sensory (medial lower leg)    Normal pedal pulses, warm and well perfused with capillary refill < 2 sec "   Patella tendon reflex normal  Achilles tendon reflex normal      Imaging:  X-Ray Hip 2 or 3 views Left with Pelvis when performed  Narrative: EXAM: XR HIP WITH PELVIS WHEN PERFORMED 2 OR 3 VIEWS LEFT    CLINICAL HISTORY:   [M25.552]-Pain in left hip.    Left hip x-ray, 4 views    COMPARISON: 10/31/2012    FINDINGS:    Negative for acute fracture or dislocation.  No suspicious osseous lesion.  Mild chondral thinning joint space narrowing at the periphery of the left hip joint which is slightly worse in comparison to previous exam.  Punctate subchondral cysts of the lateral acetabulum.  Supporting soft tissues are normal.    Bony pelvis intact and normal.  Contralateral hip well preserved.  Impression:  Mild degenerative change left hip, slightly worse since 10/31/2012.    Finalized on: 1/24/2025 12:25 PM By:  Negrito Amador MD  [facility]# [id number]      2025-01-24 12:27:59.125    [facility]        Physician Read: joint space narrowing with subchondral sclerosis left hip joint, prominence of femoral neck on lateral view consistent with CAM lesion      Impression:  59 y.o. male with left hip osteoarthritis      Plan:  Discussed diagnosis and treatment options with the patient today.His XR shows some mild osteoarthritis and evidence of CAM morphology.   Discussed non-operative treatment options in the form of rest, activity modifications, oral anti-inflammatories, corticosteroid injections, and physical therapy/physician directed home exercise program.   I recommend proceeding with short course of toradol at this time. Advised to discontinue all other anti-inflammatories while taking this. We will also get him set up with US guided intra-articular hip CSI before [place] on 2[place]5. The patient is in agreement with this plan.   Follow up with me as needed.            Filiberto Sherman MD    I, Kvng Prescott, acted as a scribe for Filiberto Sherman MD for the duration of this office visit.    This note was  generated with the assistance of ambient listening technology. Verbal consent was obtained by the patient and accompanying visitor(s) for the recording of patient appointment to facilitate this note. I attest to having reviewed and edited the generated note for accuracy, though some syntax or spelling errors may persist. Please contact the author of this note for any clarification.

## 2025-01-24 NOTE — TELEPHONE ENCOUNTER
[facility] paperwork completed, signed, and successfully faxed to [phone]  on 1/24/25 . HEMANT

## 2025-01-29 RX ORDER — TESTOSTERONE CYPIONATE 200 MG/ML
200 INJECTION, SOLUTION INTRAMUSCULAR
Qty: 10 ML | Refills: 3 | Status: SHIPPED | OUTPATIENT
Start: 2025-01-29

## 2025-01-30 ENCOUNTER — OFFICE VISIT (OUTPATIENT)
Dept: SPORTS MEDICINE | Facility: CLINIC | Age: 60
End: 2025-01-30
Payer: COMMERCIAL

## 2025-01-30 DIAGNOSIS — M16.12 PRIMARY OSTEOARTHRITIS OF LEFT HIP: Primary | ICD-10-CM

## 2025-01-30 DIAGNOSIS — M25.552 LEFT HIP PAIN: ICD-10-CM

## 2025-01-30 PROCEDURE — 99999 PR PBB SHADOW E&M-EST. PATIENT-LVL III: CPT | Mod: PBBFAC,,, | Performed by: STUDENT IN AN ORGANIZED HEALTH CARE EDUCATION/TRAINING PROGRAM

## 2025-01-30 PROCEDURE — 20611 DRAIN/INJ JOINT/BURSA W/US: CPT | Mod: LT,S$GLB,, | Performed by: STUDENT IN AN ORGANIZED HEALTH CARE EDUCATION/TRAINING PROGRAM

## 2025-01-30 PROCEDURE — 99499 UNLISTED E&M SERVICE: CPT | Mod: S$GLB,,, | Performed by: STUDENT IN AN ORGANIZED HEALTH CARE EDUCATION/TRAINING PROGRAM

## 2025-01-30 RX ORDER — TRIAMCINOLONE ACETONIDE 40 MG/ML
40 INJECTION, SUSPENSION INTRA-ARTICULAR; INTRAMUSCULAR
Status: DISCONTINUED | OUTPATIENT
Start: 2025-01-30 | End: 2025-01-30 | Stop reason: HOSPADM

## 2025-01-30 RX ADMIN — TRIAMCINOLONE ACETONIDE 40 MG: 40 INJECTION, SUSPENSION INTRA-ARTICULAR; INTRAMUSCULAR at 11:01

## 2025-01-30 NOTE — PATIENT INSTRUCTIONS
Assessment:  Reggie Sawant is a 59 y.o. male with a chief complaint of No chief complaint on file.    Encounter Diagnoses   Name Primary?    Primary osteoarthritis of left hip Yes    Left hip pain       Plan:  Left hip diagnostic/therapeutic corticosteroid injection today.    Proper protocols after the injection included: no submerging pools, baths tubs, or hot tubs for 24 hr.  Showering is okay today.  Side effects of the corticosteroid injection can include elevated blood glucose levels and blood pressures, so if you are taking medications for these, please monitor closely, and contact your PCP if any issues.  Red flag symptoms include fever, chills, nausea, vomiting, red, warm, tender joint at the area of injection.  If you are noticing these symptoms, they may be indicative of an infection, and please seek medical care immediately, either by calling our clinic or going to the emergency room.    Follow-up:  As needed or sooner if there are any problems between now and then.    Thank you for choosing [facility] and Dr. Gonzalo Sanhces for your orthopedic & sports medicine care. It is our goal to provide you with exceptional care that will help keep you healthy, active, and get you back in the game.    Please do not hesitate to reach out to us via email, phone, or MyChart with any questions, concerns, or feedback.    If you are experiencing pain/discomfort ,or have questions after 5pm and would like to be connected to the [facility] on-call team, please call this number and specify which Sports Medicine provider is treating you: [phone]

## 2025-01-30 NOTE — PROCEDURES
Large Joint Aspiration/Injection: L hip joint    Date/Time: 1/30/2025 11:00 AM    Performed by: Gonzalo Sanches MD  Authorized by: Gonzalo Sanches MD    Consent Done?:  Yes (Verbal)  Indications:  Pain, arthritis and diagnostic evaluation  Site marked: the procedure site was marked    Timeout: prior to procedure the correct patient, procedure, and site was verified      Local anesthesia used?: Yes    Local anesthetic:  Topical anesthetic, lidocaine 1% without epinephrine and bupivacaine 0.5% without epinephrine  Anesthetic total (ml):  4      Details:  Needle Size:  20 G  Ultrasonic Guidance for needle placement?: Yes    Images are saved and documented.  Approach:  Anterior  Location:  Hip  Site:  L hip joint  Medications:  40 mg triamcinolone acetonide 40 mg/mL  Patient tolerance:  Patient tolerated the procedure well with no immediate complications     Ultrasound guidance was used for needle localization. Images were saved and stored for documentation. The appropriate structures were visualized. Dynamic visualization of the needle was continuous throughout the procedures and maintained good position.     We discussed the proper protocols after the injection such as no submerging pools, baths tubs, or hot tubs for 24 hr.  Showering is okay today.  We also discussed that blood sugars can be elevated after an injection and asked patient to properly check their sugars over the next few days and contact their PCP if there are any concerns.  We discussed red flags such as fevers, chills, red, warm, tender joint at the area of injection to please seek medical care immediately.

## 2025-01-31 ENCOUNTER — PATIENT MESSAGE (OUTPATIENT)
Dept: UROLOGY | Facility: CLINIC | Age: 60
End: 2025-01-31
Payer: COMMERCIAL

## 2025-02-01 ENCOUNTER — PATIENT MESSAGE (OUTPATIENT)
Dept: SPORTS MEDICINE | Facility: CLINIC | Age: 60
End: 2025-02-01
Payer: COMMERCIAL

## 2025-02-01 DIAGNOSIS — R79.89 LOW TESTOSTERONE: ICD-10-CM

## 2025-02-01 RX ORDER — TESTOSTERONE CYPIONATE 200 MG/ML
200 INJECTION, SOLUTION INTRAMUSCULAR
Qty: 10 ML | Refills: 3 | Status: CANCELLED | OUTPATIENT
Start: 2025-02-01

## 2025-02-10 ENCOUNTER — LAB VISIT (OUTPATIENT)
Dept: LAB | Facility: HOSPITAL | Age: 60
End: 2025-02-10
Attending: UROLOGY
Payer: COMMERCIAL

## 2025-02-10 ENCOUNTER — PATIENT MESSAGE (OUTPATIENT)
Dept: SPORTS MEDICINE | Facility: CLINIC | Age: 60
End: 2025-02-10
Payer: COMMERCIAL

## 2025-02-10 DIAGNOSIS — Z12.5 PROSTATE CANCER SCREENING: ICD-10-CM

## 2025-02-10 LAB — COMPLEXED PSA SERPL-MCNC: 2.5 NG/ML (ref 0–4)

## 2025-02-10 PROCEDURE — 84153 ASSAY OF PSA TOTAL: CPT | Performed by: UROLOGY

## 2025-02-10 NOTE — PROGRESS NOTES
"Orthopaedic Follow-Up Visit    Last Appointment: 1/24/25  Diagnosis: left hip osteoarthritis   Prior Procedure: L hip CSI (1/30/25)    Reggie Sawant is a 59 y.o. male who is here for f/u evaluation of his left hip. The patient was last seen here by me on 1/24/25 at which point his XR showed some mild osteoarthritis and evidence of CAM morphology. 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 This was performed by Dr. Sanches on 1/30/25 . The patient returns today reporting only a few days of relief from the previous injection, but symptoms ultimately returned and have persisted. Reports it feels the same as it did before the injection. Pain localized to lateral flank and hip.He reports it feels "inflamed and swollen" in the mornings when he wakes up. He denies any radicular pain down his leg.       To review his history, Reggie Sawant is a 59 y.o. male who presented on 1/24/25 with left hip pain that started on 1/13/25. Pain was localized to the side and back of the hip. He initially assumed it was sciatica but noted there was no numbness. Reggie reported feeling very rigid and stiff, finding it painful to move around initially, though he was able to get moving after a while. Pain was primarily experienced when sitting for extended periods or upon waking up. He had difficulty transitioning from sitting to standing. 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  He denied any previous " history of similar hip problems, though he mentioned having experienced sciatic pain in the past.     Treatment to date: Rest, activity modification, Ibuprofen, Toradol, intra-articular hip CSI (1/30/25)     Patient's medications, allergies, past medical, surgical, social and family histories were reviewed and updated as appropriate.    Review of Systems   All systems reviewed were negative.  Specifically, the patient denies fever, chills, weight loss, chest pain, shortness of breath, or dyspnea on exertion.      Past Medical History:   Diagnosis Date    Cellulitis     left leg    Hip arthritis     right    Hyperlipidemia     Hypertension     Hypogonadism male     Hypokalemia     Morbid obesity     Prediabetes 12/30/2020    Sleep apnea     on CPAP       Objective:      Physical Exam  Patient is alert and oriented, no distress. Skin is intact. Neuro is normal with no focal motor or sensory findings.    Standing exam  stance: normal alignment, no significant leg-length discrepancy  gait: no limp      Left Hip:     Inspection:                   Normal     Palpation tenderness: none     Range of motion:        100 deg Flexion                                      0 deg IR with some pain                                      40 deg ER     Strength:                     5/5 Extension                                      5/5 Flexion                                      5/5 Abduction                                      5/5 Adduction     Special Tests:              Negative Log Roll                                      Negative KAYLA                                      + FADIR                                      Negative Microinstability test                                      Negative Circumduction                                      Negative Maty's                                           N/V Exam:                   Tibial:                           Normal sensory (plantar foot)                         Normal motor  (FHL)                              Sup Peroneal:             Normal sensory (dorsal foot)                          Normal motor (Peroneals)                                            Deep Peroneal:           Normal sensory (1st web space)                    Normal motor (EHL)                             Sural:                           Normal sensory (lateral foot)                          Saphenous:                 Normal sensory (medial lower leg)                          Normal pedal pulses, warm and well perfused with capillary refill < 2 sec   Patella tendon reflex normal  Achilles tendon reflex normal       Imaging:    XR Results:  Results for orders placed during the hospital encounter of 01/24/25    X-Ray Hip 2 or 3 views Left with Pelvis when performed    Narrative  EXAM: XR HIP WITH PELVIS WHEN PERFORMED 2 OR 3 VIEWS LEFT      CLINICAL HISTORY:   [M25.552]-Pain in left hip.    Left hip x-ray, 4 views    COMPARISON: 10/31/2012    FINDINGS:    Negative for acute fracture or dislocation.  No suspicious osseous lesion.  Mild chondral thinning joint space narrowing at the periphery of the left hip joint which is slightly worse in comparison to previous exam.  Punctate subchondral cysts of the lateral acetabulum.  Supporting soft tissues are normal.    Bony pelvis intact and normal.  Contralateral hip well preserved.    Impression  Mild degenerative change left hip, slightly worse since 10/31/2012.      Finalized on: 1/24/2025 12:25 PM By:  Negrito Amador MD  [facility]# [id number]      2025-01-24 12:27:59.125    [facility]      MRI Results:  No results found for this or any previous visit.      CT Results:  No results found for this or any previous visit.          Physician Read: I agree with the above impression. Left hip osteoarthritis    Assessment/Plan:   Assessment:  Reggie Sawant is a 59 y.o. male with left hip osteoarthritis     Plan:    He has left hip osteoarthritis.  We tried rest, activity modification, oral  medicines, and intra-articular injection.  He continues to be symptomatic.  His symptoms have been worsening, rather than improving.  I recommend proceeding with MRI for further evaluation of intra-articular pathology. The patient is in agreement with this plan.   Follow-up with me after MRI.          Filiberto Sherman MD    I, Kvng Prescott, acted as a scribe for Filiberto Sherman MD for the duration of this office visit.

## 2025-02-12 ENCOUNTER — OFFICE VISIT (OUTPATIENT)
Dept: SPORTS MEDICINE | Facility: CLINIC | Age: 60
End: 2025-02-12
Payer: COMMERCIAL

## 2025-02-12 VITALS — WEIGHT: 315 LBS | RESPIRATION RATE: 17 BRPM | HEIGHT: 77 IN | BODY MASS INDEX: 37.19 KG/M2

## 2025-02-12 DIAGNOSIS — M16.12 PRIMARY OSTEOARTHRITIS OF LEFT HIP: Primary | ICD-10-CM

## 2025-02-12 DIAGNOSIS — M25.552 PAIN OF LEFT HIP: ICD-10-CM

## 2025-02-12 PROCEDURE — 99999 PR PBB SHADOW E&M-EST. PATIENT-LVL IV: CPT | Mod: PBBFAC,,, | Performed by: STUDENT IN AN ORGANIZED HEALTH CARE EDUCATION/TRAINING PROGRAM

## 2025-02-12 PROCEDURE — 3008F BODY MASS INDEX DOCD: CPT | Mod: CPTII,S$GLB,, | Performed by: STUDENT IN AN ORGANIZED HEALTH CARE EDUCATION/TRAINING PROGRAM

## 2025-02-12 PROCEDURE — 1160F RVW MEDS BY RX/DR IN RCRD: CPT | Mod: CPTII,S$GLB,, | Performed by: STUDENT IN AN ORGANIZED HEALTH CARE EDUCATION/TRAINING PROGRAM

## 2025-02-12 PROCEDURE — 99214 OFFICE O/P EST MOD 30 MIN: CPT | Mod: S$GLB,,, | Performed by: STUDENT IN AN ORGANIZED HEALTH CARE EDUCATION/TRAINING PROGRAM

## 2025-02-12 PROCEDURE — 4010F ACE/ARB THERAPY RXD/TAKEN: CPT | Mod: CPTII,S$GLB,, | Performed by: STUDENT IN AN ORGANIZED HEALTH CARE EDUCATION/TRAINING PROGRAM

## 2025-02-12 PROCEDURE — 1159F MED LIST DOCD IN RCRD: CPT | Mod: CPTII,S$GLB,, | Performed by: STUDENT IN AN ORGANIZED HEALTH CARE EDUCATION/TRAINING PROGRAM

## 2025-02-12 NOTE — LETTER
February 12, 2025      [facility]  [id number]  [place] [address]  Phone: [phone]  Fax: [phone]       Patient: Reggie Sawant   YOB: 1965  Date of Visit: 02/12/2025    To Whom It May Concern:    Ben Sawant  was at [facility] on 02/12/2025. We are proceeding with MRI for further evaluation at this time which is scheduled for 02/18/2025. He will follow-up with me on 02/19/2025 to review his MRI and determine next treatment steps. I recommend he remain out of work at this time until he returns for his follow-up visit after MRI. If you have any questions or concerns, or if I can be of further assistance, please do not hesitate to contact me.    Sincerely,      Filiberto Sherman MD

## 2025-02-13 ENCOUNTER — PATIENT MESSAGE (OUTPATIENT)
Dept: ADMINISTRATIVE | Facility: OTHER | Age: 60
End: 2025-02-13
Payer: COMMERCIAL

## 2025-02-14 NOTE — PROGRESS NOTES
Orthopaedic Follow-Up Visit    Last Appointment: 2/12/25  Diagnosis: left hip osteoarthritis   Prior Procedure: MRI    Reggie Sawant is a 59 y.o. male who is here for f/u evaluation of his left hip. The patient was last seen here by me on 2/12/25 at which point he noted only a few days of relief from the CSI and symptoms ultiamtely returned. WE elected to proceed with an MRI for further evaluation at that time. The patient returns today to review his MRI and discus further treatment options. He reports no changes to his symptoms since his last visit.     To review his history, Reggie Sawant is a 59 y.o. male who presented on 1/24/25 with left hip pain that started on 1/13/25. Pain was localized to the side and back of the hip. He initially assumed it was sciatica but noted there was no numbness. Reggie reported feeling very rigid and stiff, finding it painful to move around initially, though he was able to get moving after a while. Pain was primarily experienced when sitting for extended periods or upon waking up. He had difficulty transitioning from sitting to standing. 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  He denied any previous history of similar hip problems, though he mentioned having experienced sciatic pain in the past. His XR showed some mild osteoarthritis and evidence of CAM morphology. 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 This was performed by Dr. Sanches  on 1/30/25 .    Treatment to date: Rest, activity modification, Ibuprofen, Toradol, intra-articular hip CSI (1/30/25)     Patient's medications, allergies, past medical, surgical, social and family histories were reviewed and updated as appropriate.    Review of Systems   All systems reviewed were negative.  Specifically, the patient denies fever, chills, weight loss, chest pain, shortness of breath, or dyspnea on exertion.      Past Medical History:   Diagnosis Date    Cellulitis     left leg    Hip arthritis     right    Hyperlipidemia     Hypertension     Hypogonadism male     Hypokalemia     Morbid obesity     Prediabetes 12/30/2020    Sleep apnea     on CPAP       Objective:      Physical Exam  Patient is alert and oriented, no distress. Skin is intact. Neuro is normal with no focal motor or sensory findings.    Standing exam  stance: normal alignment, no significant leg-length discrepancy  gait: no limp      Left Hip:     Inspection:                   Normal     Palpation tenderness: none     Range of motion:        100 deg Flexion                                      0 deg IR with some pain                                      40 deg ER     Strength:                     5/5 Extension                                      5/5 Flexion                                      5/5 Abduction                                      5/5 Adduction     Special Tests:              Negative Log Roll                                      Negative KAYLA                                      + FADIR                                      Negative Microinstability test                                      Negative Circumduction                                      Negative Maty's                                           N/V Exam:                   Tibial:                           Normal sensory (plantar foot)                         Normal motor (FHL)                              Sup Peroneal:             Normal sensory (dorsal foot)                           Normal motor (Peroneals)                                            Deep Peroneal:           Normal sensory (1st web space)                    Normal motor (EHL)                             Sural:                           Normal sensory (lateral foot)                          Saphenous:                 Normal sensory (medial lower leg)                          Normal pedal pulses, warm and well perfused with capillary refill < 2 sec   Patella tendon reflex normal  Achilles tendon reflex normal       Imaging:    XR Results:  Results for orders placed during the hospital encounter of 01/24/25    X-Ray Hip 2 or 3 views Left with Pelvis when performed    Narrative  EXAM: XR HIP WITH PELVIS WHEN PERFORMED 2 OR 3 VIEWS LEFT      CLINICAL HISTORY:   [M25.552]-Pain in left hip.    Left hip x-ray, 4 views    COMPARISON: 10/31/2012    FINDINGS:    Negative for acute fracture or dislocation.  No suspicious osseous lesion.  Mild chondral thinning joint space narrowing at the periphery of the left hip joint which is slightly worse in comparison to previous exam.  Punctate subchondral cysts of the lateral acetabulum.  Supporting soft tissues are normal.    Bony pelvis intact and normal.  Contralateral hip well preserved.    Impression  Mild degenerative change left hip, slightly worse since 10/31/2012.      Finalized on: 1/24/2025 12:25 PM By:  Negrito Amador MD  [facility]# [id number]      2025-01-24 12:27:59.125    [facility]      MRI Results:     MRI Hip Without Contrast Left  Narrative: EXAM:  MRI HIP WITHOUT CONTRAST LEFT    CLINICAL HISTORY: Left hip pain. Hip pain, chronic, osteoarthritis suspected; [M25.552]-Pain in left hip.    TECHNIQUE: Standard multiplanar, multisequence MR imaging protocol of the left hip performed.    FINDINGS:    BONES: No fracture, marrow edema or suspicious bone lesion. No evidence of avascular necrosis.    LEFT HIP JOINT: Anatomic alignment. Physiologic amount of joint fluid.  Low-grade chondral thinning and small partial chondral erosions in the anterior superior joint space.  Otherwise preserved cartilage.    LABRUM: Detached nondisplaced tear of the anterior labrum (images 18-21 series 7).  Os acetabuli of the anterior superior labrum and osseous metaplasia of the remaining superior labrum without tear identified.    FEMOROACETABULAR IMPINGEMENT ANATOMY: Pain in the anterior femoral head neck junction.    MUSCLES, TENDONS AND REMAINING MAJOR SUPPORTING SOFT TISSUE STRUCTURES: Mild bilateral abductor tendinosis and trochanteric bursitis.  Otherwise unremarkable.  Impression: 1.   Low-grade chondral degeneration left hip.  Detached anterior labral tear.  Os acetabuli and osseous metaplasia of the superior labrum. CAM lesion.  2.  Mild bilateral abductor tendinosis and trochanteric bursitis.    Finalized on: 2/18/2025 9:19 AM By:  Deny Guzmán MD  [facility]# [id number]      2025-02-18 09:21:09.990     [facility]         CT Results:  No results found for this or any previous visit.      Physician Read: I agree with the above impression.     Assessment/Plan:   Assessment:  Reggie Sawant is a 59 y.o. male with left hip osteoarthritis, anterior labral tear    Plan:    Reviewed MRI with the patient today. His MRI shows general cartilage thinning and labral tear consistent with osteoarthritis of the hip.  At this time he has tried rest, activity modification, Ibuprofen, Toradol, intra-articular hip CSI (1/30/25). We reviewed continued treatment options of intermittent injections and medrol dose pack. Discussed that this his condition may progress to the point that he may need a hip replacement but would not recommend it at this time until we have exhausted non-operative treatment options.   Will prescribe Medrol dose pack for him now.   Follow-up with me as needed.          Filiberto Sherman MD    I, Kvng Prescott, acted as a scribe for Filiberto Sherman MD for the duration of this office  visit.

## 2025-02-18 ENCOUNTER — HOSPITAL ENCOUNTER (OUTPATIENT)
Dept: RADIOLOGY | Facility: HOSPITAL | Age: 60
Discharge: HOME | End: 2025-02-18
Attending: STUDENT IN AN ORGANIZED HEALTH CARE EDUCATION/TRAINING PROGRAM
Payer: COMMERCIAL

## 2025-02-18 DIAGNOSIS — M25.552 PAIN OF LEFT HIP: ICD-10-CM

## 2025-02-18 PROCEDURE — 73721 MRI JNT OF LWR EXTRE W/O DYE: CPT | Mod: 26,LT,, | Performed by: RADIOLOGY

## 2025-02-18 PROCEDURE — 73721 MRI JNT OF LWR EXTRE W/O DYE: CPT | Mod: TC,PN,LT

## 2025-02-19 ENCOUNTER — OFFICE VISIT (OUTPATIENT)
Dept: SPORTS MEDICINE | Facility: CLINIC | Age: 60
End: 2025-02-19
Payer: COMMERCIAL

## 2025-02-19 ENCOUNTER — PATIENT MESSAGE (OUTPATIENT)
Dept: SPORTS MEDICINE | Facility: CLINIC | Age: 60
End: 2025-02-19

## 2025-02-19 VITALS — BODY MASS INDEX: 37.19 KG/M2 | WEIGHT: 315 LBS | HEIGHT: 77 IN

## 2025-02-19 DIAGNOSIS — M16.12 PRIMARY OSTEOARTHRITIS OF LEFT HIP: Primary | ICD-10-CM

## 2025-02-19 PROCEDURE — 99214 OFFICE O/P EST MOD 30 MIN: CPT | Mod: S$GLB,,, | Performed by: STUDENT IN AN ORGANIZED HEALTH CARE EDUCATION/TRAINING PROGRAM

## 2025-02-19 RX ORDER — METHYLPREDNISOLONE 4 MG/1
TABLET ORAL
Qty: 21 EACH | Refills: 0 | Status: SHIPPED | OUTPATIENT
Start: 2025-02-19 | End: 2025-03-12

## 2025-02-28 ENCOUNTER — TELEPHONE (OUTPATIENT)
Dept: SPORTS MEDICINE | Facility: CLINIC | Age: 60
End: 2025-02-28
Payer: COMMERCIAL

## 2025-02-28 DIAGNOSIS — M54.9 DORSALGIA, UNSPECIFIED: Primary | ICD-10-CM

## 2025-02-28 DIAGNOSIS — M54.50 LOW BACK PAIN, UNSPECIFIED BACK PAIN LATERALITY, UNSPECIFIED CHRONICITY, UNSPECIFIED WHETHER SCIATICA PRESENT: ICD-10-CM

## 2025-02-28 DIAGNOSIS — M25.552 PAIN OF LEFT HIP: Primary | ICD-10-CM

## 2025-02-28 RX ORDER — NAPROXEN 500 MG/1
500 TABLET ORAL 2 TIMES DAILY
Qty: 60 TABLET | Refills: 1 | Status: SHIPPED | OUTPATIENT
Start: 2025-02-28

## 2025-02-28 NOTE — ADDENDUM NOTE
Addended by: ESTUARDO DAO on: 2/28/2025 09:30 AM     Modules accepted: Orders    
Well developed

## 2025-02-28 NOTE — TELEPHONE ENCOUNTER
Discussed with patient that he has completed medrol dose pack which provided relief while he was taking the medication but as soon as he completed the medication his symptoms began to return. Discussed case with Dr. Sherman and he would like to  move forward with MRI of his lumbar spine for further evaluation of of potential lumbar pathology that could be causing his symptoms. Advised that we could have him continue with oral anti-inflammatory to help with symptoms in the interim. HEMANT

## 2025-03-06 ENCOUNTER — HOSPITAL ENCOUNTER (OUTPATIENT)
Dept: RADIOLOGY | Facility: HOSPITAL | Age: 60
Discharge: HOME | End: 2025-03-06
Attending: STUDENT IN AN ORGANIZED HEALTH CARE EDUCATION/TRAINING PROGRAM
Payer: COMMERCIAL

## 2025-03-06 DIAGNOSIS — M54.9 DORSALGIA, UNSPECIFIED: ICD-10-CM

## 2025-03-06 PROCEDURE — 72114 X-RAY EXAM L-S SPINE BENDING: CPT | Mod: TC,PN

## 2025-03-06 PROCEDURE — 72148 MRI LUMBAR SPINE W/O DYE: CPT | Mod: TC,PN

## 2025-03-06 PROCEDURE — 72114 X-RAY EXAM L-S SPINE BENDING: CPT | Mod: 26,,, | Performed by: RADIOLOGY

## 2025-03-06 PROCEDURE — 72148 MRI LUMBAR SPINE W/O DYE: CPT | Mod: 26,,, | Performed by: RADIOLOGY

## 2025-03-07 DIAGNOSIS — F41.9 ANXIETY: ICD-10-CM

## 2025-03-07 RX ORDER — BUSPIRONE HYDROCHLORIDE 10 MG/1
10 TABLET ORAL 3 TIMES DAILY
Qty: 90 TABLET | Refills: 1 | Status: SHIPPED | OUTPATIENT
Start: 2025-03-07

## 2025-03-07 NOTE — TELEPHONE ENCOUNTER
Care Due:                  Date            Visit Type   Department     Provider  --------------------------------------------------------------------------------                                EP -                              PRIMARY      [facility]  Last Visit: 08-      CARE ([facility])   MEDICINE       Irene Sanchez                              EP -                              PRIMARY      [facility]  Next Visit: 08-      CARE ([facility])   MEDICINE       Irene Sanchez                                                            Last  Test          Frequency    Reason                     Performed    Due Date  --------------------------------------------------------------------------------    CMP.........  12 months..  chlorthalidone,            05- 05-                             potassium,                             spironolactone, valsartan    Lipid Panel.  12 months..  pravastatin..............  05- 05-    [facility] Embedded Care Due Messages. Reference number: [id number].   3/07/2025 2:45:19 PM CST

## 2025-03-18 ENCOUNTER — OFFICE VISIT (OUTPATIENT)
Dept: PAIN MEDICINE | Facility: CLINIC | Age: 60
End: 2025-03-18
Payer: COMMERCIAL

## 2025-03-18 ENCOUNTER — PATIENT MESSAGE (OUTPATIENT)
Dept: PAIN MEDICINE | Facility: CLINIC | Age: 60
End: 2025-03-18

## 2025-03-18 VITALS
WEIGHT: 315 LBS | SYSTOLIC BLOOD PRESSURE: 164 MMHG | BODY MASS INDEX: 37.19 KG/M2 | HEART RATE: 67 BPM | HEIGHT: 77 IN | DIASTOLIC BLOOD PRESSURE: 94 MMHG

## 2025-03-18 DIAGNOSIS — M54.50 LOW BACK PAIN, UNSPECIFIED BACK PAIN LATERALITY, UNSPECIFIED CHRONICITY, UNSPECIFIED WHETHER SCIATICA PRESENT: ICD-10-CM

## 2025-03-18 DIAGNOSIS — M54.16 LUMBAR RADICULOPATHY: Primary | ICD-10-CM

## 2025-03-18 DIAGNOSIS — M25.552 PAIN OF LEFT HIP: ICD-10-CM

## 2025-03-18 DIAGNOSIS — M51.369 DEGENERATION OF INTERVERTEBRAL DISC OF LUMBAR REGION, UNSPECIFIED WHETHER PAIN PRESENT: ICD-10-CM

## 2025-03-18 PROCEDURE — 3077F SYST BP >= 140 MM HG: CPT | Mod: CPTII,S$GLB,, | Performed by: ANESTHESIOLOGY

## 2025-03-18 PROCEDURE — 1160F RVW MEDS BY RX/DR IN RCRD: CPT | Mod: CPTII,S$GLB,, | Performed by: ANESTHESIOLOGY

## 2025-03-18 PROCEDURE — 1159F MED LIST DOCD IN RCRD: CPT | Mod: CPTII,S$GLB,, | Performed by: ANESTHESIOLOGY

## 2025-03-18 PROCEDURE — 99204 OFFICE O/P NEW MOD 45 MIN: CPT | Mod: S$GLB,,, | Performed by: ANESTHESIOLOGY

## 2025-03-18 PROCEDURE — 3008F BODY MASS INDEX DOCD: CPT | Mod: CPTII,S$GLB,, | Performed by: ANESTHESIOLOGY

## 2025-03-18 PROCEDURE — 99999 PR PBB SHADOW E&M-EST. PATIENT-LVL IV: CPT | Mod: PBBFAC,,, | Performed by: ANESTHESIOLOGY

## 2025-03-18 PROCEDURE — 4010F ACE/ARB THERAPY RXD/TAKEN: CPT | Mod: CPTII,S$GLB,, | Performed by: ANESTHESIOLOGY

## 2025-03-18 PROCEDURE — 3080F DIAST BP >= 90 MM HG: CPT | Mod: CPTII,S$GLB,, | Performed by: ANESTHESIOLOGY

## 2025-03-18 RX ORDER — PREGABALIN 50 MG/1
50 CAPSULE ORAL 2 TIMES DAILY
Qty: 60 CAPSULE | Refills: 1 | Status: SHIPPED | OUTPATIENT
Start: 2025-03-18

## 2025-03-18 NOTE — PROGRESS NOTES
New Patient Interventional Pain Note (Initial Visit)    Referring Physician: Prasanna Schuster    PCP: Irene Sanchez MD    Chief Complaint:  Lower back and left hip       SUBJECTIVE:    Reggie Sawant is a 59 y.o. male who presents to the clinic for the evaluation of lower back and left hip pain.   Patient reports 8 week history of lower back and left hip pain.  Patient denies any previous surgeries in his lumbar spine or bilateral lower extremities.  Patient denies any inciting traumas to his lower back pain.  Patient does report previous lower back sciatica pain approximately 10 years ago.  Primarily pain is a burning aching pain that starts in the lateral aspect of the left hip.  Patient reports associated aching burning pain in the left back in the left anterior hip area.  Patient denies any significant radiation below his left knee.  Patient denies any numbness or tingling in his left foot.  Patient denies any significant right-sided pain.  Pain is worse with waking up in the morning and lying in his left side, better with anti-inflammatories and rest.  Pain is currently rated a 5/10, but can increase to an 8/10 with exacerbating activity.  Patient received left intra-articular hip injection on 01/03/2025 with a proximally 3 days relief  Patient denies any fevers, chills, saddle anesthesia, or bowel and bladder incontinence.      Non-Pharmacologic Treatments:  Physical Therapy/Home Exercise: yes  Ice/Heat:yes  TENS: no  Acupuncture: no  Massage: yes  Chiropractic: yes        Previous Pain Medications:  Tylenol, ibuprofen, naproxen, Flexeril, gabapentin, topicals     report:  Reviewed and consistent with medication use as prescribed.    Pain Procedures:   - 01/30/2025:  Left hip intra-articular injection, 3 days of relief    Pain Disability Index Review:         3/18/2025     8:13 AM   Last 3 PDI Scores   Pain Disability Index (PDI) 35       Imaging:     Results for orders placed during the hospital  encounter of 03/06/25    MRI Lumbar Spine Without Contrast    Narrative  EXAMINATION:  MRI LUMBAR SPINE WITHOUT CONTRAST    CLINICAL HISTORY:  Dorsalgia, unspecifiedLow back pain, symptoms persist with > 6wks conservative treatment;    TECHNIQUE:  Standard multiplanar noncontrast MRI sequences of the lumbar spine.    COMPARISON:  X-ray 03/06/2025    FINDINGS:  The distal cord and conus reveal normal signal and morphology.    The lumbar vertebra reveal normal alignment, shape and signal intensity.    T12-L1: Unremarkable.    L1-2:     Unremarkable.    L2-3:     Mild generalized annular disc bulge.    L3-4:     Mild disc desiccation with mild generalized disc bulge with ventral sac impingement.  Mild to moderate hypertrophic facet arthrosis with hypertrophic ligamentum flavum.  Dorsal sac impingement.  Overall moderate central canal stenosis with mild right and mild-to-moderate left foraminal stenosis.    L4-5:     Mild disc degeneration with mild broad-based disc bulge.  Left foraminal annular fissure.  Moderate hypertrophic facet arthrosis with dorsal sac impingement contributing to moderate central canal stenosis.  Mild left lateral recess stenosis with moderate left and mild right foraminal stenosis.    L5-S1:    Mild disc degeneration with disc desiccation and disc bulge.  Mild to moderate right and mild left facet arthrosis.  Mild foraminal stenosis.    Impression  Multilevel lumbar degenerative disc disease notably at L3-4 and L4-5 with central and foraminal stenosis as detailed above.      Electronically signed by: Demetrius Macario MD  Date:    03/06/2025  Time:    09:36        Results for orders placed during the hospital encounter of 03/06/25    X-Ray Lumbar Complete Including Flex And Ext    Narrative  EXAMINATION:  XR LUMBAR SPINE 5 VIEW WITH FLEX AND EXT    CLINICAL HISTORY:  Dorsalgia, unspecified    COMPARISON:  None    FINDINGS:  3 views of the lumbar spine.    Moderate facet arthropathy lower lumbar  spine.    Overall alignment is well maintained.  No evidence of spondylolysis or spondylolisthesis. Disc space narrowing L5/S1.  Suspect neural foraminal narrowing L5/S1.  Vertebral body heights are normal.    Moderate constipation.    Impression  See above.      Electronically signed by: Vinnie Gauthier MD  Date:    03/06/2025  Time:    08:20       MRI HIP WITHOUT CONTRAST LEFT 02/18/2025     CLINICAL HISTORY: Left hip pain. Hip pain, chronic, osteoarthritis suspected; [M25.552]-Pain in left hip.     TECHNIQUE: Standard multiplanar, multisequence MR imaging protocol of the left hip performed.     FINDINGS:     BONES: No fracture, marrow edema or suspicious bone lesion. No evidence of avascular necrosis.     LEFT HIP JOINT: Anatomic alignment. Physiologic amount of joint fluid. Low-grade chondral thinning and small partial chondral erosions in the anterior superior joint space.  Otherwise preserved cartilage.     LABRUM: Detached nondisplaced tear of the anterior labrum (images 18-21 series 7).  Os acetabuli of the anterior superior labrum and osseous metaplasia of the remaining superior labrum without tear identified.     FEMOROACETABULAR IMPINGEMENT ANATOMY: Pain in the anterior femoral head neck junction.     MUSCLES, TENDONS AND REMAINING MAJOR SUPPORTING SOFT TISSUE STRUCTURES: Mild bilateral abductor tendinosis and trochanteric bursitis.  Otherwise unremarkable.        Impression:     1.   Low-grade chondral degeneration left hip.  Detached anterior labral tear.  Os acetabuli and osseous metaplasia of the superior labrum. CAM lesion.  2.  Mild bilateral abductor tendinosis and trochanteric bursitis.      Past Medical History:   Diagnosis Date    Cellulitis     left leg    Hip arthritis     right    Hyperlipidemia     Hypertension     Hypogonadism male     Hypokalemia     Morbid obesity     Prediabetes 12/30/2020    Sleep apnea     on CPAP     Past Surgical History:   Procedure Laterality Date    COLONOSCOPY N/A  12/06/2017    Procedure: COLONOSCOPY;  Surgeon: Rory Barone MD;  Location: [facility] ENDO;  Service: Endoscopy;  Laterality: N/A;    COLONOSCOPY N/A 4/27/2023    Procedure: COLONOSCOPY;  Surgeon: Jeimy Garcia MD;  Location: [facility] ENDO;  Service: Endoscopy;  Laterality: N/A;    right bunionectomy       Social History[1]  Family History   Problem Relation Name Age of Onset    No Known Problems Mother Yael     Hypertension Father Amanuel     Heart disease Father Amanuel         CAD    Diabetes Sister Fawn     No Known Problems Sister Sherri     Hypertension Brother Amanuel Jr     Diabetes Maternal Grandmother Lissa     No Known Problems Daughter      No Known Problems Daughter      No Known Problems Daughter      No Known Problems Other grandson     Cancer Neg Hx      Stroke Neg Hx      Thyroid disease Neg Hx      Thyroid cancer Neg Hx          MEN2       Review of patient's allergies indicates:   Allergen Reactions    Shrimp Anaphylaxis       Current Outpatient Medications   Medication Sig    amLODIPine (NORVASC) 10 MG tablet Take 1 tablet (10 mg total) by mouth once daily.    aspirin (ECOTRIN) 81 MG EC tablet Take 1 tablet by mouth Daily.    busPIRone (BUSPAR) 10 MG tablet TAKE 1 TABLET 3 TIMES A DAY    carvediloL (COREG) 25 MG tablet Take 1 tablet (25 mg total) by mouth 2 (two) times daily with meals.    chlorthalidone (HYGROTEN) 50 MG Tab Take 1 tablet (50 mg total) by mouth once daily.    EScitalopram oxalate (LEXAPRO) 5 MG Tab TAKE 1 TABLET ONCE DAILY    multivitamin (THERAGRAN) per tablet Take 1 tablet by mouth once daily.    naproxen (NAPROSYN) 500 MG tablet Take 1 tablet (500 mg total) by mouth 2 (two) times daily.    potassium chloride SA (K-DUR,KLOR-CON) 20 MEQ tablet Take 1 tablet (20 mEq total) by mouth once daily.    pravastatin (PRAVACHOL) 20 MG tablet Take 1 tablet (20 mg total) by mouth once daily.    spironolactone (ALDACTONE) 50 MG tablet Take 1 tablet (50 mg total) by mouth once  "daily.    tadalafiL (CIALIS) 20 MG Tab Take 1 tablet (20 mg total) by mouth once daily.    testosterone cypionate (DEPOTESTOTERONE CYPIONATE) 200 mg/mL injection Inject 1 mL (200 mg total) into the muscle every 7 days. ADMINISTER 1 ML IN THE MUSCLE EVERY 14 DAYS    valsartan (DIOVAN) 320 MG tablet Take 1 tablet (320 mg total) by mouth once daily.    pregabalin (LYRICA) 50 MG capsule Take 1 capsule (50 mg total) by mouth 2 (two) times daily.     No current facility-administered medications for this visit.     Facility-Administered Medications Ordered in Other Visits   Medication    lactated ringers infusion         ROS  Review of Systems   Constitutional:  Negative for activity change, appetite change and fever.   HENT:  Negative for facial swelling, rhinorrhea and sore throat.    Respiratory:  Negative for cough, chest tightness, shortness of breath, wheezing and stridor.    Cardiovascular:  Negative for chest pain, palpitations and leg swelling.   Gastrointestinal:  Negative for abdominal pain, blood in stool, constipation, diarrhea, nausea and vomiting.   Endocrine: Negative for polydipsia, polyphagia and polyuria.   Genitourinary:  Negative for dysuria, hematuria and urgency.   Musculoskeletal:  Positive for arthralgias, back pain, gait problem and myalgias. Negative for joint swelling, neck pain and neck stiffness.   Skin:  Negative for rash.   Allergic/Immunologic: Negative for food allergies.   Neurological:  Positive for weakness. Negative for dizziness, tremors, seizures, syncope, facial asymmetry, speech difficulty, light-headedness, numbness and headaches.   Psychiatric/Behavioral:  Negative for agitation, hallucinations, self-injury and suicidal ideas. The patient is not nervous/anxious and is not hyperactive.             OBJECTIVE:  BP (!) 164/94   Pulse 67   Ht 6' 5" (1.956 m)   Wt (!) 165.8 kg (365 lb 8.4 oz)   BMI 43.34 kg/m²         Physical Exam  Constitutional:       General: He is not in acute " distress.     Appearance: Normal appearance. He is not ill-appearing.   HENT:      Head: Normocephalic and atraumatic.      Nose: No congestion or rhinorrhea.   Eyes:      Extraocular Movements: Extraocular movements intact.      Pupils: Pupils are equal, round, and reactive to light.   Cardiovascular:      Pulses: Normal pulses.   Pulmonary:      Effort: Pulmonary effort is normal.   Abdominal:      General: Abdomen is flat.   Musculoskeletal:      Right hip: Normal.      Left hip: Tenderness and bony tenderness present. No deformity or crepitus. Normal range of motion. Decreased strength.   Skin:     General: Skin is warm and dry.      Capillary Refill: Capillary refill takes less than 2 seconds.   Neurological:      General: No focal deficit present.      Mental Status: He is alert and oriented to person, place, and time.      Sensory: No sensory deficit.      Motor: Weakness present. No abnormal muscle tone.      Gait: Gait abnormal.      Deep Tendon Reflexes:      Reflex Scores:       Patellar reflexes are 2+ on the right side and 2+ on the left side.       Achilles reflexes are 2+ on the right side and 2+ on the left side.     Comments: Antalgic gait  4/5 strength in left knee extension and left hip adduction   Psychiatric:         Mood and Affect: Mood normal.         Behavior: Behavior normal.         Thought Content: Thought content normal.           Musculoskeletal:      Lumbar Exam  Incision: no  Pain with Flexion: yes  Pain with Extension: yes  ROM:  Decreased  Paraspinous TTP:  Positive on left  Facet TTP:  L4-5  Facet Loading:  Positive on the left  SLR:  Positive on left at 75° in L3 distribution  SIJ TTP:  Positive on left  KAYLA:  Positive on left      LABS:  Lab Results   Component Value Date    WBC 6.40 05/17/2024    HGB 16.9 02/10/2025    HCT 51.7 05/17/2024    MCV 99 (H) 05/17/2024     05/17/2024       CMP  Sodium   Date Value Ref Range Status   05/17/2024 137 136 - 145 mmol/L Final      Potassium   Date Value Ref Range Status   05/17/2024 4.2 3.5 - 5.1 mmol/L Final     Chloride   Date Value Ref Range Status   05/17/2024 99 95 - 110 mmol/L Final     CO2   Date Value Ref Range Status   05/17/2024 29 23 - 29 mmol/L Final     Glucose   Date Value Ref Range Status   05/17/2024 82 70 - 110 mg/dL Final     BUN   Date Value Ref Range Status   05/17/2024 20 6 - 20 mg/dL Final     Creatinine   Date Value Ref Range Status   05/17/2024 1.2 0.5 - 1.4 mg/dL Final     Calcium   Date Value Ref Range Status   05/17/2024 10.0 8.7 - 10.5 mg/dL Final     Total Protein   Date Value Ref Range Status   02/10/2025 7.8 6.0 - 8.4 g/dL Final     Albumin   Date Value Ref Range Status   02/10/2025 4.0 3.5 - 5.2 g/dL Final     Total Bilirubin   Date Value Ref Range Status   02/10/2025 1.0 0.1 - 1.0 mg/dL Final     Comment:     For infants and newborns, interpretation of results should be based  on gestational age, weight and in agreement with clinical  observations.    Premature Infant recommended reference ranges:  Up to 24 hours.............<8.0 mg/dL  Up to 48 hours............<12.0 mg/dL  3-5 days..................<15.0 mg/dL  6-29 days.................<15.0 mg/dL       Alkaline Phosphatase   Date Value Ref Range Status   02/10/2025 45 40 - 150 U/L Final     AST   Date Value Ref Range Status   02/10/2025 23 10 - 40 U/L Final     ALT   Date Value Ref Range Status   02/10/2025 38 10 - 44 U/L Final     Anion Gap   Date Value Ref Range Status   05/17/2024 9 8 - 16 mmol/L Final     eGFR if    Date Value Ref Range Status   07/20/2021 >60.0 >60 mL/min/1.73 m^2 Final     eGFR if non    Date Value Ref Range Status   07/20/2021 >60.0 >60 mL/min/1.73 m^2 Final     Comment:     Calculation used to obtain the estimated glomerular filtration  rate (eGFR) is the CKD-EPI equation.          Lab Results   Component Value Date    HGBA1C 5.7 (H) 05/17/2024             ASSESSMENT:       59 y.o. year old male  with lower back pain pain, consistent with     1. Lumbar radiculopathy  Ambulatory Referral/Consult to Physical Therapy    pregabalin (LYRICA) 50 MG capsule    Case Request-RAD/Other Procedure Area: left L3/4 + L4/5  TF PAUL      2. Pain of left hip  Ambulatory referral/consult to Pain Clinic    Ambulatory Referral/Consult to Physical Therapy    pregabalin (LYRICA) 50 MG capsule      3. Low back pain, unspecified back pain laterality, unspecified chronicity, unspecified whether sciatica present  Ambulatory referral/consult to Pain Clinic    pregabalin (LYRICA) 50 MG capsule      4. Degeneration of intervertebral disc of lumbar region, unspecified whether pain present  pregabalin (LYRICA) 50 MG capsule        Lumbar radiculopathy  -     Ambulatory Referral/Consult to Physical Therapy; Future; Expected date: 03/25/2025  -     pregabalin (LYRICA) 50 MG capsule; Take 1 capsule (50 mg total) by mouth 2 (two) times daily.  Dispense: 60 capsule; Refill: 1  -     Case Request-RAD/Other Procedure Area: left L3/4 + L4/5  TF PAUL    Pain of left hip  -     Ambulatory referral/consult to Pain Clinic  -     Ambulatory Referral/Consult to Physical Therapy; Future; Expected date: 03/25/2025  -     pregabalin (LYRICA) 50 MG capsule; Take 1 capsule (50 mg total) by mouth 2 (two) times daily.  Dispense: 60 capsule; Refill: 1    Low back pain, unspecified back pain laterality, unspecified chronicity, unspecified whether sciatica present  -     Ambulatory referral/consult to Pain Clinic  -     pregabalin (LYRICA) 50 MG capsule; Take 1 capsule (50 mg total) by mouth 2 (two) times daily.  Dispense: 60 capsule; Refill: 1    Degeneration of intervertebral disc of lumbar region, unspecified whether pain present  -     pregabalin (LYRICA) 50 MG capsule; Take 1 capsule (50 mg total) by mouth 2 (two) times daily.  Dispense: 60 capsule; Refill: 1             PLAN:   - Interventions:   - schedule patient for left L3-L4 and L4-5 transforaminal  epidural steroid injection for lumbar radiculopathy.  Patient may continue aspirin.  - Can not rule overlapping sacroiliac joint dysfunction versus underlying hip pathology with left labral tear in objective tendinosis, an MRI    - 01/30/2025:  Left hip intra-articular injection, 3 days of relief    - Anticoagulation use:   Yes aspirin    - Medications:  - Start pregabalin 50 mg twice a day  - patient has trialed multiple anti-inflammatories with minimal relief    - Therapy:   - patient has completed 6 weeks of physician lead home physical therapy in the last 3 months with mild relief.  Continue home exercises  - refer patient to formal physical therapy for left lumbar radiculopathy  - discussed with patient using traction table he has not home and safety precautions  - patient is currently on leave from his work due to this pain.  He will forward documentation to our department    - Imaging/Diagnostic:  - MRI of the lumbar spine without contrast reviewed and findings discussed with patient.  There is disc desiccation with left eccentric disc bulge at L3-L4 L4-5.  This leads to moderate canal stenosis and left-greater-than-right right foraminal stenosis at L3-L4 and moderate canal stenosis at L4-5 with left lateral recess stenosis and left foraminal stenosis.  - MRI left hip reviewed.  That has attach anterior labral tear with bilateral abductor tendinosis and trochanteric bursitis    - Consults:   - continue follow up with Orthopedics for left hip pain        - Patient Questions: Answered all of the patient's questions regarding diagnosis, therapy, and treatment     This condition does not require this patient to take time off of work, and the primary goal of our Pain Management services is to improve the patient's functional capacity.     - Follow up visit: return to clinic 4 weeks after procedure        The above plan and management options were discussed at length with patient. Patient is in agreement with the  above and verbalized understanding.    I discussed the goals of interventional chronic pain management with the patient on today's visit.  I explained the utility of injections for diagnostic and therapeutic purposes.  We discussed a multimodal approach to pain including treating the patient's given worst pain at any given time.  We will use a systematic approach to addressing pain.  We will also adopt a multimodal approach that includes injections, adjuvant medications, physical therapy, at times psychiatry.  There may be a limited role for opioid use intermittently in the treatment of pain, more particularly for acute pain although no one approach can be used as a sole treatment modality.    I emphasized the importance of regular exercise, core strengthening and stretching, diet and weight loss as a cornerstone of long-term pain management.      Narayan Do MD  Interventional Pain Management  [facility]    Future Appointments   Date Time Provider Department [place]   3/25/2025 11:30 AM Alvin Moe MD [facility] UROLOGY [facility]   4/22/2025  4:00 PM Narayan Do MD [facility] PAIN [facility]   8/19/2025 11:00 AM Irene Sanchez MD [facility]           Disclaimer:  This note was prepared using voice recognition system and is likely to have sound alike errors that may have been overlooked even after proof reading.  Please call me with any questions      I spent a total of 45 minutes on the day of the visit.  This includes face to face time and non-face to face time preparing to see the patient (eg, review of tests), obtaining and/or reviewing separately obtained history, documenting clinical information in the electronic or other health record, independently interpreting results and communicating results to the patient/family/caregiver, or care coordinator.         [1]   Social History  Socioeconomic History    Marital status:     Number of children: 4   Occupational  History    Occupation:      Employer: [employer]     Employer: [employer]   Tobacco Use    Smoking status: Light Smoker     Types: Cigars    Smokeless tobacco: Never   Substance and Sexual Activity    Alcohol use: Yes     Alcohol/week: 10.0 standard drinks of alcohol     Types: 4 Glasses of wine, 6 Shots of liquor per week     Comment: Occasionally    Drug use: Never    Sexual activity: Yes     Partners: Female     Birth control/protection: None   Social History Narrative    He wears seatbelt. He has 1 grandson and 2 granddaughters.     Social Drivers of Health     Financial Resource Strain: Low Risk  (5/14/2024)    Overall Financial Resource Strain (CARDIA)     Difficulty of Paying Living Expenses: Not hard at all   Food Insecurity: No Food Insecurity (5/14/2024)    Hunger Vital Sign     Worried About Running Out of Food in the Last Year: Never true     Ran Out of Food in the Last Year: Never true   Transportation Needs: No Transportation Needs (5/14/2024)    PRAPARE - Transportation     Lack of Transportation (Medical): No     Lack of Transportation (Non-Medical): No   Physical Activity: Unknown (5/14/2024)    Exercise Vital Sign     Days of Exercise per Week: 1 day   Recent Concern: Physical Activity - Inactive (5/14/2024)    Exercise Vital Sign     Days of Exercise per Week: 1 day     Minutes of Exercise per Session: 0 min   Stress: Patient Declined (5/14/2024)    [employer] - Occupational Stress Questionnaire     Feeling of Stress : Patient declined   Housing Stability: Low Risk  (8/14/2023)    Housing Stability Vital Sign     Unable to Pay for Housing in the Last Year: No     Number of Places Lived in the Last Year: 1     Unstable Housing in the Last Year: No

## 2025-03-19 ENCOUNTER — PATIENT MESSAGE (OUTPATIENT)
Dept: PAIN MEDICINE | Facility: CLINIC | Age: 60
End: 2025-03-19
Payer: COMMERCIAL

## 2025-03-21 NOTE — PRE-PROCEDURE INSTRUCTIONS
Spoke with patient regarding procedure scheduled on 3.31     Arrival time 0815     Has patient been sick with fever or on antibiotics within the last 7 days? No     Does the patient have any open wounds, sores or rashes? No     Does the patient have any recent fractures? no     Has patient received a vaccination within the last 7 days? No     Received the COVID vaccination? yes     Has the patient stopped all medications as directed? CONTINUE ASA PER MD      Does patient have a pacemaker, defibrillator, or implantable stimulator? No     Does the patient have a ride to and from procedure and someone reliable to remain with patient?  WIFE     Is the patient diabetic? no      Does the patient have sleep apnea? Or use O2 at home? DEEPTHI ON CPAP     Is the patient receiving sedation? Yes      Is the patient instructed to remain NPO beginning at midnight the night before their procedure? yes     Procedure location confirmed with patient? Yes     Covid- Denies signs/symptoms. Instructed to notify PAT/MD if any changes.

## 2025-03-25 ENCOUNTER — OFFICE VISIT (OUTPATIENT)
Dept: UROLOGY | Facility: CLINIC | Age: 60
End: 2025-03-25
Payer: COMMERCIAL

## 2025-03-25 VITALS
HEART RATE: 58 BPM | WEIGHT: 315 LBS | SYSTOLIC BLOOD PRESSURE: 106 MMHG | HEIGHT: 77 IN | DIASTOLIC BLOOD PRESSURE: 66 MMHG | BODY MASS INDEX: 37.19 KG/M2

## 2025-03-25 DIAGNOSIS — R79.89 LOW TESTOSTERONE: Primary | ICD-10-CM

## 2025-03-25 PROCEDURE — 3008F BODY MASS INDEX DOCD: CPT | Mod: CPTII,S$GLB,, | Performed by: UROLOGY

## 2025-03-25 PROCEDURE — 99214 OFFICE O/P EST MOD 30 MIN: CPT | Mod: S$GLB,,, | Performed by: UROLOGY

## 2025-03-25 PROCEDURE — 1159F MED LIST DOCD IN RCRD: CPT | Mod: CPTII,S$GLB,, | Performed by: UROLOGY

## 2025-03-25 PROCEDURE — 99999 PR PBB SHADOW E&M-EST. PATIENT-LVL IV: CPT | Mod: PBBFAC,,, | Performed by: UROLOGY

## 2025-03-25 PROCEDURE — 4010F ACE/ARB THERAPY RXD/TAKEN: CPT | Mod: CPTII,S$GLB,, | Performed by: UROLOGY

## 2025-03-25 PROCEDURE — 3074F SYST BP LT 130 MM HG: CPT | Mod: CPTII,S$GLB,, | Performed by: UROLOGY

## 2025-03-25 PROCEDURE — 1160F RVW MEDS BY RX/DR IN RCRD: CPT | Mod: CPTII,S$GLB,, | Performed by: UROLOGY

## 2025-03-25 PROCEDURE — 3078F DIAST BP <80 MM HG: CPT | Mod: CPTII,S$GLB,, | Performed by: UROLOGY

## 2025-03-25 NOTE — PROGRESS NOTES
Chief Complaint: Low T    HPI:   03/25/2025 - returns today for follow-up, no new issues in the interim, testosterone working well for him, Cialis working well for him as well no new voiding issues, labs appropriate but did show slight increase to his direct bilirubin    08/14/2024 - patient returns today for follow-up, no new issues in the interim, testosterone working very well for him, Cialis working well for him, no voiding issues, no gross hematuria, labs in May were appropriate    02/27/2024 - patient returns today for follow-up, no new issues in the interim, testosterone going well, Cialis still working well for him, no side effects that he is noticed, no voiding issues or gross hematuria, due for labs    07/18/2023 - patient returns today for follow-up, no new issues in the interim, testosterone going well, labs okay    01/11/2023 - returns today for follow-up, no new issues in the interim, testosterone working well at the current dosage, due for labs today, no voiding issues or gross hematuria    07/26/2022 - 57 yo male that presents for evaluation and continued management of low T. Up until recently, he was seeing Dr Narayan Grove for this issue, however he has had multiple communication issues with his staff that has made the whole process much more difficult that it should be. He notes a long history of T use, has been on it for about 10 yrs, typically on 150-200mg per week IM, obtains it from [facility]. He notes that low energy was the initial indication, and that this dosage helps greatly. He also notes ED, takes cialis, which works well for him. Denies any voiding issues, denies GH, denies family history of  cancers. Denies prior urologic procedures.     PMH:  Past Medical History:   Diagnosis Date    Cellulitis     left leg    Hip arthritis     right    Hyperlipidemia     Hypertension     Hypogonadism male     Hypokalemia     Morbid obesity     Prediabetes 12/30/2020    Sleep  apnea     on CPAP       PSH:  Past Surgical History:   Procedure Laterality Date    COLONOSCOPY N/A 12/06/2017    Procedure: COLONOSCOPY;  Surgeon: Rory Barone MD;  Location: [facility] ENDO;  Service: Endoscopy;  Laterality: N/A;    COLONOSCOPY N/A 4/27/2023    Procedure: COLONOSCOPY;  Surgeon: Jeimy Garcia MD;  Location: [facility] ENDO;  Service: Endoscopy;  Laterality: N/A;    right bunionectomy         Family History:  Family History   Problem Relation Name Age of Onset    No Known Problems Mother Yael     Hypertension Father Amanuel     Heart disease Father Amanuel         CAD    Diabetes Sister Fawn     No Known Problems Sister Sherri     Hypertension Brother Amanuel Jr     Diabetes Maternal Grandmother Lissa     No Known Problems Daughter      No Known Problems Daughter      No Known Problems Daughter      No Known Problems Other grandson     Cancer Neg Hx      Stroke Neg Hx      Thyroid disease Neg Hx      Thyroid cancer Neg Hx          MEN2       Social History:  Social History     Tobacco Use    Smoking status: Light Smoker     Types: Cigars    Smokeless tobacco: Never   Substance Use Topics    Alcohol use: Yes     Alcohol/week: 10.0 standard drinks of alcohol     Types: 4 Glasses of wine, 6 Shots of liquor per week     Comment: Occasionally    Drug use: Never        Review of Systems:  General: No fever, chills  Skin: No rashes  Chest:  Denies cough and sputum production  Heart: Denies chest pain  Resp: Denies dyspnea  Abdomen: Denies diarrhea, abdominal pain, hematemesis, or blood in stool.  Musculoskeletal: No joint stiffness or swelling. Denies back pain.  : see HPI  Neuro: no dizziness or weakness    Allergies:  Shrimp    Medications:    Current Outpatient Medications:     amLODIPine (NORVASC) 10 MG tablet, Take 1 tablet (10 mg total) by mouth once daily., Disp: 90 tablet, Rfl: 1    aspirin (ECOTRIN) 81 MG EC tablet, Take 1 tablet by mouth Daily., Disp: , Rfl:     busPIRone (BUSPAR) 10 MG  tablet, TAKE 1 TABLET 3 TIMES A DAY, Disp: 90 tablet, Rfl: 1    carvediloL (COREG) 25 MG tablet, Take 1 tablet (25 mg total) by mouth 2 (two) times daily with meals., Disp: 180 tablet, Rfl: 1    chlorthalidone (HYGROTEN) 50 MG Tab, Take 1 tablet (50 mg total) by mouth once daily., Disp: 90 tablet, Rfl: 1    EScitalopram oxalate (LEXAPRO) 5 MG Tab, TAKE 1 TABLET ONCE DAILY, Disp: 90 tablet, Rfl: 0    multivitamin (THERAGRAN) per tablet, Take 1 tablet by mouth once daily., Disp: , Rfl:     naproxen (NAPROSYN) 500 MG tablet, Take 1 tablet (500 mg total) by mouth 2 (two) times daily., Disp: 60 tablet, Rfl: 1    potassium chloride SA (K-DUR,KLOR-CON) 20 MEQ tablet, Take 1 tablet (20 mEq total) by mouth once daily., Disp: 90 tablet, Rfl: 1    pravastatin (PRAVACHOL) 20 MG tablet, Take 1 tablet (20 mg total) by mouth once daily., Disp: 90 tablet, Rfl: 1    pregabalin (LYRICA) 50 MG capsule, Take 1 capsule (50 mg total) by mouth 2 (two) times daily., Disp: 60 capsule, Rfl: 1    spironolactone (ALDACTONE) 50 MG tablet, Take 1 tablet (50 mg total) by mouth once daily., Disp: 90 tablet, Rfl: 1    tadalafiL (CIALIS) 20 MG Tab, Take 1 tablet (20 mg total) by mouth once daily., Disp: 30 tablet, Rfl: 5    testosterone cypionate (DEPOTESTOTERONE CYPIONATE) 200 mg/mL injection, Inject 1 mL (200 mg total) into the muscle every 7 days. ADMINISTER 1 ML IN THE MUSCLE EVERY 14 DAYS, Disp: 10 mL, Rfl: 3    valsartan (DIOVAN) 320 MG tablet, Take 1 tablet (320 mg total) by mouth once daily., Disp: 90 tablet, Rfl: 1  No current facility-administered medications for this visit.    Facility-Administered Medications Ordered in Other Visits:     lactated ringers infusion, , Intravenous, Continuous, Jeimy Garcia MD, New Bag at 04/27/23 1150    Physical Exam:  Vitals:    03/25/25 1123   BP: 106/66   Pulse: (!) 58       Body mass index is 43.34 kg/m².  General: awake, alert, cooperative  Head: NC/AT  Ears: external ears normal  Eyes: sclera  normal  Lungs: normal inspiration, NAD  Heart: well-perfused  : deferred, patient notes 2 normal exams in the last 6 months  Skin: The skin is warm and dry  Ext: No c/c/e.  Neuro: grossly intact, AOx3    RADIOLOGY:  No recent relevant imaging available for review.    LABS:  I personally reviewed the following lab values:  Lab Results   Component Value Date    WBC 6.40 05/17/2024    HGB 16.9 02/10/2025    HCT 51.7 05/17/2024     05/17/2024     05/17/2024    K 4.2 05/17/2024    CL 99 05/17/2024    CREATININE 1.2 05/17/2024    BUN 20 05/17/2024    CO2 29 05/17/2024    TSH 2.090 05/17/2024    PSA 2.5 02/10/2025    HGBA1C 5.7 (H) 05/17/2024    CHOL 189 05/17/2024    TRIG 94 05/17/2024    HDL 41 05/17/2024    ALT 38 02/10/2025    AST 23 02/10/2025       Assessment/Plan:   Reggie Sawant is a 59 y.o. male with:    Low T - continue 200mg/week, f/u 6 months with labs    [facility]    Prostate Cancer Screening - PSA 2.5, continue annual screening    Alvin Moe MD  Urology

## 2025-03-31 ENCOUNTER — HOSPITAL ENCOUNTER (OUTPATIENT)
Facility: HOSPITAL | Age: 60
Discharge: HOME | End: 2025-03-31
Attending: ANESTHESIOLOGY | Admitting: ANESTHESIOLOGY
Payer: COMMERCIAL

## 2025-03-31 VITALS
TEMPERATURE: 98 F | RESPIRATION RATE: 16 BRPM | SYSTOLIC BLOOD PRESSURE: 111 MMHG | OXYGEN SATURATION: 96 % | WEIGHT: 315 LBS | HEART RATE: 114 BPM | DIASTOLIC BLOOD PRESSURE: 54 MMHG | BODY MASS INDEX: 37.19 KG/M2 | HEIGHT: 77 IN

## 2025-03-31 DIAGNOSIS — M54.16 LUMBAR RADICULOPATHY: Primary | ICD-10-CM

## 2025-03-31 DIAGNOSIS — F41.9 ANXIETY: ICD-10-CM

## 2025-03-31 PROCEDURE — 64484 NJX AA&/STRD TFRM EPI L/S EA: CPT | Mod: LT | Performed by: ANESTHESIOLOGY

## 2025-03-31 PROCEDURE — 64483 NJX AA&/STRD TFRM EPI L/S 1: CPT | Mod: LT,,, | Performed by: ANESTHESIOLOGY

## 2025-03-31 PROCEDURE — 64483 NJX AA&/STRD TFRM EPI L/S 1: CPT | Mod: LT | Performed by: ANESTHESIOLOGY

## 2025-03-31 PROCEDURE — 25500020 PHARM REV CODE 255: Performed by: ANESTHESIOLOGY

## 2025-03-31 PROCEDURE — A9585 GADOBUTROL INJECTION: HCPCS | Performed by: ANESTHESIOLOGY

## 2025-03-31 PROCEDURE — 64484 NJX AA&/STRD TFRM EPI L/S EA: CPT | Mod: LT,,, | Performed by: ANESTHESIOLOGY

## 2025-03-31 PROCEDURE — 63600175 PHARM REV CODE 636 W HCPCS: Performed by: ANESTHESIOLOGY

## 2025-03-31 RX ORDER — LIDOCAINE HYDROCHLORIDE 10 MG/ML
INJECTION, SOLUTION EPIDURAL; INFILTRATION; INTRACAUDAL; PERINEURAL
Status: DISCONTINUED | OUTPATIENT
Start: 2025-03-31 | End: 2025-03-31 | Stop reason: HOSPADM

## 2025-03-31 RX ORDER — GADOBUTROL 604.72 MG/ML
INJECTION INTRAVENOUS
Status: DISCONTINUED | OUTPATIENT
Start: 2025-03-31 | End: 2025-03-31 | Stop reason: HOSPADM

## 2025-03-31 RX ORDER — MIDAZOLAM HYDROCHLORIDE 1 MG/ML
INJECTION INTRAMUSCULAR; INTRAVENOUS
Status: DISCONTINUED | OUTPATIENT
Start: 2025-03-31 | End: 2025-03-31 | Stop reason: HOSPADM

## 2025-03-31 RX ORDER — FENTANYL CITRATE 50 UG/ML
INJECTION, SOLUTION INTRAMUSCULAR; INTRAVENOUS
Status: DISCONTINUED | OUTPATIENT
Start: 2025-03-31 | End: 2025-03-31 | Stop reason: HOSPADM

## 2025-03-31 RX ORDER — BETAMETHASONE SODIUM PHOSPHATE AND BETAMETHASONE ACETATE 3; 3 MG/ML; MG/ML
INJECTION, SUSPENSION INTRA-ARTICULAR; INTRALESIONAL; INTRAMUSCULAR; SOFT TISSUE
Status: DISCONTINUED | OUTPATIENT
Start: 2025-03-31 | End: 2025-03-31 | Stop reason: HOSPADM

## 2025-03-31 RX ORDER — ONDANSETRON HYDROCHLORIDE 2 MG/ML
4 INJECTION, SOLUTION INTRAVENOUS ONCE AS NEEDED
Status: DISCONTINUED | OUTPATIENT
Start: 2025-03-31 | End: 2025-03-31 | Stop reason: HOSPADM

## 2025-03-31 NOTE — DISCHARGE INSTRUCTIONS

## 2025-03-31 NOTE — DISCHARGE SUMMARY
Discharge Note  Short Stay      SUMMARY     Admit Date: 3/31/2025    Attending Physician: Narayan Do MD        Discharge Physician: Narayan Do MD        Discharge Date: 3/31/2025 9:27 AM    Procedure(s) (LRB):  left L3/4 + L4/5  TF PAUL- continue ASA (Left)    Final Diagnosis: Lumbar radiculopathy [M54.16]    Disposition: Home or self care    Patient Instructions:   Current Discharge Medication List        CONTINUE these medications which have NOT CHANGED    Details   amLODIPine (NORVASC) 10 MG tablet Take 1 tablet (10 mg total) by mouth once daily.  Qty: 90 tablet, Refills: 1    Comments: .  Associated Diagnoses: Essential hypertension      aspirin (ECOTRIN) 81 MG EC tablet Take 1 tablet by mouth Daily.      carvediloL (COREG) 25 MG tablet Take 1 tablet (25 mg total) by mouth 2 (two) times daily with meals.  Qty: 180 tablet, Refills: 1    Comments: .      pregabalin (LYRICA) 50 MG capsule Take 1 capsule (50 mg total) by mouth 2 (two) times daily.  Qty: 60 capsule, Refills: 1    Comments: Take 1 pill at night for 3 days.  Then take 1 pill at night and 1 pill in the morning.  Associated Diagnoses: Pain of left hip; Low back pain, unspecified back pain laterality, unspecified chronicity, unspecified whether sciatica present; Lumbar radiculopathy; Degeneration of intervertebral disc of lumbar region, unspecified whether pain present      busPIRone (BUSPAR) 10 MG tablet TAKE 1 TABLET 3 TIMES A DAY  Qty: 90 tablet, Refills: 1    Comments: Advise pt last refill; must see PCP for future refill. Thanks.  Associated Diagnoses: Anxiety      chlorthalidone (HYGROTEN) 50 MG Tab Take 1 tablet (50 mg total) by mouth once daily.  Qty: 90 tablet, Refills: 1    Associated Diagnoses: Essential hypertension      EScitalopram oxalate (LEXAPRO) 5 MG Tab TAKE 1 TABLET ONCE DAILY  Qty: 90 tablet, Refills: 0    Associated Diagnoses: Anxiety      multivitamin (THERAGRAN) per tablet Take 1 tablet by mouth once daily.       naproxen (NAPROSYN) 500 MG tablet Take 1 tablet (500 mg total) by mouth 2 (two) times daily.  Qty: 60 tablet, Refills: 1      potassium chloride SA (K-DUR,KLOR-CON) 20 MEQ tablet Take 1 tablet (20 mEq total) by mouth once daily.  Qty: 90 tablet, Refills: 1    Associated Diagnoses: Essential hypertension      pravastatin (PRAVACHOL) 20 MG tablet Take 1 tablet (20 mg total) by mouth once daily.  Qty: 90 tablet, Refills: 1    Associated Diagnoses: Hyperlipidemia, unspecified hyperlipidemia type      spironolactone (ALDACTONE) 50 MG tablet Take 1 tablet (50 mg total) by mouth once daily.  Qty: 90 tablet, Refills: 1    Comments: Office visit required for further refills. May schedule via [facility] app.  Associated Diagnoses: Essential hypertension      tadalafiL (CIALIS) 20 MG Tab Take 1 tablet (20 mg total) by mouth once daily.  Qty: 30 tablet, Refills: 5    Associated Diagnoses: Erectile dysfunction, unspecified erectile dysfunction type      testosterone cypionate (DEPOTESTOTERONE CYPIONATE) 200 mg/mL injection Inject 1 mL (200 mg total) into the muscle every 7 days. ADMINISTER 1 ML IN THE MUSCLE EVERY 14 DAYS  Qty: 10 mL, Refills: 3    Associated Diagnoses: Low testosterone      valsartan (DIOVAN) 320 MG tablet Take 1 tablet (320 mg total) by mouth once daily.  Qty: 90 tablet, Refills: 1    Comments: .  Associated Diagnoses: Essential hypertension                 Discharge Diagnosis: Lumbar radiculopathy [M54.16]  Condition on Discharge: Stable with no complications to procedure   Diet on Discharge: Same as before.  Activity: as per instruction sheet.  Discharge to: Home with a responsible adult.  Follow up: 2-4 weeks       Please call the office at [phone] if you experience any weakness or loss of sensation, fever > 101.5, pain uncontrolled with oral medications, persistent nausea/vomiting/or diarrhea, redness or drainage from the incisions, or any other worrisome concerns. If physician on call was not  reached or could not communicate with our office for any reason please go to the nearest emergency department

## 2025-03-31 NOTE — TELEPHONE ENCOUNTER
Care Due:                  Date            Visit Type   Department     Provider  --------------------------------------------------------------------------------                                EP -                              PRIMARY      [facility]  Last Visit: 08-      CARE ([facility])   MEDICINE       Irene Sanchez                              EP -                              PRIMARY      [facility]  Next Visit: 08-      CARE ([facility])   MEDICINE       Irene Sanchez                                                            Last  Test          Frequency    Reason                     Performed    Due Date  --------------------------------------------------------------------------------    Office Visit  12 months..  EScitalopram, amLODIPine,   08-   08-                             busPIRone, carvediloL,                             chlorthalidone,                             potassium, pravastatin,                             spironolactone, valsartan    [employer] Embedded Care Due Messages. Reference number: [id number].   3/31/2025 4:14:05 PM [facility]

## 2025-03-31 NOTE — H&P
"HPI  Patient presenting for Procedure(s) (LRB):  left L3/4 + L4/5  TF PAUL- continue ASA (Left)     Patient on Anti-coagulation Yes, ASA, may continue    No health changes since previous encounter    Past Medical History:   Diagnosis Date    Cellulitis     left leg    Hip arthritis     right    Hyperlipidemia     Hypertension     Hypogonadism male     Hypokalemia     Morbid obesity     Prediabetes 12/30/2020    Sleep apnea     on CPAP     Past Surgical History:   Procedure Laterality Date    COLONOSCOPY N/A 12/06/2017    Procedure: COLONOSCOPY;  Surgeon: Rory Barone MD;  Location: [facility] ENDO;  Service: Endoscopy;  Laterality: N/A;    COLONOSCOPY N/A 4/27/2023    Procedure: COLONOSCOPY;  Surgeon: Jeimy Garcia MD;  Location: [facility] ENDO;  Service: Endoscopy;  Laterality: N/A;    right bunionectomy       Review of patient's allergies indicates:   Allergen Reactions    Shrimp Anaphylaxis        Medications Ordered Prior to Encounter[1]     PMHx, PSHx, Allergies, Medications reviewed in epic    ROS negative except pain complaints in HPI    OBJECTIVE:    BP (!) 105/59 (BP Location: Right arm, Patient Position: Sitting)   Pulse 76   Temp 98 °F (36.7 °C) (Temporal)   Resp 16   Ht 6' 5" (1.956 m)   Wt (!) 161.7 kg (356 lb 7.7 oz)   SpO2 98%   BMI 42.27 kg/m²     PHYSICAL EXAMINATION:    GENERAL: Well appearing, in no acute distress, alert and oriented x3.  PSYCH:  Mood and affect appropriate.  SKIN: Skin color, texture, turgor normal, no rashes or lesions which will impact the procedure.  CV: RRR with palpation of the radial artery.  PULM: No evidence of respiratory difficulty, symmetric chest rise. Clear to auscultation.  NEURO: Cranial nerves grossly intact.    Plan:    Proceed with procedure as planned Procedure(s) (LRB):  left L3/4 + L4/5  TF PAUL- continue ASA (Left)    Narayan Do MD  03/31/2025                 [1]   Current Facility-Administered Medications on File Prior to Encounter "   Medication Dose Route Frequency Provider Last Rate Last Admin    lactated ringers infusion   Intravenous Continuous Jeimy Garcia MD   New Bag at 04/27/23 1150     Current Outpatient Medications on File Prior to Encounter   Medication Sig Dispense Refill    amLODIPine (NORVASC) 10 MG tablet Take 1 tablet (10 mg total) by mouth once daily. 90 tablet 1    aspirin (ECOTRIN) 81 MG EC tablet Take 1 tablet by mouth Daily.      carvediloL (COREG) 25 MG tablet Take 1 tablet (25 mg total) by mouth 2 (two) times daily with meals. 180 tablet 1    pregabalin (LYRICA) 50 MG capsule Take 1 capsule (50 mg total) by mouth 2 (two) times daily. 60 capsule 1    busPIRone (BUSPAR) 10 MG tablet TAKE 1 TABLET 3 TIMES A DAY 90 tablet 1    chlorthalidone (HYGROTEN) 50 MG Tab Take 1 tablet (50 mg total) by mouth once daily. 90 tablet 1    EScitalopram oxalate (LEXAPRO) 5 MG Tab TAKE 1 TABLET ONCE DAILY 90 tablet 0    multivitamin (THERAGRAN) per tablet Take 1 tablet by mouth once daily.      naproxen (NAPROSYN) 500 MG tablet Take 1 tablet (500 mg total) by mouth 2 (two) times daily. 60 tablet 1    potassium chloride SA (K-DUR,KLOR-CON) 20 MEQ tablet Take 1 tablet (20 mEq total) by mouth once daily. 90 tablet 1    pravastatin (PRAVACHOL) 20 MG tablet Take 1 tablet (20 mg total) by mouth once daily. 90 tablet 1    spironolactone (ALDACTONE) 50 MG tablet Take 1 tablet (50 mg total) by mouth once daily. 90 tablet 1    tadalafiL (CIALIS) 20 MG Tab Take 1 tablet (20 mg total) by mouth once daily. 30 tablet 5    testosterone cypionate (DEPOTESTOTERONE CYPIONATE) 200 mg/mL injection Inject 1 mL (200 mg total) into the muscle every 7 days. ADMINISTER 1 ML IN THE MUSCLE EVERY 14 DAYS 10 mL 3    valsartan (DIOVAN) 320 MG tablet Take 1 tablet (320 mg total) by mouth once daily. 90 tablet 1

## 2025-03-31 NOTE — OP NOTE
Transforaminal Lumbar Epidural Steroid Injection    INFORMED CONSENT: The procedure, risks, benefits and options were discussed with patient. There are no contraindications to the procedure. The patient expressed understanding and agreed to proceed. The personnel performing the procedure was discussed.    Date of procedure 03/31/2025    Time-out taken to identify patient and procedure side prior to starting the procedure.                     PROCEDURE:    1)  Left  L3/L4 TRANSFORAMINAL EPIDURAL STEROID INJECTION    2)  Left  L4/L5 TRANSFORAMINAL EPIDURAL STEROID INJECTION    Pre Procedure diagnosis:    Lumbar radiculopathy [M54.16]  1. Lumbar radiculopathy        Post-Procedure diagnosis:   same    Surgeon: Narayan Do MD    Assistants: None    Medication: 2ml Betamethasone PF 6mg/ml and 2ml Lidocaine PF 1%    Local: 3ml Lidocaine PF 1%    Sedation: Conscious sedation provided by M.D    SEDATION MEDICATIONS: local/IV sedation: Versed 2 mg and fentanyl 75 mcg IV.  Conscious sedation ordered by MD.  Patient reevaluated and sedation administered by MD and monitored by RN.  Total sedation time was less than 10 min.    Total sedation time was <10 min    Complications: None    Specimens: None        TECHNIQUE: The patient was brought to the procedure room. IV access was obtained prior to the procedure. The patient was positioned prone on the fluoroscopy table. Continuous hemodynamic monitoring was initiated including blood pressure, EKG, and pulse oximetry. . The skin was prepped with chlorhexidine and draped in a sterile fashion.   Local Xylocaine was injected by raising a wheel and going down to the periosteum using a 27-gauge hypodermic needle.      The Left  L3/L4 and Left L4/L5 transforaminal spaces were identified with fluoroscopy in the  AP, oblique, and lateral views.  A 22 gauge spinal quinke needle was then advanced into the area of the trans foraminal spaces at the above levels with confirmation of proper  needle position using AP, oblique, and lateral fluoroscopic views. Once the needle tip was in the area of the transforaminal space, and there was no blood, CSF or paraesthesias,  2 mL of Omnipaque 300mg/ml was injected at each level for a total of 4 mL.  Fluoroscopic imaging in the AP and lateral views revealed a clear outline of the spinal nerve with proximal spread of agent through the neural foramen into the epidural space. A total combination of 1 mL of Lidocaine PF 1% and 1ml of Betamethasone PF 6mg/ml was injected into each level for a total of 4mL of injected medications with displacement of the contrast dye confirming that the medication went into the area of the transforaminal spaces at each level. A sterile dressing was applied. Patient tolerated procedure well.        The patient was monitored for approximately 30 minutes after the procedure.  Patient was given post procedure and discharge instructions to follow at home.  The patient was discharged in a stable condition

## 2025-04-02 ENCOUNTER — CLINICAL SUPPORT (OUTPATIENT)
Dept: REHABILITATION | Facility: HOSPITAL | Age: 60
End: 2025-04-02
Attending: ANESTHESIOLOGY
Payer: COMMERCIAL

## 2025-04-02 DIAGNOSIS — M25.552 PAIN OF LEFT HIP: ICD-10-CM

## 2025-04-02 DIAGNOSIS — M54.16 LUMBAR RADICULOPATHY: ICD-10-CM

## 2025-04-02 DIAGNOSIS — M25.659 DECREASED RANGE OF HIP MOVEMENT, UNSPECIFIED LATERALITY: Primary | ICD-10-CM

## 2025-04-02 DIAGNOSIS — M53.86 DECREASED RANGE OF MOTION OF INTERVERTEBRAL DISCS OF LUMBAR SPINE: ICD-10-CM

## 2025-04-02 PROCEDURE — 97110 THERAPEUTIC EXERCISES: CPT | Mod: PN

## 2025-04-02 PROCEDURE — 97161 PT EVAL LOW COMPLEX 20 MIN: CPT | Mod: PN

## 2025-04-02 RX ORDER — ESCITALOPRAM OXALATE 5 MG/1
5 TABLET ORAL
Qty: 90 TABLET | Refills: 0 | Status: SHIPPED | OUTPATIENT
Start: 2025-04-02

## 2025-04-02 NOTE — PROGRESS NOTES
Outpatient Rehab    Physical Therapy Evaluation    Patient Name: Reggie Sawant  MRN: [id number]  YOB: 1965  Encounter Date: 4/2/2025    Therapy Diagnosis:   Encounter Diagnoses   Name Primary?    Pain of left hip     Lumbar radiculopathy     Decreased range of hip movement, unspecified laterality Yes    Decreased range of motion of intervertebral discs of lumbar spine      Physician: Narayan Do MD    Physician Orders: Eval and Treat  Medical Diagnosis: Pain of left hip  Lumbar radiculopathy    Visit # / Visits Authorized:  1 / 12  Insurance Authorization Period: 3/18/2025 to 3/18/2026  Date of Evaluation: 4/2/2025  Plan of Care Certification: 4/2/2025 to 5/28/2025     Time In: 0815   Time Out: 0900  Total Time: 45   Total Billable Time:  45 minutes     Intake Outcome Measure for FOTO Survey    Therapist reviewed FOTO scores for Reggie Sawant on 4/2/2025.   FOTO report - see Media section or FOTO account episode details.     Intake Score: 49%         Subjective   History of Present Illness  Reggie is a 59 y.o. male      The patient reports a medical diagnosis of M25.552 (ICD-10-CM) - Pain of left hip  M54.16 (ICD-10-CM) - Lumbar radiculopathy.    Diagnostic tests related to this condition: MRI studies.   MRI Studies Details: HIP: 1.   Low-grade chondral degeneration left hip.  Detached anterior labral tear.  Os acetabuli and osseous metaplasia of the superior labrum. CAM lesion.  2.  Mild bilateral abductor tendinosis and trochanteric bursitis. LUMBAR SPINE:  Multilevel lumbar degenerative disc disease notably at L3-4 and L4-5 with central and foraminal stenosis as detailed above.    History of Present Condition/Illness: Patient reports in January he woke up with a pain in his hip for no know reason. He just got out of bed and couldn't walk. He works off shore in production; his job isn't super physical, but has to take fitness test to return to work. He got PAUL Monday in his back and and hip  "injection in February. He thought it was just the hip but after hip injection was still having pain and MD evalauted back and found some issues (DDD; mild bulging disc)    Pain     Patient reports a current pain level of 2/10. Pain at best is reported as 2/10. Pain at worst is reported as 10/10.   Location: left hip and low back (lumbar)  Clinical Progression (since onset): Improved  Pain Qualities: Other (Comment), Throbbing  Other Pain Qualities: "like a loose tooth"  Pain-Relieving Factors: Massage, Other (Comment)  Other Pain-Relieving Factors: injections  Pain-Aggravating Factors: Walking         Employment  Patient reports: Does the patient's condition impact their ability to work?  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    Reggie Sawant  has a past surgical history that includes right bunionectomy; Colonoscopy (N/A, 12/06/2017); Colonoscopy (N/A, 4/27/2023); and Transforaminal epidural injection of steroid (Left, 3/31/2025).    Reggie has a current medication list which includes the following prescription(s): amlodipine, aspirin, buspirone, carvedilol, chlorthalidone, escitalopram oxalate, multivitamin, naproxen, potassium chloride sa, pravastatin, pregabalin, spironolactone, tadalafil, testosterone cypionate, and valsartan, and the following Facility-Administered Medications: lactated ringers.    Review of patient's allergies indicates:   Allergen Reactions    Shrimp Anaphylaxis        Objective      Lumbar Range of Motion   Active (deg) Passive (deg) Pain   Flexion 80       Extension 50       Right Lateral Flexion 60       Right Rotation 75       Left Lateral Flexion 60       Left Rotation 75         Just reports hamstring " "tightness and stiffness       Hip Range of Motion   Right Hip   Active (deg) Passive (deg) Pain   Flexion 100 105     Extension 7       ABduction 62       ADduction         External Rotation 90/90         External Rotation Prone         Internal Rotation 90/90         Internal Rotation Prone             Left Hip   Active (deg) Passive (deg) Pain   Flexion 94 100     Extension 4       ABduction 57       ADduction         External Rotation 90/90         External Rotation Prone         Internal Rotation 90/90         Internal Rotation Prone                            Hip Strength - Planes of Motion   Right Strength Right Pain Left Strength Left  Pain   Flexion (L2) 5   5     Extension           ABduction 5   5     ADduction 5   5     Internal Rotation           External Rotation               Knee Strength   Right Strength Right Pain Left Strength Left  Pain   Flexion (S2) 5   5     Prone Flexion           Extension (L3) 5   5            Ankle/Foot Strength - Planes of Motion   Right Strength Right Pain Left Strength Left  Pain   Dorsiflexion (L4) 5   5     Plantar Flexion (S1) 5   5     Inversion           Eversion           Great Toe Flexion           Great Toe Extension (L5)           Lesser Toes Flexion           Lesser Toes Extension                     Treatment:  Therapeutic Exercise  TE 1: LTR x 1 min  TE 2: recumbent bike lvl 5 x 5 minutes  TE 3: side lying hip abduction 2 x 10  TE 4: supine SLR 2 x 10  TE 5: prone quad stretch 2 x 30"  TE 6: seated hip IR with YTB 2 x 10  TE 7: bridges 2 x 10      Time Entry(in minutes):  PT Evaluation (Low) Time Entry: 20  Therapeutic Exercise Time Entry: 25    Assessment & Plan   Assessment  Reggie presents with a condition of Moderate complexity.   Presentation of Symptoms: Changing  Will Comorbidities Impact Care: Yes  See co-morbidities listed above     Functional Limitations: Activity tolerance, Ambulating on uneven surfaces, Carrying objects, Completing self-care " activities, Completing work/school activities, Decreased ambulation distance/endurance, Functional mobility, Pain with ADLs/IADLs, Painful locomotion/ambulation, Participating in leisure activities, Range of motion, Standing tolerance, Sitting tolerance  Impairments: Abnormal muscle tone, Abnormal or restricted range of motion, Pain with functional activity, Activity intolerance    Patient Goal for Therapy (PT): be able to return to work/ pass return to work testing  Prognosis: Excellent    Plan  From a physical therapy perspective, the patient would benefit from: Skilled Rehab Services    Planned therapy interventions include: Therapeutic exercise, Therapeutic activities, Neuromuscular re-education, Manual therapy, and ADLs/IADLs.            Visit Frequency: 2 times Per Week for 8 Weeks.       This plan was discussed with Patient.   Discussion participants: Agreed Upon Plan of Care  Plan details: Frequency 2-3 times a week for return to work            Patient's spiritual, cultural, and educational needs considered and patient agreeable to plan of care and goals.           Goals:   Active       Functional outcome       Patient will show a significant change in FOTO patient-reported outcome tool to demonstrate subjective improvement       Start:  04/02/25    Expected End:  05/28/25            Patient stated goal:   be able to return to work/ pass return to work testing       Start:  04/02/25    Expected End:  05/28/25            Patient will demonstrate independence in home program for support of progression       Start:  04/02/25    Expected End:  05/28/25               Pain       Patient will report pain of 5/10 at its worse demonstrating a reduction of overall pain       Start:  04/02/25    Expected End:  05/28/25               Range of Motion       Patient will achieve bilateral spinal side bending ROM within functional limits for maximal function and return to work        Start:  04/02/25    Expected End:   05/28/25            Patient will achieve bilateral spinal rotation ROM within functional limits for maximal function and return to work        Start:  04/02/25    Expected End:  05/28/25                Marlene Masterson, PT

## 2025-04-07 ENCOUNTER — PATIENT MESSAGE (OUTPATIENT)
Dept: PAIN MEDICINE | Facility: CLINIC | Age: 60
End: 2025-04-07
Payer: COMMERCIAL

## 2025-04-10 ENCOUNTER — PATIENT MESSAGE (OUTPATIENT)
Dept: CARDIOLOGY | Facility: CLINIC | Age: 60
End: 2025-04-10

## 2025-04-10 ENCOUNTER — LAB VISIT (OUTPATIENT)
Dept: LAB | Facility: HOSPITAL | Age: 60
End: 2025-04-10
Attending: INTERNAL MEDICINE
Payer: COMMERCIAL

## 2025-04-10 ENCOUNTER — OFFICE VISIT (OUTPATIENT)
Dept: FAMILY MEDICINE | Facility: CLINIC | Age: 60
End: 2025-04-10
Payer: COMMERCIAL

## 2025-04-10 ENCOUNTER — OFFICE VISIT (OUTPATIENT)
Dept: CARDIOLOGY | Facility: CLINIC | Age: 60
End: 2025-04-10
Payer: COMMERCIAL

## 2025-04-10 ENCOUNTER — PATIENT MESSAGE (OUTPATIENT)
Dept: REHABILITATION | Facility: HOSPITAL | Age: 60
End: 2025-04-10
Payer: COMMERCIAL

## 2025-04-10 ENCOUNTER — PATIENT MESSAGE (OUTPATIENT)
Dept: FAMILY MEDICINE | Facility: CLINIC | Age: 60
End: 2025-04-10

## 2025-04-10 VITALS
SYSTOLIC BLOOD PRESSURE: 108 MMHG | OXYGEN SATURATION: 97 % | DIASTOLIC BLOOD PRESSURE: 70 MMHG | TEMPERATURE: 96 F | HEIGHT: 77 IN | WEIGHT: 315 LBS | HEART RATE: 79 BPM | BODY MASS INDEX: 37.19 KG/M2

## 2025-04-10 VITALS
OXYGEN SATURATION: 95 % | SYSTOLIC BLOOD PRESSURE: 119 MMHG | DIASTOLIC BLOOD PRESSURE: 70 MMHG | HEART RATE: 84 BPM | BODY MASS INDEX: 43.08 KG/M2 | WEIGHT: 315 LBS

## 2025-04-10 DIAGNOSIS — E78.5 HYPERLIPIDEMIA, UNSPECIFIED HYPERLIPIDEMIA TYPE: Chronic | ICD-10-CM

## 2025-04-10 DIAGNOSIS — Z72.0 TOBACCO USE: ICD-10-CM

## 2025-04-10 DIAGNOSIS — R00.2 PALPITATIONS: ICD-10-CM

## 2025-04-10 DIAGNOSIS — E66.01 MORBID OBESITY WITH BMI OF 40.0-44.9, ADULT: ICD-10-CM

## 2025-04-10 DIAGNOSIS — I48.91 ATRIAL FIBRILLATION WITH RVR: Primary | ICD-10-CM

## 2025-04-10 DIAGNOSIS — I10 ESSENTIAL HYPERTENSION: ICD-10-CM

## 2025-04-10 DIAGNOSIS — G47.33 OSA ON CPAP: ICD-10-CM

## 2025-04-10 DIAGNOSIS — E78.49 OTHER HYPERLIPIDEMIA: Chronic | ICD-10-CM

## 2025-04-10 DIAGNOSIS — R06.02 SHORTNESS OF BREATH: ICD-10-CM

## 2025-04-10 DIAGNOSIS — R94.31 ABNORMAL ECG: ICD-10-CM

## 2025-04-10 DIAGNOSIS — I48.91 ATRIAL FIBRILLATION WITH RVR: ICD-10-CM

## 2025-04-10 DIAGNOSIS — I10 ESSENTIAL HYPERTENSION: Primary | ICD-10-CM

## 2025-04-10 DIAGNOSIS — E78.49 OTHER HYPERLIPIDEMIA: ICD-10-CM

## 2025-04-10 LAB
ABSOLUTE EOSINOPHIL (OHS): 0.15 K/UL
ABSOLUTE MONOCYTE (OHS): 1.1 K/UL (ref 0.3–1)
ABSOLUTE NEUTROPHIL COUNT (OHS): 4.77 K/UL (ref 1.8–7.7)
ALBUMIN SERPL BCP-MCNC: 3.9 G/DL (ref 3.5–5.2)
ALP SERPL-CCNC: 41 UNIT/L (ref 40–150)
ALT SERPL W/O P-5'-P-CCNC: 51 UNIT/L (ref 10–44)
ANION GAP (OHS): 7 MMOL/L (ref 8–16)
AST SERPL-CCNC: 31 UNIT/L (ref 11–45)
BASOPHILS # BLD AUTO: 0.05 K/UL
BASOPHILS NFR BLD AUTO: 0.6 %
BILIRUB SERPL-MCNC: 0.9 MG/DL (ref 0.1–1)
BNP SERPL-MCNC: 129 PG/ML (ref 0–99)
BUN SERPL-MCNC: 18 MG/DL (ref 6–20)
CALCIUM SERPL-MCNC: 9.2 MG/DL (ref 8.7–10.5)
CHLORIDE SERPL-SCNC: 101 MMOL/L (ref 95–110)
CO2 SERPL-SCNC: 29 MMOL/L (ref 23–29)
CREAT SERPL-MCNC: 0.6 MG/DL (ref 0.5–1.4)
ERYTHROCYTE [DISTWIDTH] IN BLOOD BY AUTOMATED COUNT: 13.9 % (ref 11.5–14.5)
GFR SERPLBLD CREATININE-BSD FMLA CKD-EPI: >60 ML/MIN/1.73/M2
GLUCOSE SERPL-MCNC: 96 MG/DL (ref 70–110)
HCT VFR BLD AUTO: 50 % (ref 40–54)
HGB BLD-MCNC: 15.8 GM/DL (ref 14–18)
IMM GRANULOCYTES # BLD AUTO: 0.08 K/UL (ref 0–0.04)
IMM GRANULOCYTES NFR BLD AUTO: 1 % (ref 0–0.5)
INR PPP: 1 (ref 0.8–1.2)
LYMPHOCYTES # BLD AUTO: 1.93 K/UL (ref 1–4.8)
MCH RBC QN AUTO: 30.6 PG (ref 27–31)
MCHC RBC AUTO-ENTMCNC: 31.6 G/DL (ref 32–36)
MCV RBC AUTO: 97 FL (ref 82–98)
NUCLEATED RBC (/100WBC) (OHS): 0 /100 WBC
OHS QRS DURATION: 106 MS
OHS QTC CALCULATION: 427 MS
PLATELET # BLD AUTO: 230 K/UL (ref 150–450)
PMV BLD AUTO: 10.5 FL (ref 9.2–12.9)
POTASSIUM SERPL-SCNC: 4.1 MMOL/L (ref 3.5–5.1)
PROT SERPL-MCNC: 7.4 GM/DL (ref 6–8.4)
PROTHROMBIN TIME: 11.8 SECONDS (ref 9–12.5)
RBC # BLD AUTO: 5.16 M/UL (ref 4.6–6.2)
RELATIVE EOSINOPHIL (OHS): 1.9 %
RELATIVE LYMPHOCYTE (OHS): 23.9 % (ref 18–48)
RELATIVE MONOCYTE (OHS): 13.6 % (ref 4–15)
RELATIVE NEUTROPHIL (OHS): 59 % (ref 38–73)
SODIUM SERPL-SCNC: 137 MMOL/L (ref 136–145)
TSH SERPL-ACNC: 1.73 UIU/ML (ref 0.4–4)
WBC # BLD AUTO: 8.08 K/UL (ref 3.9–12.7)

## 2025-04-10 PROCEDURE — 1160F RVW MEDS BY RX/DR IN RCRD: CPT | Mod: CPTII,S$GLB,, | Performed by: INTERNAL MEDICINE

## 2025-04-10 PROCEDURE — 3074F SYST BP LT 130 MM HG: CPT | Mod: CPTII,S$GLB,, | Performed by: INTERNAL MEDICINE

## 2025-04-10 PROCEDURE — 3078F DIAST BP <80 MM HG: CPT | Mod: CPTII,S$GLB,, | Performed by: INTERNAL MEDICINE

## 2025-04-10 PROCEDURE — 85610 PROTHROMBIN TIME: CPT

## 2025-04-10 PROCEDURE — 99999 PR PBB SHADOW E&M-EST. PATIENT-LVL IV: CPT | Mod: PBBFAC,,, | Performed by: INTERNAL MEDICINE

## 2025-04-10 PROCEDURE — 99205 OFFICE O/P NEW HI 60 MIN: CPT | Mod: S$GLB,,, | Performed by: INTERNAL MEDICINE

## 2025-04-10 PROCEDURE — 99999 PR PBB SHADOW E&M-EST. PATIENT-LVL IV: CPT | Mod: PBBFAC,,, | Performed by: REGISTERED NURSE

## 2025-04-10 PROCEDURE — 93010 ELECTROCARDIOGRAM REPORT: CPT | Mod: S$GLB,,, | Performed by: INTERNAL MEDICINE

## 2025-04-10 PROCEDURE — 36415 COLL VENOUS BLD VENIPUNCTURE: CPT | Mod: PN

## 2025-04-10 PROCEDURE — 80053 COMPREHEN METABOLIC PANEL: CPT

## 2025-04-10 PROCEDURE — 3008F BODY MASS INDEX DOCD: CPT | Mod: CPTII,S$GLB,, | Performed by: INTERNAL MEDICINE

## 2025-04-10 PROCEDURE — 84443 ASSAY THYROID STIM HORMONE: CPT

## 2025-04-10 PROCEDURE — 93005 ELECTROCARDIOGRAM TRACING: CPT | Mod: S$GLB,,, | Performed by: REGISTERED NURSE

## 2025-04-10 PROCEDURE — 1159F MED LIST DOCD IN RCRD: CPT | Mod: CPTII,S$GLB,, | Performed by: INTERNAL MEDICINE

## 2025-04-10 PROCEDURE — 83880 ASSAY OF NATRIURETIC PEPTIDE: CPT

## 2025-04-10 PROCEDURE — 85025 COMPLETE CBC W/AUTO DIFF WBC: CPT

## 2025-04-10 PROCEDURE — 4010F ACE/ARB THERAPY RXD/TAKEN: CPT | Mod: CPTII,S$GLB,, | Performed by: INTERNAL MEDICINE

## 2025-04-10 NOTE — H&P (VIEW-ONLY)
Subjective   Patient ID:  Reggie Sawant is a 59 y.o. male who presents for evaluation of Atrial Fibrillation      PT REFERRED BY OTIS NGO NP    HPI: Pt presents for a fib.  Has HTN, DEEPTHI, hyperlipidemia, obesity.  Cigar smoker.  Had recent hip/back injection recently, also on pregablin, then noted palpitations last few weeks.  Some associated dizziness, dyspnea.  Some difficulty focusing on vision.  No CP or syncope.  Saw PCP clinic today dx w a fib and sent to Cards.  No prior hx of a fib.  No h/o CVA, MI.  Ecg today 4/10/25 personally reviewed; a fib with mild RVR, nonspecific st-t abnl.  BP stable.  Uses CPAP.  Compliant w meds.        Past Medical History:   Diagnosis Date    Cellulitis     left leg    Food allergy     Allergic to shrimp    Hip arthritis     right    Hyperlipidemia     Hypertension     Hypogonadism male     Hypokalemia     Morbid obesity     Prediabetes 12/30/2020    Seasonal allergies     Sleep apnea     on CPAP           Current Outpatient Medications   Medication Instructions    amLODIPine (NORVASC) 10 mg, Oral, Daily    apixaban (ELIQUIS) 5 mg, Oral, 2 times daily    aspirin (ECOTRIN) 81 MG EC tablet 1 tablet, Daily    busPIRone (BUSPAR) 10 mg, Oral, 3 times daily    carvediloL (COREG) 25 mg, Oral, 2 times daily with meals    chlorthalidone (HYGROTEN) 50 mg, Oral, Daily    EScitalopram oxalate (LEXAPRO) 5 mg, Oral    multivitamin (THERAGRAN) per tablet 1 tablet, Daily    potassium chloride SA (K-DUR,KLOR-CON) 20 MEQ tablet 20 mEq, Oral, Daily    pravastatin (PRAVACHOL) 20 mg, Oral, Daily    spironolactone (ALDACTONE) 50 mg, Oral, Daily    tadalafiL (CIALIS) 20 mg, Oral, Daily    testosterone cypionate (DEPOTESTOTERONE CYPIONATE) 200 mg, Intramuscular, Every 7 days, ADMINISTER 1 ML IN THE MUSCLE EVERY 14 DAYS    valsartan (DIOVAN) 320 mg, Oral, Daily       Review of Systems   Constitutional: Negative.   HENT: Negative.     Eyes:  Positive for blurred vision.   Cardiovascular:  Positive  for dyspnea on exertion and palpitations.   Respiratory:  Positive for shortness of breath.    Endocrine: Negative.    Hematologic/Lymphatic: Negative.    Skin: Negative.    Musculoskeletal: Negative.    Gastrointestinal: Negative.    Genitourinary: Negative.    Neurological:  Positive for dizziness.   Psychiatric/Behavioral: Negative.     Allergic/Immunologic: Negative.        /70 (BP Location: Left arm, Patient Position: Sitting)   Pulse 84   Wt (!) 164.8 kg (363 lb 5.1 oz)   SpO2 95%   BMI 43.08 kg/m²     Wt Readings from Last 3 Encounters:   04/10/25 (!) 164.8 kg (363 lb 5.1 oz)   04/10/25 (!) 164.8 kg (363 lb 5.1 oz)   03/31/25 (!) 161.7 kg (356 lb 7.7 oz)     Temp Readings from Last 3 Encounters:   04/10/25 96.2 °F (35.7 °C) (Tympanic)   03/31/25 98 °F (36.7 °C) (Temporal)   05/17/24 97.8 °F (36.6 °C) (Tympanic)     BP Readings from Last 3 Encounters:   04/10/25 119/70   04/10/25 108/70   03/31/25 (!) 111/54     Pulse Readings from Last 3 Encounters:   04/10/25 84   04/10/25 79   03/31/25 (!) 114            Objective     Physical Exam  Vitals and nursing note reviewed.   Constitutional:       Appearance: He is well-developed and overweight.   HENT:      Head: Normocephalic.   Neck:      Thyroid: No thyromegaly.      Vascular: No carotid bruit or JVD.   Cardiovascular:      Rate and Rhythm: Normal rate. Rhythm irregularly irregular.      Pulses:           Radial pulses are 2+ on the right side and 2+ on the left side.      Heart sounds: S1 normal and S2 normal. Heart sounds not distant. No midsystolic click and no opening snap. No murmur heard.     No friction rub. No S3 or S4 sounds.   Pulmonary:      Effort: Pulmonary effort is normal.      Breath sounds: Normal breath sounds. No wheezing or rales.   Abdominal:      General: Bowel sounds are normal. There is no distension or abdominal bruit.      Palpations: Abdomen is soft.      Tenderness: There is no abdominal tenderness.   Musculoskeletal:       "Cervical back: Neck supple.   Skin:     General: Skin is warm.   Neurological:      Mental Status: He is alert and oriented to person, place, and time.   Psychiatric:         Behavior: Behavior normal.       I have reviewed all pertinent labs and cardiac studies.          Chemistry        Component Value Date/Time     05/17/2024 0830    K 4.2 05/17/2024 0830    CL 99 05/17/2024 0830    CO2 29 05/17/2024 0830    BUN 20 05/17/2024 0830    CREATININE 1.2 05/17/2024 0830    GLU 82 05/17/2024 0830        Component Value Date/Time    CALCIUM 10.0 05/17/2024 0830    ALKPHOS 45 02/10/2025 0954    AST 23 02/10/2025 0954    ALT 38 02/10/2025 0954    BILITOT 1.0 02/10/2025 0954    ESTGFRAFRICA >60.0 07/20/2021 0922    EGFRNONAA >60.0 07/20/2021 0922          Lab Results   Component Value Date    WBC 6.40 05/17/2024    HGB 16.9 02/10/2025    HCT 51.7 05/17/2024    MCV 99 (H) 05/17/2024     05/17/2024       Lab Results   Component Value Date    TSH 2.090 05/17/2024     Lab Results   Component Value Date    CHOL 189 05/17/2024    CHOL 232 (H) 08/15/2023    CHOL 207 (H) 08/23/2022      Lab Results   Component Value Date    HDL 41 05/17/2024    HDL 43 08/15/2023    HDL 43 08/23/2022      No results found for: "LDL"   Lab Results   Component Value Date    TRIG 94 05/17/2024    TRIG 120 08/15/2023    TRIG 89 08/23/2022      No results found for: "DLDL"       Lab Results   Component Value Date    CHOLHDL 21.7 05/17/2024    CHOLHDL 18.5 (L) 08/15/2023    CHOLHDL 20.8 08/23/2022      Lab Results   Component Value Date    HGBA1C 5.7 (H) 05/17/2024            Assessment and Plan     1. Atrial fibrillation with RVR    2. Morbid obesity with BMI of 40.0-44.9, adult    3. DEEPTHI on CPAP    4. Essential hypertension    5. Other hyperlipidemia    6. Tobacco use    7. Abnormal ECG    8. Shortness of breath        Plan:      Symptomatic atrial fibrillation.  Unclear CHADS-vasc score, at least 1.  Discussed a fib mgt in detail with pt " to include risk for stroke, CHF.  Add DOAC.  Indications for Eliquis, risks/benefits, and need for monitoring were discussed w pt.  Continue Coreg bid.  Echocardiogram.  Discussed PAULETTE guided cardioversion to restore sinus rhythm asap.  Indications, risks/benefits discussed.  Pt agreeable to proceeding.  Check labs: CBC, CMP, TSH, BNP.  CPAP.  HTN control.  Weight loss.  Cardiac diet.  Refrain from cigar smoking.    To ER for any worsening a fib sxs in meantime.    PAULETTE cardioversion Wed 4/16/25 0730.    F/u after procedure.        I have reviewed all pertinent labs and cardiac studies independently. Plans and recommendations have been formulated under my direct supervision. All questions answered and patient voiced understanding.     HIGH COMPLEXITY VISIT TODAY ADDRESSING NEW ONSET ATRIAL FIBRILLATION MANAGEMENT, FORMULATING TX PLAN TO INCLUDE INVASIVE PAULETTE CARDIOVERSION PROCEDURE, ADDRESSING ANTICOAGULATION NEEDS/MONITORING.

## 2025-04-10 NOTE — PROGRESS NOTES
"Reggie Sawant  MRN:  [id number]  59 y.o. male      Patient Care Team:  Irene Sanchez MD as PCP - General (Family Medicine)  Cinda Gill PharmD as Hypertension Digital Medicine Clinician (Pharmacist)  Irene Sanchez MD as Hypertension Digital Medicine Responsible Provider (Family Medicine)  Company[employer] as Hypertension Digital Medicine Contract      SUBJECTIVE:     CHIEF COMPLAINT:  - Palpitations    HPI:  Mr. Sawant reports irregular heartbeat and palpitations over the past 2 weeks. He does get short-winded at times, having to rest more. He is on all HTN medication as ordered but has been running "on the lower end". Pulse highest ~ 103 at home when checking bp. Reports minimal caffeine intake, does smoke. Denies dehydration, dizziness or headaches. Denies chest pain or extremity swelling. He has been taking aspirin for an extended period.      Review of Systems   Constitutional: Negative.    Respiratory: Negative.     Cardiovascular:  Positive for palpitations. Negative for chest pain, orthopnea, claudication, leg swelling and PND.   Gastrointestinal: Negative.  Negative for blood in stool, constipation, diarrhea and vomiting.   Genitourinary: Negative.  Negative for dysuria, hematuria and urgency.   Musculoskeletal: Negative.    Neurological: Negative.  Negative for dizziness, tingling, tremors, loss of consciousness, weakness and headaches.       Review of patient's allergies indicates:   Allergen Reactions    Shrimp Anaphylaxis         Patient Active Problem List   Diagnosis    Essential hypertension    Hyperlipidemia    Hypogonadism male    DEEPTHI on CPAP    ED (erectile dysfunction)    DJD (degenerative joint disease) of hip    Morbid obesity with BMI of 40.0-44.9, adult    Insomnia    Tobacco use    Decreased range of motion of hip    Decreased range of motion of intervertebral discs of lumbar spine           Current Outpatient Medications   Medication Instructions    amLODIPine (NORVASC) 10 " "mg, Oral, Daily    aspirin (ECOTRIN) 81 MG EC tablet 1 tablet, Daily    busPIRone (BUSPAR) 10 mg, Oral, 3 times daily    carvediloL (COREG) 25 mg, Oral, 2 times daily with meals    chlorthalidone (HYGROTEN) 50 mg, Oral, Daily    EScitalopram oxalate (LEXAPRO) 5 mg, Oral    multivitamin (THERAGRAN) per tablet 1 tablet, Daily    potassium chloride SA (K-DUR,KLOR-CON) 20 MEQ tablet 20 mEq, Oral, Daily    pravastatin (PRAVACHOL) 20 mg, Oral, Daily    spironolactone (ALDACTONE) 50 mg, Oral, Daily    tadalafiL (CIALIS) 20 mg, Oral, Daily    testosterone cypionate (DEPOTESTOTERONE CYPIONATE) 200 mg, Intramuscular, Every 7 days, ADMINISTER 1 ML IN THE MUSCLE EVERY 14 DAYS    valsartan (DIOVAN) 320 mg, Oral, Daily         Past medical, surgical, family and social histories have been reviewed today.      OBJECTIVE:     Vitals:    04/10/25 1037   BP: 108/70   BP Location: Right arm   Patient Position: Sitting   Pulse: 79   Temp: 96.2 °F (35.7 °C)   TempSrc: Tympanic   SpO2: 97%   Weight: (!) 164.8 kg (363 lb 5.1 oz)   Height: 6' 5" (1.956 m)     BP Readings from Last 6 Encounters:   04/10/25 108/70   03/31/25 (!) 111/54 ----- had L-PAUL per Pain Mgmt   03/25/25 106/66   03/18/25 (!) 164/94 ---- at appt with Pain Mgmt, Lyrica added to current med list which may have helped pain level (see bp 3/25/25)   08/14/24 (!) 153/71   05/17/24 122/78         Physical Exam  Vitals reviewed.   Constitutional:       General: He is not in acute distress.  HENT:      Head: Normocephalic and atraumatic.   Cardiovascular:      Rate and Rhythm: Tachycardia present. Rhythm irregularly irregular.   Pulmonary:      Effort: Pulmonary effort is normal.      Breath sounds: Normal breath sounds.   Musculoskeletal:      Right lower leg: No edema.      Left lower leg: No edema.   Neurological:      Mental Status: He is alert and oriented to person, place, and time.      Sensory: No sensory deficit.      Motor: No weakness.      Coordination: Coordination " normal.      Gait: Gait normal.   Psychiatric:         Mood and Affect: Mood normal.         Behavior: Behavior normal.         Thought Content: Thought content normal.         Judgment: Judgment normal.         12-LEAD EKG IN OFFICE TODAY:  ATRIAL FIB WITH RVR  RATE  BPM      ASSESSMENT:     1. Atrial fibrillation with RVR ---- new problem identified, to Card ASAP for urgent evaluation.  To ER if not able to see Card today.  Is on daily ASA at this time.  -     Ambulatory referral/consult to Cardiology; Future; Expected date: 04/17/2025    2. Essential hypertension ----- chronic issue but has been running on lower end.  Possibly due to atrial-fib and/or excessive HTN medication.  To Card.    3. Palpitations ---- see # 1  -     IN OFFICE EKG 12-LEAD (to Muse)      PLAN:     As above.  RTC as directed and/or prn.        SURY Lee  [facility]

## 2025-04-11 ENCOUNTER — HOSPITAL ENCOUNTER (OUTPATIENT)
Dept: CARDIOLOGY | Facility: HOSPITAL | Age: 60
Discharge: HOME | End: 2025-04-11
Attending: INTERNAL MEDICINE
Payer: COMMERCIAL

## 2025-04-11 ENCOUNTER — TELEPHONE (OUTPATIENT)
Dept: CARDIOLOGY | Facility: CLINIC | Age: 60
End: 2025-04-11
Payer: COMMERCIAL

## 2025-04-11 VITALS
DIASTOLIC BLOOD PRESSURE: 70 MMHG | SYSTOLIC BLOOD PRESSURE: 119 MMHG | WEIGHT: 315 LBS | BODY MASS INDEX: 37.19 KG/M2 | HEIGHT: 77 IN

## 2025-04-11 DIAGNOSIS — I48.91 ATRIAL FIBRILLATION WITH RVR: ICD-10-CM

## 2025-04-11 LAB
AORTIC ROOT ANNULUS: 3.53 CM
AV INDEX (PROSTH): 0.88
AV MEAN GRADIENT: 2 MMHG
AV PEAK GRADIENT: 3 MMHG
AV VALVE AREA BY VELOCITY RATIO: 3.1 CM²
AV VALVE AREA: 2.8 CM²
AV VELOCITY RATIO: 1
BSA FOR ECHO PROCEDURE: 2.99 M2
CV ECHO LV RWT: 0.58 CM
DOP CALC AO PEAK VEL: 0.9 M/S
DOP CALC AO VTI: 15.1 CM
DOP CALC LVOT AREA: 3.1 CM2
DOP CALC LVOT DIAMETER: 2 CM
DOP CALC LVOT PEAK VEL: 0.9 M/S
DOP CALC LVOT STROKE VOLUME: 41.8 CM3
DOP CALCLVOT PEAK VEL VTI: 13.3 CM
E WAVE DECELERATION TIME: 187 MSEC
E/A RATIO: 0.95
ECHO LV POSTERIOR WALL: 1.5 CM (ref 0.6–1.1)
FRACTIONAL SHORTENING: 28.8 % (ref 28–44)
INTERVENTRICULAR SEPTUM: 1.3 CM (ref 0.6–1.1)
IVC DIAMETER: 2.02 CM
IVRT: 99 MSEC
LA MAJOR: 6.3 CM
LA MINOR: 6.6 CM
LA WIDTH: 3.8 CM
LEFT ATRIUM AREA SYSTOLIC (APICAL 2 CHAMBER): 35.28 CM2
LEFT ATRIUM AREA SYSTOLIC (APICAL 4 CHAMBER): 26.43 CM2
LEFT ATRIUM SIZE: 4.8 CM
LEFT ATRIUM VOLUME INDEX MOD: 38 ML/M2
LEFT ATRIUM VOLUME INDEX: 35 ML/M2
LEFT ATRIUM VOLUME MOD: 108 ML
LEFT ATRIUM VOLUME: 100 CM3
LEFT INTERNAL DIMENSION IN SYSTOLE: 3.7 CM (ref 2.1–4)
LEFT VENTRICLE DIASTOLIC VOLUME INDEX: 44.79 ML/M2
LEFT VENTRICLE DIASTOLIC VOLUME: 129 ML
LEFT VENTRICLE END SYSTOLIC VOLUME APICAL 2 CHAMBER: 152.1 ML
LEFT VENTRICLE END SYSTOLIC VOLUME APICAL 4 CHAMBER: 76.6 ML
LEFT VENTRICLE MASS INDEX: 107.5 G/M2
LEFT VENTRICLE SYSTOLIC VOLUME INDEX: 19.4 ML/M2
LEFT VENTRICLE SYSTOLIC VOLUME: 56 ML
LEFT VENTRICULAR INTERNAL DIMENSION IN DIASTOLE: 5.2 CM (ref 3.5–6)
LEFT VENTRICULAR MASS: 309.6 G
LV SEPTAL E/E' RATIO: 11.3 M/S
LVED V (TEICH): 128.62 ML
LVES V (TEICH): 56.23 ML
LVOT MG: 1.73 MMHG
LVOT MV: 0.61 CM/S
MV PEAK A VEL: 0.95 M/S
MV PEAK E VEL: 0.9 M/S
OHS CV RV/LV RATIO: 0.73 CM
PV MV: 0.71 M/S
PV PEAK GRADIENT: 3 MMHG
PV PEAK VELOCITY: 0.83 M/S
RA MAJOR: 6.15 CM
RA PRESSURE ESTIMATED: 8 MMHG
RA WIDTH: 4.19 CM
RIGHT VENTRICLE DIASTOLIC BASEL DIMENSION: 3.8 CM
RIGHT VENTRICULAR END-DIASTOLIC DIMENSION: 3.76 CM
SINUS: 2.64 CM
STJ: 2.58 CM
TDI SEPTAL: 0.08 M/S
Z-SCORE OF LEFT VENTRICULAR DIMENSION IN END DIASTOLE: -23.01
Z-SCORE OF LEFT VENTRICULAR DIMENSION IN END SYSTOLE: -16.37

## 2025-04-11 PROCEDURE — 93306 TTE W/DOPPLER COMPLETE: CPT | Mod: 26,,, | Performed by: INTERNAL MEDICINE

## 2025-04-11 PROCEDURE — 93306 TTE W/DOPPLER COMPLETE: CPT

## 2025-04-11 NOTE — TELEPHONE ENCOUNTER
No short term disability necessarily needed from Cards standpoint.  A fib outpatient procedure next week to get rhythm back to normal.    Can check w PCP if needed to get any papers filled out.    Dr Navarro        Message  Received: Today  Lynda Perez LPN sent to Grady Navarro MD  Please advise    PT has a medical certification for short term disability injury or illness form that needs to be filled out    Physical work restrictions and limit. May or may not return to work with temporary  or permanent restrictions.      Thank you  Ruba Perez LPN

## 2025-04-14 NOTE — PROGRESS NOTES
Interventional Pain Note    Referring Physician: No ref. provider found    PCP: Irene Sanchez MD    Chief Complaint:  Lower back and left hip       SUBJECTIVE:  Interval History (4/17/2025):  Patient Reggie Sawant presents today for follow-up visit.  Patient was last seen on 3/31/2025 left L3/4 + L4/5 TF PAUL 100% relief x 1 week.   He feels that the lower back injection did provide more relief than the 1 that he had in the left hip back in January.  He rates his pain today a 5/10.  Pain is worse with prolonged standing and is primarily located in the left hip and into the left groin.  He did stop the pregabalin because he ended up having episode of atrial fibrillation and had to be cardioverted.  He is not sure if it is related to the medication but he stopped it to be on the safe side.  Patient denies night fever/night sweats, urinary incontinence, bowel incontinence, significant weight loss and significant motor weakness.   Patient denies any other complaints or concerns at this time.      3/18/2025  Reggie Sawant is a 59 y.o. male who presents to the clinic for the evaluation of lower back and left hip pain.   Patient reports 8 week history of lower back and left hip pain.  Patient denies any previous surgeries in his lumbar spine or bilateral lower extremities.  Patient denies any inciting traumas to his lower back pain.  Patient does report previous lower back sciatica pain approximately 10 years ago.  Primarily pain is a burning aching pain that starts in the lateral aspect of the left hip.  Patient reports associated aching burning pain in the left back in the left anterior hip area.  Patient denies any significant radiation below his left knee.  Patient denies any numbness or tingling in his left foot.  Patient denies any significant right-sided pain.  Pain is worse with waking up in the morning and lying in his left side, better with anti-inflammatories and rest.  Pain is currently rated a 5/10, but  can increase to an 8/10 with exacerbating activity.  Patient received left intra-articular hip injection on 01/03/2025 with a proximally 3 days relief  Patient denies any fevers, chills, saddle anesthesia, or bowel and bladder incontinence.      Non-Pharmacologic Treatments:  Physical Therapy/Home Exercise: yes  Ice/Heat:yes  TENS: no  Acupuncture: no  Massage: yes  Chiropractic: yes        Previous Pain Medications:  Tylenol, ibuprofen, naproxen, Flexeril, gabapentin, topicals     report:  Reviewed and consistent with medication use as prescribed.    Pain Procedures:   - 01/30/2025:  Left hip intra-articular injection, 3 days of relief  3/31/2025 left L3/4 + L4/5 TF PAUL 100% relief x 1 week    Pain Disability Index Review:         4/17/2025     8:18 AM 3/18/2025     8:13 AM   Last 3 PDI Scores   Pain Disability Index (PDI) 35 35       Imaging:     Results for orders placed during the hospital encounter of 03/06/25    MRI Lumbar Spine Without Contrast    Narrative  EXAMINATION:  MRI LUMBAR SPINE WITHOUT CONTRAST    CLINICAL HISTORY:  Dorsalgia, unspecifiedLow back pain, symptoms persist with > 6wks conservative treatment;    TECHNIQUE:  Standard multiplanar noncontrast MRI sequences of the lumbar spine.    COMPARISON:  X-ray 03/06/2025    FINDINGS:  The distal cord and conus reveal normal signal and morphology.    The lumbar vertebra reveal normal alignment, shape and signal intensity.    T12-L1: Unremarkable.    L1-2:     Unremarkable.    L2-3:     Mild generalized annular disc bulge.    L3-4:     Mild disc desiccation with mild generalized disc bulge with ventral sac impingement.  Mild to moderate hypertrophic facet arthrosis with hypertrophic ligamentum flavum.  Dorsal sac impingement.  Overall moderate central canal stenosis with mild right and mild-to-moderate left foraminal stenosis.    L4-5:     Mild disc degeneration with mild broad-based disc bulge.  Left foraminal annular fissure.  Moderate hypertrophic  facet arthrosis with dorsal sac impingement contributing to moderate central canal stenosis.  Mild left lateral recess stenosis with moderate left and mild right foraminal stenosis.    L5-S1:    Mild disc degeneration with disc desiccation and disc bulge.  Mild to moderate right and mild left facet arthrosis.  Mild foraminal stenosis.    Impression  Multilevel lumbar degenerative disc disease notably at L3-4 and L4-5 with central and foraminal stenosis as detailed above.      Electronically signed by: Demetrius Macario MD  Date:    03/06/2025  Time:    09:36        Results for orders placed during the hospital encounter of 03/06/25    X-Ray Lumbar Complete Including Flex And Ext    Narrative  EXAMINATION:  XR LUMBAR SPINE 5 VIEW WITH FLEX AND EXT    CLINICAL HISTORY:  Dorsalgia, unspecified    COMPARISON:  None    FINDINGS:  3 views of the lumbar spine.    Moderate facet arthropathy lower lumbar spine.    Overall alignment is well maintained.  No evidence of spondylolysis or spondylolisthesis. Disc space narrowing L5/S1.  Suspect neural foraminal narrowing L5/S1.  Vertebral body heights are normal.    Moderate constipation.    Impression  See above.      Electronically signed by: Vinnie Gauthier MD  Date:    03/06/2025  Time:    08:20       MRI HIP WITHOUT CONTRAST LEFT 02/18/2025     CLINICAL HISTORY: Left hip pain. Hip pain, chronic, osteoarthritis suspected; [M25.552]-Pain in left hip.     TECHNIQUE: Standard multiplanar, multisequence MR imaging protocol of the left hip performed.     FINDINGS:     BONES: No fracture, marrow edema or suspicious bone lesion. No evidence of avascular necrosis.     LEFT HIP JOINT: Anatomic alignment. Physiologic amount of joint fluid. Low-grade chondral thinning and small partial chondral erosions in the anterior superior joint space.  Otherwise preserved cartilage.     LABRUM: Detached nondisplaced tear of the anterior labrum (images 18-21 series 7).  Os acetabuli of the anterior  superior labrum and osseous metaplasia of the remaining superior labrum without tear identified.     FEMOROACETABULAR IMPINGEMENT ANATOMY: Pain in the anterior femoral head neck junction.     MUSCLES, TENDONS AND REMAINING MAJOR SUPPORTING SOFT TISSUE STRUCTURES: Mild bilateral abductor tendinosis and trochanteric bursitis.  Otherwise unremarkable.        Impression:     1.   Low-grade chondral degeneration left hip.  Detached anterior labral tear.  Os acetabuli and osseous metaplasia of the superior labrum. CAM lesion.  2.  Mild bilateral abductor tendinosis and trochanteric bursitis.      Past Medical History:   Diagnosis Date    Cellulitis     left leg    Food allergy     Allergic to shrimp    Hip arthritis     right    Hyperlipidemia     Hypertension     Hypogonadism male     Hypokalemia     Morbid obesity     Prediabetes 12/30/2020    Seasonal allergies     Sleep apnea     on CPAP     Past Surgical History:   Procedure Laterality Date    COLONOSCOPY N/A 12/06/2017    Procedure: COLONOSCOPY;  Surgeon: Rory Barone MD;  Location: [facility] ENDO;  Service: Endoscopy;  Laterality: N/A;    COLONOSCOPY N/A 4/27/2023    Procedure: COLONOSCOPY;  Surgeon: Jeimy Garcia MD;  Location: [facility] ENDO;  Service: Endoscopy;  Laterality: N/A;    right bunionectomy      TRANSFORAMINAL EPIDURAL INJECTION OF STEROID Left 3/31/2025    Procedure: left L3/4 + L4/5  TF PAUL- continue ASA;  Surgeon: Narayan Do MD;  Location: [facility] PAIN MGT;  Service: Pain Management;  Laterality: Left;     Social History[1]  Family History   Problem Relation Name Age of Onset    No Known Problems Mother Yael     Hypertension Father Amanuel     Heart disease Father Amanuel         CAD    Diabetes Sister Fawn     No Known Problems Sister Sherri     Hypertension Brother Amanuel Jr     Diabetes Maternal Grandmother Lissa     No Known Problems Daughter      No Known Problems Daughter      No Known Problems Daughter      No Known Problems  Other grandson     Cancer Neg Hx      Stroke Neg Hx      Thyroid disease Neg Hx      Thyroid cancer Neg Hx          MEN2       Review of patient's allergies indicates:   Allergen Reactions    Shrimp Anaphylaxis       Current Outpatient Medications   Medication Sig    amLODIPine (NORVASC) 10 MG tablet Take 1 tablet (10 mg total) by mouth once daily.    apixaban (ELIQUIS) 5 mg Tab Take 1 tablet (5 mg total) by mouth 2 (two) times daily.    aspirin (ECOTRIN) 81 MG EC tablet Take 1 tablet by mouth Daily.    busPIRone (BUSPAR) 10 MG tablet TAKE 1 TABLET 3 TIMES A DAY    carvediloL (COREG) 25 MG tablet Take 1 tablet (25 mg total) by mouth 2 (two) times daily with meals.    chlorthalidone (HYGROTEN) 50 MG Tab Take 1 tablet (50 mg total) by mouth once daily.    EScitalopram oxalate (LEXAPRO) 5 MG Tab TAKE 1 TABLET ONCE DAILY    multivitamin (THERAGRAN) per tablet Take 1 tablet by mouth once daily.    potassium chloride SA (K-DUR,KLOR-CON) 20 MEQ tablet Take 1 tablet (20 mEq total) by mouth once daily.    pravastatin (PRAVACHOL) 20 MG tablet Take 1 tablet (20 mg total) by mouth once daily.    spironolactone (ALDACTONE) 50 MG tablet Take 1 tablet (50 mg total) by mouth once daily.    tadalafiL (CIALIS) 20 MG Tab Take 1 tablet (20 mg total) by mouth once daily.    testosterone cypionate (DEPOTESTOTERONE CYPIONATE) 200 mg/mL injection Inject 1 mL (200 mg total) into the muscle every 7 days. ADMINISTER 1 ML IN THE MUSCLE EVERY 14 DAYS    valsartan (DIOVAN) 320 MG tablet Take 1 tablet (320 mg total) by mouth once daily.     No current facility-administered medications for this visit.         ROS  Review of Systems   Constitutional:  Negative for activity change, appetite change and fever.   HENT:  Negative for facial swelling, rhinorrhea and sore throat.    Respiratory:  Negative for cough, chest tightness, shortness of breath, wheezing and stridor.    Cardiovascular:  Negative for chest pain, palpitations and leg swelling.  "  Gastrointestinal:  Negative for abdominal pain, blood in stool, constipation, diarrhea, nausea and vomiting.   Endocrine: Negative for polydipsia, polyphagia and polyuria.   Genitourinary:  Negative for dysuria, hematuria and urgency.   Musculoskeletal:  Positive for arthralgias, back pain, gait problem and myalgias. Negative for joint swelling, neck pain and neck stiffness.   Skin:  Negative for rash.   Allergic/Immunologic: Negative for food allergies.   Neurological:  Positive for weakness. Negative for dizziness, tremors, seizures, syncope, facial asymmetry, speech difficulty, light-headedness, numbness and headaches.   Psychiatric/Behavioral:  Negative for agitation, hallucinations, self-injury and suicidal ideas. The patient is not nervous/anxious and is not hyperactive.             OBJECTIVE:  /76   Pulse 62   Resp 17   Ht 6' 5" (1.956 m)   Wt (!) 162.7 kg (358 lb 11 oz)   BMI 42.53 kg/m²         Physical Exam  Constitutional:       General: He is not in acute distress.     Appearance: Normal appearance. He is not ill-appearing.   HENT:      Head: Normocephalic and atraumatic.      Nose: No congestion or rhinorrhea.   Eyes:      Extraocular Movements: Extraocular movements intact.      Pupils: Pupils are equal, round, and reactive to light.   Cardiovascular:      Pulses: Normal pulses.   Pulmonary:      Effort: Pulmonary effort is normal.   Abdominal:      General: Abdomen is flat.   Musculoskeletal:      Right hip: Normal.      Left hip: Tenderness and bony tenderness present. No deformity or crepitus. Normal range of motion. Decreased strength.   Skin:     General: Skin is warm and dry.      Capillary Refill: Capillary refill takes less than 2 seconds.   Neurological:      General: No focal deficit present.      Mental Status: He is alert and oriented to person, place, and time.      Sensory: No sensory deficit.      Motor: Weakness present. No abnormal muscle tone.      Gait: Gait abnormal.    "   Deep Tendon Reflexes:      Reflex Scores:       Patellar reflexes are 2+ on the right side and 2+ on the left side.       Achilles reflexes are 2+ on the right side and 2+ on the left side.     Comments: Antalgic gait  4/5 strength in left knee extension and left hip adduction   Psychiatric:         Mood and Affect: Mood normal.         Behavior: Behavior normal.         Thought Content: Thought content normal.           Musculoskeletal:      Lumbar Exam  Incision: no  Pain with Flexion: yes  Pain with Extension: yes  ROM:  Decreased  Paraspinous TTP:  Positive on left  Facet TTP:  L4-5  Facet Loading:  Positive on the left  SLR:  Positive on left at 75° in L3 distribution  SIJ TTP:  minimal on left  KAYLA:  negative  Gaenslen's- negative    LABS:  Lab Results   Component Value Date    WBC 8.08 04/10/2025    HGB 15.8 04/10/2025    HCT 50.0 04/10/2025    MCV 97 04/10/2025     04/10/2025       CMP  Sodium   Date Value Ref Range Status   04/10/2025 137 136 - 145 mmol/L Final   05/17/2024 137 136 - 145 mmol/L Final     Potassium   Date Value Ref Range Status   04/10/2025 4.1 3.5 - 5.1 mmol/L Final   05/17/2024 4.2 3.5 - 5.1 mmol/L Final     Chloride   Date Value Ref Range Status   04/10/2025 101 95 - 110 mmol/L Final   05/17/2024 99 95 - 110 mmol/L Final     CO2   Date Value Ref Range Status   04/10/2025 29 23 - 29 mmol/L Final   05/17/2024 29 23 - 29 mmol/L Final     Glucose   Date Value Ref Range Status   05/17/2024 82 70 - 110 mg/dL Final     BUN   Date Value Ref Range Status   04/10/2025 18 6 - 20 mg/dL Final     Creatinine   Date Value Ref Range Status   04/10/2025 0.6 0.5 - 1.4 mg/dL Final     Calcium   Date Value Ref Range Status   04/10/2025 9.2 8.7 - 10.5 mg/dL Final   05/17/2024 10.0 8.7 - 10.5 mg/dL Final     Total Protein   Date Value Ref Range Status   02/10/2025 7.8 6.0 - 8.4 g/dL Final     Albumin   Date Value Ref Range Status   04/10/2025 3.9 3.5 - 5.2 g/dL Final   02/10/2025 4.0 3.5 - 5.2 g/dL  Final     Total Bilirubin   Date Value Ref Range Status   02/10/2025 1.0 0.1 - 1.0 mg/dL Final     Comment:     For infants and newborns, interpretation of results should be based  on gestational age, weight and in agreement with clinical  observations.    Premature Infant recommended reference ranges:  Up to 24 hours.............<8.0 mg/dL  Up to 48 hours............<12.0 mg/dL  3-5 days..................<15.0 mg/dL  6-29 days.................<15.0 mg/dL       Bilirubin Total   Date Value Ref Range Status   04/10/2025 0.9 0.1 - 1.0 mg/dL Final     Comment:     For infants and newborns, interpretation of results should be based   on gestational age, weight and in agreement with clinical   observations.    Premature Infant recommended reference ranges:   0-24 hours:  <8.0 mg/dL   24-48 hours: <12.0 mg/dL   3-5 days:    <15.0 mg/dL   6-29 days:   <15.0 mg/dL     Alkaline Phosphatase   Date Value Ref Range Status   02/10/2025 45 40 - 150 U/L Final     ALP   Date Value Ref Range Status   04/10/2025 41 40 - 150 unit/L Final     AST   Date Value Ref Range Status   04/10/2025 31 11 - 45 unit/L Final   02/10/2025 23 10 - 40 U/L Final     ALT   Date Value Ref Range Status   04/10/2025 51 (H) 10 - 44 unit/L Final   02/10/2025 38 10 - 44 U/L Final     Anion Gap   Date Value Ref Range Status   04/10/2025 7 (L) 8 - 16 mmol/L Final     eGFR if    Date Value Ref Range Status   07/20/2021 >60.0 >60 mL/min/1.73 m^2 Final     eGFR if non    Date Value Ref Range Status   07/20/2021 >60.0 >60 mL/min/1.73 m^2 Final     Comment:     Calculation used to obtain the estimated glomerular filtration  rate (eGFR) is the CKD-EPI equation.          Lab Results   Component Value Date    HGBA1C 5.7 (H) 05/17/2024             ASSESSMENT:       59 y.o. year old male with lower back pain pain, consistent with     1. Lumbar radiculopathy  Case Request-RAD/Other Procedure Area: Lumbar L4/5 IL PAUL with left paramedian  approach- hold  eliquis 3 days      2. Pain of left hip            Lumbar radiculopathy  -     Case Request-RAD/Other Procedure Area: Lumbar L4/5 IL PAUL with left paramedian approach- hold  eliquis 3 days    Pain of left hip               PLAN:   - Interventions:   - Discussed with Dr. Do. Will change approach for PAUL and schedule L4/5 IL PAUL with left paramedian approach  Explained the risks and benefits of the procedure in detail with the patient today in clinic along with alternative treatment options, and the patient elected to pursue the intervention.      - Can not rule overlapping sacroiliac joint dysfunction versus underlying hip pathology with left labral tear in objective tendinosis, an MRI    - 01/30/2025:  Left hip intra-articular injection, 3 days of relief    - Anticoagulation use:   Yes aspirin and eliquis 5mg    - Medications:  - Pt stopped pregabalin- episode of Afib  - patient has trialed multiple anti-inflammatories with minimal relief    - Therapy:   - patient has completed 6 weeks of physician lead home physical therapy in the last 3 months with mild relief.  Continue home exercises  - refer patient to formal physical therapy for left lumbar radiculopathy  - discussed with patient using traction table he has not home and safety precautions  - patient is currently on leave from his work due to this pain.  He will forward documentation to our department    - Imaging/Diagnostic:  - MRI of the lumbar spine without contrast reviewed and findings discussed with patient.  There is disc desiccation with left eccentric disc bulge at L3-L4 L4-5.  This leads to moderate canal stenosis and left-greater-than-right right foraminal stenosis at L3-L4 and moderate canal stenosis at L4-5 with left lateral recess stenosis and left foraminal stenosis.  - MRI left hip reviewed.  That has attach anterior labral tear with bilateral abductor tendinosis and trochanteric bursitis    - Consults:   - continue  follow up with Orthopedics for left hip pain        - Patient Questions: Answered all of the patient's questions regarding diagnosis, therapy, and treatment     This condition does not require this patient to take time off of work, and the primary goal of our Pain Management services is to improve the patient's functional capacity.     - Follow up visit: return to clinic 4 weeks after procedure      - Susana Wilkins NP    The above plan and management options were discussed at length with patient. Patient is in agreement with the above and verbalized understanding.    I discussed the goals of interventional chronic pain management with the patient on today's visit.  I explained the utility of injections for diagnostic and therapeutic purposes.  We discussed a multimodal approach to pain including treating the patient's given worst pain at any given time.  We will use a systematic approach to addressing pain.  We will also adopt a multimodal approach that includes injections, adjuvant medications, physical therapy, at times psychiatry.  There may be a limited role for opioid use intermittently in the treatment of pain, more particularly for acute pain although no one approach can be used as a sole treatment modality.    I emphasized the importance of regular exercise, core strengthening and stretching, diet and weight loss as a cornerstone of long-term pain management.      Susana Wilkins NP  Interventional Pain Management  [facility]    Future Appointments   Date Time Provider Department Center   4/17/2025 11:15 AM Marlene Masterson PT [facility] OP [facility]   4/22/2025 11:15 AM Marlene Masterson PT [facility]   4/22/2025  4:00 PM Narayan Do MD [facility] PAIN [facility]   4/23/2025  1:10 PM EKG, [facility] SPECCPR [facility]   4/23/2025  1:30 PM Pk Regalado PA-C [facility] CARDIO [facility]   4/25/2025  9:15 AM Marlene Masterson PT [facility]   4/29/2025 11:15 AM Marlene Masterson,  PT [facility]   5/2/2025  9:15 AM ZelalemMarlene, PT [facility]   5/6/2025 11:15 AM ZelalemDanielie, PT [facility]   5/9/2025  9:15 AM Zelalem Marlene, PT [facility]   5/13/2025 11:15 AM Marlene Masterson, PT [facility]   5/16/2025  9:15 AM Marlene Masterson, PT [facility]   6/12/2025  8:40 AM Irene Sanchez MD [facility]   9/22/2025  9:30 AM LABORATORY, [facility] LAB [facility]   9/26/2025 10:15 AM Alvin Moe MD [facility] UROLOGY [facility]           Disclaimer:  This note was prepared using voice recognition system and is likely to have sound alike errors that may have been overlooked even after proof reading.  Please call me with any questions      No LOS data to display  This includes face to face time and non-face to face time preparing to see the patient (eg, review of tests), obtaining and/or reviewing separately obtained history, documenting clinical information in the electronic or other health record, independently interpreting results and communicating results to the patient/family/caregiver, or care coordinator.             [1]   Social History  Socioeconomic History    Marital status:     Number of children: 4   Occupational History    Occupation:      Employer: [employer]     Employer: [employer]   Tobacco Use    Smoking status: Light Smoker     Types: Cigars    Smokeless tobacco: Never   Substance and Sexual Activity    Alcohol use: Yes     Alcohol/week: 10.0 standard drinks of alcohol     Types: 4 Glasses of wine, 6 Shots of liquor per week     Comment: Occasionally    Drug use: Never    Sexual activity: Yes     Partners: Female     Birth control/protection: None   Social History Narrative    He wears seatbelt. He has 1 grandson and 2 granddaughters.     Social Drivers of Health     Financial Resource Strain: Low Risk  (5/14/2024)    Overall Financial Resource Strain (CARDIA)      Difficulty of Paying Living Expenses: Not hard at all   Food Insecurity: No Food Insecurity (5/14/2024)    Hunger Vital Sign     Worried About Running Out of Food in the Last Year: Never true     Ran Out of Food in the Last Year: Never true   Transportation Needs: No Transportation Needs (5/14/2024)    PRAPARE - Transportation     Lack of Transportation (Medical): No     Lack of Transportation (Non-Medical): No   Physical Activity: Unknown (5/14/2024)    Exercise Vital Sign     Days of Exercise per Week: 1 day   Recent Concern: Physical Activity - Inactive (5/14/2024)    Exercise Vital Sign     Days of Exercise per Week: 1 day     Minutes of Exercise per Session: 0 min   Stress: Patient Declined (5/14/2024)    [employer] [place] [employer] - Occupational Stress Questionnaire     Feeling of Stress : Patient declined   Housing Stability: Low Risk  (8/14/2023)    Housing Stability Vital Sign     Unable to Pay for Housing in the Last Year: No     Number of Places Lived in the Last Year: 1     Unstable Housing in the Last Year: No

## 2025-04-16 ENCOUNTER — HOSPITAL ENCOUNTER (OUTPATIENT)
Facility: HOSPITAL | Age: 60
Discharge: HOME | End: 2025-04-16
Attending: INTERNAL MEDICINE | Admitting: INTERNAL MEDICINE
Payer: COMMERCIAL

## 2025-04-16 ENCOUNTER — ANESTHESIA (OUTPATIENT)
Dept: CARDIOLOGY | Facility: HOSPITAL | Age: 60
End: 2025-04-16
Payer: COMMERCIAL

## 2025-04-16 ENCOUNTER — ANESTHESIA EVENT (OUTPATIENT)
Dept: CARDIOLOGY | Facility: HOSPITAL | Age: 60
End: 2025-04-16
Payer: COMMERCIAL

## 2025-04-16 VITALS
TEMPERATURE: 98 F | BODY MASS INDEX: 37.19 KG/M2 | DIASTOLIC BLOOD PRESSURE: 60 MMHG | OXYGEN SATURATION: 96 % | WEIGHT: 315 LBS | SYSTOLIC BLOOD PRESSURE: 110 MMHG | HEART RATE: 60 BPM | HEIGHT: 77 IN | RESPIRATION RATE: 14 BRPM

## 2025-04-16 DIAGNOSIS — I48.91 ATRIAL FIBRILLATION, UNSPECIFIED TYPE: ICD-10-CM

## 2025-04-16 DIAGNOSIS — I48.0 PAF (PAROXYSMAL ATRIAL FIBRILLATION): ICD-10-CM

## 2025-04-16 LAB
BSA FOR ECHO PROCEDURE: 2.99 M2
OHS QRS DURATION: 106 MS
OHS QRS DURATION: 106 MS
OHS QTC CALCULATION: 388 MS
OHS QTC CALCULATION: 412 MS

## 2025-04-16 PROCEDURE — 93005 ELECTROCARDIOGRAM TRACING: CPT

## 2025-04-16 PROCEDURE — 63600175 PHARM REV CODE 636 W HCPCS: Performed by: NURSE ANESTHETIST, CERTIFIED REGISTERED

## 2025-04-16 PROCEDURE — 93010 ELECTROCARDIOGRAM REPORT: CPT | Mod: ,,, | Performed by: INTERNAL MEDICINE

## 2025-04-16 PROCEDURE — 25000003 PHARM REV CODE 250: Performed by: NURSE ANESTHETIST, CERTIFIED REGISTERED

## 2025-04-16 RX ORDER — SODIUM CHLORIDE 0.9 % (FLUSH) 0.9 %
10 SYRINGE (ML) INJECTION
Status: DISCONTINUED | OUTPATIENT
Start: 2025-04-16 | End: 2025-04-16 | Stop reason: HOSPADM

## 2025-04-16 RX ORDER — LIDOCAINE HYDROCHLORIDE 20 MG/ML
INJECTION INTRAVENOUS
Status: DISCONTINUED | OUTPATIENT
Start: 2025-04-16 | End: 2025-04-16

## 2025-04-16 RX ORDER — PROPOFOL 10 MG/ML
VIAL (ML) INTRAVENOUS
Status: DISCONTINUED | OUTPATIENT
Start: 2025-04-16 | End: 2025-04-16

## 2025-04-16 RX ADMIN — PROPOFOL 30 MG: 10 INJECTION, EMULSION INTRAVENOUS at 07:04

## 2025-04-16 RX ADMIN — LIDOCAINE HYDROCHLORIDE 50 MG: 20 INJECTION INTRAVENOUS at 07:04

## 2025-04-16 RX ADMIN — PROPOFOL 100 MG: 10 INJECTION, EMULSION INTRAVENOUS at 07:04

## 2025-04-16 RX ADMIN — SODIUM CHLORIDE: 9 INJECTION, SOLUTION INTRAVENOUS at 07:04

## 2025-04-16 RX ADMIN — PROPOFOL 20 MG: 10 INJECTION, EMULSION INTRAVENOUS at 07:04

## 2025-04-16 RX ADMIN — PROPOFOL 10 MG: 10 INJECTION, EMULSION INTRAVENOUS at 07:04

## 2025-04-16 NOTE — ANESTHESIA PREPROCEDURE EVALUATION
04/16/2025  Reggie Sawant is a 59 y.o., male.      Pre-op Assessment    I have reviewed the Patient Summary Reports.     I have reviewed the Nursing Notes. I have reviewed the NPO Status.   I have reviewed the Medications.     Review of Systems  Anesthesia Hx:  No problems with previous Anesthesia             Denies Family Hx of Anesthesia complications.    Denies Personal Hx of Anesthesia complications.                    Social:  Alcohol Use, Smoker 2 cigars daily      Hematology/Oncology:  Hematology Normal   Oncology Normal                                   Cardiovascular:     Hypertension   Denies MI.     Denies CABG/stent. Dysrhythmias atrial fibrillation   Denies CHF.    hyperlipidemia   ECG has been reviewed. EKG 4/2025:  Atrial flutter with variable A-V block with rapid ventricular response 111  Nonspecific T wave abnormality   Abnormal ECG   No previous ECGs available     Echo 4/2025:    Left Ventricle: The left ventricle is normal in size. Mildly increased wall thickness. There is mild concentric hypertrophy. There is normal systolic function with a visually estimated ejection fraction of 55 - 60%. There is normal diastolic function.  ·  Right Ventricle: The right ventricle is normal in size. Wall thickness is normal. Systolic function is normal.  ·  Left Atrium: Mildly dilated  ·  Mitral Valve: There is mild regurgitation.  ·  Pulmonary Artery: Pulmonary artery pressure could not be estimated.  ·  IVC/SVC: Intermediate venous pressure at 8 mmHg.                               Pulmonary:    Denies COPD.  Denies Asthma.    Sleep Apnea                Renal/:  Renal/ Normal                 Hepatic/GI:      Denies GERD. Denies Liver Disease.  Denies Hepatitis.              Musculoskeletal:  Arthritis   djd            Neurological:    Denies CVA.    Denies Seizures.                                 Endocrine:  Denies Diabetes. Denies Hypothyroidism.  Denies Hyperthyroidism. prediabetes      Morbid Obesity / BMI > 40  Psych:     insomnia           Physical Exam    Airway:  Mallampati: II   Mouth Opening: Normal    Dental:  Intact    Chest/Lungs:  Clear to auscultation, Normal Respiratory Rate    Heart:  Rate: Tachycardia  Rhythm: Irregularly Irregular    Anesthesia Plan  Type of Anesthesia, risks & benefits discussed:    Anesthesia Type: MAC  Intra-op Monitoring Plan: Standard ASA Monitors  Induction:  IV  Informed Consent: Informed consent signed with the Patient and all parties understand the risks and agree with anesthesia plan.  All questions answered.   ASA Score: 3  Day of Surgery Review of History & Physical: H&P Update referred to the surgeon/provider.    Ready For Surgery From Anesthesia Perspective.   .

## 2025-04-16 NOTE — OP NOTE
[facility] - Cath Lab ([facility])  Cardiac Catheterization  Procedure and Discharge Note    SUMMARY     Reggie Sawant  [id number]  Irene Sanchez MD    Date of Procedure: 4/16/2025    Procedure: PAULETTE guided cardioversion    Provider: Grady Navarro MD    Indications: He was referred for PAULETTE guided cardioversion for atrial fibrillation.    Pre-Procedure Diagnosis: Symptomatic atrial fibrillation    Post-Procedure Diagnosis:  Successful cardioversion    Anesthesia: Monitor Anesthesia Care    Description of the Findings of the Procedure:     The risks, benefits, complications, treatment options, and expected outcomes were discussed with the patient. The patient and/or family concurred with the proposed plan, giving informed consent.     Findings:    Successful cardioversion 150 joules x one to NSR.  EF 50%  No left atrial appendage thrombus.  Spontaneous left atrial echo contrast noted.  Mild TR.    See report for full details.      Complications: None; patient tolerated the procedure well.    Estimated Blood Loss (EBL):  none           Implants: none    Specimens: none    Condition: stable    Disposition: PACU-hemodynamicallystable    Attestation: I was present and scrubbed for the entire procedure.    Recommendations:    Usual post PAULETTE cardioversion care.  D/c pt home.  Continue DOAC.  F/u Cards clinic 1 week with ecg.  Cardiac diet.  Continue current meds.

## 2025-04-16 NOTE — NURSING
B/P still low after procedure and non pharmacological ways of increasing B/P, different size cuff placed on patient, better outcome, b/p increased patient was asymptomatic before and after cuff change. Will continue to monitor.

## 2025-04-16 NOTE — TRANSFER OF CARE
"Anesthesia Transfer of Care Note    Patient: Reggie Sawant    Procedure(s) Performed: Procedure(s) (LRB):  Transesophageal echo (PAULETTE) intra-procedure log documentation (N/A)    Patient location: Other: CVRU    Anesthesia Type: MAC    Transport from OR: Transported from OR on room air with adequate spontaneous ventilation    Post pain: adequate analgesia    Post assessment: no apparent anesthetic complications and tolerated procedure well    Post vital signs: stable    Level of consciousness: sedated    Nausea/Vomiting: no nausea/vomiting    Complications: none    Transfer of care protocol was followed    Last vitals: Visit Vitals  /88 (BP Location: Left arm, Patient Position: Lying)   Pulse 81   Temp 36.6 °C (97.8 °F) (Temporal)   Resp 18   Ht 6' 5" (1.956 m)   Wt (!) 164.7 kg (363 lb 1.6 oz)   SpO2 97%   BMI 43.06 kg/m²     "

## 2025-04-16 NOTE — ANESTHESIA POSTPROCEDURE EVALUATION
Anesthesia Post Evaluation    Patient: Reggie Sawant    Procedure(s) Performed: Procedure(s) (LRB):  Transesophageal echo (PAULETTE) intra-procedure log documentation (N/A)    Final Anesthesia Type: MAC      Patient location: [facility].  Patient participation: No - Unable to Participate, Sedation  Level of consciousness: sedated  Post-procedure vital signs: reviewed and stable  Pain management: adequate  Airway patency: patent    PONV status at discharge: No PONV  Anesthetic complications: no      Cardiovascular status: blood pressure returned to baseline and hemodynamically stable  Respiratory status: unassisted, spontaneous ventilation and nasal cannula  Hydration status: euvolemic  Follow-up not needed.          Vitals Value Taken Time   /88 04/16/25 06:29   Temp 36.6 °C (97.8 °F) 04/16/25 06:29   Pulse 81 04/16/25 06:29   Resp 18 04/16/25 06:29   SpO2 97 % 04/16/25 06:29         No case tracking events are documented in the log.      Pain/Des Score: Des Score: 10 (4/16/2025  7:00 AM)

## 2025-04-17 ENCOUNTER — OFFICE VISIT (OUTPATIENT)
Dept: PAIN MEDICINE | Facility: CLINIC | Age: 60
End: 2025-04-17
Payer: COMMERCIAL

## 2025-04-17 ENCOUNTER — E-CONSULT (OUTPATIENT)
Dept: CARDIOLOGY | Facility: HOSPITAL | Age: 60
End: 2025-04-17
Payer: COMMERCIAL

## 2025-04-17 ENCOUNTER — CLINICAL SUPPORT (OUTPATIENT)
Dept: REHABILITATION | Facility: HOSPITAL | Age: 60
End: 2025-04-17
Payer: COMMERCIAL

## 2025-04-17 VITALS
HEIGHT: 77 IN | RESPIRATION RATE: 17 BRPM | HEART RATE: 62 BPM | WEIGHT: 315 LBS | BODY MASS INDEX: 37.19 KG/M2 | DIASTOLIC BLOOD PRESSURE: 76 MMHG | SYSTOLIC BLOOD PRESSURE: 117 MMHG

## 2025-04-17 DIAGNOSIS — M25.552 PAIN OF LEFT HIP: ICD-10-CM

## 2025-04-17 DIAGNOSIS — M54.16 LUMBAR RADICULOPATHY: Primary | ICD-10-CM

## 2025-04-17 DIAGNOSIS — M53.86 DECREASED RANGE OF MOTION OF INTERVERTEBRAL DISCS OF LUMBAR SPINE: ICD-10-CM

## 2025-04-17 DIAGNOSIS — Z01.810 PREOP CARDIOVASCULAR EXAM: Primary | ICD-10-CM

## 2025-04-17 DIAGNOSIS — Z79.01 ANTICOAGULATED: Primary | ICD-10-CM

## 2025-04-17 DIAGNOSIS — M25.659 DECREASED RANGE OF HIP MOVEMENT, UNSPECIFIED LATERALITY: Primary | ICD-10-CM

## 2025-04-17 PROCEDURE — 99214 OFFICE O/P EST MOD 30 MIN: CPT | Mod: S$GLB,,, | Performed by: NURSE PRACTITIONER

## 2025-04-17 PROCEDURE — 97112 NEUROMUSCULAR REEDUCATION: CPT | Mod: PN

## 2025-04-17 PROCEDURE — 3074F SYST BP LT 130 MM HG: CPT | Mod: CPTII,S$GLB,, | Performed by: NURSE PRACTITIONER

## 2025-04-17 PROCEDURE — 99999 PR PBB SHADOW E&M-EST. PATIENT-LVL IV: CPT | Mod: PBBFAC,,, | Performed by: NURSE PRACTITIONER

## 2025-04-17 PROCEDURE — 3008F BODY MASS INDEX DOCD: CPT | Mod: CPTII,S$GLB,, | Performed by: NURSE PRACTITIONER

## 2025-04-17 PROCEDURE — 1159F MED LIST DOCD IN RCRD: CPT | Mod: CPTII,S$GLB,, | Performed by: NURSE PRACTITIONER

## 2025-04-17 PROCEDURE — 97110 THERAPEUTIC EXERCISES: CPT | Mod: PN

## 2025-04-17 PROCEDURE — 4010F ACE/ARB THERAPY RXD/TAKEN: CPT | Mod: CPTII,S$GLB,, | Performed by: NURSE PRACTITIONER

## 2025-04-17 PROCEDURE — 97140 MANUAL THERAPY 1/> REGIONS: CPT | Mod: PN

## 2025-04-17 PROCEDURE — 3078F DIAST BP <80 MM HG: CPT | Mod: CPTII,S$GLB,, | Performed by: NURSE PRACTITIONER

## 2025-04-17 NOTE — CONSULTS
[facility] CARDIOLOGY  Response for E-Consult     Patient Name: Reggie Sawant  MRN: [id number]  Primary Care Provider: Irene Sanchez MD   Requesting Provider: Narayan Do MD  E-Consult to General Cardiology  Consult performed by: Grady Navarro MD  Consult ordered by: Narayan Do MD  Reason for consult: Preop CV evaluation          Recommendation: Patient is not cleared for procedure and not cleared to hold Eliquis.  Pt s/p PAULETTE cardioversion for atrial fibrillation on 4/16/25.  This clearance can be reassessed after 6 weeks from date of PAULETTE cardioversion.    Reggie Sawant was seen in office with complaints of severe low back pain. Dr. Do would like to perform lumbar epidural, and Reggie Sawant would like to proceed. Dr. Do is requesting for clearance to hold apixaban (Eliquis) 3 days prior to procedure. Patient Reggie Sawant can resume medication following the procedure. Can patient proceed with this procedure/injection?     Additional future steps to consider: F/u with Cardiology as scheduled.    Total time of Consultation: 5 minute    I did not speak to the requesting provider verbally about this.     *This eConsult is based on the clinical data available to me and is furnished without benefit of a physical examination. The eConsult will need to be interpreted in light of any clinical issues or changes in patient status not available to me at the time of filing this eConsults. Significant changes in patient condition or level of acuity should result in immediate formal consultation and reevaluation. Please alert me if you have further questions.    Thank you for this eConsult referral.     Grady Navarro MD  [facility] CARDIOLOGY

## 2025-04-17 NOTE — PROGRESS NOTES
Outpatient Rehab    Physical Therapy Visit    Patient Name: Reggie Sawant  MRN: [id number]  YOB: 1965  Encounter Date: 4/17/2025    Therapy Diagnosis:   Encounter Diagnoses   Name Primary?    Decreased range of hip movement, unspecified laterality Yes    Decreased range of motion of intervertebral discs of lumbar spine      Physician: Narayan Do MD    Physician Orders: Eval and Treat  Medical Diagnosis: Pain of left hip  Lumbar radiculopathy    Visit # / Visits Authorized:  1 / 10  Insurance Authorization Period: 3/31/2025 to 12/31/2025  Date of Evaluation: 4/2/2025  Plan of Care Certification: 4/2/2025 to 5/28/2025        Time In: 1105   Time Out: 1150  Total Time: 45   Total Billable Time:   45 minutes          Subjective   Patient reports no pain right now but woke up with 5/10 pain in his L hip. Since evaluation patient has had A-fib and a procedure to stop the A-fib..  Pain reported as 0/10. back and hip    Objective            Treatment:  Therapeutic Exercise  TE 1: LTR x 2 min  TE 2: recumbent bike lvl 3 x 5 minutes  TE 3: side lying hip abduction 2 x 10  TE 4: supine SLR 2 x 10  TE 5: prone quad stretch 2 x 1 min  TE 6: SL hip IR 2 x 10  TE 7: bridges 2 x 10  TE 8: slant board stretch 3 x 1 min  Manual Therapy  MT 1: MFR to L hip flexor  Balance/Neuromuscular Re-Education  NMR 1: steam boats x 10  NMR 2: calf raises 3 x 10  NMR 3: step ups x 10 (painful)  NMR 5: side stepping x 5 laps YTB  NMR 6: monster walks x 5 laps YTB (painful)    Time Entry(in minutes):  Manual Therapy Time Entry: 8  Neuromuscular Re-Education Time Entry: 12  Therapeutic Exercise Time Entry: 25    Assessment & Plan   Assessment: Patient presents for first follow up after medical complications the past two weeks. Initiated plan of care for hip range of motion, lower extremity strengthening, and improved postural stability. Patient reports pain with step ups and monster walks. Patient tender to palpation of L hip  flexor, but responded well to manual therapy.  Evaluation/Treatment Tolerance: Patient limited by pain    Patient will continue to benefit from skilled outpatient physical therapy to address the deficits listed in the problem list box on initial evaluation, provide pt/family education and to maximize pt's level of independence in the home and community environment.     Patient's spiritual, cultural, and educational needs considered and patient agreeable to plan of care and goals.           Plan: Continue current plan of care    Goals:   Active       Functional outcome       Patient will show a significant change in FOTO patient-reported outcome tool to demonstrate subjective improvement       Start:  04/02/25    Expected End:  05/28/25            Patient stated goal:   be able to return to work/ pass return to work testing       Start:  04/02/25    Expected End:  05/28/25            Patient will demonstrate independence in home program for support of progression       Start:  04/02/25    Expected End:  05/28/25               Pain       Patient will report pain of 5/10 at its worse demonstrating a reduction of overall pain       Start:  04/02/25    Expected End:  05/28/25               Range of Motion       Patient will achieve bilateral spinal side bending ROM within functional limits for maximal function and return to work        Start:  04/02/25    Expected End:  05/28/25            Patient will achieve bilateral spinal rotation ROM within functional limits for maximal function and return to work        Start:  04/02/25    Expected End:  05/28/25                Marlene Masterson PT

## 2025-04-21 ENCOUNTER — PATIENT MESSAGE (OUTPATIENT)
Dept: PAIN MEDICINE | Facility: CLINIC | Age: 60
End: 2025-04-21
Payer: COMMERCIAL

## 2025-04-22 ENCOUNTER — CLINICAL SUPPORT (OUTPATIENT)
Dept: REHABILITATION | Facility: HOSPITAL | Age: 60
End: 2025-04-22
Payer: COMMERCIAL

## 2025-04-22 DIAGNOSIS — M53.86 DECREASED RANGE OF MOTION OF INTERVERTEBRAL DISCS OF LUMBAR SPINE: ICD-10-CM

## 2025-04-22 DIAGNOSIS — M25.659 DECREASED RANGE OF HIP MOVEMENT, UNSPECIFIED LATERALITY: Primary | ICD-10-CM

## 2025-04-22 PROCEDURE — 97110 THERAPEUTIC EXERCISES: CPT | Mod: PN

## 2025-04-22 PROCEDURE — 97140 MANUAL THERAPY 1/> REGIONS: CPT | Mod: PN

## 2025-04-22 PROCEDURE — 97112 NEUROMUSCULAR REEDUCATION: CPT | Mod: PN

## 2025-04-22 RX ORDER — TIZANIDINE 4 MG/1
4 TABLET ORAL 2 TIMES DAILY PRN
Qty: 60 TABLET | Refills: 1 | Status: SHIPPED | OUTPATIENT
Start: 2025-04-22 | End: 2025-06-21

## 2025-04-22 NOTE — PROGRESS NOTES
Outpatient Rehab    Physical Therapy Visit    Patient Name: Reggie Sawant  MRN: [id number]  YOB: 1965  Encounter Date: 4/22/2025    Therapy Diagnosis:   Encounter Diagnoses   Name Primary?    Decreased range of hip movement, unspecified laterality Yes    Decreased range of motion of intervertebral discs of lumbar spine        Physician: Narayan Do MD    Physician Orders: Eval and Treat  Medical Diagnosis: Pain of left hip  Lumbar radiculopathy    Visit # / Visits Authorized:  2 / 10  Insurance Authorization Period: 3/31/2025 to 12/31/2025  Date of Evaluation: 4/2/2025  Plan of Care Certification: 4/2/2025 to 5/28/2025        Time In: 1100   Time Out: 1150  Total Time: 50   Total Billable Time:   50 minutes          Subjective   Patient reports increased hip pain today..  Pain reported as 7/10. L hip pain    Objective            Treatment:  Therapeutic Exercise  TE 1: LTR x 2 min  TE 2: recumbent bike lvl 5 x 5 minutes  TE 3: side lying hip abduction 2 x 10  TE 4: supine SLR 2 x 10  TE 5: prone quad stretch 2 x 1 min  TE 6: SL hip IR 2 x 10  TE 7: bridges 2 x 10  TE 8: slant board stretch 3 x 1 min  Manual Therapy  MT 1: MFR to L hip flexor, lumbar erector spinae, and QL  MT 2: L hip long axis traction  Balance/Neuromuscular Re-Education  NMR 1: steam boats x 10  NMR 2: calf raises 3 x 10    Time Entry(in minutes):  Manual Therapy Time Entry: 15  Neuromuscular Re-Education Time Entry: 10  Therapeutic Exercise Time Entry: 25    Assessment & Plan   Assessment: Patient presents with increased hip pain since having to stop medication due to cardiac issues. Continued plan of care for hip range of motion, lower extremity strengthening, and improved postural stability. Increased time spent on manuals for pain control.  Evaluation/Treatment Tolerance: Patient limited by pain    Patient will continue to benefit from skilled outpatient physical therapy to address the deficits listed in the problem list box  on initial evaluation, provide pt/family education and to maximize pt's level of independence in the home and community environment.     Patient's spiritual, cultural, and educational needs considered and patient agreeable to plan of care and goals.           Plan: Continue current plan of care    Goals:   Active       Functional outcome       Patient will show a significant change in FOTO patient-reported outcome tool to demonstrate subjective improvement       Start:  04/02/25    Expected End:  05/28/25            Patient stated goal:   be able to return to work/ pass return to work testing       Start:  04/02/25    Expected End:  05/28/25            Patient will demonstrate independence in home program for support of progression       Start:  04/02/25    Expected End:  05/28/25               Pain       Patient will report pain of 5/10 at its worse demonstrating a reduction of overall pain       Start:  04/02/25    Expected End:  05/28/25               Range of Motion       Patient will achieve bilateral spinal side bending ROM within functional limits for maximal function and return to work        Start:  04/02/25    Expected End:  05/28/25            Patient will achieve bilateral spinal rotation ROM within functional limits for maximal function and return to work        Start:  04/02/25    Expected End:  05/28/25                Marlene Masterson PT

## 2025-04-23 ENCOUNTER — PATIENT MESSAGE (OUTPATIENT)
Dept: PAIN MEDICINE | Facility: CLINIC | Age: 60
End: 2025-04-23
Payer: COMMERCIAL

## 2025-04-23 ENCOUNTER — TELEPHONE (OUTPATIENT)
Dept: PAIN MEDICINE | Facility: CLINIC | Age: 60
End: 2025-04-23
Payer: COMMERCIAL

## 2025-04-23 ENCOUNTER — OFFICE VISIT (OUTPATIENT)
Dept: CARDIOLOGY | Facility: CLINIC | Age: 60
End: 2025-04-23
Payer: COMMERCIAL

## 2025-04-23 ENCOUNTER — HOSPITAL ENCOUNTER (OUTPATIENT)
Dept: CARDIOLOGY | Facility: HOSPITAL | Age: 60
Discharge: HOME | End: 2025-04-23
Attending: INTERNAL MEDICINE
Payer: COMMERCIAL

## 2025-04-23 VITALS
HEART RATE: 51 BPM | BODY MASS INDEX: 37.19 KG/M2 | OXYGEN SATURATION: 99 % | WEIGHT: 315 LBS | HEIGHT: 77 IN | DIASTOLIC BLOOD PRESSURE: 66 MMHG | SYSTOLIC BLOOD PRESSURE: 110 MMHG

## 2025-04-23 DIAGNOSIS — E78.5 HYPERLIPIDEMIA, UNSPECIFIED HYPERLIPIDEMIA TYPE: Chronic | ICD-10-CM

## 2025-04-23 DIAGNOSIS — I10 ESSENTIAL HYPERTENSION: ICD-10-CM

## 2025-04-23 DIAGNOSIS — E66.01 MORBID OBESITY WITH BMI OF 40.0-44.9, ADULT: ICD-10-CM

## 2025-04-23 DIAGNOSIS — G47.33 OSA ON CPAP: ICD-10-CM

## 2025-04-23 DIAGNOSIS — I48.91 ATRIAL FIBRILLATION WITH RVR: Primary | ICD-10-CM

## 2025-04-23 DIAGNOSIS — E78.49 OTHER HYPERLIPIDEMIA: Chronic | ICD-10-CM

## 2025-04-23 DIAGNOSIS — Z72.0 TOBACCO USE: ICD-10-CM

## 2025-04-23 PROCEDURE — 1160F RVW MEDS BY RX/DR IN RCRD: CPT | Mod: CPTII,S$GLB,, | Performed by: INTERNAL MEDICINE

## 2025-04-23 PROCEDURE — 3078F DIAST BP <80 MM HG: CPT | Mod: CPTII,S$GLB,, | Performed by: INTERNAL MEDICINE

## 2025-04-23 PROCEDURE — 99999 PR PBB SHADOW E&M-EST. PATIENT-LVL III: CPT | Mod: PBBFAC,,,

## 2025-04-23 PROCEDURE — 1159F MED LIST DOCD IN RCRD: CPT | Mod: CPTII,S$GLB,, | Performed by: INTERNAL MEDICINE

## 2025-04-23 PROCEDURE — 93010 ELECTROCARDIOGRAM REPORT: CPT | Mod: ,,, | Performed by: INTERNAL MEDICINE

## 2025-04-23 PROCEDURE — 99214 OFFICE O/P EST MOD 30 MIN: CPT | Mod: S$GLB,,, | Performed by: INTERNAL MEDICINE

## 2025-04-23 PROCEDURE — 3074F SYST BP LT 130 MM HG: CPT | Mod: CPTII,S$GLB,, | Performed by: INTERNAL MEDICINE

## 2025-04-23 PROCEDURE — 4010F ACE/ARB THERAPY RXD/TAKEN: CPT | Mod: CPTII,S$GLB,, | Performed by: INTERNAL MEDICINE

## 2025-04-23 PROCEDURE — 93005 ELECTROCARDIOGRAM TRACING: CPT

## 2025-04-23 PROCEDURE — 3008F BODY MASS INDEX DOCD: CPT | Mod: CPTII,S$GLB,, | Performed by: INTERNAL MEDICINE

## 2025-04-23 NOTE — PROGRESS NOTES
Subjective:   Patient ID:  Reggie Sawant is a 59 y.o. male who presents for evaluation of No chief complaint on file.      HPI 60 y/o male with PMHx of HTN, DEEPTHI, HLD, symptomatic A-fib s/p PAULETTE/cardioversion, and obesity who presents today for cardiac eval after recent PAULETTE/cardioversion with Dr. Navarro on 4/16/25.       4/10/2025 visit with Dr. Navarro   Pt presents for a fib.  Has HTN, DEEPTHI, hyperlipidemia, obesity.  Cigar smoker.  Had recent hip/back injection recently, also on pregablin, then noted palpitations last few weeks.  Some associated dizziness, dyspnea.  Some difficulty focusing on vision.  No CP or syncope.  Saw PCP clinic today dx w a fib and sent to [facility].  No prior hx of a fib.  No h/o CVA, MI.  Ecg today 4/10/25 personally reviewed; a fib with mild RVR, nonspecific st-t abnl.  BP stable.  Uses CPAP.  Compliant w meds.    4/23/2025  HR checks at home has been around 50 but remains asymptomatic  Usually drops this low when he's at rest   Complaint with meds and not missing any doses   Not very active yet given hip issue, going to PT twice per week   Has not check BP at home recently   Using CPAP nightly   Denies chest pain, SOB, palpitations, LH, dizzy, syncope     EKG today sinus bobby, nonspecific t wave       Past Medical History:   Diagnosis Date    Cellulitis     left leg    Food allergy     Allergic to shrimp    Hip arthritis     right    Hyperlipidemia     Hypertension     Hypogonadism male     Hypokalemia     Morbid obesity     Prediabetes 12/30/2020    Seasonal allergies     Sleep apnea     on CPAP       Past Surgical History:   Procedure Laterality Date    COLONOSCOPY N/A 12/06/2017    Procedure: COLONOSCOPY;  Surgeon: Rory Baorne MD;  Location: [facility] ENDO;  Service: Endoscopy;  Laterality: N/A;    COLONOSCOPY N/A 4/27/2023    Procedure: COLONOSCOPY;  Surgeon: Jeimy Garcia MD;  Location: [facility] ENDO;  Service: Endoscopy;  Laterality: N/A;    right bunionectomy       TRANSESOPHAGEAL ECHOCARDIOGRAM WITH POSSIBLE CARDIOVERSION (PAULETTE W/ POSS CARDIOVERSION) N/A 4/16/2025    Procedure: Transesophageal echo (PAULETTE) intra-procedure log documentation;  Surgeon: Grady Navarro MD;  Location: [facility] CATH LAB;  Service: Cardiology;  Laterality: N/A;    TRANSFORAMINAL EPIDURAL INJECTION OF STEROID Left 3/31/2025    Procedure: left L3/4 + L4/5  TF PAUL- continue ASA;  Surgeon: Narayan Do MD;  Location: [facility] PAIN MGT;  Service: Pain Management;  Laterality: Left;       Social History[1]    Family History   Problem Relation Name Age of Onset    No Known Problems Mother Yael     Hypertension Father Amanuel     Heart disease Father Amanuel         CAD    Diabetes Sister Fawn     No Known Problems Sister Sherri     Hypertension Brother Amanuel Jr     Diabetes Maternal Grandmother Lissa     No Known Problems Daughter      No Known Problems Daughter      No Known Problems Daughter      No Known Problems Other grandson     Cancer Neg Hx      Stroke Neg Hx      Thyroid disease Neg Hx      Thyroid cancer Neg Hx          MEN2       Review of Systems   Constitutional: Negative for malaise/fatigue.   Cardiovascular:  Negative for chest pain, dyspnea on exertion, irregular heartbeat, leg swelling, near-syncope, palpitations and syncope.   Respiratory:  Negative for cough, shortness of breath and wheezing.    Neurological:  Negative for dizziness and light-headedness.       Current Outpatient Medications on File Prior to Visit   Medication Sig    amLODIPine (NORVASC) 10 MG tablet Take 1 tablet (10 mg total) by mouth once daily.    apixaban (ELIQUIS) 5 mg Tab Take 1 tablet (5 mg total) by mouth 2 (two) times daily.    aspirin (ECOTRIN) 81 MG EC tablet Take 1 tablet by mouth Daily.    busPIRone (BUSPAR) 10 MG tablet TAKE 1 TABLET 3 TIMES A DAY    carvediloL (COREG) 25 MG tablet Take 1 tablet (25 mg total) by mouth 2 (two) times daily with meals.    chlorthalidone (HYGROTEN) 50 MG Tab Take 1 tablet  (50 mg total) by mouth once daily.    EScitalopram oxalate (LEXAPRO) 5 MG Tab TAKE 1 TABLET ONCE DAILY    multivitamin (THERAGRAN) per tablet Take 1 tablet by mouth once daily.    potassium chloride SA (K-DUR,KLOR-CON) 20 MEQ tablet Take 1 tablet (20 mEq total) by mouth once daily.    pravastatin (PRAVACHOL) 20 MG tablet Take 1 tablet (20 mg total) by mouth once daily.    spironolactone (ALDACTONE) 50 MG tablet Take 1 tablet (50 mg total) by mouth once daily.    tadalafiL (CIALIS) 20 MG Tab Take 1 tablet (20 mg total) by mouth once daily.    testosterone cypionate (DEPOTESTOTERONE CYPIONATE) 200 mg/mL injection Inject 1 mL (200 mg total) into the muscle every 7 days. ADMINISTER 1 ML IN THE MUSCLE EVERY 14 DAYS    tiZANidine (ZANAFLEX) 4 MG tablet Take 1 tablet (4 mg total) by mouth 2 (two) times daily as needed (for muscle spasms/ caution drowsiness).    valsartan (DIOVAN) 320 MG tablet Take 1 tablet (320 mg total) by mouth once daily.     No current facility-administered medications on file prior to visit.       Objective:   Objective:  Wt Readings from Last 3 Encounters:   04/23/25 (!) 160.8 kg (354 lb 8 oz)   04/17/25 (!) 162.7 kg (358 lb 11 oz)   04/16/25 (!) 164.7 kg (363 lb 1.6 oz)     Temp Readings from Last 3 Encounters:   04/16/25 97.8 °F (36.6 °C) (Temporal)   04/10/25 96.2 °F (35.7 °C) (Tympanic)   03/31/25 98 °F (36.7 °C) (Temporal)     BP Readings from Last 3 Encounters:   04/23/25 110/66   04/17/25 117/76   04/16/25 110/60     Pulse Readings from Last 3 Encounters:   04/23/25 (!) 51   04/17/25 62   04/16/25 60       Physical Exam  Vitals reviewed.   Constitutional:       Appearance: Normal appearance. He is obese.   HENT:      Head: Normocephalic and atraumatic.      Nose: Nose normal.   Eyes:      Extraocular Movements: Extraocular movements intact.   Cardiovascular:      Rate and Rhythm: Bradycardia present.   Pulmonary:      Effort: Pulmonary effort is normal.   Abdominal:      Palpations: Abdomen  is soft.   Musculoskeletal:         General: Normal range of motion.      Cervical back: Normal range of motion and neck supple.      Right lower leg: No edema.      Left lower leg: No edema.   Skin:     General: Skin is warm and dry.   Neurological:      General: No focal deficit present.      Mental Status: He is alert and oriented to person, place, and time. Mental status is at baseline.   Psychiatric:         Mood and Affect: Mood normal.         Behavior: Behavior normal.         Lab Results   Component Value Date    CHOL 189 05/17/2024    CHOL 232 (H) 08/15/2023    CHOL 207 (H) 08/23/2022     Lab Results   Component Value Date    HDL 41 05/17/2024    HDL 43 08/15/2023    HDL 43 08/23/2022     Lab Results   Component Value Date    LDLCALC 129.2 05/17/2024    LDLCALC 165.0 (H) 08/15/2023    LDLCALC 146.2 08/23/2022     Lab Results   Component Value Date    TRIG 94 05/17/2024    TRIG 120 08/15/2023    TRIG 89 08/23/2022     Lab Results   Component Value Date    CHOLHDL 21.7 05/17/2024    CHOLHDL 18.5 (L) 08/15/2023    CHOLHDL 20.8 08/23/2022       Chemistry        Component Value Date/Time     04/10/2025 1647     05/17/2024 0830    K 4.1 04/10/2025 1647    K 4.2 05/17/2024 0830     04/10/2025 1647    CL 99 05/17/2024 0830    CO2 29 04/10/2025 1647    CO2 29 05/17/2024 0830    BUN 18 04/10/2025 1647    CREATININE 0.6 04/10/2025 1647    GLU 82 05/17/2024 0830        Component Value Date/Time    CALCIUM 9.2 04/10/2025 1647    CALCIUM 10.0 05/17/2024 0830    ALKPHOS 41 04/10/2025 1647    ALKPHOS 45 02/10/2025 0954    AST 31 04/10/2025 1647    AST 23 02/10/2025 0954    ALT 51 (H) 04/10/2025 1647    ALT 38 02/10/2025 0954    BILITOT 0.9 04/10/2025 1647    BILITOT 1.0 02/10/2025 0954    ESTGFRAFRICA >60.0 07/20/2021 0922    EGFRNONAA >60.0 07/20/2021 0922          Lab Results   Component Value Date    TSH 1.725 04/10/2025     Lab Results   Component Value Date    INR 1.0 04/10/2025     Lab Results    Component Value Date    WBC 8.08 04/10/2025    HGB 15.8 04/10/2025    HCT 50.0 04/10/2025    MCV 97 04/10/2025     04/10/2025     BNP  @LABRCNTIP(BNP,BNPTRIAGEBLO)@  CrCl cannot be calculated (Patient's most recent lab result is older than the maximum 7 days allowed.).     Imaging:  ======    No results found for this or any previous visit.    No results found for this or any previous visit.    Results for orders placed during the hospital encounter of 02/03/19    X-Ray Chest PA And Lateral    Narrative  EXAMINATION:  XR CHEST PA AND LATERAL    CLINICAL HISTORY:  Cough    TECHNIQUE:  PA and lateral views of the chest were performed.    COMPARISON:  None    FINDINGS:  The cardiac and mediastinal silhouettes appear within normal limits.   The lungs are clear bilaterally.  No acute osseous findings demonstrated.    IMPRESSION:    No acute findings.    No significant discrepancy with preliminary radiology report.      Electronically signed by: Zhao Chacon MD  Date:    02/04/2019  Time:    08:05    No results found for this or any previous visit.    No valid procedures specified.    Results for orders placed during the hospital encounter of 04/16/25    Intra-Procedure Documentation    Conclusion    The estimated blood loss was none.      No results found for this or any previous visit.      Results for orders placed during the hospital encounter of 04/11/25    Echo    Interpretation Summary    Left Ventricle: The left ventricle is normal in size. Mildly increased wall thickness. There is mild concentric hypertrophy. There is normal systolic function with a visually estimated ejection fraction of 55 - 60%. There is normal diastolic function.    Right Ventricle: The right ventricle is normal in size. Wall thickness is normal. Systolic function is normal.    Left Atrium: Mildly dilated    Mitral Valve: There is mild regurgitation.    Pulmonary Artery: Pulmonary artery pressure could not be estimated.     IVC/SVC: Intermediate venous pressure at 8 mmHg.      Diagnostic Results:  ECG: Reviewed    The 10-year ASCVD risk score (Theresa MARINELLI, et al., 2019) is: 15.8%    Values used to calculate the score:      Age: 59 years      Sex: Male      Is Non- : Yes      Diabetic: No      Tobacco smoker: Yes      Systolic Blood Pressure: 110 mmHg      Is BP treated: Yes      HDL Cholesterol: 41 mg/dL      Total Cholesterol: 189 mg/dL        Assessment and Plan:   1. Atrial fibrillation with RVR  Assessment & Plan:  S/p PAULETTE/cardioversion   EKG today sinus bobby   Asymptomatic at this time   Continue with current meds  Discussed if develops symptoms we can decrease his coreg   RTC in 3 months       2. Essential hypertension  Assessment & Plan:  BP profile   Seems controlled   Low salt diet   Continue with current meds         3. Other hyperlipidemia    4. Morbid obesity with BMI of 40.0-44.9, adult  Assessment & Plan:  Weight loss and exercise       5. DEEPTHI on CPAP  Overview:  1/28/2007 PSG The diagnostic polysomnography revealed a mild obstructive sleep apnea / hypopnea syndrome (A + H Index = 9.9 hr.   On Auto CPAP 6-20 cm with optimal control AHI 4.2  Full face mask  HME: former [facility], request change to [facility]     Assessment & Plan:  CPAP use nightly       6. Tobacco use         Reviewed all tests and above medical conditions with patient in detail and formulated treatment plan.  Risk factor modification discussed.   Cardiac low salt diet discussed.    Follow up in 3 months         [1]   Social History  Tobacco Use    Smoking status: Light Smoker     Types: Cigars    Smokeless tobacco: Never   Substance Use Topics    Alcohol use: Yes     Alcohol/week: 10.0 standard drinks of alcohol     Types: 4 Glasses of wine, 6 Shots of liquor per week     Comment: Occasionally    Drug use: Never

## 2025-04-23 NOTE — ASSESSMENT & PLAN NOTE
S/p PAULETTE/cardioversion   EKG today sinus bobby   Asymptomatic at this time   Continue with current meds  Discussed if develops symptoms we can decrease his coreg   RTC in 3 months

## 2025-04-23 NOTE — TELEPHONE ENCOUNTER
Called and spoke to pt regarding injection, pt injection was cancel for 4/30 until he is cleared from Cardiology.    .Tam HOUGH

## 2025-04-24 ENCOUNTER — TELEPHONE (OUTPATIENT)
Dept: PAIN MEDICINE | Facility: CLINIC | Age: 60
End: 2025-04-24
Payer: COMMERCIAL

## 2025-04-24 ENCOUNTER — CLINICAL SUPPORT (OUTPATIENT)
Dept: REHABILITATION | Facility: HOSPITAL | Age: 60
End: 2025-04-24
Payer: COMMERCIAL

## 2025-04-24 ENCOUNTER — TELEPHONE (OUTPATIENT)
Dept: CARDIOLOGY | Facility: CLINIC | Age: 60
End: 2025-04-24
Payer: COMMERCIAL

## 2025-04-24 DIAGNOSIS — M53.86 DECREASED RANGE OF MOTION OF INTERVERTEBRAL DISCS OF LUMBAR SPINE: ICD-10-CM

## 2025-04-24 DIAGNOSIS — M25.659 DECREASED RANGE OF HIP MOVEMENT, UNSPECIFIED LATERALITY: Primary | ICD-10-CM

## 2025-04-24 LAB
OHS QRS DURATION: 104 MS
OHS QTC CALCULATION: 375 MS

## 2025-04-24 PROCEDURE — 97110 THERAPEUTIC EXERCISES: CPT | Mod: PN

## 2025-04-24 PROCEDURE — 97140 MANUAL THERAPY 1/> REGIONS: CPT | Mod: PN

## 2025-04-24 NOTE — TELEPHONE ENCOUNTER
Pt call with complaints of blurred vision and decreased pulse rate. I advised pt to report to the ER for further evaluation. Pt voiced understanding.    .Tam HOUGH

## 2025-04-24 NOTE — TELEPHONE ENCOUNTER
Pt called and advised that he woke up from a nap and his pulse was around 40 and his vision was distorted.  Has been taking his medications and is wondering if it may be the carvedilol

## 2025-04-24 NOTE — PROGRESS NOTES
"  Outpatient Rehab    Physical Therapy Visit    Patient Name: Reggie Sawant  MRN: [id number]  YOB: 1965  Encounter Date: 4/24/2025    Therapy Diagnosis:   Encounter Diagnoses   Name Primary?    Decreased range of hip movement, unspecified laterality Yes    Decreased range of motion of intervertebral discs of lumbar spine          Physician: Narayan Do MD    Physician Orders: Eval and Treat  Medical Diagnosis: Pain of left hip  Lumbar radiculopathy    Visit # / Visits Authorized:  3 / 10  Insurance Authorization Period: 3/31/2025 to 12/31/2025  Date of Evaluation: 4/2/2025  Plan of Care Certification: 4/2/2025 to 5/28/2025        Time In: 0810   Time Out: 0905  Total Time: 55   Total Billable Time:   55 minutes          Subjective   Patient reports no change in symptoms after last visit. Patient started session in 5/10 pain, but reports no pain at end of session. Patient reports relief with piriformis stretch..  Pain reported as 5/10. L hip pain    Objective            Treatment:  Therapeutic Exercise  TE 1: LTR x 2 min  TE 2: recumbent bike lvl 5 x 5 minutes  TE 6: SL hip IR 2 x 10  TE 7: bridges 3 ways x 10 each  TE 8: slant board stretch 3 x 1 min  TE 9: piriformis stretch 3 x 30"  Manual Therapy  MT 1: MFR to L hip flexor  MT 2: L hip long axis traction  MT 3: manual lumbar traction  MT 4: STM to L quad  Balance/Neuromuscular Re-Education  NMR 2: calf raises 3 x 10    Time Entry(in minutes):  Manual Therapy Time Entry: 23  Neuromuscular Re-Education Time Entry: 2  Therapeutic Exercise Time Entry: 30    Assessment & Plan   Assessment: Patient presents with continued hip pain since last visit. Continued plan of care for hip range of motion, lower extremity strengthening, and improved postural stability. Patient reports relief with manual lumbar traction and piriformis stretch.  Evaluation/Treatment Tolerance: Patient limited by pain    Patient will continue to benefit from skilled outpatient " physical therapy to address the deficits listed in the problem list box on initial evaluation, provide pt/family education and to maximize pt's level of independence in the home and community environment.     Patient's spiritual, cultural, and educational needs considered and patient agreeable to plan of care and goals.           Plan: Continue current plan of care    Goals:   Active       Functional outcome       Patient will show a significant change in FOTO patient-reported outcome tool to demonstrate subjective improvement       Start:  04/02/25    Expected End:  05/28/25            Patient stated goal:   be able to return to work/ pass return to work testing       Start:  04/02/25    Expected End:  05/28/25            Patient will demonstrate independence in home program for support of progression       Start:  04/02/25    Expected End:  05/28/25               Pain       Patient will report pain of 5/10 at its worse demonstrating a reduction of overall pain       Start:  04/02/25    Expected End:  05/28/25               Range of Motion       Patient will achieve bilateral spinal side bending ROM within functional limits for maximal function and return to work        Start:  04/02/25    Expected End:  05/28/25            Patient will achieve bilateral spinal rotation ROM within functional limits for maximal function and return to work        Start:  04/02/25    Expected End:  05/28/25                Marlene Masterson, PT

## 2025-04-25 NOTE — TELEPHONE ENCOUNTER
Called pt and advised recommendations and he voiced understanding and did not have any questions.     Pk Regalado PA-C to Me (Selected Message)  RH      4/25/25  9:24 AM  He can decrease his coreg to 12.5 mg BID. Long has he did not have any other symptoms we can monitor. Keep track of BP as well, sometimes elevated BP can cause blurry vision. If this continues to happen recommend going to ED.

## 2025-04-29 ENCOUNTER — CLINICAL SUPPORT (OUTPATIENT)
Dept: REHABILITATION | Facility: HOSPITAL | Age: 60
End: 2025-04-29
Payer: COMMERCIAL

## 2025-04-29 DIAGNOSIS — M53.86 DECREASED RANGE OF MOTION OF INTERVERTEBRAL DISCS OF LUMBAR SPINE: ICD-10-CM

## 2025-04-29 DIAGNOSIS — M25.659 DECREASED RANGE OF HIP MOVEMENT, UNSPECIFIED LATERALITY: Primary | ICD-10-CM

## 2025-04-29 PROCEDURE — 97530 THERAPEUTIC ACTIVITIES: CPT | Mod: PN

## 2025-04-29 PROCEDURE — 97112 NEUROMUSCULAR REEDUCATION: CPT | Mod: PN

## 2025-04-29 PROCEDURE — 97110 THERAPEUTIC EXERCISES: CPT | Mod: PN

## 2025-04-29 PROCEDURE — 97140 MANUAL THERAPY 1/> REGIONS: CPT | Mod: PN

## 2025-04-29 NOTE — PROGRESS NOTES
"  Outpatient Rehab    Physical Therapy Visit    Patient Name: Reggie Sawant  MRN: [id number]  YOB: 1965  Encounter Date: 4/29/2025    Therapy Diagnosis:   Encounter Diagnoses   Name Primary?    Decreased range of hip movement, unspecified laterality Yes    Decreased range of motion of intervertebral discs of lumbar spine            Physician: Narayan Do MD    Physician Orders: Eval and Treat  Medical Diagnosis: Pain of left hip  Lumbar radiculopathy    Visit # / Visits Authorized:  4 / 10  Insurance Authorization Period: 3/31/2025 to 12/31/2025  Date of Evaluation: 4/2/2025  Plan of Care Certification: 4/2/2025 to 5/28/2025        Time In: 1055   Time Out: 1155  Total Time: 60   Total Billable Time:   58 minutes          Subjective   Patient reports decreased pain initially following last visit, but [facility]..  Pain reported as 5/10. L hip pain    Objective            Treatment:  Therapeutic Exercise  TE 1: figure 4 LTR x 2 min  TE 2: recumbent bike lvl 5 x 5 minutes  TE 6: SL hip IR 2 x 10  TE 7: bridges 3 ways x 10 each  TE 9: piriformis stretch 3 x 30"  TE 10: Sl clams with GTB 2 x 10  Manual Therapy  MT 1: MFR to L hip flexor  MT 4: STM to L IT band and piriformis  MT 5: MFR to L piriformis  Balance/Neuromuscular Re-Education  NMR 2: calf raises 3 x 10  NMR 7: PPT 10 x 10"  NMR 8: PPT with march 2 x 10  Therapeutic Activity  TA 1: leg press 3 x 10  TA 2: 3 way sit to stand x 10    Time Entry(in minutes):  Manual Therapy Time Entry: 22  Neuromuscular Re-Education Time Entry: 10  Therapeutic Activity Time Entry: 8  Therapeutic Exercise Time Entry: 30    Assessment & Plan   Assessment: Patient presents with continued hip pain since last visit. Continued plan of care for hip range of motion, lower extremity strengthening, and improved postural stability. Progressed exercise program to more standing and functional strengthening.  Evaluation/Treatment " Tolerance: Patient tolerated treatment well    Patient will continue to benefit from skilled outpatient physical therapy to address the deficits listed in the problem list box on initial evaluation, provide pt/family education and to maximize pt's level of independence in the home and community environment.     Patient's spiritual, cultural, and educational needs considered and patient agreeable to plan of care and goals.           Plan: Continue current plan of care    Goals:   Active       Functional outcome       Patient will show a significant change in FOTO patient-reported outcome tool to demonstrate subjective improvement       Start:  04/02/25    Expected End:  05/28/25            Patient stated goal:   be able to return to work/ pass return to work testing       Start:  04/02/25    Expected End:  05/28/25            Patient will demonstrate independence in home program for support of progression       Start:  04/02/25    Expected End:  05/28/25               Pain       Patient will report pain of 5/10 at its worse demonstrating a reduction of overall pain       Start:  04/02/25    Expected End:  05/28/25               Range of Motion       Patient will achieve bilateral spinal side bending ROM within functional limits for maximal function and return to work        Start:  04/02/25    Expected End:  05/28/25            Patient will achieve bilateral spinal rotation ROM within functional limits for maximal function and return to work        Start:  04/02/25    Expected End:  05/28/25                Marlene Masterson PT

## 2025-04-30 DIAGNOSIS — F41.9 ANXIETY: ICD-10-CM

## 2025-04-30 NOTE — TELEPHONE ENCOUNTER
No care due was identified.  [facility] Embedded Care Due Messages. Reference number: [id number].   4/30/2025 4:06:07 PM CDT

## 2025-05-01 ENCOUNTER — PATIENT MESSAGE (OUTPATIENT)
Dept: OTHER | Facility: OTHER | Age: 60
End: 2025-05-01
Payer: COMMERCIAL

## 2025-05-02 ENCOUNTER — CLINICAL SUPPORT (OUTPATIENT)
Dept: REHABILITATION | Facility: HOSPITAL | Age: 60
End: 2025-05-02
Payer: COMMERCIAL

## 2025-05-02 DIAGNOSIS — M53.86 DECREASED RANGE OF MOTION OF INTERVERTEBRAL DISCS OF LUMBAR SPINE: ICD-10-CM

## 2025-05-02 DIAGNOSIS — M25.659 DECREASED RANGE OF HIP MOVEMENT, UNSPECIFIED LATERALITY: Primary | ICD-10-CM

## 2025-05-02 DIAGNOSIS — F41.9 ANXIETY: ICD-10-CM

## 2025-05-02 PROCEDURE — 97140 MANUAL THERAPY 1/> REGIONS: CPT | Mod: PN

## 2025-05-02 PROCEDURE — 97112 NEUROMUSCULAR REEDUCATION: CPT | Mod: PN

## 2025-05-02 PROCEDURE — 97530 THERAPEUTIC ACTIVITIES: CPT | Mod: PN

## 2025-05-02 PROCEDURE — 97110 THERAPEUTIC EXERCISES: CPT | Mod: PN

## 2025-05-02 NOTE — PROGRESS NOTES
"  Outpatient Rehab    Physical Therapy Visit    Patient Name: Reggie Sawant  MRN: [id number]  YOB: 1965  Encounter Date: 5/2/2025    Therapy Diagnosis:   Encounter Diagnoses   Name Primary?    Decreased range of hip movement, unspecified laterality Yes    Decreased range of motion of intervertebral discs of lumbar spine              Physician: Narayan Do MD    Physician Orders: Eval and Treat  Medical Diagnosis: Pain of left hip  Lumbar radiculopathy    Visit # / Visits Authorized:  5 / 10  Insurance Authorization Period: 3/31/2025 to 12/31/2025  Date of Evaluation: 4/2/2025  Plan of Care Certification: 4/2/2025 to 5/28/2025        Time In: 0910   Time Out: 1005  Total Time: 55   Total Billable Time:   55 minutes          Subjective   Patient reports releif until evening after last visit. He had 7/10 pain this morning, but 4/10 current pain..  Pain reported as 4/10. L hip pain    Objective            Treatment:  Therapeutic Exercise  TE 1: figure 4 LTR x 2 min  TE 2: recumbent bike lvl 5 x 5 minutes  TE 6: SL hip IR 2 x 10  TE 7: bridges 3 ways x 10 each  TE 8: slant board stretch 3 x 1 min  TE 9: piriformis stretch 2 x 30"  TE 10: Sl clams with GTB 2 x 10  Manual Therapy  MT 1: MFR to L hip flexor  MT 3: manual lumbar traction  MT 4: STM to L IT band and piriformis  MT 5: MFR to L piriformis  Balance/Neuromuscular Re-Education  NMR 2: calf raises 3 x 10  NMR 7: PPT 10 x 10"  NMR 8: PPT with march 2 x 10  Therapeutic Activity  TA 1: leg press 3 x 10  TA 2: 3 way sit to stand x 10    Time Entry(in minutes):  Manual Therapy Time Entry: 10  Neuromuscular Re-Education Time Entry: 10  Therapeutic Activity Time Entry: 10  Therapeutic Exercise Time Entry: 25    Assessment & Plan   Assessment: Patient relief of pain following last session, but pain returned. Continued plan of care for hip range of motion, lower extremity strengthening, and improved postural stability.  Evaluation/Treatment Tolerance: " Patient tolerated treatment well    Patient will continue to benefit from skilled outpatient physical therapy to address the deficits listed in the problem list box on initial evaluation, provide pt/family education and to maximize pt's level of independence in the home and community environment.     Patient's spiritual, cultural, and educational needs considered and patient agreeable to plan of care and goals.           Plan: Continue current plan of care    Goals:   Active       Functional outcome       Patient will show a significant change in FOTO patient-reported outcome tool to demonstrate subjective improvement       Start:  04/02/25    Expected End:  05/28/25            Patient stated goal:   be able to return to work/ pass return to work testing       Start:  04/02/25    Expected End:  05/28/25            Patient will demonstrate independence in home program for support of progression       Start:  04/02/25    Expected End:  05/28/25               Pain       Patient will report pain of 5/10 at its worse demonstrating a reduction of overall pain       Start:  04/02/25    Expected End:  05/28/25               Range of Motion       Patient will achieve bilateral spinal side bending ROM within functional limits for maximal function and return to work        Start:  04/02/25    Expected End:  05/28/25            Patient will achieve bilateral spinal rotation ROM within functional limits for maximal function and return to work        Start:  04/02/25    Expected End:  05/28/25                Marlene Masterson PT

## 2025-05-02 NOTE — TELEPHONE ENCOUNTER
No care due was identified.  [facility] Embedded Care Due Messages. Reference number: [id number].   5/02/2025 3:59:22 PM CDT

## 2025-05-06 ENCOUNTER — CLINICAL SUPPORT (OUTPATIENT)
Dept: REHABILITATION | Facility: HOSPITAL | Age: 60
End: 2025-05-06
Payer: COMMERCIAL

## 2025-05-06 DIAGNOSIS — M53.86 DECREASED RANGE OF MOTION OF INTERVERTEBRAL DISCS OF LUMBAR SPINE: ICD-10-CM

## 2025-05-06 DIAGNOSIS — M25.659 DECREASED RANGE OF HIP MOVEMENT, UNSPECIFIED LATERALITY: Primary | ICD-10-CM

## 2025-05-06 PROCEDURE — 97112 NEUROMUSCULAR REEDUCATION: CPT | Mod: PN

## 2025-05-06 PROCEDURE — 97110 THERAPEUTIC EXERCISES: CPT | Mod: PN

## 2025-05-06 PROCEDURE — 97140 MANUAL THERAPY 1/> REGIONS: CPT | Mod: PN

## 2025-05-06 NOTE — PROGRESS NOTES
"  Outpatient Rehab    Physical Therapy Visit    Patient Name: Reggie Sawant  MRN: [id number]  YOB: 1965  Encounter Date: 5/6/2025    Therapy Diagnosis:   Encounter Diagnoses   Name Primary?    Decreased range of hip movement, unspecified laterality Yes    Decreased range of motion of intervertebral discs of lumbar spine                Physician: Narayan Do MD    Physician Orders: Eval and Treat  Medical Diagnosis: Pain of left hip  Lumbar radiculopathy    Visit # / Visits Authorized:  6 / 10  Insurance Authorization Period: 3/31/2025 to 12/31/2025  Date of Evaluation: 4/2/2025  Plan of Care Certification: 4/2/2025 to 5/28/2025        Time In: 1110   Time Out: 1155  Total Time: 45   Total Billable Time:   45 minutes          Subjective   Patient reports no change in symptoms..  Pain reported as 3/10. L hip pain    Objective            Treatment:  Therapeutic Exercise  TE 1: LTR x 2 min  TE 2: recumbent bike lvl 3 x 5 minutes  TE 6: Seated hip IR with GTB 2 x 10  TE 7: bridges 3 ways x 10 each  TE 9: piriformis stretch 2 x 30"  Manual Therapy  MT 1: MFR to L hip flexor  MT 3: manual lumbar traction  MT 5: MFR to L piriformis  Balance/Neuromuscular Re-Education  NMR 7: PPT 10 x 10"  NMR 8: PPT with march 2 x 10  Therapeutic Activity  TA 2: 3 way sit to stand x 10    Time Entry(in minutes):  Manual Therapy Time Entry: 15  Neuromuscular Re-Education Time Entry: 10  Therapeutic Activity Time Entry: 5  Therapeutic Exercise Time Entry: 15    Assessment & Plan   Assessment: Continued plan of care for hip range of motion, lower extremity strengthening, and improved postural stability. Patient reports decreased msucle tension at end of session.  Evaluation/Treatment Tolerance: Patient tolerated treatment well    Patient will continue to benefit from skilled outpatient physical therapy to address the deficits listed in the problem list box on initial evaluation, provide pt/family education and to maximize " pt's level of independence in the home and community environment.     Patient's spiritual, cultural, and educational needs considered and patient agreeable to plan of care and goals.           Plan: Continue current plan of care    Goals:   Active       Functional outcome       Patient will show a significant change in FOTO patient-reported outcome tool to demonstrate subjective improvement       Start:  04/02/25    Expected End:  05/28/25            Patient stated goal:   be able to return to work/ pass return to work testing       Start:  04/02/25    Expected End:  05/28/25            Patient will demonstrate independence in home program for support of progression       Start:  04/02/25    Expected End:  05/28/25               Pain       Patient will report pain of 5/10 at its worse demonstrating a reduction of overall pain       Start:  04/02/25    Expected End:  05/28/25               Range of Motion       Patient will achieve bilateral spinal side bending ROM within functional limits for maximal function and return to work        Start:  04/02/25    Expected End:  05/28/25            Patient will achieve bilateral spinal rotation ROM within functional limits for maximal function and return to work        Start:  04/02/25    Expected End:  05/28/25                Marlene Masterson, PT

## 2025-05-07 RX ORDER — BUSPIRONE HYDROCHLORIDE 10 MG/1
10 TABLET ORAL 3 TIMES DAILY
Qty: 90 TABLET | Refills: 1 | Status: SHIPPED | OUTPATIENT
Start: 2025-05-07

## 2025-05-08 RX ORDER — BUSPIRONE HYDROCHLORIDE 10 MG/1
10 TABLET ORAL 3 TIMES DAILY
Qty: 90 TABLET | Refills: 1 | OUTPATIENT
Start: 2025-05-08

## 2025-05-09 ENCOUNTER — CLINICAL SUPPORT (OUTPATIENT)
Dept: REHABILITATION | Facility: HOSPITAL | Age: 60
End: 2025-05-09
Payer: COMMERCIAL

## 2025-05-09 DIAGNOSIS — M25.659 DECREASED RANGE OF HIP MOVEMENT, UNSPECIFIED LATERALITY: Primary | ICD-10-CM

## 2025-05-09 DIAGNOSIS — M53.86 DECREASED RANGE OF MOTION OF INTERVERTEBRAL DISCS OF LUMBAR SPINE: ICD-10-CM

## 2025-05-09 PROCEDURE — 97110 THERAPEUTIC EXERCISES: CPT | Mod: PN

## 2025-05-09 PROCEDURE — 97112 NEUROMUSCULAR REEDUCATION: CPT | Mod: PN

## 2025-05-09 NOTE — PROGRESS NOTES
"  Outpatient Rehab    Physical Therapy Progress Note    Patient Name: Reggie Sawant  MRN: [id number]  YOB: 1965  Encounter Date: 5/9/2025    Therapy Diagnosis:   Encounter Diagnoses   Name Primary?    Decreased range of hip movement, unspecified laterality Yes    Decreased range of motion of intervertebral discs of lumbar spine      Physician: Narayan Do MD    Physician Orders: Eval and Treat  Medical Diagnosis: Pain of left hip  Lumbar radiculopathy    Visit # / Visits Authorized:  7 / 10  Insurance Authorization Period: 3/31/2025 to 12/31/2025  Date of Evaluation: 4/2/2025  Plan of Care Certification: 4/2/2025 to 5/28/2025      Time In: 0910   Time Out: 0955  Total Time (in minutes): 45   Total Billable Time (in minutes):  45     FOTO:  Intake Score: 49%  Survey Score 2: 33%  Survey Score 3:  %         Subjective   Patient reports 6/10 pain when first arriving but 8/10 pain after running to car in the rain. He woke up in 8/10 every morning, but cleaned out gutters yesterday. He reports climbing/steps aggervates his pain. He reports feeling more mobile/moving better but still significant pain..  Pain reported as 8/10. L hip pain    Objective      Lumbar Range of Motion   Active (deg) Passive (deg) Pain   Flexion 90       Extension 75       Right Lateral Flexion 75       Right Rotation 100       Left Lateral Flexion 75       Left Rotation 100                        Treatment:  Therapeutic Exercise  TE 1: LTR x 2 min  TE 2: recumbent bike lvl 3 x 5 minutes  TE 3: FM seated hamstring curls 23# 2 x 10  TE 4: supine SLR 2 x 10  TE 6: Seated hip IR with GTB 2 x 10  TE 7: bridges with GTB 2 x 10  TE 8: prone hamstring curls 2 x 10  TE 9: piriformis stretch 2 x 30"  TE 10: Sl clams with GTB 2 x 10  Balance/Neuromuscular Re-Education  NMR 1: steam boats x 10 (painful)  NMR 5: side stepping x 5 laps YTB (painful)  NMR 7: PPT 10 x 10"  NMR 8: PPT with march 2 x 10    Time Entry(in minutes):  Neuromuscular " Re-Education Time Entry: 15  Therapeutic Exercise Time Entry: 30    Assessment & Plan   Assessment: Continued plan of care for hip range of motion, lower extremity strengthening, and improved postural stability. Patient reports increased mobility, but FOTO score doesnt match subjective report. Side stepping and steam boats were painful for patient.  Evaluation/Treatment Tolerance: Patient limited by pain    Patient will continue to benefit from skilled outpatient physical therapy to address the deficits listed in the problem list box on initial evaluation, provide pt/family education and to maximize pt's level of independence in the home and community environment.     Patient's spiritual, cultural, and educational needs considered and patient agreeable to plan of care and goals.           Plan: Continue current plan of care    Goals:   Active       Functional outcome       Patient will show a significant change in FOTO patient-reported outcome tool to demonstrate subjective improvement       Start:  04/02/25    Expected End:  05/28/25            Patient stated goal:   be able to return to work/ pass return to work testing       Start:  04/02/25    Expected End:  05/28/25            Patient will demonstrate independence in home program for support of progression       Start:  04/02/25    Expected End:  05/28/25               Pain       Patient will report pain of 5/10 at its worse demonstrating a reduction of overall pain       Start:  04/02/25    Expected End:  05/28/25               Range of Motion       Patient will achieve bilateral spinal side bending ROM within functional limits for maximal function and return to work        Start:  04/02/25    Expected End:  05/28/25            Patient will achieve bilateral spinal rotation ROM within functional limits for maximal function and return to work  (Met)       Start:  04/02/25    Expected End:  05/28/25    Resolved:  05/09/25             Marlene Masterson PT

## 2025-05-12 ENCOUNTER — PATIENT MESSAGE (OUTPATIENT)
Dept: REHABILITATION | Facility: HOSPITAL | Age: 60
End: 2025-05-12
Payer: COMMERCIAL

## 2025-05-14 ENCOUNTER — CLINICAL SUPPORT (OUTPATIENT)
Dept: REHABILITATION | Facility: HOSPITAL | Age: 60
End: 2025-05-14
Payer: COMMERCIAL

## 2025-05-14 DIAGNOSIS — M25.659 DECREASED RANGE OF HIP MOVEMENT, UNSPECIFIED LATERALITY: Primary | ICD-10-CM

## 2025-05-14 DIAGNOSIS — M53.86 DECREASED RANGE OF MOTION OF INTERVERTEBRAL DISCS OF LUMBAR SPINE: ICD-10-CM

## 2025-05-14 PROCEDURE — 97112 NEUROMUSCULAR REEDUCATION: CPT | Mod: PN

## 2025-05-14 PROCEDURE — 97140 MANUAL THERAPY 1/> REGIONS: CPT | Mod: PN

## 2025-05-14 PROCEDURE — 97110 THERAPEUTIC EXERCISES: CPT | Mod: PN

## 2025-05-14 NOTE — PROGRESS NOTES
"    Outpatient Rehab    Physical Therapy Visit    Patient Name: Reggie Sawant  MRN: [id number]  YOB: 1965  Encounter Date: 5/14/2025    Therapy Diagnosis:   Encounter Diagnoses   Name Primary?    Decreased range of hip movement, unspecified laterality Yes    Decreased range of motion of intervertebral discs of lumbar spine      Physician: Narayan Do MD    Physician Orders: Eval and Treat  Medical Diagnosis: Pain of left hip  Lumbar radiculopathy    Visit # / Visits Authorized:  8 / 10  Insurance Authorization Period: 3/31/2025 to 12/31/2025  Date of Evaluation: 3/18/2025  Plan of Care Certification: 4/2/2025 to 5/28/2025      Time In: 0900   Time Out: 0945  Total Time (in minutes): 45   Total Billable Time (in minutes):  45       Precautions:       Subjective   Patient reports waking up in 10/10 pain this morning, but no pain while pedalling the bike..         Objective            Treatment:  Therapeutic Exercise  TE 1: LTR x 2 min  TE 2: recumbent bike lvl 3 x 5 minutes  TE 4: supine SLR 2 x 10  TE 7: bridges with GTB 2 x 10  TE 8: prone hamstring curls 2 x 10  TE 9: piriformis stretch 2 x 30"  TE 10: Sl clams with GTB 2 x 10  Manual Therapy  MT 1: MFR to L hip flexor  MT 2: L hip long axis traction  MT 3: manual lumbar traction  Balance/Neuromuscular Re-Education  NMR 7: PPT 10 x 10"  NMR 8: PPT with march 2 x 10    Time Entry(in minutes):  Manual Therapy Time Entry: 15  Neuromuscular Re-Education Time Entry: 8  Therapeutic Exercise Time Entry: 22    Assessment & Plan   Assessment: Continued plan of care for hip range of motion, lower extremity strengthening, and improved postural stability.  Evaluation/Treatment Tolerance: Patient limited by pain    Patient will continue to benefit from skilled outpatient physical therapy to address the deficits listed in the problem list box on initial evaluation, provide pt/family education and to maximize pt's level of independence in the home and community " environment.     Patient's spiritual, cultural, and educational needs considered and patient agreeable to plan of care and goals.           Plan: Continue current plan of care    Goals:   Active       Functional outcome       Patient will show a significant change in FOTO patient-reported outcome tool to demonstrate subjective improvement       Start:  04/02/25    Expected End:  05/28/25            Patient stated goal:   be able to return to work/ pass return to work testing       Start:  04/02/25    Expected End:  05/28/25            Patient will demonstrate independence in home program for support of progression       Start:  04/02/25    Expected End:  05/28/25               Pain       Patient will report pain of 5/10 at its worse demonstrating a reduction of overall pain       Start:  04/02/25    Expected End:  05/28/25               Range of Motion       Patient will achieve bilateral spinal side bending ROM within functional limits for maximal function and return to work        Start:  04/02/25    Expected End:  05/28/25            Patient will achieve bilateral spinal rotation ROM within functional limits for maximal function and return to work  (Met)       Start:  04/02/25    Expected End:  05/28/25    Resolved:  05/09/25             Marlene Masterson PT

## 2025-05-14 NOTE — PROCEDURES
Large Joint Aspiration/Injection: R subacromial bursa    Date/Time: 10/14/2022 8:00 AM  Performed by: Filiberto Sherman MD  Authorized by: Filiberto Sherman MD     Consent Done?:  Yes (Verbal)  Indications:  Pain  Site marked: the procedure site was marked    Timeout: prior to procedure the correct patient, procedure, and site was verified    Prep: patient was prepped and draped in usual sterile fashion      Local anesthesia used?: Yes    Anesthesia:  Local infiltration  Local anesthetic:  Lidocaine 1% without epinephrine    Details:  Needle Size:  22 G  Ultrasonic Guidance for needle placement?: No    Approach:  Posterior  Location:  Shoulder  Site:  R subacromial bursa  Medications:  40 mg triamcinolone acetonide 40 mg/mL  Patient tolerance:  Patient tolerated the procedure well with no immediate complications   Quality 226: Preventive Care And Screening: Tobacco Use: Screening And Cessation Intervention: Patient screened for tobacco use and is an ex/non-smoker Detail Level: Zone

## 2025-05-16 ENCOUNTER — PATIENT MESSAGE (OUTPATIENT)
Dept: PAIN MEDICINE | Facility: CLINIC | Age: 60
End: 2025-05-16
Payer: COMMERCIAL

## 2025-05-16 ENCOUNTER — TELEPHONE (OUTPATIENT)
Dept: PAIN MEDICINE | Facility: CLINIC | Age: 60
End: 2025-05-16
Payer: COMMERCIAL

## 2025-05-16 ENCOUNTER — CLINICAL SUPPORT (OUTPATIENT)
Dept: REHABILITATION | Facility: HOSPITAL | Age: 60
End: 2025-05-16
Payer: COMMERCIAL

## 2025-05-16 DIAGNOSIS — M53.86 DECREASED RANGE OF MOTION OF INTERVERTEBRAL DISCS OF LUMBAR SPINE: ICD-10-CM

## 2025-05-16 DIAGNOSIS — M25.659 DECREASED RANGE OF HIP MOVEMENT, UNSPECIFIED LATERALITY: Primary | ICD-10-CM

## 2025-05-16 PROCEDURE — 97110 THERAPEUTIC EXERCISES: CPT | Mod: PN

## 2025-05-16 PROCEDURE — 97112 NEUROMUSCULAR REEDUCATION: CPT | Mod: PN

## 2025-05-16 NOTE — TELEPHONE ENCOUNTER
Reached out to the pt to get clarification on the message he sent through [employer]. I am trying to figure out if he's saying another clearance needs to be sent to the Cardiologist.    Pt did not answer.  VERA COON MA

## 2025-05-16 NOTE — TELEPHONE ENCOUNTER
Returned the pts call. He is just wanting a clearance sent to his Cardiologist in order to proceed with his procedure.     Sent Lori and Ronaldo Claudette a message to get this clearance sent.    Terra COON MA

## 2025-05-16 NOTE — PROGRESS NOTES
"    Outpatient Rehab    Physical Therapy Visit    Patient Name: Reggie Sawant  MRN: [id number]  YOB: 1965  Encounter Date: 5/16/2025    Therapy Diagnosis:   Encounter Diagnoses   Name Primary?    Decreased range of hip movement, unspecified laterality Yes    Decreased range of motion of intervertebral discs of lumbar spine        Physician: Narayan Do MD    Physician Orders: Eval and Treat  Medical Diagnosis: Pain of left hip  Lumbar radiculopathy    Visit # / Visits Authorized:  9 / 10  Insurance Authorization Period: 3/31/2025 to 12/31/2025  Date of Evaluation: 3/18/2025  Plan of Care Certification: 4/2/2025 to 5/28/2025      Time In: 0905   Time Out: 0950  Total Time (in minutes): 45   Total Billable Time (in minutes):  45       Precautions:       Subjective   Patient reports messaging MD concerning progressively more pain..  Pain reported as 4/10. L hip pain    Objective            Treatment:  Therapeutic Exercise  TE 1: LTR x 2 min  TE 2: recumbent bike lvl 3 x 5 minutes  TE 4: supine SLR 2 x 10  TE 6: Seated hip IR with GTB 2 x 10  TE 7: bridges 3 ways x 10 each  TE 8: prone hamstring curls 2 x 10  TE 9: piriformis stretch 2 x 30"  TE 10: Sl clams with GTB 2 x 10  Balance/Neuromuscular Re-Education  NMR 7: PPT 10 x 10"  NMR 8: PPT with march 2 x 10  NMR 9: quadruped bird dogs 2 x 10    Time Entry(in minutes):  Neuromuscular Re-Education Time Entry: 15  Therapeutic Exercise Time Entry: 25    Assessment & Plan   Assessment: Continued plan of care for hip range of motion, lower extremity strengthening, and improved postural stability. Patient plans to follow up with MD concerning increased pain. Patient will follow up with physical therapy per MD instructions.  Evaluation/Treatment Tolerance: Patient limited by pain    Patient will continue to benefit from skilled outpatient physical therapy to address the deficits listed in the problem list box on initial evaluation, provide pt/family " education and to maximize pt's level of independence in the home and community environment.     Patient's spiritual, cultural, and educational needs considered and patient agreeable to plan of care and goals.           Plan: Patient plans to follow up with MD concerning increased pain. Patient will follow up with physical therapy per MD instructions.    Goals:   Active       Functional outcome       Patient will show a significant change in FOTO patient-reported outcome tool to demonstrate subjective improvement       Start:  04/02/25    Expected End:  05/28/25            Patient stated goal:   be able to return to work/ pass return to work testing       Start:  04/02/25    Expected End:  05/28/25            Patient will demonstrate independence in home program for support of progression       Start:  04/02/25    Expected End:  05/28/25               Pain       Patient will report pain of 5/10 at its worse demonstrating a reduction of overall pain       Start:  04/02/25    Expected End:  05/28/25               Range of Motion       Patient will achieve bilateral spinal side bending ROM within functional limits for maximal function and return to work        Start:  04/02/25    Expected End:  05/28/25            Patient will achieve bilateral spinal rotation ROM within functional limits for maximal function and return to work  (Met)       Start:  04/02/25    Expected End:  05/28/25    Resolved:  05/09/25             Marlene Masterson PT

## 2025-05-21 ENCOUNTER — PATIENT MESSAGE (OUTPATIENT)
Dept: PAIN MEDICINE | Facility: CLINIC | Age: 60
End: 2025-05-21
Payer: COMMERCIAL

## 2025-05-22 ENCOUNTER — TELEPHONE (OUTPATIENT)
Dept: PAIN MEDICINE | Facility: CLINIC | Age: 60
End: 2025-05-22
Payer: COMMERCIAL

## 2025-05-22 DIAGNOSIS — Z79.01 ANTICOAGULATED: Primary | ICD-10-CM

## 2025-05-22 NOTE — TELEPHONE ENCOUNTER
I called the patient and got him scheduled for his procedure, I also went over the procedure instructions. He also understands that we are waiting on clearance from his cardiologist as well. I scheduled his follow up virtual as well.    Lisseth CISSE ([facility])

## 2025-05-22 NOTE — TELEPHONE ENCOUNTER
----- Message from Susana Wilkins NP sent at 5/19/2025  7:49 AM CDT -----  Pain Provider: Magaly Name: Reggie SawantN: [id number]: LUMBAR INTERLAMINAR EPIDURAL STEROID INJECTIONLevel: L4/5Anticoagulation: eliquisLength to hold:3 daysRx Provider: Grady Hunter week follow up- offer virtual 7-8:00

## 2025-05-27 ENCOUNTER — E-CONSULT (OUTPATIENT)
Dept: CARDIOLOGY | Facility: CLINIC | Age: 60
End: 2025-05-27
Payer: COMMERCIAL

## 2025-05-27 DIAGNOSIS — Z01.810 PREOP CARDIOVASCULAR EXAM: Primary | ICD-10-CM

## 2025-05-28 NOTE — CONSULTS
[facility]  Response for E-Consult     Patient Name: Reggie Sawant  MRN: [id number]  Primary Care Provider: Irene Sanchez MD   Requesting Provider: Susana Wilkins NP  E-Consult to General Cardiology  Consult performed by: Omero Song MD  Consult ordered by: Susana Wilkins NP          E consult for preop clearance of lumbar PAUL  The chart reviewed.  PMH afib htn hld    Plan  Elevated periop risk of CV events for non-high risk procedure.  Ok to proceed the scheduled procedure without further cardiac study.  OK to hold Eliquis 3 days before the procedure and resume postop once hemodynamically stable      Total time of Consultation: 10 minute    I did not speak to the requesting provider verbally about this.     *This eConsult is based on the clinical data available to me and is furnished without benefit of a physical examination. The eConsult will need to be interpreted in light of any clinical issues or changes in patient status not available to me at the time of filing this eConsults. Significant changes in patient condition or level of acuity should result in immediate formal consultation and reevaluation. Please alert me if you have further questions.    Thank you for this eConsult referral.     Omero Song MD  The [address]

## 2025-05-30 ENCOUNTER — OFFICE VISIT (OUTPATIENT)
Dept: PAIN MEDICINE | Facility: CLINIC | Age: 60
End: 2025-05-30
Payer: COMMERCIAL

## 2025-05-30 DIAGNOSIS — M54.16 LUMBAR RADICULOPATHY: Primary | ICD-10-CM

## 2025-06-02 ENCOUNTER — HOSPITAL ENCOUNTER (OUTPATIENT)
Facility: HOSPITAL | Age: 60
Discharge: HOME | End: 2025-06-02
Attending: ANESTHESIOLOGY | Admitting: ANESTHESIOLOGY
Payer: COMMERCIAL

## 2025-06-02 ENCOUNTER — TELEPHONE (OUTPATIENT)
Dept: PAIN MEDICINE | Facility: CLINIC | Age: 60
End: 2025-06-02
Payer: COMMERCIAL

## 2025-06-02 ENCOUNTER — PATIENT MESSAGE (OUTPATIENT)
Dept: PAIN MEDICINE | Facility: CLINIC | Age: 60
End: 2025-06-02
Payer: COMMERCIAL

## 2025-06-02 VITALS
HEART RATE: 55 BPM | BODY MASS INDEX: 37.19 KG/M2 | OXYGEN SATURATION: 96 % | RESPIRATION RATE: 16 BRPM | DIASTOLIC BLOOD PRESSURE: 51 MMHG | HEIGHT: 77 IN | SYSTOLIC BLOOD PRESSURE: 100 MMHG | TEMPERATURE: 98 F | WEIGHT: 315 LBS

## 2025-06-02 DIAGNOSIS — M54.16 LUMBAR RADICULOPATHY: Primary | ICD-10-CM

## 2025-06-02 PROCEDURE — 63600175 PHARM REV CODE 636 W HCPCS: Performed by: ANESTHESIOLOGY

## 2025-06-02 PROCEDURE — A9585 GADOBUTROL INJECTION: HCPCS | Performed by: ANESTHESIOLOGY

## 2025-06-02 PROCEDURE — 25500020 PHARM REV CODE 255: Performed by: ANESTHESIOLOGY

## 2025-06-02 PROCEDURE — 62323 NJX INTERLAMINAR LMBR/SAC: CPT | Mod: ,,, | Performed by: ANESTHESIOLOGY

## 2025-06-02 PROCEDURE — 62323 NJX INTERLAMINAR LMBR/SAC: CPT | Performed by: ANESTHESIOLOGY

## 2025-06-02 RX ORDER — LIDOCAINE HYDROCHLORIDE 10 MG/ML
INJECTION, SOLUTION EPIDURAL; INFILTRATION; INTRACAUDAL; PERINEURAL
Status: DISCONTINUED | OUTPATIENT
Start: 2025-06-02 | End: 2025-06-02 | Stop reason: HOSPADM

## 2025-06-02 RX ORDER — GADOBUTROL 604.72 MG/ML
INJECTION INTRAVENOUS
Status: DISCONTINUED | OUTPATIENT
Start: 2025-06-02 | End: 2025-06-02 | Stop reason: HOSPADM

## 2025-06-02 RX ORDER — FENTANYL CITRATE 50 UG/ML
INJECTION, SOLUTION INTRAMUSCULAR; INTRAVENOUS
Status: DISCONTINUED | OUTPATIENT
Start: 2025-06-02 | End: 2025-06-02 | Stop reason: HOSPADM

## 2025-06-02 RX ORDER — ONDANSETRON HYDROCHLORIDE 2 MG/ML
4 INJECTION, SOLUTION INTRAVENOUS ONCE AS NEEDED
Status: DISCONTINUED | OUTPATIENT
Start: 2025-06-02 | End: 2025-06-02 | Stop reason: HOSPADM

## 2025-06-02 RX ORDER — MIDAZOLAM HYDROCHLORIDE 1 MG/ML
INJECTION INTRAMUSCULAR; INTRAVENOUS
Status: DISCONTINUED | OUTPATIENT
Start: 2025-06-02 | End: 2025-06-02 | Stop reason: HOSPADM

## 2025-06-02 RX ORDER — BETAMETHASONE SODIUM PHOSPHATE AND BETAMETHASONE ACETATE 3; 3 MG/ML; MG/ML
INJECTION, SUSPENSION INTRA-ARTICULAR; INTRALESIONAL; INTRAMUSCULAR; SOFT TISSUE
Status: DISCONTINUED | OUTPATIENT
Start: 2025-06-02 | End: 2025-06-02 | Stop reason: HOSPADM

## 2025-06-12 ENCOUNTER — LAB VISIT (OUTPATIENT)
Dept: LAB | Facility: HOSPITAL | Age: 60
End: 2025-06-12
Attending: FAMILY MEDICINE
Payer: COMMERCIAL

## 2025-06-12 ENCOUNTER — OFFICE VISIT (OUTPATIENT)
Dept: FAMILY MEDICINE | Facility: CLINIC | Age: 60
End: 2025-06-12
Attending: FAMILY MEDICINE
Payer: COMMERCIAL

## 2025-06-12 VITALS
HEART RATE: 59 BPM | OXYGEN SATURATION: 96 % | SYSTOLIC BLOOD PRESSURE: 114 MMHG | TEMPERATURE: 96 F | RESPIRATION RATE: 18 BRPM | DIASTOLIC BLOOD PRESSURE: 58 MMHG | WEIGHT: 315 LBS | HEIGHT: 77 IN | BODY MASS INDEX: 37.19 KG/M2

## 2025-06-12 DIAGNOSIS — E66.01 MORBID OBESITY WITH BMI OF 40.0-44.9, ADULT: ICD-10-CM

## 2025-06-12 DIAGNOSIS — I48.91 ATRIAL FIBRILLATION WITH RVR: ICD-10-CM

## 2025-06-12 DIAGNOSIS — G47.33 OSA ON CPAP: ICD-10-CM

## 2025-06-12 DIAGNOSIS — R73.03 PREDIABETES: ICD-10-CM

## 2025-06-12 DIAGNOSIS — Z00.00 ANNUAL PHYSICAL EXAM: ICD-10-CM

## 2025-06-12 DIAGNOSIS — Z72.0 TOBACCO USE: ICD-10-CM

## 2025-06-12 DIAGNOSIS — I10 ESSENTIAL HYPERTENSION: ICD-10-CM

## 2025-06-12 DIAGNOSIS — F17.200 NEEDS SMOKING CESSATION EDUCATION: ICD-10-CM

## 2025-06-12 DIAGNOSIS — Z23 NEED FOR TD VACCINE: ICD-10-CM

## 2025-06-12 DIAGNOSIS — Z00.00 ANNUAL PHYSICAL EXAM: Primary | ICD-10-CM

## 2025-06-12 DIAGNOSIS — E78.5 HYPERLIPIDEMIA, UNSPECIFIED HYPERLIPIDEMIA TYPE: Chronic | ICD-10-CM

## 2025-06-12 DIAGNOSIS — M16.12 PRIMARY OSTEOARTHRITIS OF LEFT HIP: ICD-10-CM

## 2025-06-12 LAB
CHOLEST SERPL-MCNC: 205 MG/DL (ref 120–199)
CHOLEST/HDLC SERPL: 5.1 {RATIO} (ref 2–5)
EAG (OHS): 128 MG/DL (ref 68–131)
HBA1C MFR BLD: 6.1 % (ref 4–5.6)
HDLC SERPL-MCNC: 40 MG/DL (ref 40–75)
HDLC SERPL: 19.5 % (ref 20–50)
LDLC SERPL CALC-MCNC: 117 MG/DL (ref 63–159)
NONHDLC SERPL-MCNC: 165 MG/DL
TRIGL SERPL-MCNC: 240 MG/DL (ref 30–150)

## 2025-06-12 PROCEDURE — 3008F BODY MASS INDEX DOCD: CPT | Mod: CPTII,S$GLB,, | Performed by: FAMILY MEDICINE

## 2025-06-12 PROCEDURE — 90714 TD VACC NO PRESV 7 YRS+ IM: CPT | Mod: S$GLB,,, | Performed by: FAMILY MEDICINE

## 2025-06-12 PROCEDURE — 4010F ACE/ARB THERAPY RXD/TAKEN: CPT | Mod: CPTII,S$GLB,, | Performed by: FAMILY MEDICINE

## 2025-06-12 PROCEDURE — 80061 LIPID PANEL: CPT

## 2025-06-12 PROCEDURE — 99396 PREV VISIT EST AGE 40-64: CPT | Mod: 25,S$GLB,, | Performed by: FAMILY MEDICINE

## 2025-06-12 PROCEDURE — 3074F SYST BP LT 130 MM HG: CPT | Mod: CPTII,S$GLB,, | Performed by: FAMILY MEDICINE

## 2025-06-12 PROCEDURE — 90471 IMMUNIZATION ADMIN: CPT | Mod: S$GLB,,, | Performed by: FAMILY MEDICINE

## 2025-06-12 PROCEDURE — 36415 COLL VENOUS BLD VENIPUNCTURE: CPT | Mod: PO

## 2025-06-12 PROCEDURE — 3078F DIAST BP <80 MM HG: CPT | Mod: CPTII,S$GLB,, | Performed by: FAMILY MEDICINE

## 2025-06-12 PROCEDURE — 83036 HEMOGLOBIN GLYCOSYLATED A1C: CPT

## 2025-06-12 PROCEDURE — 1159F MED LIST DOCD IN RCRD: CPT | Mod: CPTII,S$GLB,, | Performed by: FAMILY MEDICINE

## 2025-06-12 PROCEDURE — 99999 PR PBB SHADOW E&M-EST. PATIENT-LVL V: CPT | Mod: PBBFAC,,, | Performed by: FAMILY MEDICINE

## 2025-06-12 PROCEDURE — 3044F HG A1C LEVEL LT 7.0%: CPT | Mod: CPTII,S$GLB,, | Performed by: FAMILY MEDICINE

## 2025-06-12 NOTE — PROGRESS NOTES
HISTORY OF PRESENT ILLNESS: Mr. Sawant comes in today not fasting with taking medication and without acute problems for annual wellness examination.    END OF LIFE DECISION: He has a living will but does not desire life-support.    Current Outpatient Medications   Medication Sig    amLODIPine (NORVASC) 10 MG tablet Take 1 tablet (10 mg total) by mouth once daily.    apixaban (ELIQUIS) 5 mg Tab Take 1 tablet (5 mg total) by mouth 2 (two) times daily.    busPIRone (BUSPAR) 10 MG tablet TAKE 1 TABLET 3 TIMES A DAY    carvediloL (COREG) 25 MG tablet Take 1 tablet (25 mg total) by mouth 2 (two) times daily with meals.    chlorthalidone (HYGROTEN) 50 MG Tab Take 1 tablet (50 mg total) by mouth once daily.    EScitalopram oxalate (LEXAPRO) 5 MG Tab TAKE 1 TABLET ONCE DAILY    multivitamin (THERAGRAN) per tablet Take 1 tablet by mouth once daily.    potassium chloride SA (K-DUR,KLOR-CON) 20 MEQ tablet Take 1 tablet (20 mEq total) by mouth once daily.    pravastatin (PRAVACHOL) 20 MG tablet Take 1 tablet (20 mg total) by mouth once daily.    spironolactone (ALDACTONE) 50 MG tablet Take 1 tablet (50 mg total) by mouth once daily.    tadalafiL (CIALIS) 20 MG Tab Take 1 tablet (20 mg total) by mouth once daily.    testosterone cypionate (DEPOTESTOTERONE CYPIONATE) 200 mg/mL injection Inject 1 mL (200 mg total) into the muscle every 7 days. ADMINISTER 1 ML IN THE MUSCLE EVERY 14 DAYS    valsartan (DIOVAN) 320 MG tablet Take 1 tablet (320 mg total) by mouth once daily.      SCREENINGS:   Cholesterol: May 17, 2024.  FFS/Colonoscopy: April 27, 2023 - okay; repeat in 5 years. December 6, 2017 - benign colon polyp, internal hemorrhoid.  Prostate/PSA: February 10, 2025 - 2.5.  Dexa Scan: Never.  Eye Exam: June 2018 with [facility] per patient. He wears glasses. He states he will schedule.  PPD: Negative in the past.    Immunization History   Administered Date(s) Administered    COVID-19 MRNA, LN-S PF (MODERNA HALF 0.25 ML DOSE)  11/16/2021, 07/20/2022    COVID-19 Vaccine 04/13/2021, 11/16/2021, 07/20/2022    COVID-19, MRNA, LN-S, PF (MODERNA FULL 0.5 ML DOSE) 03/16/2021, 04/13/2021    COVID-19, mRNA, LNP-S, PF (Moderna) Ages 12+ 11/14/2023    COVID-19, mRNA, LNP-S, bivalent booster, PF (Moderna Omicron)12 + YEARS 11/16/2022    Influenza 08/01/2019, 11/16/2022    Influenza - Quadrivalent 11/16/2022    Influenza - Quadrivalent - MDCK - PF 11/14/2023    Influenza - Quadrivalent - PF *Preferred* (6 months and older) 10/30/2015, 10/04/2017, 10/05/2018, 01/29/2020, 10/07/2020, 09/28/2021, 11/16/2022    Influenza - Trivalent - Afluria, Fluzone MDV 12/14/2009, 11/15/2010, 10/28/2011    Influenza Split 12/14/2009, 11/15/2010, 10/28/2011    Pneumococcal Conjugate - 20 Valent 08/23/2022    Tdap 01/23/2014. He desires.    Zoster Recombinant 01/29/2020, 12/30/2020        ROS:  GENERAL: No fever, chills, fatigue or unusual weight change. Appetite normal. Wt 159.8 kg (352 lb 4.7 oz) at May 17, 2024 visit. Does not exercise. Monitors diet very little.  SKIN: No rashes, itching, changes in mole, color or texture of skin or easy bruising.      HEAD: No headaches or recent head trauma.  EYES: No change in vision, no pain, diplopia, redness or discharge.Wears glasses.  EARS: Denies ear pain, discharge, vertigo or decreased hearing.  NOSE: No epistaxis or rhinitis. Non tender external nose.  MOUTH & THROAT: No hoarseness or change in voice. No excessive gum bleeding or mouth sores. No sore throat.  NODES: Denies swollen glands.  CHEST: Denies GOMEZ, wheezing, cough, hemoptysis or sputum production. Saw NP Christie Beckman with pulmonary on November 15, 2022 for DEEPTHI on CPAP surveillance with 1-year follow up advised.   CARDIOVASCULAR: Denies chest pain, No palpitations. Reports follows with [facility] but performs blood pressure checks once a week with levels ranging 110-130/60-70s but also uses Kardia weekly for EKG tracing. Saw AUGUSTA Regalado with cardiology on April 23, 2025  "for atrial fibrillation with RVR, essential hypertension, other hyperlipidemia, morbid obesity with BMI of 40.0-44.9, adult, DEEPTHI on CPAP, tobacco use.   ABDOMEN: Denies diarrhea, constipation, nausea, vomiting, abdominal pain, or blood in stool.  GENITOURINARY: No flank pain, dysuria or hematuria.No nocturia or frequency. No lesions, pain or swelling in genital area. Performs monthly self testicular exam. Saw Dr. Moe, urologist, March 25, 2025 for low testosterone, BPH, testicular hypofunction.  ENDOCRINE: Denies diabetes, thyroid problems.  HEME/LYMPH: Denies bleeding problems.  PERIPHERAL VASCULAR: No claudication or cyanosis  MUSCULOSKELETAL: No joint stiffness, pain or swelling. No edema. Reports left hip pain with walking since January 2025 and received epidural #2 on June 2, 2025 and with receiving physical therapy with little relief. Saw MIRYAM Wilkins with pain management on April 17, 2025 for lumbar radiculopathy, pain of left hip.  Saw Dr. Sherman with sports medicine on February 19, 2025 for osteoarthritis of left hip. Denies having back pain.  NEUROLOGIC: No history of seizures, tremors, alteration of gait or coordination.  PSYCHIATRIC: Denies mood swings, depression, anxiety, homicidal or suicidal thoughts. Denies sleep problems as takes Unisom with more help than Trazodone and without sedation as Trazodone.  Reports casually smokes cigars.    PE:   VS:  BP (!) 114/58 (BP Location: Left arm, Patient Position: Sitting)   Pulse (!) 59   Temp 96.1 °F (35.6 °C) (Tympanic)   Resp 18   Ht 6' 5" (1.956 m)   Wt (!) 156 kg (343 lb 14.7 oz)   SpO2 96%   BMI 40.78 kg/m²   APPEARANCE: Well nourished, well developed male, obese and pleasant, alert and oriented in no acute distress.   HEAD: Non tender . Full range of motion.  EYES: PERRL, conjunctiva pink, lids no edema. He wears glasses.  EARS: External canal patent, no swelling or redness. TM's shiny and clear.  NOSE: Mucosa and turbinates pink, not " swollen. No discharge  THROAT: No pharyngeal erythema or exudate. No stridor.   NECK: Supple, no mass, thyroid not enlarged. No carotid bruit.  NODES: No cervical, axillary lymph node enlargement.  CHEST: Normal respiratory effort. Lungs clear to auscultation.  CARDIOVASCULAR: Normal S1, S2. No rubs, murmurs or gallops.No edema.Pedal pulses palpable bilaterally.  ABDOMEN: Bowel sounds present. Not distended. Soft. No tenderness, masses or organomegaly.  BREAST: Non tender, no asymmetry, nipple discharge, abnormal masses, nodules, lumps.  GENITALIA: Not examined as deferred to Urology.  RECTAL: Not examined as deferred to Urology and as patient declines today.   MUSCULOSKELETAL: No joint deformities or stiffness. He is ambulatory without problems. Non tender hips with full range of motion noted.  SKIN: No rashes or suspicious lesions, normal color and turgor.    NEUROLOGIC: Cranial Nerves: II-XII grossly intact. DTR's: Knees, Ankles 2+ and equal bilaterally Gait & Posture: Normal gait and fine motion.  PSYCHIATRIC: Patient alert, oriented x 3. Mood/Affect normal without acute anxiety or depression noted. Judgement and insight-good as he makes appropriate decisions on today's examination.    Protective Sensation (w/ 10 gram monofilament):  Right: Intact  Left: Intact    Visual Inspection:  Normal -  Bilateral    Pedal Pulses:   Right: Present  Left: Present    Posterior Tibialis Pulses:   Right:Present  Left: Present     DIAGNOSIS:    ICD-10-CM ICD-9-CM    1. Annual physical exam  Z00.00 V70.0 Hemoglobin A1C      Lipid Panel      2. Primary osteoarthritis of left hip  M16.12 715.15 Ambulatory referral/consult to Orthopedics      3. DEEPTHI on CPAP - stable and managed by pulmonary G47.33 327.23       4. Tobacco use  Z72.0 305.1       5. Essential hypertension - stable on medication I10 401.9       6. Hyperlipidemia, unspecified hyperlipidemia type - on medication E78.5 272.4       7. Prediabetes - stable and managed with  diet R73.03 790.29       8. Atrial fibrillation with RVR - stable on medication and managed by cardiology I48.91 427.31       9. Morbid obesity with BMI of 40.0-44.9, adult - managed with diet E66.01 278.01     Z68.41 V85.41       10. Need for Td vaccine  Z23 V06.5 Td (Tenivac) IM vaccine (>/= 6 yo)          PLAN:   1. Age-appropriate counseling-appropriate low-sodium, low-cholesterol, low carbohydrate diet and exercise daily, monthly self testicular examination, annual wellness examination.  2. Patient advised to call for results.  3. Continue current medications.  4. Keep follow up with specialists.  5. Eye exam this year advised.  6. Flu shot this fall.   7. Smoking cessation advised.  8. Follow up in about 6 months (around 12/12/2025) for prediabetes and hypertension follow up.

## 2025-06-13 DIAGNOSIS — F41.9 ANXIETY: ICD-10-CM

## 2025-06-13 RX ORDER — ESCITALOPRAM OXALATE 5 MG/1
5 TABLET ORAL
Qty: 90 TABLET | Refills: 3 | Status: SHIPPED | OUTPATIENT
Start: 2025-06-13

## 2025-06-13 NOTE — TELEPHONE ENCOUNTER
No care due was identified.  [facility] Embedded Care Due Messages. Reference number: [id number].   6/13/2025 4:17:21 PM CDT

## 2025-06-14 NOTE — TELEPHONE ENCOUNTER
Refill Decision Note   Reggie Sawant  is requesting a refill authorization.  Brief Assessment and Rationale for Refill:  Approve     Medication Therapy Plan:         Comments:     Note composed:7:38 PM 06/13/2025

## 2025-06-16 ENCOUNTER — PATIENT MESSAGE (OUTPATIENT)
Dept: PAIN MEDICINE | Facility: CLINIC | Age: 60
End: 2025-06-16
Payer: COMMERCIAL

## 2025-06-18 NOTE — PROGRESS NOTES
Interventional Pain Note    Referring Physician: No ref. provider found    PCP: Irene Sanchez MD    Chief Complaint:  Lower back and left hip       SUBJECTIVE:  Interval History (6/20/2025):  Patient Reggie Sawant presents today for follow-up visit.  Patient was last seen on 6/2/2025 L4/5 IL PAUL without relief. He has had a hip injection with Dr. Sherman without relief. Pain continues to be over the left lower back and left hip region. Pain is worse with prolonged standing and pain is primarily in the hip and groin region. Pain is a 8/10 today. States tizanidine did not help pain.   Patient denies night fever/night sweats, urinary incontinence, bowel incontinence, significant weight loss and significant motor weakness.   Patient denies any other complaints or concerns at this time.      Interval History (4/17/2025):  Patient Reggie Sawant presents today for follow-up visit.  Patient was last seen on 3/31/2025 left L3/4 + L4/5 TF PAUL 100% relief x 1 week.   He feels that the lower back injection did provide more relief than the 1 that he had in the left hip back in January.  He rates his pain today a 5/10.  Pain is worse with prolonged standing and is primarily located in the left hip and into the left groin.  He did stop the pregabalin because he ended up having episode of atrial fibrillation and had to be cardioverted.  He is not sure if it is related to the medication but he stopped it to be on the safe side.  Patient denies night fever/night sweats, urinary incontinence, bowel incontinence, significant weight loss and significant motor weakness.   Patient denies any other complaints or concerns at this time.      3/18/2025  Reggie Sawant is a 59 y.o. male who presents to the clinic for the evaluation of lower back and left hip pain.   Patient reports 8 week history of lower back and left hip pain.  Patient denies any previous surgeries in his lumbar spine or bilateral lower extremities.  Patient denies  any inciting traumas to his lower back pain.  Patient does report previous lower back sciatica pain approximately 10 years ago.  Primarily pain is a burning aching pain that starts in the lateral aspect of the left hip.  Patient reports associated aching burning pain in the left back in the left anterior hip area.  Patient denies any significant radiation below his left knee.  Patient denies any numbness or tingling in his left foot.  Patient denies any significant right-sided pain.  Pain is worse with waking up in the morning and lying in his left side, better with anti-inflammatories and rest.  Pain is currently rated a 5/10, but can increase to an 8/10 with exacerbating activity.  Patient received left intra-articular hip injection on 01/03/2025 with a proximally 3 days relief  Patient denies any fevers, chills, saddle anesthesia, or bowel and bladder incontinence.      Non-Pharmacologic Treatments:  Physical Therapy/Home Exercise: yes  Ice/Heat:yes  TENS: no  Acupuncture: no  Massage: yes  Chiropractic: yes        Previous Pain Medications:  Tylenol, ibuprofen, naproxen, Flexeril, gabapentin, topicals     report:  Reviewed and consistent with medication use as prescribed.    Pain Procedures:   - 01/30/2025:  Left hip intra-articular injection, 3 days of relief  3/31/2025 left L3/4 + L4/5 TF PAUL 100% relief x 1 week  6/2/2025 L4/5 IL PAUL without relief.    Pain Disability Index Review:         6/20/2025    11:19 AM 4/17/2025     8:18 AM 3/18/2025     8:13 AM   Last 3 PDI Scores   Pain Disability Index (PDI) 56 35 35       Imaging:     Results for orders placed during the hospital encounter of 03/06/25    MRI Lumbar Spine Without Contrast    Narrative  EXAMINATION:  MRI LUMBAR SPINE WITHOUT CONTRAST    CLINICAL HISTORY:  Dorsalgia, unspecifiedLow back pain, symptoms persist with > 6wks conservative treatment;    TECHNIQUE:  Standard multiplanar noncontrast MRI sequences of the lumbar  spine.    COMPARISON:  X-ray 03/06/2025    FINDINGS:  The distal cord and conus reveal normal signal and morphology.    The lumbar vertebra reveal normal alignment, shape and signal intensity.    T12-L1: Unremarkable.    L1-2:     Unremarkable.    L2-3:     Mild generalized annular disc bulge.    L3-4:     Mild disc desiccation with mild generalized disc bulge with ventral sac impingement.  Mild to moderate hypertrophic facet arthrosis with hypertrophic ligamentum flavum.  Dorsal sac impingement.  Overall moderate central canal stenosis with mild right and mild-to-moderate left foraminal stenosis.    L4-5:     Mild disc degeneration with mild broad-based disc bulge.  Left foraminal annular fissure.  Moderate hypertrophic facet arthrosis with dorsal sac impingement contributing to moderate central canal stenosis.  Mild left lateral recess stenosis with moderate left and mild right foraminal stenosis.    L5-S1:    Mild disc degeneration with disc desiccation and disc bulge.  Mild to moderate right and mild left facet arthrosis.  Mild foraminal stenosis.    Impression  Multilevel lumbar degenerative disc disease notably at L3-4 and L4-5 with central and foraminal stenosis as detailed above.      Electronically signed by: Demetrius Macario MD  Date:    03/06/2025  Time:    09:36        Results for orders placed during the hospital encounter of 03/06/25    X-Ray Lumbar Complete Including Flex And Ext    Narrative  EXAMINATION:  XR LUMBAR SPINE 5 VIEW WITH FLEX AND EXT    CLINICAL HISTORY:  Dorsalgia, unspecified    COMPARISON:  None    FINDINGS:  3 views of the lumbar spine.    Moderate facet arthropathy lower lumbar spine.    Overall alignment is well maintained.  No evidence of spondylolysis or spondylolisthesis. Disc space narrowing L5/S1.  Suspect neural foraminal narrowing L5/S1.  Vertebral body heights are normal.    Moderate constipation.    Impression  See above.      Electronically signed by: Vinnie Gauthier  MD  Date:    03/06/2025  Time:    08:20       MRI HIP WITHOUT CONTRAST LEFT 02/18/2025     CLINICAL HISTORY: Left hip pain. Hip pain, chronic, osteoarthritis suspected; [M25.552]-Pain in left hip.     TECHNIQUE: Standard multiplanar, multisequence MR imaging protocol of the left hip performed.     FINDINGS:     BONES: No fracture, marrow edema or suspicious bone lesion. No evidence of avascular necrosis.     LEFT HIP JOINT: Anatomic alignment. Physiologic amount of joint fluid. Low-grade chondral thinning and small partial chondral erosions in the anterior superior joint space.  Otherwise preserved cartilage.     LABRUM: Detached nondisplaced tear of the anterior labrum (images 18-21 series 7).  Os acetabuli of the anterior superior labrum and osseous metaplasia of the remaining superior labrum without tear identified.     FEMOROACETABULAR IMPINGEMENT ANATOMY: Pain in the anterior femoral head neck junction.     MUSCLES, TENDONS AND REMAINING MAJOR SUPPORTING SOFT TISSUE STRUCTURES: Mild bilateral abductor tendinosis and trochanteric bursitis.  Otherwise unremarkable.        Impression:     1.   Low-grade chondral degeneration left hip.  Detached anterior labral tear.  Os acetabuli and osseous metaplasia of the superior labrum. CAM lesion.  2.  Mild bilateral abductor tendinosis and trochanteric bursitis.      Past Medical History:   Diagnosis Date    Cellulitis     left leg    Food allergy     Allergic to shrimp    Hip arthritis     right    Hyperlipidemia     Hypertension     Hypogonadism male     Hypokalemia     Morbid obesity     Prediabetes 12/30/2020    Seasonal allergies     Sleep apnea     on CPAP     Past Surgical History:   Procedure Laterality Date    COLONOSCOPY N/A 12/06/2017    Procedure: COLONOSCOPY;  Surgeon: Rory Barone MD;  [facility];  Service: Endoscopy;  Laterality: N/A;    COLONOSCOPY N/A 4/27/2023    Procedure: COLONOSCOPY;  Surgeon: Jeimy Garcia MD;  Location: [facility]  ENDO;  Service: Endoscopy;  Laterality: N/A;    EPIDURAL STEROID INJECTION INTO LUMBAR SPINE N/A 6/2/2025    Procedure: Lumbar L4/5 IL PAUL with left paramedian approach- hold  eliquis 3 days;  Surgeon: Narayan Do MD;  Location: [facility] PAIN MGT;  Service: Pain Management;  Laterality: N/A;    right bunionectomy      TRANSESOPHAGEAL ECHOCARDIOGRAM WITH POSSIBLE CARDIOVERSION (PAULETTE W/ POSS CARDIOVERSION) N/A 4/16/2025    Procedure: Transesophageal echo (PAULETTE) intra-procedure log documentation;  Surgeon: Grady Navarro MD;  Location: [facility] CATH LAB;  Service: Cardiology;  Laterality: N/A;    TRANSFORAMINAL EPIDURAL INJECTION OF STEROID Left 3/31/2025    Procedure: left L3/4 + L4/5  TF PAUL- continue ASA;  Surgeon: Narayan Do MD;  Location: [facility] PAIN MGT;  Service: Pain Management;  Laterality: Left;     Social History[1]  Family History   Problem Relation Name Age of Onset    No Known Problems Mother Yael     Hypertension Father Amanuel     Heart disease Father Amanuel         CAD    Diabetes Sister Fawn     No Known Problems Sister Sherri     Hypertension Brother Amanuel Jr     Diabetes Maternal Grandmother Lissa     No Known Problems Daughter      No Known Problems Daughter      No Known Problems Daughter      No Known Problems Other grandson     Cancer Neg Hx      Stroke Neg Hx      Thyroid disease Neg Hx      Thyroid cancer Neg Hx          MEN2       Review of patient's allergies indicates:   Allergen Reactions    Shrimp Anaphylaxis       Current Outpatient Medications   Medication Sig    amLODIPine (NORVASC) 10 MG tablet Take 1 tablet (10 mg total) by mouth once daily.    apixaban (ELIQUIS) 5 mg Tab Take 1 tablet (5 mg total) by mouth 2 (two) times daily.    busPIRone (BUSPAR) 10 MG tablet TAKE 1 TABLET 3 TIMES A DAY    carvediloL (COREG) 25 MG tablet Take 1 tablet (25 mg total) by mouth 2 (two) times daily with meals.    chlorthalidone (HYGROTEN) 50 MG Tab Take 1 tablet (50 mg total) by mouth  once daily.    EScitalopram oxalate (LEXAPRO) 5 MG Tab TAKE 1 TABLET ONCE DAILY    multivitamin (THERAGRAN) per tablet Take 1 tablet by mouth once daily.    potassium chloride SA (K-DUR,KLOR-CON) 20 MEQ tablet Take 1 tablet (20 mEq total) by mouth once daily.    pravastatin (PRAVACHOL) 20 MG tablet Take 1 tablet (20 mg total) by mouth once daily.    spironolactone (ALDACTONE) 50 MG tablet Take 1 tablet (50 mg total) by mouth once daily.    tadalafiL (CIALIS) 20 MG Tab Take 1 tablet (20 mg total) by mouth once daily.    testosterone cypionate (DEPOTESTOTERONE CYPIONATE) 200 mg/mL injection Inject 1 mL (200 mg total) into the muscle every 7 days. ADMINISTER 1 ML IN THE MUSCLE EVERY 14 DAYS    valsartan (DIOVAN) 320 MG tablet Take 1 tablet (320 mg total) by mouth once daily.    topiramate (TOPAMAX) 25 MG tablet Take 1 tablet (25 mg total) by mouth every evening for 5 days, THEN 1 tablet (25 mg total) 2 (two) times daily for 25 days.     No current facility-administered medications for this visit.         ROS  Review of Systems   Constitutional:  Negative for activity change, appetite change and fever.   HENT:  Negative for facial swelling, rhinorrhea and sore throat.    Respiratory:  Negative for cough, chest tightness, shortness of breath, wheezing and stridor.    Cardiovascular:  Negative for chest pain, palpitations and leg swelling.   Gastrointestinal:  Negative for abdominal pain, blood in stool, constipation, diarrhea, nausea and vomiting.   Endocrine: Negative for polydipsia, polyphagia and polyuria.   Genitourinary:  Negative for dysuria, hematuria and urgency.   Musculoskeletal:  Positive for arthralgias, back pain, gait problem and myalgias. Negative for joint swelling, neck pain and neck stiffness.   Skin:  Negative for rash.   Allergic/Immunologic: Negative for food allergies.   Neurological:  Positive for weakness. Negative for dizziness, tremors, seizures, syncope, facial asymmetry, speech difficulty,  "light-headedness, numbness and headaches.   Psychiatric/Behavioral:  Negative for agitation, hallucinations, self-injury and suicidal ideas. The patient is not nervous/anxious and is not hyperactive.             OBJECTIVE:  /70 (BP Location: Left arm, Patient Position: Sitting)   Pulse (!) 55   Resp 17   Ht 6' 5" (1.956 m)   Wt (!) 155.2 kg (342 lb 2.5 oz)   BMI 40.57 kg/m²         Physical Exam  Constitutional:       General: He is not in acute distress.     Appearance: Normal appearance. He is not ill-appearing.   HENT:      Head: Normocephalic and atraumatic.      Nose: No congestion or rhinorrhea.   Eyes:      Extraocular Movements: Extraocular movements intact.      Pupils: Pupils are equal, round, and reactive to light.   Cardiovascular:      Pulses: Normal pulses.   Pulmonary:      Effort: Pulmonary effort is normal.   Abdominal:      General: Abdomen is flat.   Musculoskeletal:      Right hip: Normal.      Left hip: Tenderness and bony tenderness present. No deformity or crepitus. Normal range of motion. Decreased strength.   Skin:     General: Skin is warm and dry.      Capillary Refill: Capillary refill takes less than 2 seconds.   Neurological:      General: No focal deficit present.      Mental Status: He is alert and oriented to person, place, and time.      Sensory: No sensory deficit.      Motor: Weakness present. No abnormal muscle tone.      Gait: Gait abnormal.      Deep Tendon Reflexes:      Reflex Scores:       Patellar reflexes are 2+ on the right side and 2+ on the left side.       Achilles reflexes are 2+ on the right side and 2+ on the left side.     Comments: Antalgic gait  4/5 strength in left knee extension and left hip adduction   Psychiatric:         Mood and Affect: Mood normal.         Behavior: Behavior normal.         Thought Content: Thought content normal.           Musculoskeletal:      Lumbar Exam  Incision: no  Pain with Flexion: yes  Pain with Extension: yes  ROM:  " Decreased  Paraspinous TTP:  Positive on left  Facet TTP:  L4-5  Facet Loading:  Positive on the left  SLR:  Positive on left at 75° in L3 distribution  SIJ TTP:  minimal on left  KAYLA:  negative  Gaenslen's- negative    LABS:  Lab Results   Component Value Date    WBC 8.08 04/10/2025    HGB 15.8 04/10/2025    HCT 50.0 04/10/2025    MCV 97 04/10/2025     04/10/2025       CMP  Sodium   Date Value Ref Range Status   04/10/2025 137 136 - 145 mmol/L Final   05/17/2024 137 136 - 145 mmol/L Final     Potassium   Date Value Ref Range Status   04/10/2025 4.1 3.5 - 5.1 mmol/L Final   05/17/2024 4.2 3.5 - 5.1 mmol/L Final     Chloride   Date Value Ref Range Status   04/10/2025 101 95 - 110 mmol/L Final   05/17/2024 99 95 - 110 mmol/L Final     CO2   Date Value Ref Range Status   04/10/2025 29 23 - 29 mmol/L Final   05/17/2024 29 23 - 29 mmol/L Final     Glucose   Date Value Ref Range Status   04/10/2025 96 70 - 110 mg/dL Final   05/17/2024 82 70 - 110 mg/dL Final     BUN   Date Value Ref Range Status   04/10/2025 18 6 - 20 mg/dL Final     Creatinine   Date Value Ref Range Status   04/10/2025 0.6 0.5 - 1.4 mg/dL Final     Calcium   Date Value Ref Range Status   04/10/2025 9.2 8.7 - 10.5 mg/dL Final   05/17/2024 10.0 8.7 - 10.5 mg/dL Final     Protein Total   Date Value Ref Range Status   04/10/2025 7.4 6.0 - 8.4 gm/dL Final     Total Protein   Date Value Ref Range Status   02/10/2025 7.8 6.0 - 8.4 g/dL Final     Albumin   Date Value Ref Range Status   04/10/2025 3.9 3.5 - 5.2 g/dL Final   02/10/2025 4.0 3.5 - 5.2 g/dL Final     Total Bilirubin   Date Value Ref Range Status   02/10/2025 1.0 0.1 - 1.0 mg/dL Final     Comment:     For infants and newborns, interpretation of results should be based  on gestational age, weight and in agreement with clinical  observations.    Premature Infant recommended reference ranges:  Up to 24 hours.............<8.0 mg/dL  Up to 48 hours............<12.0 mg/dL  3-5  days..................<15.0 mg/dL  6-29 days.................<15.0 mg/dL       Bilirubin Total   Date Value Ref Range Status   04/10/2025 0.9 0.1 - 1.0 mg/dL Final     Comment:     For infants and newborns, interpretation of results should be based   on gestational age, weight and in agreement with clinical   observations.    Premature Infant recommended reference ranges:   0-24 hours:  <8.0 mg/dL   24-48 hours: <12.0 mg/dL   3-5 days:    <15.0 mg/dL   6-29 days:   <15.0 mg/dL     Alkaline Phosphatase   Date Value Ref Range Status   02/10/2025 45 40 - 150 U/L Final     ALP   Date Value Ref Range Status   04/10/2025 41 40 - 150 unit/L Final     AST   Date Value Ref Range Status   04/10/2025 31 11 - 45 unit/L Final   02/10/2025 23 10 - 40 U/L Final     ALT   Date Value Ref Range Status   04/10/2025 51 (H) 10 - 44 unit/L Final   02/10/2025 38 10 - 44 U/L Final     Anion Gap   Date Value Ref Range Status   04/10/2025 7 (L) 8 - 16 mmol/L Final     eGFR if    Date Value Ref Range Status   07/20/2021 >60.0 >60 mL/min/1.73 m^2 Final     eGFR if non    Date Value Ref Range Status   07/20/2021 >60.0 >60 mL/min/1.73 m^2 Final     Comment:     Calculation used to obtain the estimated glomerular filtration  rate (eGFR) is the CKD-EPI equation.          Lab Results   Component Value Date    HGBA1C 6.1 (H) 06/12/2025             ASSESSMENT:       59 y.o. year old male with lower back pain pain, consistent with     1. Lumbar radiculopathy  Ambulatory referral/consult to Neurosurgery      2. Pain of left hip  Case Request-RAD/Other Procedure Area: left femoral/ obturator nerve block- therapeutic            Lumbar radiculopathy  -     Ambulatory referral/consult to Neurosurgery; Future; Expected date: 06/27/2025    Pain of left hip  -     Case Request-RAD/Other Procedure Area: left femoral/ obturator nerve block- therapeutic    Other orders  -     topiramate (TOPAMAX) 25 MG tablet; Take 1 tablet (25 mg  total) by mouth every evening for 5 days, THEN 1 tablet (25 mg total) 2 (two) times daily for 25 days.  Dispense: 55 tablet; Refill: 0                 PLAN:   - Interventions:   - Discussed with Dr. Do. Will schedule left femoral obturator block, therapeutic and refer to neurosurgery  Explained the risks and benefits of the procedure in detail with the patient today in clinic along with alternative treatment options, and the patient elected to pursue the intervention.      - Can not rule overlapping sacroiliac joint dysfunction versus underlying hip pathology with left labral tear in objective tendinosis, an MRI    - 01/30/2025:  Left hip intra-articular injection, 3 days of relief    - Anticoagulation use:   Yes aspirin and eliquis 5mg- discussed with Dr. Do- do not need to hold for block    - Medications:  - Pt stopped pregabalin- episode of Afib  - patient has trialed multiple anti-inflammatories with minimal relief    Tizanidine without relief  Flexeril without relief  Cannot do cymbalta with medications  Do not recommend NSAIDs with anticoagulants    Trial on topamax 25mg QHS x 5 days then BID. No contraindications (history of kidney stones, sulfa allergy or narrow angle glaucoma).-We have discussed starting the patient on a Topamax titration.  We discussed potential side effects of this medication include drowsiness, dizziness, tingling of the hands and feet, diarrhea, dysgeusia, weight loss/anorexia.  Patient has been given the following algorithm to follow      - Therapy:   - patient has completed 6 weeks of physician lead home physical therapy in the last 3 months with mild relief.  Continue home exercises  - refer patient to formal physical therapy for left lumbar radiculopathy  - discussed with patient using traction table he has not home and safety precautions  - patient is currently on leave from his work due to this pain.  He will forward documentation to our department    -  Imaging/Diagnostic:  - MRI of the lumbar spine without contrast reviewed and findings discussed with patient.  There is disc desiccation with left eccentric disc bulge at L3-L4 L4-5.  This leads to moderate canal stenosis and left-greater-than-right right foraminal stenosis at L3-L4 and moderate canal stenosis at L4-5 with left lateral recess stenosis and left foraminal stenosis.  - MRI left hip reviewed.  That has attach anterior labral tear with bilateral abductor tendinosis and trochanteric bursitis    - Consults:   - continue follow up with Orthopedics for left hip pain        - Patient Questions: Answered all of the patient's questions regarding diagnosis, therapy, and treatment     This condition does not require this patient to take time off of work, and the primary goal of our Pain Management services is to improve the patient's functional capacity.     - Follow up visit: return to clinic 4 weeks after procedure      - Susana Wilkins NP    The above plan and management options were discussed at length with patient. Patient is in agreement with the above and verbalized understanding.    I discussed the goals of interventional chronic pain management with the patient on today's visit.  I explained the utility of injections for diagnostic and therapeutic purposes.  We discussed a multimodal approach to pain including treating the patient's given worst pain at any given time.  We will use a systematic approach to addressing pain.  We will also adopt a multimodal approach that includes injections, adjuvant medications, physical therapy, at times psychiatry.  There may be a limited role for opioid use intermittently in the treatment of pain, more particularly for acute pain although no one approach can be used as a sole treatment modality.    I emphasized the importance of regular exercise, core strengthening and stretching, diet and weight loss as a cornerstone of long-term pain management.      Susana  MIRYAM Wilkins  Interventional Pain Management  [facility]    Future Appointments   Date Time Provider Department Center   8/11/2025  3:40 PM Grady Navarro MD [facility] CARDIO [facility]   8/12/2025  9:00 AM Lejeune, Elizabeth B, NP [facility] SLEEP [facility]   9/22/2025  9:30 AM LABORATORY, [facility] LAB [facility]   9/26/2025 10:15 AM Alvin Moe MD [facility] UROLOGY [facility]   12/12/2025  8:40 AM Irene Sanchez MD [facility]           Disclaimer:  This note was prepared using voice recognition system and is likely to have sound alike errors that may have been overlooked even after proof reading.  Please call me with any questions      No LOS data to display  This includes face to face time and non-face to face time preparing to see the patient (eg, review of tests), obtaining and/or reviewing separately obtained history, documenting clinical information in the electronic or other health record, independently interpreting results and communicating results to the patient/family/caregiver, or care coordinator.                 [1]   Social History  Socioeconomic History    Marital status:     Number of children: 4   Occupational History    Occupation:      Employer: [employer]     Employer: [employer]   Tobacco Use    Smoking status: Light Smoker     Types: Cigars    Smokeless tobacco: Never   Vaping Use    Vaping status: Never Used   Substance and Sexual Activity    Alcohol use: Yes     Alcohol/week: 10.0 standard drinks of alcohol     Types: 4 Glasses of wine, 6 Shots of liquor per week     Comment: Occasionally    Drug use: Never    Sexual activity: Yes     Partners: Female     Birth control/protection: None   Social History Narrative    He wears seatbelt. He has 2 grandsons and 2 granddaughters.     Social Drivers of Health     Financial Resource Strain: Low Risk  (5/14/2024)    Overall Financial Resource Strain (CARDIA)     Difficulty of  Paying Living Expenses: Not hard at all   Food Insecurity: No Food Insecurity (5/14/2024)    Hunger Vital Sign     Worried About Running Out of Food in the Last Year: Never true     Ran Out of Food in the Last Year: Never true   Transportation Needs: No Transportation Needs (5/14/2024)    PRAPARE - Transportation     Lack of Transportation (Medical): No     Lack of Transportation (Non-Medical): No   Physical Activity: Inactive (6/12/2025)    Exercise Vital Sign     Days of Exercise per Week: 0 days     Minutes of Exercise per Session: 0 min   Stress: Patient Declined (5/14/2024)    [employer] [place] [employer] - Occupational Stress Questionnaire     Feeling of Stress : Patient declined   Housing Stability: Low Risk  (8/14/2023)    Housing Stability Vital Sign     Unable to Pay for Housing in the Last Year: No     Number of Places Lived in the Last Year: 1     Unstable Housing in the Last Year: No

## 2025-06-20 ENCOUNTER — OFFICE VISIT (OUTPATIENT)
Dept: PAIN MEDICINE | Facility: CLINIC | Age: 60
End: 2025-06-20
Payer: COMMERCIAL

## 2025-06-20 VITALS
HEART RATE: 55 BPM | WEIGHT: 315 LBS | DIASTOLIC BLOOD PRESSURE: 70 MMHG | HEIGHT: 77 IN | RESPIRATION RATE: 17 BRPM | BODY MASS INDEX: 37.19 KG/M2 | SYSTOLIC BLOOD PRESSURE: 112 MMHG

## 2025-06-20 DIAGNOSIS — M54.16 LUMBAR RADICULOPATHY: Primary | ICD-10-CM

## 2025-06-20 DIAGNOSIS — M25.552 PAIN OF LEFT HIP: ICD-10-CM

## 2025-06-20 PROCEDURE — 99999 PR PBB SHADOW E&M-EST. PATIENT-LVL III: CPT | Mod: PBBFAC,,, | Performed by: NURSE PRACTITIONER

## 2025-06-20 RX ORDER — TOPIRAMATE 25 MG/1
TABLET, FILM COATED ORAL
Qty: 55 TABLET | Refills: 0 | Status: SHIPPED | OUTPATIENT
Start: 2025-06-20 | End: 2025-07-20

## 2025-06-22 ENCOUNTER — RESULTS FOLLOW-UP (OUTPATIENT)
Dept: FAMILY MEDICINE | Facility: CLINIC | Age: 60
End: 2025-06-22

## 2025-06-23 ENCOUNTER — TELEPHONE (OUTPATIENT)
Dept: NEUROSURGERY | Facility: CLINIC | Age: 60
End: 2025-06-23
Payer: COMMERCIAL

## 2025-06-23 NOTE — TELEPHONE ENCOUNTER
Contacted patient regarding referral with Neurosurgery. Patient states no surgeries within last 2 years, nor was pain/injury caused by any accident that would be involved in any type of future litigation.    Yvonne Alberto RN

## 2025-06-30 NOTE — PRE-PROCEDURE INSTRUCTIONS
OUTPATIENT CATHETERIZATION INSTRUCTIONS    You have been scheduled for a procedure in the catheterization lab on Monday, July 10, 2023.     Please report to the Cardiology Waiting Area on the Third floor of the hospital and check in at 6 AM.   You will then be taken to the SSCU (Short Stay Cardiac Unit) and prepared for your procedure. Please be aware that this is not the time of your procedure but the time you are to arrive. The procedures are scheduled on an hourly basis; however, emergency cases take precedence over all other cases.       No solid foods 8 hours prior to your procedure.  You may have clear liquids until the time of your admission which should be 2 hours prior to your procedure.  You are encouraged to drink at least 8 ounces of clear liquids prior to your admission to SSCU.  Patients with gastric emptying issues should be fasting for 6- 8 hours prior to the procedure.  Clear liquids include water, black coffee, clear juices, and performance drinks - no pulp or milk.    Heart failure or dialysis patients will be limited to 8 ounces (1 cup) of clear liquids up until 2 hours of the procedure.    3.   You may take your regular morning medications with water. If there are any medications that you should not take you will be instructed to hold them that morning. If you         are diabetic and on Metformin (Glucophage) do not take it the day before, the day of, and for 2 days after your procedure.  4.   If you are on Pradaxa, Eliquis or Coumadin , you can hold them 3 days prior to your procedure.      The procedure will take 1-2 hours to perform. After the procedure, you will return to SSCU on the third floor of the hospital. You will need to lie still (or keep your arm still) for the next 2 to 4 hours to minimize bleeding from the puncture site.  This time is determined by your physician.  Your family may remain in the room with you during this time.       You may be able to be discharged home that same  Spoke with patient regarding procedure scheduled on 7.7     Arrival time 1015     Has patient been sick with fever or on antibiotics within the last 7 days? No     Does the patient have any open wounds, sores or rashes? No     Does the patient have any recent fractures? no     Has patient received a vaccination within the last 7 days? No     Received the COVID vaccination?      Has the patient stopped all medications as directed? na     Does patient have a pacemaker, defibrillator, or implantable stimulator? No     Does the patient have a ride to and from procedure and someone reliable to remain with patient?  WIFE     Is the patient diabetic? PRE     Does the patient have sleep apnea? Or use O2 at home? DEEPTHI     Is the patient receiving sedation? YES     Is the patient instructed to remain NPO beginning at midnight the night before their procedure? yes     Procedure location confirmed with patient? Yes     Covid- Denies signs/symptoms. Instructed to notify PAT/MD if any changes.   "afternoon if there is someone to drive you home and there are no complications.  Your doctor will determine, based on your progress, the date and time of your discharge. The results of your procedure will be discussed with you before you are discharged. Any further testing or procedures will be scheduled for you either before you leave or after your discharge..       If you should have any questions, concerns, or need to change the date of your procedure, please call "MARLENA Leon @ [phone]            Special Instructions:    Continue your current medications.    On the morning of the procedure, take your Aspirin and Plavix only.        THE ABOVE INSTRUCTIONS WERE GIVEN TO THE PATIENT VERBALLY AND THEY VERBALIZED UNDERSTANDING.  THEY DO NOT REQUIRE ANY SPECIAL NEEDS AND DO NOT HAVE ANY LEARNING BARRIERS.          Directions for Reporting to Cardiology Waiting Area in the Hospital  If you park in the Parking Garage:  Take elevators to the1st floor of the parking garage.  Continue past the gift shop, coffee shop, and piano.  Take a right and go to the gold elevators. (Elevator B)  Take the elevator to the 3rd floor.  Follow the arrow on the sign on the wall that says Cath Lab Registration/EP Lab Registration.  Follow the long hallway all the way around until you come to a big open area.  This is the registration area.  Check in at Reception Desk.    OR    If family is dropping you off:  Have them drop you off at the front of the Hospital under the green overhang.  Enter through the doors and take a right.  Take the E elevators to the 3rd floor Cardiology Waiting Area.  Check in at the Reception Desk in the waiting room.              "

## 2025-07-02 ENCOUNTER — PATIENT MESSAGE (OUTPATIENT)
Dept: PAIN MEDICINE | Facility: CLINIC | Age: 60
End: 2025-07-02
Payer: COMMERCIAL

## 2025-07-03 ENCOUNTER — TELEPHONE (OUTPATIENT)
Dept: PAIN MEDICINE | Facility: CLINIC | Age: 60
End: 2025-07-03
Payer: COMMERCIAL

## 2025-07-03 NOTE — TELEPHONE ENCOUNTER
Reach out to pt to inform, that his paperwork is at th  on the first floor with his name on it at the [facility]. Pt understood

## 2025-07-04 DIAGNOSIS — N52.9 ERECTILE DYSFUNCTION, UNSPECIFIED ERECTILE DYSFUNCTION TYPE: ICD-10-CM

## 2025-07-04 DIAGNOSIS — I10 ESSENTIAL HYPERTENSION: ICD-10-CM

## 2025-07-04 NOTE — TELEPHONE ENCOUNTER
No care due was identified.  [facility] Embedded Care Due Messages. Reference number: [id number].   7/04/2025 2:11:59 PM CDT

## 2025-07-06 RX ORDER — AMLODIPINE BESYLATE 10 MG/1
10 TABLET ORAL
Qty: 90 TABLET | Refills: 3 | Status: SHIPPED | OUTPATIENT
Start: 2025-07-06

## 2025-07-07 ENCOUNTER — HOSPITAL ENCOUNTER (OUTPATIENT)
Facility: HOSPITAL | Age: 60
Discharge: HOME | End: 2025-07-07
Attending: ANESTHESIOLOGY | Admitting: ANESTHESIOLOGY
Payer: COMMERCIAL

## 2025-07-07 ENCOUNTER — TELEPHONE (OUTPATIENT)
Dept: PAIN MEDICINE | Facility: CLINIC | Age: 60
End: 2025-07-07
Payer: COMMERCIAL

## 2025-07-07 VITALS
OXYGEN SATURATION: 97 % | TEMPERATURE: 98 F | DIASTOLIC BLOOD PRESSURE: 59 MMHG | RESPIRATION RATE: 15 BRPM | BODY MASS INDEX: 37.19 KG/M2 | WEIGHT: 315 LBS | HEART RATE: 50 BPM | SYSTOLIC BLOOD PRESSURE: 125 MMHG | HEIGHT: 77 IN

## 2025-07-07 DIAGNOSIS — M54.16 LUMBAR RADICULOPATHY: Primary | ICD-10-CM

## 2025-07-07 DIAGNOSIS — M16.12 LOCALIZED OSTEOARTHROSIS OF LEFT HIP: ICD-10-CM

## 2025-07-07 PROCEDURE — A9585 GADOBUTROL INJECTION: HCPCS | Performed by: ANESTHESIOLOGY

## 2025-07-07 PROCEDURE — 64640 INJECTION TREATMENT OF NERVE: CPT | Mod: XS,LT | Performed by: ANESTHESIOLOGY

## 2025-07-07 PROCEDURE — 25500020 PHARM REV CODE 255: Performed by: ANESTHESIOLOGY

## 2025-07-07 PROCEDURE — 63600175 PHARM REV CODE 636 W HCPCS: Performed by: ANESTHESIOLOGY

## 2025-07-07 PROCEDURE — 64640 INJECTION TREATMENT OF NERVE: CPT | Mod: LT,,, | Performed by: ANESTHESIOLOGY

## 2025-07-07 RX ORDER — FENTANYL CITRATE 50 UG/ML
INJECTION, SOLUTION INTRAMUSCULAR; INTRAVENOUS
Status: DISCONTINUED | OUTPATIENT
Start: 2025-07-07 | End: 2025-07-07 | Stop reason: HOSPADM

## 2025-07-07 RX ORDER — ONDANSETRON HYDROCHLORIDE 2 MG/ML
4 INJECTION, SOLUTION INTRAVENOUS ONCE AS NEEDED
Status: DISCONTINUED | OUTPATIENT
Start: 2025-07-07 | End: 2025-07-07 | Stop reason: HOSPADM

## 2025-07-07 RX ORDER — MIDAZOLAM HYDROCHLORIDE 1 MG/ML
INJECTION INTRAMUSCULAR; INTRAVENOUS
Status: DISCONTINUED | OUTPATIENT
Start: 2025-07-07 | End: 2025-07-07 | Stop reason: HOSPADM

## 2025-07-07 RX ORDER — BUPIVACAINE HYDROCHLORIDE 5 MG/ML
INJECTION, SOLUTION EPIDURAL; INTRACAUDAL; PERINEURAL
Status: DISCONTINUED | OUTPATIENT
Start: 2025-07-07 | End: 2025-07-07 | Stop reason: HOSPADM

## 2025-07-07 RX ORDER — METHYLPREDNISOLONE ACETATE 40 MG/ML
INJECTION, SUSPENSION INTRA-ARTICULAR; INTRALESIONAL; INTRAMUSCULAR; SOFT TISSUE
Status: DISCONTINUED | OUTPATIENT
Start: 2025-07-07 | End: 2025-07-07 | Stop reason: HOSPADM

## 2025-07-07 RX ORDER — GADOBUTROL 604.72 MG/ML
INJECTION INTRAVENOUS
Status: DISCONTINUED | OUTPATIENT
Start: 2025-07-07 | End: 2025-07-07 | Stop reason: HOSPADM

## 2025-07-07 RX ORDER — LIDOCAINE HYDROCHLORIDE 10 MG/ML
INJECTION, SOLUTION EPIDURAL; INFILTRATION; INTRACAUDAL; PERINEURAL
Status: DISCONTINUED | OUTPATIENT
Start: 2025-07-07 | End: 2025-07-07 | Stop reason: HOSPADM

## 2025-07-07 NOTE — H&P
HPI  Patient presenting for Procedure(s) (LRB):  left femoral/ obturator nerve block- therapeutic (Left)     Patient on Anti-coagulation Yes, Eliquis, may continue    No health changes since previous encounter    Past Medical History:   Diagnosis Date    Cellulitis     left leg    Food allergy     Allergic to shrimp    Hip arthritis     right    Hyperlipidemia     Hypertension     Hypogonadism male     Hypokalemia     Morbid obesity     Prediabetes 12/30/2020    Seasonal allergies     Sleep apnea     on CPAP     Past Surgical History:   Procedure Laterality Date    COLONOSCOPY N/A 12/06/2017    Procedure: COLONOSCOPY;  Surgeon: Rory Barone MD;  Location: [facility] ENDO;  Service: Endoscopy;  Laterality: N/A;    COLONOSCOPY N/A 4/27/2023    Procedure: COLONOSCOPY;  Surgeon: Jeimy Garcia MD;  Location: [facility] ENDO;  Service: Endoscopy;  Laterality: N/A;    EPIDURAL STEROID INJECTION INTO LUMBAR SPINE N/A 6/2/2025    Procedure: Lumbar L4/5 IL PAUL with left paramedian approach- hold  eliquis 3 days;  Surgeon: Narayan Do MD;  Location: [facility] PAIN MGT;  Service: Pain Management;  Laterality: N/A;    right bunionectomy      TRANSESOPHAGEAL ECHOCARDIOGRAM WITH POSSIBLE CARDIOVERSION (PAULETTE W/ POSS CARDIOVERSION) N/A 4/16/2025    Procedure: Transesophageal echo (PAULETTE) intra-procedure log documentation;  Surgeon: Grady Navarro MD;  Location: [facility] CATH LAB;  Service: Cardiology;  Laterality: N/A;    TRANSFORAMINAL EPIDURAL INJECTION OF STEROID Left 3/31/2025    Procedure: left L3/4 + L4/5  TF PAUL- continue ASA;  Surgeon: Narayan Do MD;  Location: [facility] PAIN MGT;  Service: Pain Management;  Laterality: Left;     Review of patient's allergies indicates:   Allergen Reactions    Shrimp Anaphylaxis        Medications Ordered Prior to Encounter[1]     PMHx, PSHx, Allergies, Medications reviewed in epic    ROS negative except pain complaints in HPI    OBJECTIVE:    /63 (BP Location: Right arm, Patient  "Position: Sitting)   Pulse (!) 51   Temp 97.7 °F (36.5 °C)   Resp 15   Ht 6' 5" (1.956 m)   Wt (!) 154 kg (339 lb 8.1 oz)   SpO2 97%   BMI 40.26 kg/m²     PHYSICAL EXAMINATION:    GENERAL: Well appearing, in no acute distress, alert and oriented x3.  PSYCH:  Mood and affect appropriate.  SKIN: Skin color, texture, turgor normal, no rashes or lesions which will impact the procedure.  CV: RRR with palpation of the radial artery.  PULM: No evidence of respiratory difficulty, symmetric chest rise. Clear to auscultation.  NEURO: Cranial nerves grossly intact.    Plan:    Proceed with procedure as planned Procedure(s) (LRB):  left femoral/ obturator nerve block- therapeutic (Left)    Narayan Do MD  07/07/2025                 [1]   No current facility-administered medications on file prior to encounter.     Current Outpatient Medications on File Prior to Encounter   Medication Sig Dispense Refill    apixaban (ELIQUIS) 5 mg Tab Take 1 tablet (5 mg total) by mouth 2 (two) times daily. 60 tablet 6    busPIRone (BUSPAR) 10 MG tablet TAKE 1 TABLET 3 TIMES A DAY 90 tablet 1    carvediloL (COREG) 25 MG tablet Take 1 tablet (25 mg total) by mouth 2 (two) times daily with meals. 180 tablet 1    chlorthalidone (HYGROTEN) 50 MG Tab Take 1 tablet (50 mg total) by mouth once daily. 90 tablet 1    EScitalopram oxalate (LEXAPRO) 5 MG Tab TAKE 1 TABLET ONCE DAILY 90 tablet 3    multivitamin (THERAGRAN) per tablet Take 1 tablet by mouth once daily.      potassium chloride SA (K-DUR,KLOR-CON) 20 MEQ tablet Take 1 tablet (20 mEq total) by mouth once daily. 90 tablet 1    pravastatin (PRAVACHOL) 20 MG tablet Take 1 tablet (20 mg total) by mouth once daily. 90 tablet 1    spironolactone (ALDACTONE) 50 MG tablet Take 1 tablet (50 mg total) by mouth once daily. 90 tablet 1    topiramate (TOPAMAX) 25 MG tablet Take 1 tablet (25 mg total) by mouth every evening for 5 days, THEN 1 tablet (25 mg total) 2 (two) times daily for 25 days. " 55 tablet 0    valsartan (DIOVAN) 320 MG tablet Take 1 tablet (320 mg total) by mouth once daily. 90 tablet 1    tadalafiL (CIALIS) 20 MG Tab Take 1 tablet (20 mg total) by mouth once daily. 30 tablet 5    testosterone cypionate (DEPOTESTOTERONE CYPIONATE) 200 mg/mL injection Inject 1 mL (200 mg total) into the muscle every 7 days. ADMINISTER 1 ML IN THE MUSCLE EVERY 14 DAYS 10 mL 3

## 2025-07-07 NOTE — TELEPHONE ENCOUNTER
Reached out to pt to let him know that Dr. Murray is only in office once a month and that it may take some time for them to reach out to him to schedule an appt.    We also advised he reach out to his insurance to see what neurosurgeon they are in network with if he is uncomfortable waiting on Dr. Allen office.    Pt understood    Terra COON MA

## 2025-07-07 NOTE — TELEPHONE ENCOUNTER
Refill Decision Note   Reggie Sawant  is requesting a refill authorization.  Brief Assessment and Rationale for Refill:  Approve     Medication Therapy Plan:         Comments:     Note composed:7:43 PM 07/06/2025

## 2025-07-07 NOTE — DISCHARGE INSTRUCTIONS

## 2025-07-07 NOTE — DISCHARGE SUMMARY
Discharge Note  Short Stay      SUMMARY     Admit Date: 7/7/2025    Attending Physician: Narayan Do MD        Discharge Physician: Narayan Do MD        Discharge Date: 7/7/2025 11:10 AM    Procedure(s) (LRB):  left femoral/ obturator nerve block- therapeutic (Left)    Final Diagnosis: Pain of left hip [M25.552]    Disposition: Home or self care    Patient Instructions:   Current Discharge Medication List        CONTINUE these medications which have NOT CHANGED    Details   amLODIPine (NORVASC) 10 MG tablet TAKE 1 TABLET ONCE DAILY  Qty: 90 tablet, Refills: 3    Comments: .  Associated Diagnoses: Essential hypertension      apixaban (ELIQUIS) 5 mg Tab Take 1 tablet (5 mg total) by mouth 2 (two) times daily.  Qty: 60 tablet, Refills: 6    Associated Diagnoses: Atrial fibrillation with RVR      busPIRone (BUSPAR) 10 MG tablet TAKE 1 TABLET 3 TIMES A DAY  Qty: 90 tablet, Refills: 1    Associated Diagnoses: Anxiety      carvediloL (COREG) 25 MG tablet Take 1 tablet (25 mg total) by mouth 2 (two) times daily with meals.  Qty: 180 tablet, Refills: 1    Comments: .      chlorthalidone (HYGROTEN) 50 MG Tab Take 1 tablet (50 mg total) by mouth once daily.  Qty: 90 tablet, Refills: 1    Associated Diagnoses: Essential hypertension      EScitalopram oxalate (LEXAPRO) 5 MG Tab TAKE 1 TABLET ONCE DAILY  Qty: 90 tablet, Refills: 3    Associated Diagnoses: Anxiety      multivitamin (THERAGRAN) per tablet Take 1 tablet by mouth once daily.      potassium chloride SA (K-DUR,KLOR-CON) 20 MEQ tablet Take 1 tablet (20 mEq total) by mouth once daily.  Qty: 90 tablet, Refills: 1    Associated Diagnoses: Essential hypertension      pravastatin (PRAVACHOL) 20 MG tablet Take 1 tablet (20 mg total) by mouth once daily.  Qty: 90 tablet, Refills: 1    Associated Diagnoses: Hyperlipidemia, unspecified hyperlipidemia type      spironolactone (ALDACTONE) 50 MG tablet Take 1 tablet (50 mg total) by mouth once daily.  Qty: 90 tablet,  Refills: 1    Comments: Office visit required for further refills. May schedule via [facility].  Associated Diagnoses: Essential hypertension      topiramate (TOPAMAX) 25 MG tablet Take 1 tablet (25 mg total) by mouth every evening for 5 days, THEN 1 tablet (25 mg total) 2 (two) times daily for 25 days.  Qty: 55 tablet, Refills: 0      valsartan (DIOVAN) 320 MG tablet Take 1 tablet (320 mg total) by mouth once daily.  Qty: 90 tablet, Refills: 1    Comments: .  Associated Diagnoses: Essential hypertension      tadalafiL (CIALIS) 20 MG Tab Take 1 tablet (20 mg total) by mouth once daily.  Qty: 30 tablet, Refills: 5    Associated Diagnoses: Erectile dysfunction, unspecified erectile dysfunction type      testosterone cypionate (DEPOTESTOTERONE CYPIONATE) 200 mg/mL injection Inject 1 mL (200 mg total) into the muscle every 7 days. ADMINISTER 1 ML IN THE MUSCLE EVERY 14 DAYS  Qty: 10 mL, Refills: 3    Associated Diagnoses: Low testosterone                 Discharge Diagnosis: Pain of left hip [M25.552]  Condition on Discharge: Stable with no complications to procedure   Diet on Discharge: Same as before.  Activity: as per instruction sheet.  Discharge to: Home with a responsible adult.  Follow up: 2-4 weeks       Please call the office at [phone] if you experience any weakness or loss of sensation, fever > 101.5, pain uncontrolled with oral medications, persistent nausea/vomiting/or diarrhea, redness or drainage from the incisions, or any other worrisome concerns. If physician on call was not reached or could not communicate with our office for any reason please go to the nearest emergency department

## 2025-07-07 NOTE — OP NOTE
Femoral and Obturator Sensory Branch Nerve Cooled Thermal Radiofrequency Ablation       INFORMED CONSENT: The procedure, risks, benefits and options were discussed with patient. There are no contraindications to the procedure. The patient expressed understanding and agreed to proceed. The personnel performing the procedure was discussed.    Date of procedure 07/07/2025    Time-out taken to identify patient and procedure side prior to starting the procedure.                         PROCEDURE:    1) Left Femoral Sensory Branch Nerve Cooled Thermal Radiofrequency Ablation under fluoroscopy and ultrasound guidance  2) Left Obturator Sensory Branch Nerve Cooled Thermal Radiofrequency Ablation under fluoroscopy and ultrasound guidance     Pre Procedure diagnosis:    Pain of left hip [M25.552]  1. Localized osteoarthrosis of left hip        Post-Procedure diagnosis:   same    PHYSICIAN: Narayan Do MD     MEDICATIONS INJECTED: 3mL Bupivacaine 0.5% at each site     LOCAL ANESTHETIC USED: Xylocaine 1% 5mL     SEDATION MEDICATIONS: None     ESTIMATED BLOOD LOSS: None.     COMPLICATIONS: None.      Sedation: Conscious sedation provided by M.D    SEDATION MEDICATIONS: local/IV sedation: Versed 2 mg and fentanyl 50 mcg IV.  Conscious sedation ordered by MD.  Patient reevaluated and sedation administered by MD and monitored by RN.  Total sedation time was less than 10 min.       TECHNIQUE: Laying in the supine position, the patient was prepped and draped in the usual sterile fashion using ChloraPrep and fenestrated drape. The left hip was visualized under fluoroscopy. Local Xylocaine was injected by raising a wheel and going down to the periosteum using a 27-gauge hypodermic needle. The 5 inch 17-gauge introducer needle was introduced into the approximate areas of the sensory branch of the femoral nerve to the hip superior medially to the femoral head and the sensory branch of the obturator nerves to the hip at the incisura  under the guidance of fluoroscopy and ultrasound. The incisura needle was approached from the medial aspect of the thigh. Once os was contacted and the final needle tip position confirmed on fluoroscopy, negative pressure was applied to confirm no intravascular placement. This was followed by motor testing at each of the nerves to ensure that there was no motor involvement. After negative aspiration and no paresthesias there was injection of 2.5 mL of 0.25% bupivacaine into each of these areas for a total volume of 5 mL of 0.25% bupivacaine. This was followed by cooled thermal lesioning at 80 degrees celsius for 150 seconds at each site. Then an additional 1mL 0.25% bupivacaine +20mg depomedrol was injected prior to removing the introducer needle. Needle was withdrawn and a sterile band-aid applied to the skin.         The patient was monitored for approximately 30 minutes after the procedure.  Patient was given post procedure and discharge instructions to follow at home.  The patient was discharged in a stable condition

## 2025-07-08 ENCOUNTER — TELEPHONE (OUTPATIENT)
Dept: PAIN MEDICINE | Facility: CLINIC | Age: 60
End: 2025-07-08
Payer: COMMERCIAL

## 2025-07-08 RX ORDER — TADALAFIL 20 MG/1
20 TABLET ORAL
Qty: 30 TABLET | Refills: 5 | Status: SHIPPED | OUTPATIENT
Start: 2025-07-08

## 2025-07-08 NOTE — TELEPHONE ENCOUNTER
Please see the attached refill request. Dr Moe pt, last visit 03/2025   Cheek-To-Nose Interpolation Flap Text: A decision was made to reconstruct the defect utilizing an interpolation axial flap and a staged reconstruction.  A telfa template was made of the defect.  This telfa template was then used to outline the Cheek-To-Nose Interpolation flap.  The donor area for the pedicle flap was then injected with anesthesia.  The flap was excised through the skin and subcutaneous tissue down to the layer of the underlying musculature.  The interpolation flap was carefully excised within this deep plane to maintain its blood supply.  The edges of the donor site were undermined.   The donor site was closed in a primary fashion.  The pedicle was then rotated into position and sutured.  Once the tube was sutured into place, adequate blood supply was confirmed with blanching and refill.  The pedicle was then wrapped with xeroform gauze and dressed appropriately with a telfa and gauze bandage to ensure continued blood supply and protect the attached pedicle.

## 2025-07-08 NOTE — TELEPHONE ENCOUNTER
Reached out to pt to let him know he would need to contact medical records in order to obtain those documents.    Pt understood.    Terra COON MA

## 2025-07-16 ENCOUNTER — TELEPHONE (OUTPATIENT)
Dept: PAIN MEDICINE | Facility: CLINIC | Age: 60
End: 2025-07-16
Payer: COMMERCIAL

## 2025-07-16 NOTE — TELEPHONE ENCOUNTER
Reached out to pt to reschedule his August appt to something sooner.    Appt rescheduled.    Terra COON MA

## 2025-07-18 NOTE — PROGRESS NOTES
Established Patient - TeleHealth Visit    The patient location is: [place]  The chief complaint leading to consultation is: chronic pain     Visit type: audiovisual    Encounter includes face to face time and non-face to face time preparing to see the patient (eg, review of tests), Obtaining and/or reviewing separately obtained history, Documenting clinical information in the electronic or other health record, Independently interpreting results (not separately reported) and communicating results to the patient/family/caregiver, or Care coordination (not separately reported).     Each patient to whom he or she provides medical services by telemedicine is:  (1) informed of the relationship between the physician and patient and the respective role of any other health care provider with respect to management of the patient; and (2) notified that he or she may decline to receive medical services by telemedicine and may withdraw from such care at any time.      Interventional Pain Note    Referring Physician: No ref. provider found    PCP: Irene Sanchez MD    Chief Complaint:  Lower back and left hip       SUBJECTIVE:  Interval History (7/28/2025):  Patient Reggie Sawant presents today for follow-up visit.  Patient was last seen on 7/7/2025 left femoral and obturator nerve block with only a few days relief. He has had a hip injection with Dr. Sherman without relief.  He continues to have 8/10 pain. Pain is worse with prolonged standing and walking and he feels he will limp at times. He gets relief with sitting. 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 For safety reasons he has not been back to work since 1/17/2025. He is requesting help with his newest [facility] paperwork which has been scanned in.  He has an appt with Dr. Murray, neurosurgeon, on 9/18/2025 for further evaluation  Patient denies night fever/night sweats, urinary incontinence, bowel  incontinence, significant weight loss and significant motor weakness.   Patient denies any other complaints or concerns at this time.      Interval History (6/20/2025):  Patient Reggie Sawant presents today for follow-up visit.  Patient was last seen on 6/2/2025 L4/5 IL PAUL without relief. He has had a hip injection with Dr. Sherman without relief. Pain continues to be over the left lower back and left hip region. Pain is worse with prolonged standing and pain is primarily in the hip and groin region. Pain is a 8/10 today. States tizanidine did not help pain.   Patient denies night fever/night sweats, urinary incontinence, bowel incontinence, significant weight loss and significant motor weakness.   Patient denies any other complaints or concerns at this time.      Interval History (4/17/2025):  Patient Reggie Sawant presents today for follow-up visit.  Patient was last seen on 3/31/2025 left L3/4 + L4/5 TF PAUL 100% relief x 1 week.   He feels that the lower back injection did provide more relief than the 1 that he had in the left hip back in January.  He rates his pain today a 5/10.  Pain is worse with prolonged standing and is primarily located in the left hip and into the left groin.  He did stop the pregabalin because he ended up having episode of atrial fibrillation and had to be cardioverted.  He is not sure if it is related to the medication but he stopped it to be on the safe side.  Patient denies night fever/night sweats, urinary incontinence, bowel incontinence, significant weight loss and significant motor weakness.   Patient denies any other complaints or concerns at this time.      3/18/2025  Reggie Sawant is a 59 y.o. male who presents to the clinic for the evaluation of lower back and left hip pain.   Patient reports 8 week history of lower back and left hip pain.  Patient denies any previous surgeries in his lumbar spine or bilateral lower extremities.  Patient denies any inciting traumas to his  lower back pain.  Patient does report previous lower back sciatica pain approximately 10 years ago.  Primarily pain is a burning aching pain that starts in the lateral aspect of the left hip.  Patient reports associated aching burning pain in the left back in the left anterior hip area.  Patient denies any significant radiation below his left knee.  Patient denies any numbness or tingling in his left foot.  Patient denies any significant right-sided pain.  Pain is worse with waking up in the morning and lying in his left side, better with anti-inflammatories and rest.  Pain is currently rated a 5/10, but can increase to an 8/10 with exacerbating activity.  Patient received left intra-articular hip injection on 01/03/2025 with a proximally 3 days relief  Patient denies any fevers, chills, saddle anesthesia, or bowel and bladder incontinence.      Non-Pharmacologic Treatments:  Physical Therapy/Home Exercise: yes  Ice/Heat:yes  TENS: no  Acupuncture: no  Massage: yes  Chiropractic: yes        Previous Pain Medications:  Tylenol, ibuprofen, naproxen, Flexeril, gabapentin, topicals     report:  Reviewed and consistent with medication use as prescribed.    Pain Procedures:   - 01/30/2025:  Left hip intra-articular injection, 3 days of relief  3/31/2025 left L3/4 + L4/5 TF PAUL 100% relief x 1 week  6/2/2025 L4/5 IL PAUL without relief.  7/7/2025 left femoral and obturator nerve block with only a few days relief.    Pain Disability Index Review:         6/20/2025    11:19 AM 4/17/2025     8:18 AM 3/18/2025     8:13 AM   Last 3 PDI Scores   Pain Disability Index (PDI) 56 35 35       Imaging:     Results for orders placed during the hospital encounter of 03/06/25    MRI Lumbar Spine Without Contrast    Narrative  EXAMINATION:  MRI LUMBAR SPINE WITHOUT CONTRAST    CLINICAL HISTORY:  Dorsalgia, unspecifiedLow back pain, symptoms persist with > 6wks conservative treatment;    TECHNIQUE:  Standard multiplanar noncontrast MRI  sequences of the lumbar spine.    COMPARISON:  X-ray 03/06/2025    FINDINGS:  The distal cord and conus reveal normal signal and morphology.    The lumbar vertebra reveal normal alignment, shape and signal intensity.    T12-L1: Unremarkable.    L1-2:     Unremarkable.    L2-3:     Mild generalized annular disc bulge.    L3-4:     Mild disc desiccation with mild generalized disc bulge with ventral sac impingement.  Mild to moderate hypertrophic facet arthrosis with hypertrophic ligamentum flavum.  Dorsal sac impingement.  Overall moderate central canal stenosis with mild right and mild-to-moderate left foraminal stenosis.    L4-5:     Mild disc degeneration with mild broad-based disc bulge.  Left foraminal annular fissure.  Moderate hypertrophic facet arthrosis with dorsal sac impingement contributing to moderate central canal stenosis.  Mild left lateral recess stenosis with moderate left and mild right foraminal stenosis.    L5-S1:    Mild disc degeneration with disc desiccation and disc bulge.  Mild to moderate right and mild left facet arthrosis.  Mild foraminal stenosis.    Impression  Multilevel lumbar degenerative disc disease notably at L3-4 and L4-5 with central and foraminal stenosis as detailed above.      Electronically signed by: Demetrius Macario MD  Date:    03/06/2025  Time:    09:36        Results for orders placed during the hospital encounter of 03/06/25    X-Ray Lumbar Complete Including Flex And Ext    Narrative  EXAMINATION:  XR LUMBAR SPINE 5 VIEW WITH FLEX AND EXT    CLINICAL HISTORY:  Dorsalgia, unspecified    COMPARISON:  None    FINDINGS:  3 views of the lumbar spine.    Moderate facet arthropathy lower lumbar spine.    Overall alignment is well maintained.  No evidence of spondylolysis or spondylolisthesis. Disc space narrowing L5/S1.  Suspect neural foraminal narrowing L5/S1.  Vertebral body heights are normal.    Moderate constipation.    Impression  See above.      Electronically signed  by: Vinnie Gauthier MD  Date:    03/06/2025  Time:    08:20       MRI HIP WITHOUT CONTRAST LEFT 02/18/2025     CLINICAL HISTORY: Left hip pain. Hip pain, chronic, osteoarthritis suspected; [M25.552]-Pain in left hip.     TECHNIQUE: Standard multiplanar, multisequence MR imaging protocol of the left hip performed.     FINDINGS:     BONES: No fracture, marrow edema or suspicious bone lesion. No evidence of avascular necrosis.     LEFT HIP JOINT: Anatomic alignment. Physiologic amount of joint fluid. Low-grade chondral thinning and small partial chondral erosions in the anterior superior joint space.  Otherwise preserved cartilage.     LABRUM: Detached nondisplaced tear of the anterior labrum (images 18-21 series 7).  Os acetabuli of the anterior superior labrum and osseous metaplasia of the remaining superior labrum without tear identified.     FEMOROACETABULAR IMPINGEMENT ANATOMY: Pain in the anterior femoral head neck junction.     MUSCLES, TENDONS AND REMAINING MAJOR SUPPORTING SOFT TISSUE STRUCTURES: Mild bilateral abductor tendinosis and trochanteric bursitis.  Otherwise unremarkable.        Impression:     1.   Low-grade chondral degeneration left hip.  Detached anterior labral tear.  Os acetabuli and osseous metaplasia of the superior labrum. CAM lesion.  2.  Mild bilateral abductor tendinosis and trochanteric bursitis.      Past Medical History:   Diagnosis Date    Cellulitis     left leg    Food allergy     Allergic to shrimp    Hip arthritis     right    Hyperlipidemia     Hypertension     Hypogonadism male     Hypokalemia     Morbid obesity     Prediabetes 12/30/2020    Seasonal allergies     Sleep apnea     on CPAP     Past Surgical History:   Procedure Laterality Date    BLOCK, NERVE, PERIPHERAL Left 7/7/2025    Procedure: left femoral/ obturator nerve block- therapeutic;  Surgeon: Narayan Do MD;  Location: [facility];  Service: Pain Management;  Laterality: Left;    COLONOSCOPY N/A 12/06/2017     Procedure: COLONOSCOPY;  Surgeon: Rory Barone MD;  Location: [facility] ENDO;  Service: Endoscopy;  Laterality: N/A;    COLONOSCOPY N/A 4/27/2023    Procedure: COLONOSCOPY;  Surgeon: Jeimy Garcia MD;  Location: [facility] ENDO;  Service: Endoscopy;  Laterality: N/A;    EPIDURAL STEROID INJECTION INTO LUMBAR SPINE N/A 6/2/2025    Procedure: Lumbar L4/5 IL PAUL with left paramedian approach- hold  eliquis 3 days;  Surgeon: Narayan Do MD;  Location: [facility] PAIN MGT;  Service: Pain Management;  Laterality: N/A;    right bunionectomy      TRANSESOPHAGEAL ECHOCARDIOGRAM WITH POSSIBLE CARDIOVERSION (PAULETTE W/ POSS CARDIOVERSION) N/A 4/16/2025    Procedure: Transesophageal echo (PAULETTE) intra-procedure log documentation;  Surgeon: Grady Navarro MD;  Location: [facility] CATH LAB;  Service: Cardiology;  Laterality: N/A;    TRANSFORAMINAL EPIDURAL INJECTION OF STEROID Left 3/31/2025    Procedure: left L3/4 + L4/5  TF PAUL- continue ASA;  Surgeon: Narayan Do MD;  Location: [facility] PAIN MGT;  Service: Pain Management;  Laterality: Left;     Social History[1]  Family History   Problem Relation Name Age of Onset    No Known Problems Mother Yael     Hypertension Father Amanuel     Heart disease Father Amanuel         CAD    Diabetes Sister Fawn     No Known Problems Sister Sherri     Hypertension Brother Amanuel Jr     Diabetes Maternal Grandmother Lissa     No Known Problems Daughter      No Known Problems Daughter      No Known Problems Daughter      No Known Problems Other grandson     Cancer Neg Hx      Stroke Neg Hx      Thyroid disease Neg Hx      Thyroid cancer Neg Hx          MEN2       Review of patient's allergies indicates:   Allergen Reactions    Shrimp Anaphylaxis       Current Outpatient Medications   Medication Sig    amLODIPine (NORVASC) 10 MG tablet TAKE 1 TABLET ONCE DAILY    apixaban (ELIQUIS) 5 mg Tab Take 1 tablet (5 mg total) by mouth 2 (two) times daily.    busPIRone (BUSPAR) 10 MG tablet Take 1  tablet (10 mg total) by mouth 3 (three) times daily.    carvediloL (COREG) 25 MG tablet Take 1 tablet (25 mg total) by mouth 2 (two) times daily with meals.    chlorthalidone (HYGROTEN) 50 MG Tab Take 1 tablet (50 mg total) by mouth once daily.    EScitalopram oxalate (LEXAPRO) 5 MG Tab TAKE 1 TABLET ONCE DAILY    multivitamin (THERAGRAN) per tablet Take 1 tablet by mouth once daily.    potassium chloride SA (K-DUR,KLOR-CON) 20 MEQ tablet Take 1 tablet (20 mEq total) by mouth once daily.    pravastatin (PRAVACHOL) 20 MG tablet Take 1 tablet (20 mg total) by mouth once daily.    spironolactone (ALDACTONE) 50 MG tablet Take 1 tablet (50 mg total) by mouth once daily.    tadalafiL (CIALIS) 20 MG Tab TAKE 1 TABLET ONCE DAILY    testosterone cypionate (DEPOTESTOTERONE CYPIONATE) 200 mg/mL injection Inject 1 mL (200 mg total) into the muscle every 7 days. ADMINISTER 1 ML IN THE MUSCLE EVERY 14 DAYS    topiramate (TOPAMAX) 25 MG tablet Take 1 tablet (25 mg total) by mouth 2 (two) times daily.    valsartan (DIOVAN) 320 MG tablet Take 1 tablet (320 mg total) by mouth once daily.     No current facility-administered medications for this visit.         ROS  Review of Systems   Constitutional:  Negative for activity change, appetite change and fever.   HENT:  Negative for facial swelling, rhinorrhea and sore throat.    Respiratory:  Negative for cough, chest tightness, shortness of breath, wheezing and stridor.    Cardiovascular:  Negative for chest pain, palpitations and leg swelling.   Gastrointestinal:  Negative for abdominal pain, blood in stool, constipation, diarrhea, nausea and vomiting.   Endocrine: Negative for polydipsia, polyphagia and polyuria.   Genitourinary:  Negative for dysuria, hematuria and urgency.   Musculoskeletal:  Positive for arthralgias, back pain, gait problem and myalgias. Negative for joint swelling, neck pain and neck stiffness.   Skin:  Negative for rash.   Allergic/Immunologic: Negative for food  allergies.   Neurological:  Positive for weakness. Negative for dizziness, tremors, seizures, syncope, facial asymmetry, speech difficulty, light-headedness, numbness and headaches.   Psychiatric/Behavioral:  Negative for agitation, hallucinations, self-injury and suicidal ideas. The patient is not nervous/anxious and is not hyperactive.         Telemedicine Exam  There were no vitals filed for this visit.  There is no height or weight on file to calculate BMI.      Physical Exam: last in clinic visit:      OBJECTIVE:  There were no vitals taken for this visit.        Physical Exam  Constitutional:       General: He is not in acute distress.     Appearance: Normal appearance. He is not ill-appearing.   HENT:      Head: Normocephalic and atraumatic.      Nose: No congestion or rhinorrhea.   Eyes:      Extraocular Movements: Extraocular movements intact.      Pupils: Pupils are equal, round, and reactive to light.   Cardiovascular:      Pulses: Normal pulses.   Pulmonary:      Effort: Pulmonary effort is normal.   Abdominal:      General: Abdomen is flat.   Musculoskeletal:      Right hip: Normal.      Left hip: Tenderness and bony tenderness present. No deformity or crepitus. Normal range of motion. Decreased strength.   Skin:     General: Skin is warm and dry.      Capillary Refill: Capillary refill takes less than 2 seconds.   Neurological:      General: No focal deficit present.      Mental Status: He is alert and oriented to person, place, and time.      Sensory: No sensory deficit.      Motor: Weakness present. No abnormal muscle tone.      Gait: Gait abnormal.      Deep Tendon Reflexes:      Reflex Scores:       Patellar reflexes are 2+ on the right side and 2+ on the left side.       Achilles reflexes are 2+ on the right side and 2+ on the left side.     Comments: Antalgic gait  4/5 strength in left knee extension and left hip adduction   Psychiatric:         Mood and Affect: Mood normal.         Behavior:  Behavior normal.         Thought Content: Thought content normal.           Musculoskeletal:      Lumbar Exam  Incision: no  Pain with Flexion: yes  Pain with Extension: yes  ROM:  Decreased  Paraspinous TTP:  Positive on left  Facet TTP:  L4-5  Facet Loading:  Positive on the left  SLR:  Positive on left at 75° in L3 distribution  SIJ TTP:  minimal on left  KAYLA:  negative  Gaenslen's- negative    LABS:  Lab Results   Component Value Date    WBC 8.08 04/10/2025    HGB 15.8 04/10/2025    HCT 50.0 04/10/2025    MCV 97 04/10/2025     04/10/2025       CMP  Sodium   Date Value Ref Range Status   04/10/2025 137 136 - 145 mmol/L Final   05/17/2024 137 136 - 145 mmol/L Final     Potassium   Date Value Ref Range Status   04/10/2025 4.1 3.5 - 5.1 mmol/L Final   05/17/2024 4.2 3.5 - 5.1 mmol/L Final     Chloride   Date Value Ref Range Status   04/10/2025 101 95 - 110 mmol/L Final   05/17/2024 99 95 - 110 mmol/L Final     CO2   Date Value Ref Range Status   04/10/2025 29 23 - 29 mmol/L Final   05/17/2024 29 23 - 29 mmol/L Final     Glucose   Date Value Ref Range Status   04/10/2025 96 70 - 110 mg/dL Final   05/17/2024 82 70 - 110 mg/dL Final     BUN   Date Value Ref Range Status   04/10/2025 18 6 - 20 mg/dL Final     Creatinine   Date Value Ref Range Status   04/10/2025 0.6 0.5 - 1.4 mg/dL Final     Calcium   Date Value Ref Range Status   04/10/2025 9.2 8.7 - 10.5 mg/dL Final   05/17/2024 10.0 8.7 - 10.5 mg/dL Final     Protein Total   Date Value Ref Range Status   04/10/2025 7.4 6.0 - 8.4 gm/dL Final     Total Protein   Date Value Ref Range Status   02/10/2025 7.8 6.0 - 8.4 g/dL Final     Albumin   Date Value Ref Range Status   04/10/2025 3.9 3.5 - 5.2 g/dL Final   02/10/2025 4.0 3.5 - 5.2 g/dL Final     Total Bilirubin   Date Value Ref Range Status   02/10/2025 1.0 0.1 - 1.0 mg/dL Final     Comment:     For infants and newborns, interpretation of results should be based  on gestational age, weight and in agreement  with clinical  observations.    Premature Infant recommended reference ranges:  Up to 24 hours.............<8.0 mg/dL  Up to 48 hours............<12.0 mg/dL  3-5 days..................<15.0 mg/dL  6-29 days.................<15.0 mg/dL       Bilirubin Total   Date Value Ref Range Status   04/10/2025 0.9 0.1 - 1.0 mg/dL Final     Comment:     For infants and newborns, interpretation of results should be based   on gestational age, weight and in agreement with clinical   observations.    Premature Infant recommended reference ranges:   0-24 hours:  <8.0 mg/dL   24-48 hours: <12.0 mg/dL   3-5 days:    <15.0 mg/dL   6-29 days:   <15.0 mg/dL     Alkaline Phosphatase   Date Value Ref Range Status   02/10/2025 45 40 - 150 U/L Final     ALP   Date Value Ref Range Status   04/10/2025 41 40 - 150 unit/L Final     AST   Date Value Ref Range Status   04/10/2025 31 11 - 45 unit/L Final   02/10/2025 23 10 - 40 U/L Final     ALT   Date Value Ref Range Status   04/10/2025 51 (H) 10 - 44 unit/L Final   02/10/2025 38 10 - 44 U/L Final     Anion Gap   Date Value Ref Range Status   04/10/2025 7 (L) 8 - 16 mmol/L Final     eGFR if    Date Value Ref Range Status   07/20/2021 >60.0 >60 mL/min/1.73 m^2 Final     eGFR if non    Date Value Ref Range Status   07/20/2021 >60.0 >60 mL/min/1.73 m^2 Final     Comment:     Calculation used to obtain the estimated glomerular filtration  rate (eGFR) is the CKD-EPI equation.          Lab Results   Component Value Date    HGBA1C 6.1 (H) 06/12/2025             ASSESSMENT:       59 y.o. year old male with lower back pain pain, consistent with     1. Lumbar radiculopathy  Ambulatory Referral/Consult to Physical Therapy      2. Localized osteoarthrosis of left hip  Ambulatory Referral/Consult to Physical Therapy      3. Pain of left hip                Lumbar radiculopathy  -     Ambulatory Referral/Consult to Physical Therapy; Future; Expected date: 08/04/2025    Localized  osteoarthrosis of left hip  -     Ambulatory Referral/Consult to Physical Therapy; Future; Expected date: 08/04/2025    Pain of left hip                   PLAN:   - Interventions:   - Discussed with Dr. Do. Pt has an appt with neurosurgery, Dr. Murray 9/18/2025  Referral placed for PT for [facility] for disability paperwork    - Can not rule overlapping sacroiliac joint dysfunction versus underlying hip pathology with left labral tear in objective tendinosis, an MRI    - 01/30/2025:  Left hip intra-articular injection, 3 days of relief    - Anticoagulation use:   Yes aspirin and eliquis 5mg    - Medications:  - Pt stopped pregabalin- episode of Afib  - patient has trialed multiple anti-inflammatories with minimal relief    Tizanidine without relief  Flexeril without relief  Cannot do cymbalta with medications  Do not recommend NSAIDs with anticoagulants    We have discussed continuing judicious use of Tylenol, not to exceed 3000 mg daily to avoid acute hepatotoxicity.      continue topamax 25mg  BID. No contraindications (history of kidney stones, sulfa allergy or narrow angle glaucoma).-We have discussed starting the patient on a Topamax titration.  We discussed potential side effects of this medication include drowsiness, dizziness, tingling of the hands and feet, diarrhea, dysgeusia, weight loss/anorexia.  Patient has been given the following algorithm to follow      - Therapy:   - patient has completed 6 weeks of physician lead home physical therapy in the last 3 months with mild relief.  Continue home exercises  - refer patient to formal physical therapy for left lumbar radiculopathy  - discussed with patient using traction table he has not home and safety precautions  - patient is currently on leave from his work due to this pain.  He will forward documentation to our department    - Imaging/Diagnostic:  - MRI of the lumbar spine without contrast reviewed and findings discussed with patient.  There is disc  desiccation with left eccentric disc bulge at L3-L4 L4-5.  This leads to moderate canal stenosis and left-greater-than-right right foraminal stenosis at L3-L4 and moderate canal stenosis at L4-5 with left lateral recess stenosis and left foraminal stenosis.  - MRI left hip reviewed.  That has attach anterior labral tear with bilateral abductor tendinosis and trochanteric bursitis    - Consults:   - continue follow up with Orthopedics for left hip pain  Neurosurgery, Dr. Murray, appt 9/18        - Patient Questions: Answered all of the patient's questions regarding diagnosis, therapy, and treatment     This condition does not require this patient to take time off of work, and the primary goal of our Pain Management services is to improve the patient's functional capacity.     - Follow up visit: after neurosurgery reyes      - Susana Wilkins NP    The above plan and management options were discussed at length with patient. Patient is in agreement with the above and verbalized understanding.    I discussed the goals of interventional chronic pain management with the patient on today's visit.  I explained the utility of injections for diagnostic and therapeutic purposes.  We discussed a multimodal approach to pain including treating the patient's given worst pain at any given time.  We will use a systematic approach to addressing pain.  We will also adopt a multimodal approach that includes injections, adjuvant medications, physical therapy, at times psychiatry.  There may be a limited role for opioid use intermittently in the treatment of pain, more particularly for acute pain although no one approach can be used as a sole treatment modality.    I emphasized the importance of regular exercise, core strengthening and stretching, diet and weight loss as a cornerstone of long-term pain management.  Visit today included increased complexity associated with the care of the episodic problem of chronic pain which was  addressed and continue to manage the longitudinal care of the patient due to the serious and/or complex managed problem(s) listed above.      Susana Wilkins NP  Interventional Pain Management  [facility]    Future Appointments   Date Time Provider Department Center   8/11/2025  3:40 PM Grady Navarro MD [facility]ARDIO [facility]   8/12/2025  9:00 AM Lejeune, Elizabeth B, NP [facility] SLEEP [facility]   9/18/2025  9:00 AM Pk Murray MD [facility] NEUSUR [facility]   9/22/2025  9:30 AM LABORATORY, V [facility]   9/26/2025 10:15 AM Alvin Moe MD [facility] UROLOGY [facility]   12/12/2025  8:40 AM Irene Sanchez MD [facility]           Disclaimer:  This note was prepared using voice recognition system and is likely to have sound alike errors that may have been overlooked even after proof reading.  Please call me with any questions      No LOS data to display  This includes face to face time and non-face to face time preparing to see the patient (eg, review of tests), obtaining and/or reviewing separately obtained history, documenting clinical information in the electronic or other health record, independently interpreting results and communicating results to the patient/family/caregiver, or care coordinator.                                 [1]   Social History  Socioeconomic History    Marital status:     Number of children: 4   Occupational History    Occupation:      Employer: [employer]     Employer: [employer]   Tobacco Use    Smoking status: Light Smoker     Types: Cigars    Smokeless tobacco: Never   Vaping Use    Vaping status: Never Used   Substance and Sexual Activity    Alcohol use: Yes     Alcohol/week: 10.0 standard drinks of alcohol     Types: 4 Glasses of wine, 6 Shots of liquor per week     Comment: Occasionally    Drug use: Never    Sexual activity: Yes     Partners: Female     Birth control/protection: None    Social History Narrative    He wears seatbelt. He has 2 grandsons and 2 granddaughters.     Social Drivers of Health     Financial Resource Strain: Low Risk  (5/14/2024)    Overall Financial Resource Strain (CARDIA)     Difficulty of Paying Living Expenses: Not hard at all   Food Insecurity: No Food Insecurity (5/14/2024)    Hunger Vital Sign     Worried About Running Out of Food in the Last Year: Never true     Ran Out of Food in the Last Year: Never true   Transportation Needs: No Transportation Needs (5/14/2024)    PRAPARE - Transportation     Lack of Transportation (Medical): No     Lack of Transportation (Non-Medical): No   Physical Activity: Inactive (6/12/2025)    Exercise Vital Sign     Days of Exercise per Week: 0 days     Minutes of Exercise per Session: 0 min   Stress: Patient Declined (5/14/2024)    [employer] - Occupational Stress Questionnaire     Feeling of Stress : Patient declined   Housing Stability: Low Risk  (8/14/2023)    Housing Stability Vital Sign     Unable to Pay for Housing in the Last Year: No     Number of Places Lived in the Last Year: 1     Unstable Housing in the Last Year: No

## 2025-07-21 ENCOUNTER — PATIENT MESSAGE (OUTPATIENT)
Dept: ADMINISTRATIVE | Facility: OTHER | Age: 60
End: 2025-07-21
Payer: COMMERCIAL

## 2025-07-21 RX ORDER — TOPIRAMATE 25 MG/1
25 TABLET, FILM COATED ORAL 2 TIMES DAILY
Qty: 60 TABLET | Refills: 3 | Status: SHIPPED | OUTPATIENT
Start: 2025-07-21 | End: 2025-08-20

## 2025-07-21 NOTE — TELEPHONE ENCOUNTER
Good morning,    Pt is requesting for a refill of: topiramate (TOPAMAX) 25 MG tablet   Last filled: 6/20/25  Last encounter: 6/20/25  Upcoming apt: 7/28/25  Pharmacy:   [id number] - [facility] [id number] [address]     Terra COON MA

## 2025-07-22 DIAGNOSIS — F41.9 ANXIETY: ICD-10-CM

## 2025-07-22 NOTE — TELEPHONE ENCOUNTER
No care due was identified.  [facility] Embedded Care Due Messages. Reference number: [id number].   7/22/2025 1:03:31 PM CDT

## 2025-07-22 NOTE — TELEPHONE ENCOUNTER
Refill Routing Note   Medication(s) are not appropriate for processing by [facility] for the following reason(s):        Outside of protocol    [facility] action(s):  Route               Appointments  past 12m or future 3m with PCP    Date Provider   Last Visit   6/12/2025 Irene Sanchez MD   Next Visit   12/12/2025 Irene Sanchez MD   ED visits in past 90 days: 0        Note composed:2:18 PM 07/22/2025

## 2025-07-23 RX ORDER — BUSPIRONE HYDROCHLORIDE 10 MG/1
10 TABLET ORAL 3 TIMES DAILY
Qty: 90 TABLET | Refills: 5 | Status: SHIPPED | OUTPATIENT
Start: 2025-07-23

## 2025-07-28 ENCOUNTER — OFFICE VISIT (OUTPATIENT)
Dept: PAIN MEDICINE | Facility: CLINIC | Age: 60
End: 2025-07-28
Payer: COMMERCIAL

## 2025-07-28 DIAGNOSIS — M16.12 LOCALIZED OSTEOARTHROSIS OF LEFT HIP: ICD-10-CM

## 2025-07-28 DIAGNOSIS — M25.552 PAIN OF LEFT HIP: ICD-10-CM

## 2025-07-28 DIAGNOSIS — M54.16 LUMBAR RADICULOPATHY: Primary | ICD-10-CM

## 2025-07-28 PROCEDURE — 3044F HG A1C LEVEL LT 7.0%: CPT | Mod: CPTII,95,, | Performed by: NURSE PRACTITIONER

## 2025-07-28 PROCEDURE — G2211 COMPLEX E/M VISIT ADD ON: HCPCS | Mod: 95,,, | Performed by: NURSE PRACTITIONER

## 2025-07-28 PROCEDURE — 4010F ACE/ARB THERAPY RXD/TAKEN: CPT | Mod: CPTII,95,, | Performed by: NURSE PRACTITIONER

## 2025-07-28 PROCEDURE — 98005 SYNCH AUDIO-VIDEO EST LOW 20: CPT | Mod: 95,,, | Performed by: NURSE PRACTITIONER

## 2025-07-30 ENCOUNTER — PATIENT MESSAGE (OUTPATIENT)
Dept: ADMINISTRATIVE | Facility: OTHER | Age: 60
End: 2025-07-30
Payer: COMMERCIAL

## 2025-07-31 ENCOUNTER — TELEPHONE (OUTPATIENT)
Dept: PAIN MEDICINE | Facility: CLINIC | Age: 60
End: 2025-07-31
Payer: COMMERCIAL

## 2025-08-04 ENCOUNTER — PATIENT MESSAGE (OUTPATIENT)
Dept: CARDIOLOGY | Facility: CLINIC | Age: 60
End: 2025-08-04
Payer: COMMERCIAL

## 2025-08-04 DIAGNOSIS — M54.16 LUMBAR RADICULOPATHY: Primary | ICD-10-CM

## 2025-08-04 RX ORDER — TRAMADOL HYDROCHLORIDE 50 MG/1
50 TABLET, FILM COATED ORAL EVERY 12 HOURS PRN
Qty: 20 TABLET | Refills: 0 | Status: SHIPPED | OUTPATIENT
Start: 2025-08-04 | End: 2025-09-03

## 2025-08-04 NOTE — TELEPHONE ENCOUNTER
SEE NOTE:   Ernestina patient  Multiple procedures w/o relief, we do disability paperwork for him, scheduled to see neurosurgery on 9/18. He has failed multiple meds. Are you ok with short term tramadol?

## 2025-08-05 ENCOUNTER — TELEPHONE (OUTPATIENT)
Dept: PAIN MEDICINE | Facility: CLINIC | Age: 60
End: 2025-08-05
Payer: COMMERCIAL

## 2025-08-10 PROBLEM — I48.0 PAROXYSMAL ATRIAL FIBRILLATION: Status: ACTIVE | Noted: 2025-08-10

## 2025-08-11 ENCOUNTER — OFFICE VISIT (OUTPATIENT)
Dept: CARDIOLOGY | Facility: CLINIC | Age: 60
End: 2025-08-11
Payer: COMMERCIAL

## 2025-08-11 VITALS
HEART RATE: 73 BPM | RESPIRATION RATE: 16 BRPM | SYSTOLIC BLOOD PRESSURE: 120 MMHG | BODY MASS INDEX: 37.19 KG/M2 | WEIGHT: 315 LBS | DIASTOLIC BLOOD PRESSURE: 66 MMHG | HEIGHT: 77 IN | OXYGEN SATURATION: 99 %

## 2025-08-11 DIAGNOSIS — E66.01 MORBID OBESITY WITH BMI OF 40.0-44.9, ADULT: ICD-10-CM

## 2025-08-11 DIAGNOSIS — I48.0 PAROXYSMAL ATRIAL FIBRILLATION: Primary | ICD-10-CM

## 2025-08-11 DIAGNOSIS — I10 ESSENTIAL HYPERTENSION: ICD-10-CM

## 2025-08-11 DIAGNOSIS — R94.31 ABNORMAL ECG: ICD-10-CM

## 2025-08-11 DIAGNOSIS — E78.5 HYPERLIPIDEMIA, UNSPECIFIED HYPERLIPIDEMIA TYPE: Chronic | ICD-10-CM

## 2025-08-11 DIAGNOSIS — Z72.0 TOBACCO USE: ICD-10-CM

## 2025-08-11 DIAGNOSIS — G47.33 OSA ON CPAP: ICD-10-CM

## 2025-08-11 DIAGNOSIS — I48.91 ATRIAL FIBRILLATION WITH RVR: ICD-10-CM

## 2025-08-11 PROCEDURE — 99999 PR PBB SHADOW E&M-EST. PATIENT-LVL III: CPT | Mod: PBBFAC,,, | Performed by: INTERNAL MEDICINE

## 2025-08-11 PROCEDURE — 99214 OFFICE O/P EST MOD 30 MIN: CPT | Mod: S$GLB,,, | Performed by: INTERNAL MEDICINE

## 2025-08-11 PROCEDURE — 1160F RVW MEDS BY RX/DR IN RCRD: CPT | Mod: CPTII,S$GLB,, | Performed by: INTERNAL MEDICINE

## 2025-08-11 PROCEDURE — 3078F DIAST BP <80 MM HG: CPT | Mod: CPTII,S$GLB,, | Performed by: INTERNAL MEDICINE

## 2025-08-11 PROCEDURE — 1159F MED LIST DOCD IN RCRD: CPT | Mod: CPTII,S$GLB,, | Performed by: INTERNAL MEDICINE

## 2025-08-11 PROCEDURE — 3008F BODY MASS INDEX DOCD: CPT | Mod: CPTII,S$GLB,, | Performed by: INTERNAL MEDICINE

## 2025-08-11 PROCEDURE — 3044F HG A1C LEVEL LT 7.0%: CPT | Mod: CPTII,S$GLB,, | Performed by: INTERNAL MEDICINE

## 2025-08-11 PROCEDURE — 4010F ACE/ARB THERAPY RXD/TAKEN: CPT | Mod: CPTII,S$GLB,, | Performed by: INTERNAL MEDICINE

## 2025-08-11 PROCEDURE — 3074F SYST BP LT 130 MM HG: CPT | Mod: CPTII,S$GLB,, | Performed by: INTERNAL MEDICINE

## 2025-08-12 ENCOUNTER — PATIENT MESSAGE (OUTPATIENT)
Dept: OTHER | Facility: OTHER | Age: 60
End: 2025-08-12
Payer: COMMERCIAL

## 2025-08-12 ENCOUNTER — OFFICE VISIT (OUTPATIENT)
Dept: SLEEP MEDICINE | Facility: CLINIC | Age: 60
End: 2025-08-12
Payer: COMMERCIAL

## 2025-08-12 VITALS
BODY MASS INDEX: 36.45 KG/M2 | HEART RATE: 55 BPM | RESPIRATION RATE: 20 BRPM | WEIGHT: 315 LBS | SYSTOLIC BLOOD PRESSURE: 126 MMHG | DIASTOLIC BLOOD PRESSURE: 68 MMHG | OXYGEN SATURATION: 96 % | HEIGHT: 78 IN

## 2025-08-12 DIAGNOSIS — G47.00 INSOMNIA, UNSPECIFIED TYPE: ICD-10-CM

## 2025-08-12 DIAGNOSIS — E66.01 SEVERE OBESITY (BMI 35.0-39.9) WITH COMORBIDITY: ICD-10-CM

## 2025-08-12 DIAGNOSIS — G47.33 OSA ON CPAP: Primary | ICD-10-CM

## 2025-08-12 DIAGNOSIS — I48.0 PAROXYSMAL ATRIAL FIBRILLATION: ICD-10-CM

## 2025-08-12 PROCEDURE — 3008F BODY MASS INDEX DOCD: CPT | Mod: CPTII,S$GLB,, | Performed by: NURSE PRACTITIONER

## 2025-08-12 PROCEDURE — 1159F MED LIST DOCD IN RCRD: CPT | Mod: CPTII,S$GLB,, | Performed by: NURSE PRACTITIONER

## 2025-08-12 PROCEDURE — 3074F SYST BP LT 130 MM HG: CPT | Mod: CPTII,S$GLB,, | Performed by: NURSE PRACTITIONER

## 2025-08-12 PROCEDURE — 99999 PR PBB SHADOW E&M-EST. PATIENT-LVL V: CPT | Mod: PBBFAC,,, | Performed by: NURSE PRACTITIONER

## 2025-08-12 PROCEDURE — 1160F RVW MEDS BY RX/DR IN RCRD: CPT | Mod: CPTII,S$GLB,, | Performed by: NURSE PRACTITIONER

## 2025-08-12 PROCEDURE — 3078F DIAST BP <80 MM HG: CPT | Mod: CPTII,S$GLB,, | Performed by: NURSE PRACTITIONER

## 2025-08-12 PROCEDURE — 4010F ACE/ARB THERAPY RXD/TAKEN: CPT | Mod: CPTII,S$GLB,, | Performed by: NURSE PRACTITIONER

## 2025-08-12 PROCEDURE — 99214 OFFICE O/P EST MOD 30 MIN: CPT | Mod: S$GLB,,, | Performed by: NURSE PRACTITIONER

## 2025-08-12 PROCEDURE — 3044F HG A1C LEVEL LT 7.0%: CPT | Mod: CPTII,S$GLB,, | Performed by: NURSE PRACTITIONER

## 2025-08-12 RX ORDER — MELOXICAM 15 MG/1
15 TABLET ORAL
COMMUNITY
Start: 2025-07-04

## 2025-08-18 DIAGNOSIS — R79.89 LOW TESTOSTERONE: ICD-10-CM

## 2025-08-22 ENCOUNTER — OFFICE VISIT (OUTPATIENT)
Dept: PAIN MEDICINE | Facility: CLINIC | Age: 60
End: 2025-08-22
Payer: COMMERCIAL

## 2025-08-22 DIAGNOSIS — M16.12 LOCALIZED OSTEOARTHROSIS OF LEFT HIP: ICD-10-CM

## 2025-08-22 DIAGNOSIS — M54.16 LUMBAR RADICULOPATHY: Primary | ICD-10-CM

## 2025-08-22 RX ORDER — TESTOSTERONE CYPIONATE 200 MG/ML
INJECTION, SOLUTION INTRAMUSCULAR
Qty: 10 ML | Refills: 2 | Status: SHIPPED | OUTPATIENT
Start: 2025-08-22

## 2025-08-26 DIAGNOSIS — M54.16 LUMBAR RADICULOPATHY: ICD-10-CM

## 2025-08-27 RX ORDER — TRAMADOL HYDROCHLORIDE 50 MG/1
50 TABLET, FILM COATED ORAL EVERY 12 HOURS PRN
Qty: 20 TABLET | Refills: 0 | Status: SHIPPED | OUTPATIENT
Start: 2025-08-27 | End: 2025-09-26